# Patient Record
Sex: MALE | Race: WHITE | Employment: OTHER | URBAN - METROPOLITAN AREA
[De-identification: names, ages, dates, MRNs, and addresses within clinical notes are randomized per-mention and may not be internally consistent; named-entity substitution may affect disease eponyms.]

---

## 2018-06-05 ENCOUNTER — HOSPITAL ENCOUNTER (OUTPATIENT)
Dept: NON INVASIVE DIAGNOSTICS | Age: 83
Discharge: HOME | End: 2018-06-05
Payer: MEDICARE

## 2018-06-05 LAB
LV EF: 55 %
LVEF MODALITY: NORMAL

## 2018-06-05 PROCEDURE — 93306 TTE W/DOPPLER COMPLETE: CPT

## 2018-12-14 ENCOUNTER — HOSPITAL ENCOUNTER (OUTPATIENT)
Age: 83
Discharge: HOME | End: 2018-12-14
Payer: MEDICARE

## 2018-12-14 LAB — PROSTATE SPECIFIC ANTIGEN: 0.7 NG/ML (ref 0–4)

## 2018-12-14 PROCEDURE — 36415 COLL VENOUS BLD VENIPUNCTURE: CPT

## 2018-12-14 PROCEDURE — 84153 ASSAY OF PSA TOTAL: CPT

## 2019-01-14 ENCOUNTER — HOSPITAL ENCOUNTER (OUTPATIENT)
Age: 84
Discharge: HOME | End: 2019-01-14
Payer: MEDICARE

## 2019-01-14 LAB — PROSTATE SPECIFIC ANTIGEN: 0.82 NG/ML (ref 0–4)

## 2019-01-14 PROCEDURE — 36415 COLL VENOUS BLD VENIPUNCTURE: CPT

## 2019-01-14 PROCEDURE — 84153 ASSAY OF PSA TOTAL: CPT

## 2019-02-04 ENCOUNTER — HOSPITAL ENCOUNTER (OUTPATIENT)
Age: 84
Discharge: HOME | End: 2019-02-04
Payer: MEDICARE

## 2019-02-04 LAB
PROSTATE SPECIFIC ANTIGEN: 1.26 NG/ML (ref 0–4)
TESTOSTERONE TOTAL: <2.5 NG/DL

## 2019-02-04 PROCEDURE — 84403 ASSAY OF TOTAL TESTOSTERONE: CPT

## 2019-02-04 PROCEDURE — 84153 ASSAY OF PSA TOTAL: CPT

## 2019-02-04 PROCEDURE — 36415 COLL VENOUS BLD VENIPUNCTURE: CPT

## 2019-02-21 ENCOUNTER — HOSPITAL ENCOUNTER (OUTPATIENT)
Age: 84
Discharge: HOME | End: 2019-02-23
Payer: MEDICARE

## 2019-02-21 LAB
ALBUMIN SERPL-MCNC: 4.3 G/DL (ref 3.5–5.2)
ALP BLD-CCNC: 50 U/L (ref 40–129)
ALT SERPL-CCNC: 20 U/L (ref 0–40)
ANION GAP SERPL CALCULATED.3IONS-SCNC: 13 MMOL/L (ref 7–16)
AST SERPL-CCNC: 19 U/L (ref 0–39)
BILIRUB SERPL-MCNC: 0.6 MG/DL (ref 0–1.2)
BUN BLDV-MCNC: 29 MG/DL (ref 8–23)
CALCIUM SERPL-MCNC: 9.3 MG/DL (ref 8.6–10.2)
CHLORIDE BLD-SCNC: 107 MMOL/L (ref 98–107)
CO2: 22 MMOL/L (ref 22–29)
CREAT SERPL-MCNC: 1.2 MG/DL (ref 0.7–1.2)
GFR AFRICAN AMERICAN: >60
GFR NON-AFRICAN AMERICAN: 58 ML/MIN/1.73
GLUCOSE BLD-MCNC: 99 MG/DL (ref 74–99)
HCT VFR BLD CALC: 37.6 % (ref 37–54)
HEMOGLOBIN: 12.7 G/DL (ref 12.5–16.5)
MCH RBC QN AUTO: 30.2 PG (ref 26–35)
MCHC RBC AUTO-ENTMCNC: 33.8 % (ref 32–34.5)
MCV RBC AUTO: 89.5 FL (ref 80–99.9)
PDW BLD-RTO: 14.1 FL (ref 11.5–15)
PLATELET # BLD: 173 E9/L (ref 130–450)
PMV BLD AUTO: 11 FL (ref 7–12)
POTASSIUM SERPL-SCNC: 4.6 MMOL/L (ref 3.5–5)
RBC # BLD: 4.2 E12/L (ref 3.8–5.8)
SODIUM BLD-SCNC: 142 MMOL/L (ref 132–146)
TOTAL PROTEIN: 6.9 G/DL (ref 6.4–8.3)
WBC # BLD: 7.3 E9/L (ref 4.5–11.5)

## 2019-02-21 PROCEDURE — 85027 COMPLETE CBC AUTOMATED: CPT

## 2019-02-21 PROCEDURE — 80053 COMPREHEN METABOLIC PANEL: CPT

## 2019-02-25 ENCOUNTER — HOSPITAL ENCOUNTER (OUTPATIENT)
Age: 84
Discharge: HOME | End: 2019-02-27

## 2019-02-25 PROCEDURE — 88305 TISSUE EXAM BY PATHOLOGIST: CPT

## 2019-03-07 ENCOUNTER — HOSPITAL ENCOUNTER (OUTPATIENT)
Age: 84
Discharge: HOME | End: 2019-03-09
Payer: MEDICARE

## 2019-03-07 LAB — PROSTATE SPECIFIC ANTIGEN: 0.01 NG/ML (ref 0–4)

## 2019-03-07 PROCEDURE — 84153 ASSAY OF PSA TOTAL: CPT

## 2019-03-08 ENCOUNTER — HOSPITAL ENCOUNTER (OUTPATIENT)
Dept: INTERVENTIONAL RADIOLOGY/VASCULAR | Age: 84
Discharge: HOME | End: 2019-03-10
Payer: MEDICARE

## 2019-03-08 DIAGNOSIS — R97.20 ELEVATED PSA: ICD-10-CM

## 2019-03-08 PROCEDURE — 99214 OFFICE O/P EST MOD 30 MIN: CPT

## 2019-03-08 PROCEDURE — 99213 OFFICE O/P EST LOW 20 MIN: CPT

## 2019-03-13 ENCOUNTER — HOSPITAL ENCOUNTER (OUTPATIENT)
Dept: NUCLEAR MEDICINE | Age: 84
Discharge: HOME | End: 2019-03-15
Payer: MEDICARE

## 2019-03-13 DIAGNOSIS — C61 PROSTATE CA (HCC): ICD-10-CM

## 2019-03-13 PROCEDURE — 3440000000 HC RX THERAPEUTIC RADIOPHARMACEUTICAL: Performed by: RADIOLOGY

## 2019-03-13 PROCEDURE — 79101 NUCLEAR RX IV ADMIN: CPT

## 2019-03-13 PROCEDURE — A9606 RADIUM RA223 DICHLORIDE THER: HCPCS | Performed by: RADIOLOGY

## 2019-03-13 RX ADMIN — RADIUM RA 223 DICHLORIDE 149 MICRO CURIE: 30 INJECTION INTRAVENOUS at 11:33

## 2019-04-05 ENCOUNTER — HOSPITAL ENCOUNTER (OUTPATIENT)
Age: 84
Discharge: HOME | End: 2019-04-07
Payer: MEDICARE

## 2019-04-05 LAB
ALBUMIN SERPL-MCNC: 4.4 G/DL (ref 3.5–5.2)
ALP BLD-CCNC: 35 U/L (ref 40–129)
ALT SERPL-CCNC: 14 U/L (ref 0–40)
ANION GAP SERPL CALCULATED.3IONS-SCNC: 10 MMOL/L (ref 7–16)
AST SERPL-CCNC: 22 U/L (ref 0–39)
BASOPHILS ABSOLUTE: 0.04 E9/L (ref 0–0.2)
BASOPHILS RELATIVE PERCENT: 0.9 % (ref 0–2)
BILIRUB SERPL-MCNC: 0.6 MG/DL (ref 0–1.2)
BUN BLDV-MCNC: 14 MG/DL (ref 8–23)
CALCIUM SERPL-MCNC: 9.4 MG/DL (ref 8.6–10.2)
CHLORIDE BLD-SCNC: 108 MMOL/L (ref 98–107)
CO2: 26 MMOL/L (ref 22–29)
CREAT SERPL-MCNC: 0.9 MG/DL (ref 0.7–1.2)
EOSINOPHILS ABSOLUTE: 0.07 E9/L (ref 0.05–0.5)
EOSINOPHILS RELATIVE PERCENT: 1.5 % (ref 0–6)
GFR AFRICAN AMERICAN: >60
GFR NON-AFRICAN AMERICAN: >60 ML/MIN/1.73
GLUCOSE BLD-MCNC: 100 MG/DL (ref 74–99)
HCT VFR BLD CALC: 35.2 % (ref 37–54)
HEMOGLOBIN: 11.7 G/DL (ref 12.5–16.5)
IMMATURE GRANULOCYTES #: 0.04 E9/L
IMMATURE GRANULOCYTES %: 0.9 % (ref 0–5)
LYMPHOCYTES ABSOLUTE: 1.41 E9/L (ref 1.5–4)
LYMPHOCYTES RELATIVE PERCENT: 30.1 % (ref 20–42)
MCH RBC QN AUTO: 30.5 PG (ref 26–35)
MCHC RBC AUTO-ENTMCNC: 33.2 % (ref 32–34.5)
MCV RBC AUTO: 91.7 FL (ref 80–99.9)
MONOCYTES ABSOLUTE: 0.39 E9/L (ref 0.1–0.95)
MONOCYTES RELATIVE PERCENT: 8.3 % (ref 2–12)
NEUTROPHILS ABSOLUTE: 2.73 E9/L (ref 1.8–7.3)
NEUTROPHILS RELATIVE PERCENT: 58.3 % (ref 43–80)
PDW BLD-RTO: 14.6 FL (ref 11.5–15)
PLATELET # BLD: 168 E9/L (ref 130–450)
PMV BLD AUTO: 10.9 FL (ref 7–12)
POTASSIUM SERPL-SCNC: 4.3 MMOL/L (ref 3.5–5)
RBC # BLD: 3.84 E12/L (ref 3.8–5.8)
SODIUM BLD-SCNC: 144 MMOL/L (ref 132–146)
TOTAL PROTEIN: 7 G/DL (ref 6.4–8.3)
WBC # BLD: 4.7 E9/L (ref 4.5–11.5)

## 2019-04-05 PROCEDURE — 80053 COMPREHEN METABOLIC PANEL: CPT

## 2019-04-05 PROCEDURE — 85025 COMPLETE CBC W/AUTO DIFF WBC: CPT

## 2019-04-11 ENCOUNTER — TELEPHONE (OUTPATIENT)
Dept: VASCULAR SURGERY | Age: 84
End: 2019-04-11

## 2019-04-11 NOTE — TELEPHONE ENCOUNTER
Referral received from Dr. Mery Anderson for the evaluation of possible PVD, appt with Dr. Sandy Menendez 4/25 at 1:15 pm left on pt's answering machine

## 2019-04-12 ENCOUNTER — HOSPITAL ENCOUNTER (OUTPATIENT)
Dept: NUCLEAR MEDICINE | Age: 84
Discharge: HOME | End: 2019-04-14
Payer: MEDICARE

## 2019-04-12 DIAGNOSIS — C79.51 METASTASIS TO BONE (HCC): ICD-10-CM

## 2019-04-12 DIAGNOSIS — C61 PROSTATE CANCER (HCC): ICD-10-CM

## 2019-04-12 PROCEDURE — A9606 RADIUM RA223 DICHLORIDE THER: HCPCS | Performed by: RADIOLOGY

## 2019-04-12 PROCEDURE — 3440000000 HC RX THERAPEUTIC RADIOPHARMACEUTICAL: Performed by: RADIOLOGY

## 2019-04-12 PROCEDURE — 79101 NUCLEAR RX IV ADMIN: CPT

## 2019-04-12 RX ADMIN — RADIUM RA 223 DICHLORIDE 150 MICRO CURIE: 30 INJECTION INTRAVENOUS at 11:03

## 2019-04-18 ENCOUNTER — HOSPITAL ENCOUNTER (OUTPATIENT)
Dept: INTERVENTIONAL RADIOLOGY/VASCULAR | Age: 84
Discharge: HOME | End: 2019-04-20
Payer: MEDICARE

## 2019-04-18 ENCOUNTER — HOSPITAL ENCOUNTER (OUTPATIENT)
Dept: ULTRASOUND IMAGING | Age: 84
Discharge: HOME | End: 2019-04-20
Payer: MEDICARE

## 2019-04-18 DIAGNOSIS — I73.9 PVD (PERIPHERAL VASCULAR DISEASE) (HCC): ICD-10-CM

## 2019-04-18 DIAGNOSIS — I73.9 INTERMITTENT CLAUDICATION (HCC): ICD-10-CM

## 2019-04-18 PROCEDURE — 93970 EXTREMITY STUDY: CPT

## 2019-04-18 PROCEDURE — 93922 UPR/L XTREMITY ART 2 LEVELS: CPT

## 2019-04-25 ENCOUNTER — OFFICE VISIT (OUTPATIENT)
Dept: VASCULAR SURGERY | Age: 84
End: 2019-04-25
Payer: MEDICARE

## 2019-04-25 VITALS — SYSTOLIC BLOOD PRESSURE: 138 MMHG | HEART RATE: 72 BPM | DIASTOLIC BLOOD PRESSURE: 68 MMHG

## 2019-04-25 DIAGNOSIS — I73.9 PVD (PERIPHERAL VASCULAR DISEASE) WITH CLAUDICATION (HCC): ICD-10-CM

## 2019-04-25 DIAGNOSIS — C61 CA PROSTATE, ADENOCA (HCC): ICD-10-CM

## 2019-04-25 PROCEDURE — G8417 CALC BMI ABV UP PARAM F/U: HCPCS | Performed by: SURGERY

## 2019-04-25 PROCEDURE — 1123F ACP DISCUSS/DSCN MKR DOCD: CPT | Performed by: SURGERY

## 2019-04-25 PROCEDURE — 4040F PNEUMOC VAC/ADMIN/RCVD: CPT | Performed by: SURGERY

## 2019-04-25 PROCEDURE — 1036F TOBACCO NON-USER: CPT | Performed by: SURGERY

## 2019-04-25 PROCEDURE — G8427 DOCREV CUR MEDS BY ELIG CLIN: HCPCS | Performed by: SURGERY

## 2019-04-25 PROCEDURE — 99204 OFFICE O/P NEW MOD 45 MIN: CPT | Performed by: SURGERY

## 2019-04-25 RX ORDER — M-VIT,TX,IRON,MINS/CALC/FOLIC 27MG-0.4MG
1 TABLET ORAL DAILY
COMMUNITY

## 2019-04-25 RX ORDER — CILOSTAZOL 100 MG/1
100 TABLET ORAL 2 TIMES DAILY
Qty: 60 TABLET | Refills: 11 | Status: SHIPPED | OUTPATIENT
Start: 2019-04-25 | End: 2019-08-13

## 2019-04-25 NOTE — PROGRESS NOTES
Chief Complaint:   Chief Complaint   Patient presents with    Surgical Consult     PVD         HPI:  Patient came to the office, with his wife, for evaluation of peripheral vascular disease, and abnormal ankle brachial index that was done because of difficulty walking, referred by his PCP for further evaluation    Patient stated that after walking about  yards, he gets cramps in the legs and pain in the calves, has to stop for a few minutes, then start walking again denies any symptoms of rest pain    Patient denies any history of back problems or any history of diabetes    Patient does have a 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, currently under hormonal therapy, did undergo orchiectomy also      Patient denies any focal lateralizing neurological symptoms like loss of speech, vision or loss of function of extremity        Allergies   Allergen Reactions    Iodine        Current Outpatient Medications   Medication Sig Dispense Refill    Multiple Vitamins-Minerals (THERAPEUTIC MULTIVITAMIN-MINERALS) tablet Take 1 tablet by mouth daily      cilostazol (PLETAL) 100 MG tablet Take 1 tablet by mouth 2 times daily 60 tablet 11    metoprolol tartrate (LOPRESSOR) 25 MG tablet Take 25 mg by mouth 2 times daily      radium Ra 223 dichloride (XOFIGO) 30 MCCI/ML SOLN Infuse 30 micro curie intravenously once      megestrol (MEGACE) 20 MG tablet Take 20 mg by mouth daily      omeprazole (PRILOSEC) 20 MG delayed release capsule Take 20 mg by mouth daily      B Complex Vitamins (B COMPLEX PO) Take by mouth      Coenzyme Q10 (COQ10) 50 MG CAPS Take 1 capsule by mouth daily.  aspirin 81 MG tablet Take 81 mg by mouth daily.  omega-3 acid ethyl esters (LOVAZA) 1 GM capsule Take 1 g by mouth 2 times daily.  LISINOPRIL PO Take 20 mg by mouth daily.  Rosuvastatin Calcium (CRESTOR PO) Take 10 mg by mouth daily.       Calcium-Vitamin D (CALTRATE 600 PLUS-VIT D PO) Take  by mouth.      Leuprolide Acetate (LUPRON DEPOT IM) Inject into the muscle Indications: every 3 months      docusate sodium (COLACE) 100 MG capsule Take 100 mg by mouth as needed.  Cyanocobalamin (VITAMIN B 12 PO) Take  by mouth daily. sublingual       alendronate (FOSAMAX) 70 MG tablet Take 70 mg by mouth every 7 days. No current facility-administered medications for this visit. Past Medical History:   Diagnosis Date    Ascending aortic aneurysm ([facility] 2/8/2011    CA prostate, adenoca ([facility] [address] 4/25/2019    CAD (coronary artery disease)     Cancer (HCC)     prostate    Hyperlipidemia     Hypertension     Osteoarthritis     Osteopenia     PVD (peripheral vascular disease) with claudication ([facility] [address] 4/25/2019    S/P CABG (coronary artery bypass graft) 1996    Thoracic aneurysm     ASCENDING AORTIC ANEURYSM (MILD0 4. O CM    Ventricular bigeminy        Past Surgical History:   Procedure Laterality Date    ACETABULUM FRACTURE SURGERY  3/24/11    CHOLECYSTECTOMY      CORONARY ARTERY BYPASS GRAFT  Dec. 1996    [facility]    left   [facility]    LYMPH NODE DISSECTION      PROSTATE SURGERY  10/31/07    [address] [place] LAPAROSCOPIC PROSTATECTOMY    TOTAL HIP ARTHROPLASTY  11/30/11    right    TOTAL KNEE ARTHROPLASTY  2010    bilateral       Family History   Problem Relation Age of Onset    Stroke Mother     Cancer Mother         pancreatic    Cancer Father         prostate    Stroke Father        Social History     Socioeconomic History    Marital status:      Spouse name: Not on file    Number of children: Not on file    Years of education: Not on file    Highest education level: Not on file   Occupational History    Not on file   Social Needs    Financial resource strain: Not on file    Food insecurity:     Worry: Not on file     Inability: Not on file    Transportation needs:     Medical: Not on file     Non-medical: Not on file   Tobacco Use    Smoking status: Former Smoker     Packs/day: 1.00     Years: 40.00     Pack years: 40.00     Types: Cigarettes     Last attempt to quit: 10/1/1994     Years since quittin.5    Smokeless tobacco: Former User     Types: Chew   Substance and Sexual Activity    Alcohol use: Yes     Alcohol/week: 0.0 oz     Types: 2 - 3 Cans of beer per week     Comment: socially    Drug use: No    Sexual activity: Not on file   Lifestyle    Physical activity:     Days per week: Not on file     Minutes per session: Not on file    Stress: Not on file   Relationships    Social connections:     Talks on phone: Not on file     Gets together: Not on file     Attends Bahai service: Not on file     Active member of club or organization: Not on file     Attends meetings of clubs or organizations: Not on file     Relationship status: Not on file    Intimate partner violence:     Fear of current or ex partner: Not on file     Emotionally abused: Not on file     Physically abused: Not on file     Forced sexual activity: Not on file   Other Topics Concern    Not on file   Social History Narrative    Not on file       Review of Systems:  Skin:  No abnormal pigmentation or rash  Eyes:  No blurring, diplopia or vision loss  Ears/Nose/Throat:  No hearing loss or vertigo  Respiratory:  No cough, pleuritic chest pain, dyspnea, or wheezing. Cardiovascular: No angina, palpitations . Gastrointestinal:  No nausea or vomiting; no abdominal pain or rectal bleeding  Musculoskeletal:  No arthritis or weakness. Neurologic:  No paralysis, paresis, paresthesia, seizures or headaches  Hematologic/Lymphatic/Immunologic:  No anemia, abnormal bleeding/bruising, fever, chills or night sweats. Endocrine:  No heat or cold intolerance. No polyphagia, polydipsia or polyuria. Physical Exam:  General appearance:  Alert, awake, oriented x 3. No distress. Skin:  Warm and dry  Head:  Normocephalic. No masses, lesions or tenderness  Eyes:  Conjunctivae appear normal; PERRL  Ears:  External ears normal  Nose/Sinuses:  Septum midline, mucosa normal; no drainage  Oropharynx:  Clear, no exudate noted  Neck:  No jugular venous distention, lymphadenopathy or thyromegaly. No evidence of carotid bruit  Lungs:  Clear to ausculation bilaterally. No rhonchi, crackles, wheezes  Heart:  Regular rate and rhythm. No rub or murmur  Abdomen:  Soft, non-tender. No masses, organomegaly. Musculoskeletal : No joint effusions, tenderness swelling    Neuro: Speech is intact. Moving all extremities. No focal motor or sensory deficits      Extremities:  Both feet are warm to touch. The color of both feet is normal.        Pulses Right  Left    Brachial 3 3    Radial    3=normal   Femoral 1-2 2  2=diminished   Popliteal    1=barely palpable   Dorsalis pedis    0=absent   Posterior tibial 1 0  4=aneurysmal             Other pertinent information:1. The past medical records were reviewed. 2.  Ankle-brachial index that was done was personally reviewed by me, 0.91 on the right, with triphasic ankle Doppler tracings, almost biphasic ankle Doppler tracings on the left, with an ankle-brachial index of 0.66, with adequate collaterals to the metatarsal, based upon the pulse volume recordings    Assessment:    1.  PVD (peripheral vascular disease) with claudication (HCC)    2. CA prostate, adenoca (HCC)              Plan:        Discussed initial the patient's wife, all options, risks benefits and alternatives were explained,  it is felt, his symptoms could be due to combination of peripheral vascular disease as well as musculoskeletal pathology and in view of multiple risk factors, felt it is best to follow him conservatively with a walking program and a trial of Pletal, and continue the low-dose aspirin and call me when necessary if he has any increasing symptoms          Patient was instructed to continue walking program and to call if any worsening of symptoms and to call if any focal lateralizing neurological symptoms like loss of speech, vision or loss of function of extremity. All the questions were answered. Orders Placed This Encounter   Medications    cilostazol (PLETAL) 100 MG tablet     Sig: Take 1 tablet by mouth 2 times daily     Dispense:  60 tablet     Refill:  11           Indicated follow-up: Return in about 3 months (around 7/25/2019), or if symptoms worsen or fail to improve.

## 2019-05-06 ENCOUNTER — HOSPITAL ENCOUNTER (OUTPATIENT)
Age: 84
Discharge: HOME | End: 2019-05-08
Payer: MEDICARE

## 2019-05-06 ENCOUNTER — TELEPHONE (OUTPATIENT)
Dept: PRIMARY CARE CLINIC | Age: 84
End: 2019-05-06

## 2019-05-06 LAB
HCT VFR BLD CALC: 37.6 % (ref 37–54)
HEMOGLOBIN: 12.6 G/DL (ref 12.5–16.5)
MCH RBC QN AUTO: 30.7 PG (ref 26–35)
MCHC RBC AUTO-ENTMCNC: 33.5 % (ref 32–34.5)
MCV RBC AUTO: 91.5 FL (ref 80–99.9)
PDW BLD-RTO: 13.7 FL (ref 11.5–15)
PLATELET # BLD: 157 E9/L (ref 130–450)
PMV BLD AUTO: 10.4 FL (ref 7–12)
PROSTATE SPECIFIC ANTIGEN: <0.01 NG/ML (ref 0–4)
RBC # BLD: 4.11 E12/L (ref 3.8–5.8)
WBC # BLD: 5.1 E9/L (ref 4.5–11.5)

## 2019-05-06 PROCEDURE — 84153 ASSAY OF PSA TOTAL: CPT

## 2019-05-06 PROCEDURE — 85027 COMPLETE CBC AUTOMATED: CPT

## 2019-05-13 ENCOUNTER — OFFICE VISIT (OUTPATIENT)
Dept: PRIMARY CARE CLINIC | Age: 84
End: 2019-05-13
Payer: MEDICARE

## 2019-05-13 VITALS
HEART RATE: 104 BPM | BODY MASS INDEX: 30.76 KG/M2 | DIASTOLIC BLOOD PRESSURE: 70 MMHG | OXYGEN SATURATION: 98 % | WEIGHT: 214.4 LBS | SYSTOLIC BLOOD PRESSURE: 128 MMHG

## 2019-05-13 DIAGNOSIS — E04.1 THYROID NODULE: ICD-10-CM

## 2019-05-13 DIAGNOSIS — E88.9 METABOLIC DISORDER: ICD-10-CM

## 2019-05-13 DIAGNOSIS — D36.9 TUBULAR ADENOMA: ICD-10-CM

## 2019-05-13 DIAGNOSIS — I25.10 CORONARY ARTERY DISEASE INVOLVING NATIVE HEART WITHOUT ANGINA PECTORIS, UNSPECIFIED VESSEL OR LESION TYPE: ICD-10-CM

## 2019-05-13 DIAGNOSIS — I73.9 PVD (PERIPHERAL VASCULAR DISEASE) WITH CLAUDICATION (HCC): ICD-10-CM

## 2019-05-13 DIAGNOSIS — C61 PROSTATE CA (HCC): ICD-10-CM

## 2019-05-13 DIAGNOSIS — M81.0 AGE-RELATED OSTEOPOROSIS WITHOUT CURRENT PATHOLOGICAL FRACTURE: ICD-10-CM

## 2019-05-13 DIAGNOSIS — I71.10 THORACIC ANEURYSM, RUPTURED: ICD-10-CM

## 2019-05-13 DIAGNOSIS — E78.2 MIXED HYPERLIPIDEMIA: Primary | ICD-10-CM

## 2019-05-13 PROCEDURE — 99214 OFFICE O/P EST MOD 30 MIN: CPT | Performed by: FAMILY MEDICINE

## 2019-05-13 RX ORDER — ATORVASTATIN CALCIUM 40 MG/1
40 TABLET, FILM COATED ORAL DAILY
Qty: 30 TABLET | Refills: 1 | Status: SHIPPED | OUTPATIENT
Start: 2019-05-13 | End: 2019-08-08 | Stop reason: SDUPTHER

## 2019-05-13 RX ORDER — LISINOPRIL 20 MG/1
TABLET ORAL
COMMUNITY
Start: 2019-03-11 | End: 2020-01-15 | Stop reason: SDUPTHER

## 2019-05-13 NOTE — ASSESSMENT & PLAN NOTE
Counseled, potential serious is reviewed, if any symptoms directly to ER. Continue per Dr. Fran Ramos. He saw him 3/15 and had CT at least then.  Dr. Fran Ramos discharged him at this point unless symptoms and he declines follow-up imaging

## 2019-05-13 NOTE — ASSESSMENT & PLAN NOTE
Patient hematocrit, continue per cardiology. Sees Dr. Aidan Montelongo in August, had stress test 3/15. Asymptomatic now.

## 2019-05-13 NOTE — ASSESSMENT & PLAN NOTE
Did very well on d.a.w. Crestor unfortunately severe myalgias on generic. He may try to preauthorize d.a.w. but he would like to try Lipitor 1st with precautions. If ADRs which were reviewed at length discontinue. Counseling CoQ10 and vitamin D. Blood work 1 month follow-up 5 weeks sooner when necessary.  We discussed appropriate dosing given his risk factors

## 2019-05-13 NOTE — ASSESSMENT & PLAN NOTE
Last colonoscopy Dr. Kelsey Cai, 2/15 complaining repeat 3 years from then. Overdue. Declines. Hemoglobin consistently low but stable.

## 2019-05-13 NOTE — ASSESSMENT & PLAN NOTE
Counseled extensively. He tolerated Fosamax but discontinued after he took from 7/09 through 7/16. Morbidity/mortality related to fracture reviewed. 11/13 bone density showed improvement, 12/15 -1.3 stable, 3/19 showed L2 -2.6, hip -1.0. Discussed the option of restarting alendronate with pros and cons reviewed, he has taken it twice. Versus option of prolia every 6 months with pros and cons reviewed. He prefers this and he is checking with insurance.

## 2019-05-13 NOTE — PROGRESS NOTES
19  Rohit Poole : 1935 Sex: male  Age: 82 y.o. Chief Complaint   Patient presents with    Medication Problem     d/c crestor due to leg pain        HPI  HPI:     Patient presents today for follow-up. He saw Dr. Elias Wade. His peripheral vascular studies were positive and he was started on a trial of Pletal as well as asked to continue his baby aspirin. Risk benefits reviewed. However his leg symptoms resolved when he stopped the Crestor. He did well on d.a.w. Crestor for years but had symptoms on generic. He has history of CAD. Denies headache dizziness chest pain shortness breath abdominal symptoms or otherwise. He is still look into the cost of prolia, he has labs pending next month including CMP lipid panel CK vitamin B12/folic acid CBC TSH urinalysis microhematuria when A1c magnesium and phosphorus his last PSA was less than 0.01 and he has an upcoming appointment with Dr. Kristen Rdz. He also had injections by all points earlier in the month and sees him tomorrow, Dr. Celsa Cevallos in August    Last bone density scan showed -2.6 back, carotid ultrasound negative at 7 mm right lobe nodule    Review of Systems  ROS:    Const: Denies chills, fever, malaise and sweats. Eyes: Denies discharge, pain, redness and visual disturbance. ENMT: Denies earaches, other ear symptoms. Denies nasal or sinus symptoms other than if stated  above. Denies mouth and tongue lesions and sore throat. CV: Denies chest discomfort, pain; diaphoresis, dizziness, edema, lightheadedness, orthopnea,  palpitations, syncope and near syncopal episode or any exertional symptoms  Resp: Denies cough, hemoptysis, pleuritic pain, SOB, sputum production and wheezing. GI: Denies abdominal pain, change in bowel habits, hematochezia, melena, nausea and vomiting. : Denies urinary problems including dysuria. Musculo: Denies musculoskeletal symptoms. Skin: Denies bruising and rash or changing skin lesions.   Neuro: Denies headache, numbness, stiff neck, tingling and focal weakness slurred speech or facial  Droop  Extremities: Denies calf pain, redness or swelling. Hema/Lymph: Denies bleeding/bruising tendency and enlarged lymph nodes. Problem List: History of polyp of colon, Disorder of bone density and structure, unspecified,  Osteochondropathy, Carcinoma in situ of prostate, Aneurysm of thoracic aorta, Anemia, Coronary  arteriosclerosis, Cobalamin deficiency, Multiple closed fractures of pelvis with disruption of pelvic [place],  Hyperlipidemia, Essential hypertension  Health Maintenance:  Bone Density Test Screening - (3/5/2019)  Bone Density Scan - (12/18/2015)  Influenza Vaccination - (10/7/2016)  Colonoscopy - (4/3/2012)  Couseled on Home Safety - (11/23/2015)  Colonoscopy Screening - (4/3/2012)  US Liver - 8/1/08  Medical Problems:  Coronary Artery Disease (CAD), Hypertension, Hyperlipidemia, Prostate Cancer  Surgical Hx:  Prostatectomy, Coronary Artery Bypass Graft (CABG)  Reviewed, no changes. FH:  Father:  . (Hx)  Mother:  . (Hx)  Reviewed, no changes. SH:  Marital:  - retired . Personal Habits: Cigarette Use: former cigarette smoker, Nonsmoker. Alcohol: Occasionally  consumes alcohol. Reviewed, no change      Current Outpatient Medications:     atorvastatin (LIPITOR) 40 MG tablet, Take 1 tablet by mouth daily, Disp: 30 tablet, Rfl: 1    Multiple Vitamins-Minerals (THERAPEUTIC MULTIVITAMIN-MINERALS) tablet, Take 1 tablet by mouth daily, Disp: , Rfl:     cilostazol (PLETAL) 100 MG tablet, Take 1 tablet by mouth 2 times daily, Disp: 60 tablet, Rfl: 11    metoprolol tartrate (LOPRESSOR) 25 MG tablet, Take 25 mg by mouth 2 times daily, Disp: , Rfl:     omeprazole (PRILOSEC) 20 MG delayed release capsule, Take 20 mg by mouth daily, Disp: , Rfl:     B Complex Vitamins (B COMPLEX PO), Take by mouth, Disp: , Rfl:     Coenzyme Q10 (COQ10) 50 MG CAPS, Take 1 capsule by mouth daily. , Disp: , Rfl:     docusate sodium (COLACE) 100 MG capsule, Take 100 mg by mouth as needed. , Disp: , Rfl:     aspirin 81 MG tablet, Take 81 mg by mouth daily. , Disp: , Rfl:     omega-3 acid ethyl esters (LOVAZA) 1 GM capsule, Take 1 g by mouth 2 times daily. , Disp: , Rfl:     LISINOPRIL PO, Take 20 mg by mouth daily. , Disp: , Rfl:     Cyanocobalamin (VITAMIN B 12 PO), Take  by mouth daily. sublingual , Disp: , Rfl:     Calcium-Vitamin D (CALTRATE 600 PLUS-VIT D PO), Take  by mouth., Disp: , Rfl:     lisinopril (PRINIVIL;ZESTRIL) 20 MG tablet, , Disp: , Rfl:     radium Ra 223 dichloride (Wyona Spice) 30 MCCI/ML SOLN, Infuse 30 micro curie intravenously once, Disp: , Rfl:   Allergies   Allergen Reactions    Iodine        Past Medical History:   Diagnosis Date    Ascending aortic aneurysm ([facility] [address] 2/8/2011    CA prostate, adenoca ([facility] [address] 4/25/2019    CAD (coronary artery disease)     Cancer ([facility] [address]     prostate    Hyperlipidemia     Hypertension     Osteoarthritis     Osteopenia     PVD (peripheral vascular disease) with claudication ([facility] [address] 4/25/2019    S/P CABG (coronary artery bypass graft) 1996    Thoracic aneurysm     ASCENDING AORTIC ANEURYSM (MILD0 4. O CM    Ventricular bigeminy      Past Surgical History:   Procedure Laterality Date    ACETABULUM FRACTURE SURGERY  3/24/11    CHOLECYSTECTOMY      CORONARY ARTERY BYPASS GRAFT  Dec. 1996    [facility]/ Dr. Beth Records    left   [address] [facility] SURGERY  10/31/07    AMIRAH Reinoso M.D./ [place] LAPAROSCOPIC PROSTATECTOMY    TOTAL HIP ARTHROPLASTY  11/30/11    right    TOTAL KNEE ARTHROPLASTY  2010    bilateral     Family History   Problem Relation Age of Onset    Stroke Mother     Cancer Mother         pancreatic    Cancer Father         prostate    Stroke Father      Social History     Socioeconomic History    Marital status:      Spouse name: Not on file    Number of children: Not on file    Years of education: Not on file    Highest education level: Not on file   Occupational History    Not on file   Social Needs    Financial resource strain: Not on file    Food insecurity:     Worry: Not on file     Inability: Not on file    Transportation needs:     Medical: Not on file     Non-medical: Not on file   Tobacco Use    Smoking status: Former Smoker     Packs/day: 1.00     Years: 40.00     Pack years: 40.00     Types: Cigarettes     Last attempt to quit: 10/1/1994     Years since quittin.6    Smokeless tobacco: Former User     Types: Chew   Substance and Sexual Activity    Alcohol use: Yes     Alcohol/week: 0.0 oz     Types: 2 - 3 Cans of beer per week     Comment: socially    Drug use: No    Sexual activity: Not on file   Lifestyle    Physical activity:     Days per week: Not on file     Minutes per session: Not on file    Stress: Not on file   Relationships    Social connections:     Talks on phone: Not on file     Gets together: Not on file     Attends Congregational service: Not on file     Active member of club or organization: Not on file     Attends meetings of clubs or organizations: Not on file     Relationship status: Not on file    Intimate partner violence:     Fear of current or ex partner: Not on file     Emotionally abused: Not on file     Physically abused: Not on file     Forced sexual activity: Not on file   Other Topics Concern    Not on file   Social History Narrative    Not on file       Vitals:    19 1455   BP: 128/70   Pulse: 104   SpO2: 98%   Weight: 214 lb 6.4 oz (97.3 kg)      Wt Readings from Last 3 Encounters:   19 214 lb 6.4 oz (97.3 kg)   19 220 lb (99.8 kg)   17 218 lb (98.9 kg)        Physical Exam  Exam:  Const: Appears comfortable. No signs of acute distress present. Head/Face: Atraumatic on inspection. Eyes: EOMI in both eyes. No discharge from the eyes. PERRL. Sclerae clear.   ENMT: Auditory canals normal. Tympanic

## 2019-05-13 NOTE — ASSESSMENT & PLAN NOTE
Counseled, incidental 7 mm right thyroid nodule on carotid ultrasound 3/19.  Declines workup or follow-up now

## 2019-05-13 NOTE — ASSESSMENT & PLAN NOTE
Counseled. Blood sugar has been recently borderline at 124, 125. Admitted to a lot of l[facility]; await labwork a months with A1c.  Micro-macrovascular complications reviewed

## 2019-05-13 NOTE — ASSESSMENT & PLAN NOTE
Continue per Dr. Becky Gray. Recently started on Pletal and low-dose aspirin. Patient right now.  A number of her symptoms actually resolved after stopping generic Crestor

## 2019-05-16 ENCOUNTER — HOSPITAL ENCOUNTER (OUTPATIENT)
Dept: NUCLEAR MEDICINE | Age: 84
Discharge: HOME | End: 2019-05-18
Payer: MEDICARE

## 2019-05-16 DIAGNOSIS — C61 MALIGNANT NEOPLASM OF PROSTATE (HCC): ICD-10-CM

## 2019-05-16 PROCEDURE — A9606 RADIUM RA223 DICHLORIDE THER: HCPCS | Performed by: RADIOLOGY

## 2019-05-16 PROCEDURE — 3440000000 HC RX THERAPEUTIC RADIOPHARMACEUTICAL: Performed by: RADIOLOGY

## 2019-05-16 PROCEDURE — 79101 NUCLEAR RX IV ADMIN: CPT

## 2019-05-16 RX ADMIN — RADIUM RA 223 DICHLORIDE 144 MICRO CURIE: 30 INJECTION INTRAVENOUS at 13:28

## 2019-05-30 ENCOUNTER — TELEPHONE (OUTPATIENT)
Dept: VASCULAR SURGERY | Age: 84
End: 2019-05-30

## 2019-05-30 NOTE — TELEPHONE ENCOUNTER
Patient's wife called, patient having dizziness and one episode of heart palpitations with Pletal 100 mg b.i.d. Instructed to take 50 mg b.i.d.  And to call if symptoms persist.

## 2019-06-24 ENCOUNTER — HOSPITAL ENCOUNTER (OUTPATIENT)
Age: 84
Discharge: HOME | End: 2019-06-24
Payer: MEDICARE

## 2019-06-24 LAB
ALBUMIN SERPL-MCNC: 4.4 G/DL (ref 3.5–5.2)
ALP BLD-CCNC: 40 U/L (ref 40–129)
ALT SERPL-CCNC: 17 U/L (ref 0–40)
ANION GAP SERPL CALCULATED.3IONS-SCNC: 14 MMOL/L (ref 7–16)
AST SERPL-CCNC: 26 U/L (ref 0–39)
BASOPHILS ABSOLUTE: 0.02 E9/L (ref 0–0.2)
BASOPHILS RELATIVE PERCENT: 0.6 % (ref 0–2)
BILIRUB SERPL-MCNC: 0.7 MG/DL (ref 0–1.2)
BILIRUBIN URINE: NEGATIVE
BLOOD, URINE: NEGATIVE
BUN BLDV-MCNC: 19 MG/DL (ref 8–23)
CALCIUM SERPL-MCNC: 9.5 MG/DL (ref 8.6–10.2)
CHLORIDE BLD-SCNC: 107 MMOL/L (ref 98–107)
CHOLESTEROL, TOTAL: 194 MG/DL (ref 0–199)
CLARITY: CLEAR
CO2: 25 MMOL/L (ref 22–29)
COLOR: YELLOW
CREAT SERPL-MCNC: 1.1 MG/DL (ref 0.7–1.2)
CREATININE URINE: 161 MG/DL (ref 40–278)
EOSINOPHILS ABSOLUTE: 0.09 E9/L (ref 0.05–0.5)
EOSINOPHILS RELATIVE PERCENT: 2.5 % (ref 0–6)
FOLATE: >20 NG/ML (ref 4.8–24.2)
GFR AFRICAN AMERICAN: >60
GFR NON-AFRICAN AMERICAN: >60 ML/MIN/1.73
GLUCOSE BLD-MCNC: 117 MG/DL (ref 74–99)
GLUCOSE URINE: NEGATIVE MG/DL
HBA1C MFR BLD: 4.9 % (ref 4–5.6)
HCT VFR BLD CALC: 33.3 % (ref 37–54)
HDLC SERPL-MCNC: 39 MG/DL
HEMOGLOBIN: 11.2 G/DL (ref 12.5–16.5)
IMMATURE GRANULOCYTES #: 0.05 E9/L
IMMATURE GRANULOCYTES %: 1.4 % (ref 0–5)
KETONES, URINE: NEGATIVE MG/DL
LDL CHOLESTEROL CALCULATED: 96 MG/DL (ref 0–99)
LEUKOCYTE ESTERASE, URINE: NEGATIVE
LYMPHOCYTES ABSOLUTE: 0.98 E9/L (ref 1.5–4)
LYMPHOCYTES RELATIVE PERCENT: 27.5 % (ref 20–42)
MAGNESIUM: 1.8 MG/DL (ref 1.6–2.6)
MCH RBC QN AUTO: 30.1 PG (ref 26–35)
MCHC RBC AUTO-ENTMCNC: 33.6 % (ref 32–34.5)
MCV RBC AUTO: 89.5 FL (ref 80–99.9)
MICROALBUMIN UR-MCNC: <12 MG/L
MICROALBUMIN/CREAT UR-RTO: ABNORMAL (ref 0–30)
MONOCYTES ABSOLUTE: 0.3 E9/L (ref 0.1–0.95)
MONOCYTES RELATIVE PERCENT: 8.4 % (ref 2–12)
NEUTROPHILS ABSOLUTE: 2.12 E9/L (ref 1.8–7.3)
NEUTROPHILS RELATIVE PERCENT: 59.6 % (ref 43–80)
NITRITE, URINE: NEGATIVE
PDW BLD-RTO: 14.4 FL (ref 11.5–15)
PH UA: 5 (ref 5–9)
PHOSPHORUS: 3.4 MG/DL (ref 2.5–4.5)
PLATELET # BLD: 128 E9/L (ref 130–450)
PMV BLD AUTO: 9.4 FL (ref 7–12)
POTASSIUM SERPL-SCNC: 4.4 MMOL/L (ref 3.5–5)
PROTEIN UA: NEGATIVE MG/DL
RBC # BLD: 3.72 E12/L (ref 3.8–5.8)
SODIUM BLD-SCNC: 146 MMOL/L (ref 132–146)
SPECIFIC GRAVITY UA: 1.02 (ref 1–1.03)
TOTAL CK: 166 U/L (ref 20–200)
TOTAL PROTEIN: 7 G/DL (ref 6.4–8.3)
TRIGL SERPL-MCNC: 295 MG/DL (ref 0–149)
TSH SERPL DL<=0.05 MIU/L-ACNC: 2.67 UIU/ML (ref 0.27–4.2)
UROBILINOGEN, URINE: 0.2 E.U./DL
VITAMIN B-12: 1075 PG/ML (ref 211–946)
VLDLC SERPL CALC-MCNC: 59 MG/DL
WBC # BLD: 3.6 E9/L (ref 4.5–11.5)

## 2019-06-24 PROCEDURE — 82044 UR ALBUMIN SEMIQUANTITATIVE: CPT

## 2019-06-24 PROCEDURE — 82550 ASSAY OF CK (CPK): CPT

## 2019-06-24 PROCEDURE — 82746 ASSAY OF FOLIC ACID SERUM: CPT

## 2019-06-24 PROCEDURE — 84100 ASSAY OF PHOSPHORUS: CPT

## 2019-06-24 PROCEDURE — 80053 COMPREHEN METABOLIC PANEL: CPT

## 2019-06-24 PROCEDURE — 83735 ASSAY OF MAGNESIUM: CPT

## 2019-06-24 PROCEDURE — 83036 HEMOGLOBIN GLYCOSYLATED A1C: CPT

## 2019-06-24 PROCEDURE — 85025 COMPLETE CBC W/AUTO DIFF WBC: CPT

## 2019-06-24 PROCEDURE — 82570 ASSAY OF URINE CREATININE: CPT

## 2019-06-24 PROCEDURE — 82607 VITAMIN B-12: CPT

## 2019-06-24 PROCEDURE — 80061 LIPID PANEL: CPT

## 2019-06-24 PROCEDURE — 81003 URINALYSIS AUTO W/O SCOPE: CPT

## 2019-06-24 PROCEDURE — 36415 COLL VENOUS BLD VENIPUNCTURE: CPT

## 2019-06-24 PROCEDURE — 84443 ASSAY THYROID STIM HORMONE: CPT

## 2019-06-25 NOTE — RESULT ENCOUNTER NOTE
Triglycerides elevated, watch diet. White count, hemoglobin and platelets slightly low. I believe these have fluctuated over the years. Monitor. Keep follow-up to review more detail, sooner PRN.

## 2019-06-28 ENCOUNTER — HOSPITAL ENCOUNTER (OUTPATIENT)
Dept: NUCLEAR MEDICINE | Age: 84
Discharge: HOME | End: 2019-06-30
Payer: MEDICARE

## 2019-06-28 DIAGNOSIS — C61 PROSTATE CA (HCC): ICD-10-CM

## 2019-06-28 PROCEDURE — 3440000000 HC RX THERAPEUTIC RADIOPHARMACEUTICAL: Performed by: RADIOLOGY

## 2019-06-28 PROCEDURE — A9606 RADIUM RA223 DICHLORIDE THER: HCPCS | Performed by: RADIOLOGY

## 2019-06-28 PROCEDURE — 79101 NUCLEAR RX IV ADMIN: CPT

## 2019-06-28 RX ADMIN — RADIUM RA 223 DICHLORIDE 144.3 MICRO CURIE: 30 INJECTION INTRAVENOUS at 11:13

## 2019-07-02 ENCOUNTER — OFFICE VISIT (OUTPATIENT)
Dept: PRIMARY CARE CLINIC | Age: 84
End: 2019-07-02
Payer: MEDICARE

## 2019-07-02 ENCOUNTER — TELEPHONE (OUTPATIENT)
Dept: PRIMARY CARE CLINIC | Age: 84
End: 2019-07-02

## 2019-07-02 VITALS
BODY MASS INDEX: 30.64 KG/M2 | OXYGEN SATURATION: 92 % | HEIGHT: 70 IN | TEMPERATURE: 97.3 F | SYSTOLIC BLOOD PRESSURE: 114 MMHG | HEART RATE: 92 BPM | WEIGHT: 214 LBS | DIASTOLIC BLOOD PRESSURE: 70 MMHG

## 2019-07-02 DIAGNOSIS — C61 PROSTATE CA (HCC): ICD-10-CM

## 2019-07-02 DIAGNOSIS — I71.20 THORACIC AORTIC ANEURYSM WITHOUT RUPTURE: ICD-10-CM

## 2019-07-02 DIAGNOSIS — M81.0 AGE-RELATED OSTEOPOROSIS WITHOUT CURRENT PATHOLOGICAL FRACTURE: ICD-10-CM

## 2019-07-02 DIAGNOSIS — R73.9 HYPERGLYCEMIA: ICD-10-CM

## 2019-07-02 DIAGNOSIS — I25.10 CORONARY ARTERY DISEASE INVOLVING NATIVE HEART WITHOUT ANGINA PECTORIS, UNSPECIFIED VESSEL OR LESION TYPE: ICD-10-CM

## 2019-07-02 DIAGNOSIS — E53.8 VITAMIN B12 DEFICIENCY: ICD-10-CM

## 2019-07-02 DIAGNOSIS — E04.1 THYROID NODULE: ICD-10-CM

## 2019-07-02 DIAGNOSIS — D36.9 TUBULAR ADENOMA: ICD-10-CM

## 2019-07-02 DIAGNOSIS — E78.2 MIXED HYPERLIPIDEMIA: ICD-10-CM

## 2019-07-02 DIAGNOSIS — R79.0 LOW MAGNESIUM LEVEL: ICD-10-CM

## 2019-07-02 DIAGNOSIS — E88.9 METABOLIC DISORDER: ICD-10-CM

## 2019-07-02 DIAGNOSIS — E55.9 VITAMIN D DEFICIENCY: ICD-10-CM

## 2019-07-02 DIAGNOSIS — D61.818 PANCYTOPENIA (HCC): ICD-10-CM

## 2019-07-02 DIAGNOSIS — I73.9 PVD (PERIPHERAL VASCULAR DISEASE) WITH CLAUDICATION (HCC): Primary | ICD-10-CM

## 2019-07-02 PROCEDURE — 99215 OFFICE O/P EST HI 40 MIN: CPT | Performed by: FAMILY MEDICINE

## 2019-07-02 RX ORDER — CILOSTAZOL 100 MG/1
100 TABLET ORAL 2 TIMES DAILY
COMMUNITY
End: 2020-02-12 | Stop reason: ALTCHOICE

## 2019-07-02 ASSESSMENT — PATIENT HEALTH QUESTIONNAIRE - PHQ9
SUM OF ALL RESPONSES TO PHQ QUESTIONS 1-9: 0
SUM OF ALL RESPONSES TO PHQ QUESTIONS 1-9: 0
1. LITTLE INTEREST OR PLEASURE IN DOING THINGS: 0
SUM OF ALL RESPONSES TO PHQ9 QUESTIONS 1 & 2: 0
2. FEELING DOWN, DEPRESSED OR HOPELESS: 0

## 2019-07-02 NOTE — PROGRESS NOTES
hematochezia, melena, nausea and vomiting. : Denies urinary problems including dysuria. Musculo: Denies musculoskeletal symptoms. Skin: Denies bruising and rash or changing skin lesions. Neuro: Denies headache, numbness, stiff neck, tingling and focal weakness slurred speech or facial  Droop  Extremities: Denies calf pain, redness or swelling. Hema/Lymph: Denies bleeding/bruising tendency and enlarged lymph nodes. Current Outpatient Medications:     cilostazol (PLETAL) 100 MG tablet, Take 100 mg by mouth 2 times daily, Disp: , Rfl:     denosumab (PROLIA) 60 MG/ML SOSY SC injection, Inject 1 mL into the skin once for 1 dose Every 6mos. , Disp: 1 mL, Rfl: 0    lisinopril (PRINIVIL;ZESTRIL) 20 MG tablet, , Disp: , Rfl:     Multiple Vitamins-Minerals (THERAPEUTIC MULTIVITAMIN-MINERALS) tablet, Take 1 tablet by mouth daily, Disp: , Rfl:     metoprolol tartrate (LOPRESSOR) 25 MG tablet, Take 25 mg by mouth 2 times daily, Disp: , Rfl:     radium Ra 223 dichloride (XOFIGO) 30 MCCI/ML SOLN, Infuse 30 micro curie intravenously once, Disp: , Rfl:     omeprazole (PRILOSEC) 20 MG delayed release capsule, Take 20 mg by mouth daily, Disp: , Rfl:     B Complex Vitamins (B COMPLEX PO), Take by mouth, Disp: , Rfl:     aspirin 81 MG tablet, Take 81 mg by mouth daily. , Disp: , Rfl:     omega-3 acid ethyl esters (LOVAZA) 1 GM capsule, Take 1 g by mouth 2 times daily. , Disp: , Rfl:     Cyanocobalamin (VITAMIN B 12 PO), Take  by mouth daily. sublingual , Disp: , Rfl:     Calcium Carbonate-Vit D-Min (CALTRATE 600+D PLUS PO), Take by mouth, Disp: , Rfl:     atorvastatin (LIPITOR) 40 MG tablet, Take 1 tablet by mouth daily, Disp: 30 tablet, Rfl: 1    cilostazol (PLETAL) 100 MG tablet, Take 1 tablet by mouth 2 times daily, Disp: 60 tablet, Rfl: 11    Coenzyme Q10 (COQ10) 50 MG CAPS, Take 1 capsule by mouth daily. , Disp: , Rfl:     docusate sodium (COLACE) 100 MG capsule, Take 100 mg by mouth as needed.   , Smokeless tobacco: Former User     Types: Chew   Substance and Sexual Activity    Alcohol use: Yes     Alcohol/week: 0.0 oz     Types: 2 - 3 Cans of beer per week     Comment: socially    Drug use: No    Sexual activity: Not on file   Lifestyle    Physical activity:     Days per week: Not on file     Minutes per session: Not on file    Stress: Not on file   Relationships    Social connections:     Talks on phone: Not on file     Gets together: Not on file     Attends Bahai service: Not on file     Active member of club or organization: Not on file     Attends meetings of clubs or organizations: Not on file     Relationship status: Not on file    Intimate partner violence:     Fear of current or ex partner: Not on file     Emotionally abused: Not on file     Physically abused: Not on file     Forced sexual activity: Not on file   Other Topics Concern    Not on file   Social History Narrative    Problem List: History of polyp of colon, Disorder of bone density and structure, unspecified,    Osteochondropathy, Carcinoma in situ of prostate, Aneurysm of thoracic aorta, Anemia, Coronary    arteriosclerosis, Cobalamin deficiency, Multiple closed fractures of pelvis with disruption of pelvic [place],    Hyperlipidemia, Essential hypertension    Health Maintenance:    Bone Density Test Screening - (3/5/2019)    Bone Density Scan - (12/18/2015)    Influenza Vaccination - (10/7/2016)    Colonoscopy - (4/3/2012)    Couseled on Home Safety - (11/23/2015)    Colonoscopy Screening - (4/3/2012)    US Liver - 8/1/08    Medical Problems:    Coronary Artery Disease (CAD), Hypertension, Hyperlipidemia, Prostate Cancer    Surgical Hx:    Prostatectomy, Coronary Artery Bypass Graft (CABG)            FH: Father:    . (Hx)    Mother:    . (Hx)        SH: Marital:  - retired . Personal Habits: Cigarette Use: former cigarette smoker, Nonsmoker. Alcohol: Occasionally    consumes alcohol.        Vitals: PHOSPHORUS   13. Hyperglycemia  Hemoglobin A1C    Magnesium    Urinalysis    Microalbumin / Creatinine Urine Ratio    PHOSPHORUS   14. Thoracic aortic aneurysm without rupture [facility])         Coronary artery disease involving native heart without angina pectoris  Continue per cardiology. Sees Dr. Johnie Caicedo in August, had stress test 3/15. Asymptomatic now    Pancytopenia ([facility] [address]  Counseled extensively. Differential reviewed, including serious etiologies. Possibly from medication. CC Dr. Rosanne Figueroa. Declines hematology. Monitor. Asymptomatic. Low magnesium level  Minimally low/not at goal.  Goals reviewed. Discussed magnesium oxide 400 mg daily. Monitor. Vitamin D deficiency  Appropriate supplementation reviewed, monitor. Vitamin B12 deficiency  Appropriate supplementation reviewed. Monitor. Risk of deficiency reviewed including anemia and neuropathy. No longer on injection    Hyperglycemia  Counseled as above. Monitor. Tubular adenoma   Last colonoscopy Dr. Sarah Ace, 2/15 complaining repeat 3 years from then. Overdue. Declines. Hemoglobin consistently low but stable.     Thyroid nodule  Counseled, incidental 7 mm right thyroid nodule on carotid ultrasound 3/19. Declines workup or follow-up now     Thoracic aneurysm, not ruptured  Counseled, potential serious is reviewed, if any symptoms directly to ER. Continue per Dr. Faheem Tovar. He saw him 3/15 and had CT at least then. Dr. Faheem Tovar discharged him at this point unless symptoms and he declines follow-up imaging     PVD (peripheral vascular disease) with claudication ([facility] [address]  Continue per Dr. Rommel Peoples. Encouraged to keep his follow-up. He would like a second opinion from [facility] and referral placed as well. On low-dose Pletal entheses cannot tolerate higher dose, minimal results) and low-dose aspirin. My concerns reviewed. Risks of serious complications reviewed. Prostate CA [facility])  Continue per Dr. Reyes Bryant.  PSA less than 0.01 which is

## 2019-07-02 NOTE — ASSESSMENT & PLAN NOTE
Counseled extensively. Differential reviewed, including serious etiologies. Possibly from medication. CC Dr. John Patino. Declines hematology. Monitor. Asymptomatic.

## 2019-07-22 ENCOUNTER — TELEPHONE (OUTPATIENT)
Dept: PRIMARY CARE CLINIC | Age: 84
End: 2019-07-22

## 2019-07-25 ENCOUNTER — TELEPHONE (OUTPATIENT)
Dept: VASCULAR SURGERY | Age: 84
End: 2019-07-25

## 2019-07-30 ENCOUNTER — HOSPITAL ENCOUNTER (OUTPATIENT)
Age: 84
Discharge: HOME | End: 2019-07-30
Payer: MEDICARE

## 2019-07-30 LAB
HCT VFR BLD CALC: 34.8 % (ref 37–54)
HEMOGLOBIN: 11.6 G/DL (ref 12.5–16.5)
MCH RBC QN AUTO: 30.2 PG (ref 26–35)
MCHC RBC AUTO-ENTMCNC: 33.3 % (ref 32–34.5)
MCV RBC AUTO: 90.6 FL (ref 80–99.9)
PDW BLD-RTO: 13.9 FL (ref 11.5–15)
PLATELET # BLD: 144 E9/L (ref 130–450)
PMV BLD AUTO: 9.8 FL (ref 7–12)
RBC # BLD: 3.84 E12/L (ref 3.8–5.8)
WBC # BLD: 4.9 E9/L (ref 4.5–11.5)

## 2019-07-30 PROCEDURE — 36415 COLL VENOUS BLD VENIPUNCTURE: CPT

## 2019-07-30 PROCEDURE — 85027 COMPLETE CBC AUTOMATED: CPT

## 2019-08-01 ENCOUNTER — HOSPITAL ENCOUNTER (OUTPATIENT)
Age: 84
Discharge: HOME | End: 2019-08-01
Payer: MEDICARE

## 2019-08-01 DIAGNOSIS — R73.9 HYPERGLYCEMIA: ICD-10-CM

## 2019-08-01 DIAGNOSIS — D36.9 TUBULAR ADENOMA: ICD-10-CM

## 2019-08-01 DIAGNOSIS — I25.10 CORONARY ARTERY DISEASE INVOLVING NATIVE HEART WITHOUT ANGINA PECTORIS, UNSPECIFIED VESSEL OR LESION TYPE: ICD-10-CM

## 2019-08-01 DIAGNOSIS — E78.2 MIXED HYPERLIPIDEMIA: ICD-10-CM

## 2019-08-01 DIAGNOSIS — E55.9 VITAMIN D DEFICIENCY: ICD-10-CM

## 2019-08-01 DIAGNOSIS — R79.0 LOW MAGNESIUM LEVEL: ICD-10-CM

## 2019-08-01 DIAGNOSIS — E04.1 THYROID NODULE: ICD-10-CM

## 2019-08-01 DIAGNOSIS — D61.818 PANCYTOPENIA (HCC): ICD-10-CM

## 2019-08-01 DIAGNOSIS — I73.9 PVD (PERIPHERAL VASCULAR DISEASE) WITH CLAUDICATION (HCC): ICD-10-CM

## 2019-08-01 DIAGNOSIS — E53.8 VITAMIN B12 DEFICIENCY: ICD-10-CM

## 2019-08-01 DIAGNOSIS — M81.0 AGE-RELATED OSTEOPOROSIS WITHOUT CURRENT PATHOLOGICAL FRACTURE: ICD-10-CM

## 2019-08-01 DIAGNOSIS — C61 PROSTATE CA (HCC): ICD-10-CM

## 2019-08-01 DIAGNOSIS — E88.9 METABOLIC DISORDER: ICD-10-CM

## 2019-08-01 LAB
ALBUMIN SERPL-MCNC: 4.4 G/DL (ref 3.5–5.2)
ALP BLD-CCNC: 41 U/L (ref 40–129)
ALT SERPL-CCNC: 18 U/L (ref 0–40)
ANION GAP SERPL CALCULATED.3IONS-SCNC: 8 MMOL/L (ref 7–16)
AST SERPL-CCNC: 21 U/L (ref 0–39)
BASOPHILS ABSOLUTE: 0.01 E9/L (ref 0–0.2)
BASOPHILS RELATIVE PERCENT: 0.3 % (ref 0–2)
BILIRUB SERPL-MCNC: 1.1 MG/DL (ref 0–1.2)
BILIRUBIN URINE: NEGATIVE
BLOOD, URINE: NEGATIVE
BUN BLDV-MCNC: 20 MG/DL (ref 8–23)
CALCIUM SERPL-MCNC: 9.6 MG/DL (ref 8.6–10.2)
CHLORIDE BLD-SCNC: 109 MMOL/L (ref 98–107)
CHOLESTEROL, TOTAL: 132 MG/DL (ref 0–199)
CLARITY: CLEAR
CO2: 28 MMOL/L (ref 22–29)
COLOR: YELLOW
CREAT SERPL-MCNC: 1.1 MG/DL (ref 0.7–1.2)
CREATININE URINE: 156 MG/DL (ref 40–278)
EOSINOPHILS ABSOLUTE: 0.07 E9/L (ref 0.05–0.5)
EOSINOPHILS RELATIVE PERCENT: 1.8 % (ref 0–6)
FOLATE: 19.5 NG/ML (ref 4.8–24.2)
GFR AFRICAN AMERICAN: >60
GFR NON-AFRICAN AMERICAN: >60 ML/MIN/1.73
GLUCOSE BLD-MCNC: 101 MG/DL (ref 74–99)
GLUCOSE URINE: NEGATIVE MG/DL
HBA1C MFR BLD: 4.6 % (ref 4–5.6)
HCT VFR BLD CALC: 33.5 % (ref 37–54)
HDLC SERPL-MCNC: 37 MG/DL
HEMOGLOBIN: 11.4 G/DL (ref 12.5–16.5)
IMMATURE GRANULOCYTES #: 0.04 E9/L
IMMATURE GRANULOCYTES %: 1 % (ref 0–5)
KETONES, URINE: NEGATIVE MG/DL
LDL CHOLESTEROL CALCULATED: 52 MG/DL (ref 0–99)
LEUKOCYTE ESTERASE, URINE: NEGATIVE
LYMPHOCYTES ABSOLUTE: 0.95 E9/L (ref 1.5–4)
LYMPHOCYTES RELATIVE PERCENT: 24.4 % (ref 20–42)
MAGNESIUM: 1.9 MG/DL (ref 1.6–2.6)
MCH RBC QN AUTO: 31 PG (ref 26–35)
MCHC RBC AUTO-ENTMCNC: 34 % (ref 32–34.5)
MCV RBC AUTO: 91 FL (ref 80–99.9)
MICROALBUMIN UR-MCNC: <12 MG/L
MICROALBUMIN/CREAT UR-RTO: ABNORMAL (ref 0–30)
MONOCYTES ABSOLUTE: 0.32 E9/L (ref 0.1–0.95)
MONOCYTES RELATIVE PERCENT: 8.2 % (ref 2–12)
NEUTROPHILS ABSOLUTE: 2.51 E9/L (ref 1.8–7.3)
NEUTROPHILS RELATIVE PERCENT: 64.3 % (ref 43–80)
NITRITE, URINE: NEGATIVE
PDW BLD-RTO: 13.6 FL (ref 11.5–15)
PH UA: 5 (ref 5–9)
PHOSPHORUS: 4 MG/DL (ref 2.5–4.5)
PLATELET # BLD: 126 E9/L (ref 130–450)
PMV BLD AUTO: 9.3 FL (ref 7–12)
POTASSIUM SERPL-SCNC: 4.5 MMOL/L (ref 3.5–5)
PROSTATE SPECIFIC ANTIGEN: 0.03 NG/ML (ref 0–4)
PROTEIN UA: NEGATIVE MG/DL
RBC # BLD: 3.68 E12/L (ref 3.8–5.8)
SODIUM BLD-SCNC: 145 MMOL/L (ref 132–146)
SPECIFIC GRAVITY UA: 1.02 (ref 1–1.03)
TOTAL CK: 75 U/L (ref 20–200)
TOTAL PROTEIN: 7.1 G/DL (ref 6.4–8.3)
TRIGL SERPL-MCNC: 213 MG/DL (ref 0–149)
TSH SERPL DL<=0.05 MIU/L-ACNC: 2.88 UIU/ML (ref 0.27–4.2)
UROBILINOGEN, URINE: 0.2 E.U./DL
VITAMIN B-12: 1223 PG/ML (ref 211–946)
VITAMIN D 25-HYDROXY: 47 NG/ML (ref 30–100)
VLDLC SERPL CALC-MCNC: 43 MG/DL
WBC # BLD: 3.9 E9/L (ref 4.5–11.5)

## 2019-08-01 PROCEDURE — 82044 UR ALBUMIN SEMIQUANTITATIVE: CPT

## 2019-08-01 PROCEDURE — 82550 ASSAY OF CK (CPK): CPT

## 2019-08-01 PROCEDURE — 84443 ASSAY THYROID STIM HORMONE: CPT

## 2019-08-01 PROCEDURE — 81003 URINALYSIS AUTO W/O SCOPE: CPT

## 2019-08-01 PROCEDURE — 36415 COLL VENOUS BLD VENIPUNCTURE: CPT

## 2019-08-01 PROCEDURE — 82607 VITAMIN B-12: CPT

## 2019-08-01 PROCEDURE — 82570 ASSAY OF URINE CREATININE: CPT

## 2019-08-01 PROCEDURE — 85025 COMPLETE CBC W/AUTO DIFF WBC: CPT

## 2019-08-01 PROCEDURE — 83036 HEMOGLOBIN GLYCOSYLATED A1C: CPT

## 2019-08-01 PROCEDURE — 80053 COMPREHEN METABOLIC PANEL: CPT

## 2019-08-01 PROCEDURE — 83735 ASSAY OF MAGNESIUM: CPT

## 2019-08-01 PROCEDURE — 82306 VITAMIN D 25 HYDROXY: CPT

## 2019-08-01 PROCEDURE — 84153 ASSAY OF PSA TOTAL: CPT

## 2019-08-01 PROCEDURE — 80061 LIPID PANEL: CPT

## 2019-08-01 PROCEDURE — 82746 ASSAY OF FOLIC ACID SERUM: CPT

## 2019-08-01 PROCEDURE — 84100 ASSAY OF PHOSPHORUS: CPT

## 2019-08-08 DIAGNOSIS — E78.2 MIXED HYPERLIPIDEMIA: ICD-10-CM

## 2019-08-08 RX ORDER — ATORVASTATIN CALCIUM 40 MG/1
40 TABLET, FILM COATED ORAL DAILY
Qty: 90 TABLET | Refills: 3 | Status: SHIPPED
Start: 2019-08-08 | End: 2020-02-12 | Stop reason: SDUPTHER

## 2019-08-09 ENCOUNTER — HOSPITAL ENCOUNTER (OUTPATIENT)
Dept: NUCLEAR MEDICINE | Age: 84
Discharge: HOME | End: 2019-08-11
Payer: MEDICARE

## 2019-08-09 DIAGNOSIS — C61 MALIGNANT NEOPLASM OF PROSTATE (HCC): ICD-10-CM

## 2019-08-09 PROCEDURE — A9606 RADIUM RA223 DICHLORIDE THER: HCPCS | Performed by: RADIOLOGY

## 2019-08-09 PROCEDURE — 79101 NUCLEAR RX IV ADMIN: CPT

## 2019-08-09 PROCEDURE — 3440000000 HC RX THERAPEUTIC RADIOPHARMACEUTICAL: Performed by: RADIOLOGY

## 2019-08-09 RX ADMIN — RADIUM RA 223 DICHLORIDE 141 MICRO CURIE: 30 INJECTION INTRAVENOUS at 11:33

## 2019-08-13 ENCOUNTER — OFFICE VISIT (OUTPATIENT)
Dept: PRIMARY CARE CLINIC | Age: 84
End: 2019-08-13
Payer: MEDICARE

## 2019-08-13 VITALS
DIASTOLIC BLOOD PRESSURE: 70 MMHG | BODY MASS INDEX: 30.28 KG/M2 | SYSTOLIC BLOOD PRESSURE: 138 MMHG | HEART RATE: 75 BPM | WEIGHT: 211 LBS | OXYGEN SATURATION: 97 %

## 2019-08-13 DIAGNOSIS — E78.2 MIXED HYPERLIPIDEMIA: Primary | ICD-10-CM

## 2019-08-13 DIAGNOSIS — E53.8 VITAMIN B12 DEFICIENCY: ICD-10-CM

## 2019-08-13 DIAGNOSIS — I73.9 PVD (PERIPHERAL VASCULAR DISEASE) WITH CLAUDICATION (HCC): ICD-10-CM

## 2019-08-13 DIAGNOSIS — D61.818 PANCYTOPENIA (HCC): ICD-10-CM

## 2019-08-13 DIAGNOSIS — I25.10 CORONARY ARTERY DISEASE INVOLVING NATIVE HEART WITHOUT ANGINA PECTORIS, UNSPECIFIED VESSEL OR LESION TYPE: ICD-10-CM

## 2019-08-13 DIAGNOSIS — S92.354A CLOSED NONDISPLACED FRACTURE OF FIFTH METATARSAL BONE OF RIGHT FOOT, INITIAL ENCOUNTER: ICD-10-CM

## 2019-08-13 DIAGNOSIS — M51.37 DISC DISEASE, DEGENERATIVE, LUMBAR OR LUMBOSACRAL: ICD-10-CM

## 2019-08-13 DIAGNOSIS — E88.9 METABOLIC DISORDER: ICD-10-CM

## 2019-08-13 DIAGNOSIS — E55.9 VITAMIN D DEFICIENCY: ICD-10-CM

## 2019-08-13 DIAGNOSIS — C61 PROSTATE CA (HCC): ICD-10-CM

## 2019-08-13 DIAGNOSIS — M81.0 AGE-RELATED OSTEOPOROSIS WITHOUT CURRENT PATHOLOGICAL FRACTURE: ICD-10-CM

## 2019-08-13 PROBLEM — M51.379 DISC DISEASE, DEGENERATIVE, LUMBAR OR LUMBOSACRAL: Status: ACTIVE | Noted: 2019-08-13

## 2019-08-13 PROCEDURE — 99214 OFFICE O/P EST MOD 30 MIN: CPT | Performed by: FAMILY MEDICINE

## 2019-08-13 NOTE — PROGRESS NOTES
syncopal episode or any exertional symptoms  Resp: Denies cough, hemoptysis, pleuritic pain, SOB, sputum production and wheezing. GI: Denies abdominal pain, change in bowel habits, hematochezia, melena, nausea and vomiting. : Denies urinary problems including dysuria. Musculo: Denies musculoskeletal symptoms other than above. .  Skin: Denies bruising and rash or changing skin lesions. Neuro: Denies headache, numbness, stiff neck, tingling and focal weakness slurred speech or facial  Droop  Extremities: Denies calf pain, redness or swelling. Hema/Lymph: Denies bleeding/bruising tendency and enlarged lymph nodes. Current Outpatient Medications:     atorvastatin (LIPITOR) 40 MG tablet, Take 1 tablet by mouth daily, Disp: 90 tablet, Rfl: 3    Calcium Carbonate-Vit D-Min (CALTRATE 600+D PLUS PO), Take by mouth, Disp: , Rfl:     cilostazol (PLETAL) 100 MG tablet, Take 100 mg by mouth 2 times daily, Disp: , Rfl:     lisinopril (PRINIVIL;ZESTRIL) 20 MG tablet, , Disp: , Rfl:     Multiple Vitamins-Minerals (THERAPEUTIC MULTIVITAMIN-MINERALS) tablet, Take 1 tablet by mouth daily, Disp: , Rfl:     metoprolol tartrate (LOPRESSOR) 25 MG tablet, Take 25 mg by mouth 2 times daily, Disp: , Rfl:     radium Ra 223 dichloride (XOFIGO) 30 MCCI/ML SOLN, Infuse 30 micro curie intravenously once, Disp: , Rfl:     omeprazole (PRILOSEC) 20 MG delayed release capsule, Take 20 mg by mouth daily, Disp: , Rfl:     B Complex Vitamins (B COMPLEX PO), Take by mouth, Disp: , Rfl:     Coenzyme Q10 (COQ10) 50 MG CAPS, Take 1 capsule by mouth daily. , Disp: , Rfl:     aspirin 81 MG tablet, Take 81 mg by mouth daily. , Disp: , Rfl:     omega-3 acid ethyl esters (LOVAZA) 1 GM capsule, Take 1 g by mouth 2 times daily. , Disp: , Rfl:     Cyanocobalamin (VITAMIN B 12 PO), Take  by mouth daily.  sublingual , Disp: , Rfl:   Allergies   Allergen Reactions    Latex     Iodine     Other Hives       Past Medical History: pathological fracture     6. Metabolic disorder     7. Vitamin D deficiency     8. Vitamin B12 deficiency     9. Pancytopenia ([facility] [address]     10. Closed nondisplaced fracture of fifth metatarsal bone of right foot, initial encounter     11. Disc disease, degenerative, lumbar or lumbosacral         Prostate CA ([facility] [address]  Continue per Dr. Herbert Bill. PSA  8/19 0.03,  had metastasis, since orchiectomy, did not tolerate xtandi so he stopped it doing very well now    Vitamin D deficiency  Stable on current therapy. 8/19 level 47. Monitor    Vitamin B12 deficiency  Appropriate supplementation reviewed. Vitamin B12 elevated, discussed backing down. Monitor. Risk of deficiency reviewed including anemia and neuropathy. No longer on injection    Closed nondisplaced fracture of fifth right metatarsal bone  In a boot. Continue per Dr. Judith Judge. Precautions reviewed    Disc disease, degenerative, lumbar or lumbosacral  Counseled extensively. Differential reviewed, including serious etiologies. Sees dr Sherren Minister soon.             PVD (peripheral vascular disease) with claudication [facility])  He saw Dr. Deanna Morel, declines follow-up, saw [facility] vascular who did tell him that he has a \"blockage behind the left knee\". He did not tolerate Pletal and so stopped it. He is on low-dose aspirin. My concerns reviewed. Risks of serious complications reviewed. Eldred told him to address the back issue first and so he is seeing Dr. Raymund Holstein August 19.       Mixed hyperlipidemia  Did very well on d.a.w. Crestor unfortunately severe myalgias on generic. He could try to preauthorize d.a.w. but he preferred Lipitor which she was started on about 5 weeks ago and doing well. If ADRs which were reviewed at length discontinue. Counseling CoQ10 and vitamin D. We discussed appropriate dosing given his risk factors     Metabolic disorder  Counseled. Blood sugar has been recently borderline at 124, 125, now 117.  Admitted to a lot of pitzels, cookies; however hemoglobin A1c only 4.6 we will stop checking this. Micro-macrovascular complications reviewed     Coronary artery disease involving native heart without angina pectoris  Continue per cardiology. He saw Dr. Maliha Rosa yesterday, had stress test 3/15. Asymptomatic now.     Age-related osteoporosis without current pathological fracture  Counseled extensively. He tolerated Fosamax but discontinued after he took from 7/09 through 7/16. Morbidity/mortality related to fracture reviewed. 11/13 bone density showed improvement, 12/15 -1.3 stable, 3/19 showed L2 -2.6, hip -1.0. Discussed the option of restarting alendronate with pros and cons reviewed, he has taken it twice. Versus option of prolia every 6 months with pros and cons reviewed. Agreed to prolia now refusing. \"no more injections\". Has been of fosamax x > 5yrs as of 7/19. Decided to take fosamax 7/19 then agreed to prolia again. It appears it is approved through Medicare part B but not D. They will look into it. They will defer until next visit. Needs bw within 2wks prior. Counseled extensively and differential diagnoses relevant to above were reviewed, including serious etiologies. Side effects and interactions of medications were reviewed. Overall feeling well. Unfortunately the lab deedee the wrong dates so we will look into it. At this point simply wants to get the labs that are currently ordered for next time and will keep follow-up, actually wants to reschedule his 2-month follow-up for 3 months, sooner as needed. Continue per multiple specialist.  Precautions reviewed. Return in about 3 months (around 11/13/2019) for Multiple. Signs and symptoms to watch for discussed, serious signs and symptoms reviewed. ER if any. Jesus Godoy MD    Patients are advised to check with insurance company to ensure coverage and to fully understand benefits and cost prior to any testing.   This note was created with the assistance of

## 2019-09-03 ENCOUNTER — HOSPITAL ENCOUNTER (OUTPATIENT)
Age: 84
Discharge: HOME | End: 2019-09-03
Payer: MEDICARE

## 2019-09-03 LAB
HCT VFR BLD CALC: 32.4 % (ref 37–54)
HEMOGLOBIN: 10.7 G/DL (ref 12.5–16.5)
MCH RBC QN AUTO: 30.1 PG (ref 26–35)
MCHC RBC AUTO-ENTMCNC: 33 % (ref 32–34.5)
MCV RBC AUTO: 91.3 FL (ref 80–99.9)
PDW BLD-RTO: 14 FL (ref 11.5–15)
PLATELET # BLD: 134 E9/L (ref 130–450)
PMV BLD AUTO: 9.9 FL (ref 7–12)
RBC # BLD: 3.55 E12/L (ref 3.8–5.8)
WBC # BLD: 3.7 E9/L (ref 4.5–11.5)

## 2019-09-03 PROCEDURE — 36415 COLL VENOUS BLD VENIPUNCTURE: CPT

## 2019-09-03 PROCEDURE — 85027 COMPLETE CBC AUTOMATED: CPT

## 2019-09-07 ENCOUNTER — APPOINTMENT (OUTPATIENT)
Dept: GENERAL RADIOLOGY | Age: 84
End: 2019-09-07
Payer: MEDICARE

## 2019-09-07 ENCOUNTER — HOSPITAL ENCOUNTER (EMERGENCY)
Age: 84
Discharge: HOME | End: 2019-09-07
Attending: EMERGENCY MEDICINE
Payer: MEDICARE

## 2019-09-07 VITALS
OXYGEN SATURATION: 98 % | SYSTOLIC BLOOD PRESSURE: 142 MMHG | TEMPERATURE: 97.5 F | DIASTOLIC BLOOD PRESSURE: 68 MMHG | RESPIRATION RATE: 16 BRPM | HEART RATE: 76 BPM | BODY MASS INDEX: 30.06 KG/M2 | HEIGHT: 70 IN | WEIGHT: 210 LBS

## 2019-09-07 DIAGNOSIS — S76.011A STRAIN OF RIGHT HIP, INITIAL ENCOUNTER: ICD-10-CM

## 2019-09-07 DIAGNOSIS — M25.551 RIGHT HIP PAIN: Primary | ICD-10-CM

## 2019-09-07 PROCEDURE — 99283 EMERGENCY DEPT VISIT LOW MDM: CPT

## 2019-09-07 PROCEDURE — 72170 X-RAY EXAM OF PELVIS: CPT

## 2019-09-07 PROCEDURE — 73552 X-RAY EXAM OF FEMUR 2/>: CPT

## 2019-09-07 RX ORDER — NAPROXEN 500 MG/1
500 TABLET ORAL 2 TIMES DAILY
Qty: 10 TABLET | Refills: 0 | Status: SHIPPED | OUTPATIENT
Start: 2019-09-07 | End: 2020-02-12

## 2019-09-07 ASSESSMENT — ENCOUNTER SYMPTOMS
GASTROINTESTINAL NEGATIVE: 1
RESPIRATORY NEGATIVE: 1
EYES NEGATIVE: 1

## 2019-09-07 NOTE — ED PROVIDER NOTES
REVIEWED ---------------------------  Date / Time Roomed:  9/7/2019  2:04 PM  ED Bed Assignment:  DYLLAN/DYLLAN    The nursing notes within the ED encounter and vital signs as below have been reviewed. Patient Vitals for the past 24 hrs:   BP Temp Temp src Pulse Resp SpO2 Height Weight   09/07/19 1403 (!) 142/68 97.5 °F (36.4 °C) Oral 76 16 98 % 5' 10\" (1.778 m) 210 lb (95.3 kg)       ------------------------------------------ PROGRESS NOTES ------------------------------------------  Re-evaluation(s):  Patient re evaluated in the room. Patient states he still is not in pain. Patient informed of imaging results. Feels well and wants to go home.       --------------------------------------- ED Clinical Course/MDM --------------------------------------    Patient had a near mechanical fall and is worried that he fractured, dislocated, his right hip. Patient is being evaluated for right hip fracture and dislocations. Imaging results are negative for fracture and dislocation. Patient feels well and wants to go home  11:48 AM  I have spoken with the patient and discussed todays results, in addition to providing specific details for the plan of care and counseling regarding the diagnosis and prognosis. Their questions are answered at this time and they are agreeable with the plan. I discussed at length with them reasons for immediate return here for re evaluation. They will followup with their primary care physician by calling their office at their earliest convinience. Kalen Gtz --------------------------------- ADDITIONAL PROVIDER NOTES ---------------------------------  At this time the patient is without objective evidence of an acute process requiring hospitalization or inpatient management. They have remained hemodynamically stable throughout their entire ED visit and are stable for discharge with outpatient follow-up.      The plan has been discussed in detail and they are aware of the specific conditions for

## 2019-09-12 ENCOUNTER — HOSPITAL ENCOUNTER (OUTPATIENT)
Dept: NUCLEAR MEDICINE | Age: 84
Discharge: HOME | End: 2019-09-14
Payer: MEDICARE

## 2019-09-12 DIAGNOSIS — C61 MALIGNANT NEOPLASM OF PROSTATE (HCC): ICD-10-CM

## 2019-09-12 PROCEDURE — 3440000000 HC RX THERAPEUTIC RADIOPHARMACEUTICAL: Performed by: RADIOLOGY

## 2019-09-12 PROCEDURE — 79101 NUCLEAR RX IV ADMIN: CPT

## 2019-09-12 PROCEDURE — A9606 RADIUM RA223 DICHLORIDE THER: HCPCS | Performed by: RADIOLOGY

## 2019-09-12 RX ADMIN — RADIUM RA 223 DICHLORIDE 143 MICRO CURIE: 30 INJECTION INTRAVENOUS at 10:46

## 2019-10-02 ENCOUNTER — TELEPHONE (OUTPATIENT)
Dept: PRIMARY CARE CLINIC | Age: 84
End: 2019-10-02

## 2019-10-02 DIAGNOSIS — M81.0 AGE-RELATED OSTEOPOROSIS WITHOUT CURRENT PATHOLOGICAL FRACTURE: Primary | ICD-10-CM

## 2019-10-03 ENCOUNTER — HOSPITAL ENCOUNTER (OUTPATIENT)
Age: 84
Discharge: HOME | End: 2019-10-03
Payer: MEDICARE

## 2019-10-03 DIAGNOSIS — M81.0 AGE-RELATED OSTEOPOROSIS WITHOUT CURRENT PATHOLOGICAL FRACTURE: ICD-10-CM

## 2019-10-03 LAB
ANION GAP SERPL CALCULATED.3IONS-SCNC: 11 MMOL/L (ref 7–16)
BUN BLDV-MCNC: 19 MG/DL (ref 8–23)
CALCIUM SERPL-MCNC: 9.8 MG/DL (ref 8.6–10.2)
CHLORIDE BLD-SCNC: 106 MMOL/L (ref 98–107)
CO2: 28 MMOL/L (ref 22–29)
CREAT SERPL-MCNC: 1.2 MG/DL (ref 0.7–1.2)
GFR AFRICAN AMERICAN: >60
GFR NON-AFRICAN AMERICAN: 58 ML/MIN/1.73
GLUCOSE BLD-MCNC: 111 MG/DL (ref 74–99)
MAGNESIUM: 1.7 MG/DL (ref 1.6–2.6)
PHOSPHORUS: 4.3 MG/DL (ref 2.5–4.5)
POTASSIUM SERPL-SCNC: 4.5 MMOL/L (ref 3.5–5)
SODIUM BLD-SCNC: 145 MMOL/L (ref 132–146)

## 2019-10-03 PROCEDURE — 36415 COLL VENOUS BLD VENIPUNCTURE: CPT

## 2019-10-03 PROCEDURE — 80048 BASIC METABOLIC PNL TOTAL CA: CPT

## 2019-10-03 PROCEDURE — 84100 ASSAY OF PHOSPHORUS: CPT

## 2019-10-03 PROCEDURE — 83735 ASSAY OF MAGNESIUM: CPT

## 2019-10-16 RX ORDER — MECLIZINE HYDROCHLORIDE 25 MG/1
TABLET ORAL
COMMUNITY

## 2019-10-16 RX ORDER — ROSUVASTATIN CALCIUM 10 MG/1
10 TABLET, COATED ORAL DAILY
COMMUNITY
End: 2020-02-12

## 2019-10-16 RX ORDER — B-COMPLEX WITH VITAMIN C
TABLET ORAL
COMMUNITY
End: 2020-05-18

## 2019-10-16 RX ORDER — MEGESTROL ACETATE 20 MG/1
20 TABLET ORAL DAILY
COMMUNITY
End: 2020-02-12

## 2019-10-17 ENCOUNTER — OFFICE VISIT (OUTPATIENT)
Dept: PRIMARY CARE CLINIC | Age: 84
End: 2019-10-17
Payer: MEDICARE

## 2019-10-17 VITALS
OXYGEN SATURATION: 98 % | BODY MASS INDEX: 30.42 KG/M2 | WEIGHT: 212 LBS | HEART RATE: 74 BPM | DIASTOLIC BLOOD PRESSURE: 64 MMHG | SYSTOLIC BLOOD PRESSURE: 126 MMHG

## 2019-10-17 DIAGNOSIS — M25.511 ACUTE PAIN OF RIGHT SHOULDER: ICD-10-CM

## 2019-10-17 DIAGNOSIS — Z00.00 HEALTH MAINTENANCE EXAMINATION: ICD-10-CM

## 2019-10-17 DIAGNOSIS — M81.0 AGE-RELATED OSTEOPOROSIS WITHOUT CURRENT PATHOLOGICAL FRACTURE: Primary | ICD-10-CM

## 2019-10-17 PROCEDURE — 1123F ACP DISCUSS/DSCN MKR DOCD: CPT | Performed by: FAMILY MEDICINE

## 2019-10-17 PROCEDURE — G8598 ASA/ANTIPLAT THER USED: HCPCS | Performed by: FAMILY MEDICINE

## 2019-10-17 PROCEDURE — G8417 CALC BMI ABV UP PARAM F/U: HCPCS | Performed by: FAMILY MEDICINE

## 2019-10-17 PROCEDURE — 1036F TOBACCO NON-USER: CPT | Performed by: FAMILY MEDICINE

## 2019-10-17 PROCEDURE — G8484 FLU IMMUNIZE NO ADMIN: HCPCS | Performed by: FAMILY MEDICINE

## 2019-10-17 PROCEDURE — G8427 DOCREV CUR MEDS BY ELIG CLIN: HCPCS | Performed by: FAMILY MEDICINE

## 2019-10-17 PROCEDURE — 4040F PNEUMOC VAC/ADMIN/RCVD: CPT | Performed by: FAMILY MEDICINE

## 2019-10-17 PROCEDURE — 99214 OFFICE O/P EST MOD 30 MIN: CPT | Performed by: FAMILY MEDICINE

## 2019-10-17 PROCEDURE — 96372 THER/PROPH/DIAG INJ SC/IM: CPT | Performed by: FAMILY MEDICINE

## 2019-11-05 ENCOUNTER — HOSPITAL ENCOUNTER (OUTPATIENT)
Age: 84
Discharge: HOME | End: 2019-11-05
Payer: MEDICARE

## 2019-11-05 LAB — PROSTATE SPECIFIC ANTIGEN: 0.07 NG/ML (ref 0–4)

## 2019-11-05 PROCEDURE — 84153 ASSAY OF PSA TOTAL: CPT

## 2019-11-05 PROCEDURE — 36415 COLL VENOUS BLD VENIPUNCTURE: CPT

## 2019-11-12 ENCOUNTER — OFFICE VISIT (OUTPATIENT)
Dept: PRIMARY CARE CLINIC | Age: 84
End: 2019-11-12
Payer: MEDICARE

## 2019-11-12 VITALS
SYSTOLIC BLOOD PRESSURE: 128 MMHG | HEART RATE: 68 BPM | WEIGHT: 212.6 LBS | DIASTOLIC BLOOD PRESSURE: 70 MMHG | BODY MASS INDEX: 30.5 KG/M2 | OXYGEN SATURATION: 95 %

## 2019-11-12 DIAGNOSIS — G56.03 BILATERAL CARPAL TUNNEL SYNDROME: Primary | ICD-10-CM

## 2019-11-12 DIAGNOSIS — I73.9 PVD (PERIPHERAL VASCULAR DISEASE) WITH CLAUDICATION (HCC): ICD-10-CM

## 2019-11-12 DIAGNOSIS — D61.818 PANCYTOPENIA (HCC): ICD-10-CM

## 2019-11-12 DIAGNOSIS — M25.511 ACUTE PAIN OF RIGHT SHOULDER: ICD-10-CM

## 2019-11-12 DIAGNOSIS — I10 ESSENTIAL HYPERTENSION: ICD-10-CM

## 2019-11-12 DIAGNOSIS — E78.2 MIXED HYPERLIPIDEMIA: ICD-10-CM

## 2019-11-12 DIAGNOSIS — D36.9 TUBULAR ADENOMA: ICD-10-CM

## 2019-11-12 DIAGNOSIS — E04.1 THYROID NODULE: ICD-10-CM

## 2019-11-12 DIAGNOSIS — C61 PROSTATE CA (HCC): ICD-10-CM

## 2019-11-12 DIAGNOSIS — R73.9 HYPERGLYCEMIA: ICD-10-CM

## 2019-11-12 DIAGNOSIS — I71.20 THORACIC AORTIC ANEURYSM WITHOUT RUPTURE: ICD-10-CM

## 2019-11-12 DIAGNOSIS — E88.9 METABOLIC DISORDER: ICD-10-CM

## 2019-11-12 DIAGNOSIS — M81.0 AGE-RELATED OSTEOPOROSIS WITHOUT CURRENT PATHOLOGICAL FRACTURE: ICD-10-CM

## 2019-11-12 DIAGNOSIS — M51.37 DISC DISEASE, DEGENERATIVE, LUMBAR OR LUMBOSACRAL: ICD-10-CM

## 2019-11-12 DIAGNOSIS — I25.10 CORONARY ARTERY DISEASE INVOLVING NATIVE HEART WITHOUT ANGINA PECTORIS, UNSPECIFIED VESSEL OR LESION TYPE: ICD-10-CM

## 2019-11-12 DIAGNOSIS — R79.0 LOW MAGNESIUM LEVEL: ICD-10-CM

## 2019-11-12 DIAGNOSIS — Z00.00 HEALTH MAINTENANCE EXAMINATION: ICD-10-CM

## 2019-11-12 DIAGNOSIS — E53.8 VITAMIN B12 DEFICIENCY: ICD-10-CM

## 2019-11-12 DIAGNOSIS — E55.9 VITAMIN D DEFICIENCY: ICD-10-CM

## 2019-11-12 PROBLEM — I71.10 THORACIC ANEURYSM, RUPTURED (HCC): Status: ACTIVE | Noted: 2019-11-12

## 2019-11-12 PROCEDURE — 4040F PNEUMOC VAC/ADMIN/RCVD: CPT | Performed by: FAMILY MEDICINE

## 2019-11-12 PROCEDURE — G8598 ASA/ANTIPLAT THER USED: HCPCS | Performed by: FAMILY MEDICINE

## 2019-11-12 PROCEDURE — 1036F TOBACCO NON-USER: CPT | Performed by: FAMILY MEDICINE

## 2019-11-12 PROCEDURE — G8482 FLU IMMUNIZE ORDER/ADMIN: HCPCS | Performed by: FAMILY MEDICINE

## 2019-11-12 PROCEDURE — G8417 CALC BMI ABV UP PARAM F/U: HCPCS | Performed by: FAMILY MEDICINE

## 2019-11-12 PROCEDURE — 1123F ACP DISCUSS/DSCN MKR DOCD: CPT | Performed by: FAMILY MEDICINE

## 2019-11-12 PROCEDURE — 99215 OFFICE O/P EST HI 40 MIN: CPT | Performed by: FAMILY MEDICINE

## 2019-11-12 PROCEDURE — G8427 DOCREV CUR MEDS BY ELIG CLIN: HCPCS | Performed by: FAMILY MEDICINE

## 2019-11-16 PROBLEM — Z00.00 HEALTH MAINTENANCE EXAMINATION: Status: RESOLVED | Noted: 2019-10-17 | Resolved: 2019-11-16

## 2019-11-19 ENCOUNTER — HOSPITAL ENCOUNTER (OUTPATIENT)
Age: 84
Discharge: HOME | End: 2019-11-19
Payer: MEDICARE

## 2019-11-19 DIAGNOSIS — R79.0 LOW MAGNESIUM LEVEL: ICD-10-CM

## 2019-11-19 DIAGNOSIS — I10 ESSENTIAL HYPERTENSION: ICD-10-CM

## 2019-11-19 DIAGNOSIS — E53.8 VITAMIN B12 DEFICIENCY: ICD-10-CM

## 2019-11-19 DIAGNOSIS — E55.9 VITAMIN D DEFICIENCY: ICD-10-CM

## 2019-11-19 DIAGNOSIS — E04.1 THYROID NODULE: ICD-10-CM

## 2019-11-19 DIAGNOSIS — E88.9 METABOLIC DISORDER: ICD-10-CM

## 2019-11-19 DIAGNOSIS — M81.0 AGE-RELATED OSTEOPOROSIS WITHOUT CURRENT PATHOLOGICAL FRACTURE: ICD-10-CM

## 2019-11-19 LAB
BILIRUBIN URINE: NEGATIVE
BLOOD, URINE: NEGATIVE
CLARITY: CLEAR
COLOR: YELLOW
CREATININE URINE: 202 MG/DL (ref 40–278)
FOLATE: 19 NG/ML (ref 4.8–24.2)
GLUCOSE URINE: NEGATIVE MG/DL
KETONES, URINE: NEGATIVE MG/DL
LEUKOCYTE ESTERASE, URINE: NEGATIVE
MAGNESIUM: 1.8 MG/DL (ref 1.6–2.6)
MICROALBUMIN UR-MCNC: 13.6 MG/L
MICROALBUMIN/CREAT UR-RTO: 6.7 (ref 0–30)
NITRITE, URINE: NEGATIVE
PH UA: 5.5 (ref 5–9)
PROTEIN UA: NEGATIVE MG/DL
SPECIFIC GRAVITY UA: >=1.03 (ref 1–1.03)
TSH SERPL DL<=0.05 MIU/L-ACNC: 1.15 UIU/ML (ref 0.27–4.2)
UROBILINOGEN, URINE: 0.2 E.U./DL
VITAMIN B-12: 1009 PG/ML (ref 211–946)
VITAMIN D 25-HYDROXY: 47 NG/ML (ref 30–100)

## 2019-11-19 PROCEDURE — 36415 COLL VENOUS BLD VENIPUNCTURE: CPT

## 2019-11-19 PROCEDURE — 83735 ASSAY OF MAGNESIUM: CPT

## 2019-11-19 PROCEDURE — 82306 VITAMIN D 25 HYDROXY: CPT

## 2019-11-19 PROCEDURE — 82570 ASSAY OF URINE CREATININE: CPT

## 2019-11-19 PROCEDURE — 82746 ASSAY OF FOLIC ACID SERUM: CPT

## 2019-11-19 PROCEDURE — 82607 VITAMIN B-12: CPT

## 2019-11-19 PROCEDURE — 84443 ASSAY THYROID STIM HORMONE: CPT

## 2019-11-19 PROCEDURE — 81003 URINALYSIS AUTO W/O SCOPE: CPT

## 2019-11-19 PROCEDURE — 82044 UR ALBUMIN SEMIQUANTITATIVE: CPT

## 2020-01-16 RX ORDER — LISINOPRIL 20 MG/1
20 TABLET ORAL DAILY
Qty: 90 TABLET | Refills: 3 | Status: SHIPPED
Start: 2020-01-16 | End: 2020-09-16 | Stop reason: SDUPTHER

## 2020-02-03 ENCOUNTER — HOSPITAL ENCOUNTER (OUTPATIENT)
Age: 85
Discharge: HOME | End: 2020-02-03
Payer: MEDICARE

## 2020-02-03 LAB — PROSTATE SPECIFIC ANTIGEN: 0.3 NG/ML (ref 0–4)

## 2020-02-03 PROCEDURE — 36415 COLL VENOUS BLD VENIPUNCTURE: CPT

## 2020-02-03 PROCEDURE — 84153 ASSAY OF PSA TOTAL: CPT

## 2020-02-06 ENCOUNTER — HOSPITAL ENCOUNTER (OUTPATIENT)
Age: 85
Discharge: HOME | End: 2020-02-06
Payer: MEDICARE

## 2020-02-06 LAB
ALBUMIN SERPL-MCNC: 4.4 G/DL (ref 3.5–5.2)
ALP BLD-CCNC: 47 U/L (ref 40–129)
ALT SERPL-CCNC: 18 U/L (ref 0–40)
ANION GAP SERPL CALCULATED.3IONS-SCNC: 12 MMOL/L (ref 7–16)
AST SERPL-CCNC: 23 U/L (ref 0–39)
BASOPHILS ABSOLUTE: 0.02 E9/L (ref 0–0.2)
BASOPHILS RELATIVE PERCENT: 0.5 % (ref 0–2)
BILIRUB SERPL-MCNC: 1.1 MG/DL (ref 0–1.2)
BUN BLDV-MCNC: 22 MG/DL (ref 8–23)
CALCIUM SERPL-MCNC: 9.5 MG/DL (ref 8.6–10.2)
CHLORIDE BLD-SCNC: 104 MMOL/L (ref 98–107)
CHOLESTEROL, TOTAL: 125 MG/DL (ref 0–199)
CO2: 26 MMOL/L (ref 22–29)
CREAT SERPL-MCNC: 1.2 MG/DL (ref 0.7–1.2)
EOSINOPHILS ABSOLUTE: 0.1 E9/L (ref 0.05–0.5)
EOSINOPHILS RELATIVE PERCENT: 2.5 % (ref 0–6)
GFR AFRICAN AMERICAN: >60
GFR NON-AFRICAN AMERICAN: 58 ML/MIN/1.73
GLUCOSE BLD-MCNC: 110 MG/DL (ref 74–99)
HCT VFR BLD CALC: 34.9 % (ref 37–54)
HDLC SERPL-MCNC: 44 MG/DL
HEMOGLOBIN: 11.7 G/DL (ref 12.5–16.5)
IMMATURE GRANULOCYTES #: 0.02 E9/L
IMMATURE GRANULOCYTES %: 0.5 % (ref 0–5)
LDL CHOLESTEROL CALCULATED: 43 MG/DL (ref 0–99)
LYMPHOCYTES ABSOLUTE: 1.23 E9/L (ref 1.5–4)
LYMPHOCYTES RELATIVE PERCENT: 31.1 % (ref 20–42)
MCH RBC QN AUTO: 29.8 PG (ref 26–35)
MCHC RBC AUTO-ENTMCNC: 33.5 % (ref 32–34.5)
MCV RBC AUTO: 89 FL (ref 80–99.9)
MONOCYTES ABSOLUTE: 0.31 E9/L (ref 0.1–0.95)
MONOCYTES RELATIVE PERCENT: 7.8 % (ref 2–12)
NEUTROPHILS ABSOLUTE: 2.27 E9/L (ref 1.8–7.3)
NEUTROPHILS RELATIVE PERCENT: 57.6 % (ref 43–80)
PDW BLD-RTO: 13.9 FL (ref 11.5–15)
PHOSPHORUS: 3.8 MG/DL (ref 2.5–4.5)
PLATELET # BLD: 133 E9/L (ref 130–450)
PMV BLD AUTO: 10 FL (ref 7–12)
POTASSIUM SERPL-SCNC: 4.4 MMOL/L (ref 3.5–5)
RBC # BLD: 3.92 E12/L (ref 3.8–5.8)
SODIUM BLD-SCNC: 142 MMOL/L (ref 132–146)
TOTAL CK: 60 U/L (ref 20–200)
TOTAL PROTEIN: 6.8 G/DL (ref 6.4–8.3)
TRIGL SERPL-MCNC: 190 MG/DL (ref 0–149)
VLDLC SERPL CALC-MCNC: 38 MG/DL
WBC # BLD: 4 E9/L (ref 4.5–11.5)

## 2020-02-06 PROCEDURE — 84100 ASSAY OF PHOSPHORUS: CPT

## 2020-02-06 PROCEDURE — 80061 LIPID PANEL: CPT

## 2020-02-06 PROCEDURE — 36415 COLL VENOUS BLD VENIPUNCTURE: CPT

## 2020-02-06 PROCEDURE — 82550 ASSAY OF CK (CPK): CPT

## 2020-02-06 PROCEDURE — 85025 COMPLETE CBC W/AUTO DIFF WBC: CPT

## 2020-02-06 PROCEDURE — 80053 COMPREHEN METABOLIC PANEL: CPT

## 2020-02-12 ENCOUNTER — OFFICE VISIT (OUTPATIENT)
Dept: PRIMARY CARE CLINIC | Age: 85
End: 2020-02-12
Payer: MEDICARE

## 2020-02-12 VITALS
DIASTOLIC BLOOD PRESSURE: 64 MMHG | WEIGHT: 212 LBS | RESPIRATION RATE: 14 BRPM | BODY MASS INDEX: 30.42 KG/M2 | TEMPERATURE: 98.3 F | SYSTOLIC BLOOD PRESSURE: 128 MMHG | OXYGEN SATURATION: 97 % | HEART RATE: 78 BPM

## 2020-02-12 PROBLEM — I71.10 THORACIC ANEURYSM, RUPTURED (HCC): Status: ACTIVE | Noted: 2020-02-12

## 2020-02-12 PROBLEM — I71.10 THORACIC ANEURYSM, RUPTURED: Status: RESOLVED | Noted: 2020-02-12 | Resolved: 2020-02-12

## 2020-02-12 PROCEDURE — G8427 DOCREV CUR MEDS BY ELIG CLIN: HCPCS | Performed by: FAMILY MEDICINE

## 2020-02-12 PROCEDURE — 99215 OFFICE O/P EST HI 40 MIN: CPT | Performed by: FAMILY MEDICINE

## 2020-02-12 PROCEDURE — G8417 CALC BMI ABV UP PARAM F/U: HCPCS | Performed by: FAMILY MEDICINE

## 2020-02-12 PROCEDURE — 4040F PNEUMOC VAC/ADMIN/RCVD: CPT | Performed by: FAMILY MEDICINE

## 2020-02-12 PROCEDURE — G8482 FLU IMMUNIZE ORDER/ADMIN: HCPCS | Performed by: FAMILY MEDICINE

## 2020-02-12 PROCEDURE — 1123F ACP DISCUSS/DSCN MKR DOCD: CPT | Performed by: FAMILY MEDICINE

## 2020-02-12 PROCEDURE — 1036F TOBACCO NON-USER: CPT | Performed by: FAMILY MEDICINE

## 2020-02-12 RX ORDER — OMEGA-3-ACID ETHYL ESTERS 1 G/1
1 CAPSULE, LIQUID FILLED ORAL 2 TIMES DAILY
Qty: 180 CAPSULE | Refills: 3 | Status: SHIPPED
Start: 2020-02-12 | End: 2020-12-09 | Stop reason: SDUPTHER

## 2020-02-12 RX ORDER — ATORVASTATIN CALCIUM 40 MG/1
40 TABLET, FILM COATED ORAL DAILY
Qty: 90 TABLET | Refills: 3 | Status: SHIPPED
Start: 2020-02-12 | End: 2020-09-16 | Stop reason: SDUPTHER

## 2020-02-12 ASSESSMENT — PATIENT HEALTH QUESTIONNAIRE - PHQ9
SUM OF ALL RESPONSES TO PHQ QUESTIONS 1-9: 0
2. FEELING DOWN, DEPRESSED OR HOPELESS: 0
SUM OF ALL RESPONSES TO PHQ9 QUESTIONS 1 & 2: 0
1. LITTLE INTEREST OR PLEASURE IN DOING THINGS: 0
SUM OF ALL RESPONSES TO PHQ QUESTIONS 1-9: 0

## 2020-02-12 NOTE — PROGRESS NOTES
19  Oscar Perry : 1935 Sex: male  Age: 82 y.o. Chief Complaint   Patient presents with    Discuss Labs       HPI:       Patient presents today for follow-up. Here with his wife. Overall doing well. Chronic back pain. Switching from Dr. Mari Cortes because of insurance purposes, saw Dr. Kyara Ashby, going to see [facility] pain management. Known metastatic prostate cancer. Well aware of the lesions. We reviewed the MRI of the cervical spine and lumbar spine. Dr. Aquiles Maya recently started [facility] and he follows closely with him.     TSH  1.150, vitamin D 40 711/, PSA went from 0.07-0.3, glucose 110, LDL 43, triglyceride 190, HDL 44, white blood cell count 3.74.0, hemoglobin 10.7 11.7.. Triglycerides 190 HDL 44 LDL 43, platelet 376, CMP normal    Most Recent Labs  CBC  Lab Results   Component Value Date    WBC 4.0 2020    WBC 3.7 2019    WBC 3.9 2019    RBC 3.92 2020    RBC 3.55 2019    RBC 3.68 2019    HGB 11.7 2020    HGB 10.7 2019    HGB 11.4 2019    HCT 34.9 2020    HCT 32.4 2019    HCT 33.5 2019    MCV 89.0 2020    MCV 91.3 2019    MCV 91.0 2019     2020     2019     2019      CMP  Lab Results   Component Value Date     2020     10/03/2019     2019    K 4.4 2020    K 4.5 10/03/2019    K 4.5 2019     2020     10/03/2019     2019    CO2 26 2020    CO2 28 10/03/2019    CO2 28 2019    ANIONGAP 12 2020    ANIONGAP 11 10/03/2019    ANIONGAP 8 2019    GLUCOSE 110 2020    GLUCOSE 111 10/03/2019    GLUCOSE 101 2019    GLUCOSE 113 2011    GLUCOSE 93 2011    GLUCOSE 101 2011    BUN 22 2020    BUN 19 10/03/2019    BUN 20 2019    CREATININE 1.2 2020    CREATININE 1.2 10/03/2019    CREATININE 1.1 2019    LABGLOM 58 2020 LABGLOM 58 10/03/2019    LABGLOM >60 08/01/2019    GFRAA >60 02/06/2020    GFRAA >60 10/03/2019    GFRAA >60 08/01/2019    CALCIUM 9.5 02/06/2020    CALCIUM 9.8 10/03/2019    CALCIUM 9.6 08/01/2019    PROT 6.8 02/06/2020    PROT 7.1 08/01/2019    PROT 7.0 06/24/2019    LABALBU 4.4 02/06/2020    LABALBU 4.4 08/01/2019    LABALBU 4.4 06/24/2019    LABALBU 2.9 03/26/2011    LABALBU 3.0 03/25/2011    LABALBU 3.4 03/24/2011    BILITOT 1.1 02/06/2020    BILITOT 1.1 08/01/2019    BILITOT 0.7 06/24/2019    ALKPHOS 47 02/06/2020    ALKPHOS 41 08/01/2019    ALKPHOS 40 06/24/2019    AST 23 02/06/2020    AST 21 08/01/2019    AST 26 06/24/2019    ALT 18 02/06/2020    ALT 18 08/01/2019    ALT 17 06/24/2019     A1C  Lab Results   Component Value Date    LABA1C 4.6 08/01/2019    LABA1C 4.9 06/24/2019     TSH  Lab Results   Component Value Date    TSH 1.150 11/19/2019    TSH 2.880 08/01/2019    TSH 2.670 06/24/2019     FREET4  Lab Results   Component Value Date    K5MCMAG 9.5 01/31/2011     LIPID  Lab Results   Component Value Date    CHOL 125 02/06/2020    CHOL 132 08/01/2019    CHOL 194 06/24/2019    HDL 44 02/06/2020    HDL 37 08/01/2019    HDL 39 06/24/2019    LDLCALC 43 02/06/2020    LDLCALC 52 08/01/2019    LDLCALC 96 06/24/2019    TRIG 190 02/06/2020    TRIG 213 08/01/2019    TRIG 295 06/24/2019     VITAMIN D  Lab Results   Component Value Date    VITD25 47 11/19/2019    VITD25 47 08/01/2019     MAGNESIUM  Lab Results   Component Value Date    MG 1.8 11/19/2019    MG 1.7 10/03/2019    MG 1.9 08/01/2019      PHOS  Lab Results   Component Value Date    PHOS 3.8 02/06/2020    PHOS 4.3 10/03/2019    PHOS 4.0 08/01/2019      PAWAN   No results found for: PAWAN  RHEUMATOID FACTOR  No results found for: RF  PSA  Lab Results   Component Value Date    PSA 0.30 02/03/2020    PSA 0.07 11/05/2019    PSA 0.03 08/01/2019      HEPATITIS C  No results found for: HCVABI  HIV  No results found for: DEK1RDD, HIV1QT  UA  Lab Results   Component Value Date    COLORU Yellow 11/19/2019    COLORU Yellow 08/01/2019    COLORU Yellow 06/24/2019    CLARITYU Clear 11/19/2019    CLARITYU Clear 08/01/2019    CLARITYU Clear 06/24/2019    GLUCOSEU Negative 11/19/2019    GLUCOSEU Negative 08/01/2019    GLUCOSEU Negative 06/24/2019    GLUCOSEU NEGATIVE 12/21/2010    BILIRUBINUR Negative 11/19/2019    BILIRUBINUR Negative 08/01/2019    BILIRUBINUR Negative 06/24/2019    BILIRUBINUR NEGATIVE 12/21/2010    KETUA Negative 11/19/2019    KETUA Negative 08/01/2019    KETUA Negative 06/24/2019    SPECGRAV >=1.030 11/19/2019    SPECGRAV 1.025 08/01/2019    SPECGRAV 1.025 06/24/2019    BLOODU Negative 11/19/2019    BLOODU Negative 08/01/2019    BLOODU Negative 06/24/2019    PHUR 5.5 11/19/2019    PHUR 5.0 08/01/2019    PHUR 5.0 06/24/2019    PROTEINU Negative 11/19/2019    PROTEINU Negative 08/01/2019    PROTEINU Negative 06/24/2019    UROBILINOGEN 0.2 11/19/2019    UROBILINOGEN 0.2 08/01/2019    UROBILINOGEN 0.2 06/24/2019    NITRU Negative 11/19/2019    NITRU Negative 08/01/2019    NITRU Negative 06/24/2019    LEUKOCYTESUR Negative 11/19/2019    LEUKOCYTESUR Negative 08/01/2019    LEUKOCYTESUR Negative 06/24/2019     Urine Micro/Albumin Ratio  Lab Results   Component Value Date    MALBCR 6.7 11/19/2019    MALBCR - 08/01/2019    MALBCR - 06/24/2019           He also follow-up with Dr. [facility] who did identify blockage proximal femoral area that would require bypass as well as more distal blockages that would require stents but they felt management with observation best.  Did not feel medication was necessary. Dr. Gosia Pozo agreed with this plan. Had recent EMG nerve conduction study showing bilateral carpal tunnel syndrome. He was referred to Dr. Cris Davalos but didn't go because no sxs. Can't see Laisha Stone any more because of insurance, now [facility]      Health Maintenance: Well balanced/proper diet reviewed.  Counseled on MVI, bruising and rash or changing skin lesions. Neuro: Denies headache, numbness, stiff neck, tingling and focal weakness slurred speech or facial  Droop  Extremities: Denies calf pain, redness or swelling. Hema/Lymph: Denies bleeding/bruising tendency and enlarged lymph nodes. Current Outpatient Medications:     lisinopril (PRINIVIL;ZESTRIL) 20 MG tablet, Take 1 tablet by mouth daily, Disp: 90 tablet, Rfl: 3    denosumab (PROLIA) 60 MG/ML SOSY SC injection, Inject 60 mg into the skin every 6 months, Disp: , Rfl:     meclizine (ANTIVERT) 25 MG tablet, 1/2 - 1 by mouth every 6 hours as needed, Disp: , Rfl:     Leuprolide Acetate (LUPRON IJ), Inject as directed, Disp: , Rfl:     calcium carbonate-vitamin D (CALCIUM 600+D) 600-200 MG-UNIT TABS, Take by mouth, Disp: , Rfl:     Multiple Vitamin (MULTI-VITAMIN DAILY PO), Take by mouth, Disp: , Rfl:     atorvastatin (LIPITOR) 40 MG tablet, Take 1 tablet by mouth daily, Disp: 90 tablet, Rfl: 3    Multiple Vitamins-Minerals (THERAPEUTIC MULTIVITAMIN-MINERALS) tablet, Take 1 tablet by mouth daily, Disp: , Rfl:     metoprolol tartrate (LOPRESSOR) 25 MG tablet, Take 25 mg by mouth 2 times daily, Disp: , Rfl:     omeprazole (PRILOSEC) 20 MG delayed release capsule, Take 20 mg by mouth daily, Disp: , Rfl:     B Complex Vitamins (B COMPLEX PO), Take by mouth, Disp: , Rfl:     Coenzyme Q10 (COQ10) 50 MG CAPS, Take 1 capsule by mouth daily. , Disp: , Rfl:     aspirin 81 MG tablet, Take 81 mg by mouth daily. , Disp: , Rfl:     omega-3 acid ethyl esters (LOVAZA) 1 GM capsule, Take 1 g by mouth 2 times daily. , Disp: , Rfl:     Cyanocobalamin (VITAMIN B 12 PO), Take  by mouth daily.  sublingual , Disp: , Rfl:   Allergies   Allergen Reactions    Latex     Iodine     Other Hives       Past Medical History:   Diagnosis Date    Anemia     Aneurysm [facility] thoracic aorta ([facility]     Ascending aortic aneurysm ([facility] [address] 2/8/2011    Bilateral carpal tunnel syndrome 11/12/2019 unspecified vessel or lesion type     2. Mixed hyperlipidemia  atorvastatin (LIPITOR) 40 MG tablet   3. PVD (peripheral vascular disease) with claudication ([address]     4. Metabolic disorder     5. Prostate CA ([address]     6. Health maintenance examination     7. Disc disease, degenerative, lumbar or lumbosacral     8. Age-related osteoporosis without current pathological fracture     9. Vitamin B12 deficiency     10. Vitamin D deficiency     11. Pancytopenia ([address]     12. Tubular adenoma     13. Thyroid nodule     14. Hyperglycemia     15. Thoracic aortic aneurysm without rupture ([address]     16. Essential hypertension     17. Thoracic aneurysm, ruptured (HCC)         Disc disease, degenerative, lumbar or lumbosacral  Counseled extensively. Differential reviewed, including serious etiologies. Was Following with [facility], had the changed because of insurance, now going to see [facility] pain management, saw Dr. Benigno Griffin. Saw [place] [facility]. Counseled on injections, radiofrequency ablation etc.  Failed physical therapy. .    Bilateral carpal tunnel syndrome  Had emg/ncs Nov 2019 [facility]. Referred to [facility] but did not go because no symptoms  Health maintenance examination  Health maintenance issues discussed at length as above (2/2020) encouraged yearly. had flu  shot . Counseled on shingrix. Pancytopenia ([address]  Counseled extensively. Differential reviewed, including serious etiologies. Possibly from medication. .  Declines hematology. Monitor. Asymptomatic. Platelets since normalized    Low magnesium level  Minimally low/not at goal last time. Goals reviewed. Now on magnesium oxide 400 mg daily. Monitor. Vitamin D deficiency  Continue current therapy. Monitor. Vitamin B12 deficiency  Appropriate supplementation reviewed.    Vitamin B12 elevated last time, discussed backing down.   Monitor.  Risk of deficiency reviewed including anemia and neuropathy.  No longer on injection, await repeat    Hyperglycemia  Counseled as above. Monitor. Essential hypertension  Counseled. The risks of hypertension and hypotension reviewed. Watch closely ambulatory. Hyper and hypotensive precautions and parameters reviewed and written as well as parameters on pulse, call if out of range, ER dangers numbers. Lifestyle modification reviewed. Tolerating therapy. Tubular adenoma   Last colonoscopy Dr. Michael Morgan, 2/15 complaining repeat 3 years from then. Overdue. Declines now, fit cards or Cologuard. . Hemoglobin consistently low but stable.     Thyroid nodule  Counseled, incidental 7 mm right thyroid nodule on carotid ultrasound 3/19. Declines workup or follow-up now     Thoracic aneurysm, not ruptured  Counseled, potential serious is reviewed, if any symptoms directly to ER. Continue per Dr. Tomer Dorantes. He saw him 3/15 and had CT at least then. Dr. Tomer Dorantes discharged him at this point unless symptoms and he declines follow-up imaging     PVD (peripheral vascular disease) with claudication ([address]  Sees  Dr. Analia Keys, who had him on pletal, minimal results. Also saw Dr Meta Baumgarten vascular in [place] who states he did have a femoral blockage proximal that would require bypass as well as some distal blockages that would require stenting but they felt observation most appropriate. Will follow with them regularly. He did not feel medication was necessary or helpful at this time. They state Dr. Analia Keys agrees with this plan. He is comfortable with this plan at this point. Serious signs and symptoms watch were reviewed. Prostate CA ([address]  Metastatic. Continue per Dr. Andre Penaloza. PSA less than 0.01-0.07-0.3 , he had been on Lupron, had metastasis, since orchiectomy, did not tolerate xtandi so he stopped it , overall feeling well with rising PSA going to start Erleada    Mixed hyperlipidemia  Did very well on d.a.w. Crestor unfortunately severe myalgias on generic.  He could try to preauthorize d.a.w. but he would like to try Lipitor 1st with precautions. If ADRs which were reviewed at length discontinue. Counseling CoQ10 and vitamin D. .  We discussed appropriate dosing given his risk factors       Metabolic disorder  Counseled. Blood sugar had been recently borderline at 124, 125,117, [id number]. Admitted to a lot of [facility]; await labwork a months with A1c. Micro-macrovascular complications      Coronary artery disease involving native heart without angina pectoris  Continue per cardiology. Sees Dr. Rudd Necessary in August, had stress test 3/15. Asymptomatic now.     Age-related osteoporosis without current pathological fracture  Counseled extensively. He tolerated Fosamax but discontinued after he took from 7/09 through 7/16. Morbidity/mortality related to fracture reviewed. 11/13 bone density showed improvement, 12/15 -1.3 stable, 3/19 showed L2 -2.6, hip -1.0. Discussed the option of restarting alendronate with pros and cons reviewed, he has taken it twice. Now on prolia, had 10/17, q 6mos. Needs bw within 2wks          PLAN AS ABOVE:      Counseled extensively and differential diagnoses relevant to above were reviewed, including serious etiologies. Side effects and interactions of medications were reviewed. At this point simply wants to continue with specialists continue current therapy. Refills given. Declines blood work or follow-up for 4 months sooner as needed. Precautions reviewed. No follow-ups on file. Signs and symptoms to watch for discussed, serious signs and symptoms reviewed. ER if any. Over 40 minutes  spent with the patient in reviewing records, reviewing with patient/family, counseling, ordering,  prescribing, completing h&p, etc., with over 50% of the time spent face to face counseling. Robe Malone MD    Patients are advised to check with insurance company to ensure coverage and to fully understand benefits and cost prior to any testing.   This note was created with

## 2020-02-19 RX ORDER — OMEPRAZOLE 20 MG/1
20 CAPSULE, DELAYED RELEASE ORAL DAILY
Qty: 90 CAPSULE | Refills: 3 | Status: SHIPPED
Start: 2020-02-19 | End: 2020-09-16 | Stop reason: SDUPTHER

## 2020-03-05 NOTE — PROGRESS NOTES
[address]  Dept: [phone]        Patient:  Shari Lindquist DOB 1935    Date of Service:  20     Requesting Physician:  Zan Novoa DO    Reason for Consult:      Patient presents with complaints of bilateral back pain that started 4 years ago    HISTORY OF PRESENT ILLNESS:      Pain is constant and is described as aching, stabbing, throbbing and shooting. Pain does not radiate to both lower extremities. He  does not have weakness in the posterior calves (has been diagnosed with a vascular issue) of the both lower extremities and does not have bladder or bowel dysfunction. Alleviating factors include: sitting. Aggravating factors include:  Standing, walking, activity. He has been on anticoagulation medications to include ASA and has not been on herbal supplements. He is not diabetic. Imaging:   Lumbar MRI 2019 -   IMPRESSION:    Bone lesions at S1, S2 and in the right iliac wing worrisome for a marrow   infiltrative process.  Recommend clinical correlation and correlation   with nuclear medicine bone scanning. Dextroconvex scoliosis and multilevel malalignment associated with   multilevel spondylosis as detailed. L2-3 level, disc bulging with mild spinal canal stenosis and mild to   moderate right foraminal stenosis. L3-4 level, disc bulging with mild spinal canal stenosis and mild to   moderate left foraminal stenosis. L4-5 level, disc degeneration and disc osteophyte complex with mild to   moderate foraminal stenosis. L5-S1 level, grade 1 anterolisthesis from bilateral L5 pars defects and   degenerative disc disease with moderate to severe foraminal stenosis and   impingement of the L5 nerve roots. Anatomic Thoracic/Lumbar Variant: None.  L4-5 is considered the level of   the iliac crest and assume there are 5 lumbar-type vertebrae.     There are hepatic and renal cysts.  Recommend correlation with ultrasound. Cervical MRI 7/2019 -   IMPRESSION:    10 mm low signal intensity lesion at the right side of the C6 vertebral   body and 7 mm signal intensity lesion at the base of C2 are worrisome for   a marrow infiltrative process such as osteoblastic metastasis.  Clinical   correlation and correlation with nuclear medicine bone scanning   recommended for further evaluation. Levoconvex scoliosis and multilevel malalignment associated multilevel   spondylosis most notably moderate to advanced degenerative disc disease   at C6-7 and mild to moderate degenerative disc disease at C5-6 and C4-5. There is no substantial spinal canal stenosis. There is multilevel acquired foraminal stenosis most notably moderate to   severe left and moderate right foraminal stenosis at C5-6. Anatomic Variant:  None.  Assume 7 cervical vertebrae with counting from   the craniocervical junction. Previous treatments: Epidural Steroid Injection and lumbar MNBB, OTC medications. Past Medical History:   Diagnosis Date    Anemia     Aneurysm of thoracic aorta (HCC)     Ascending aortic aneurysm (HCC) 2/8/2011    Bilateral carpal tunnel syndrome 11/12/2019    CA prostate, adenoca ([facility] [address] 4/25/2019    CAD (coronary artery disease)     Cancer (HCC)     prostate    Chronic bilateral low back pain without sciatica 3/6/2020    Cobalamin deficiency     Hyperlipidemia     Hypertension     Multiple closed fractures of pelvis with stable disruption of pelvic [place] (HCC)     Osteoarthritis     Osteochondropathy     Osteopenia     Pain in lower limb     Peripheral venous insufficiency     PVD (peripheral vascular disease) with claudication ([address] 4/25/2019    S/P CABG (coronary artery bypass graft) 1996    SOB (shortness of breath)     Thoracic aneurysm     ASCENDING AORTIC ANEURYSM (MILD0 4. O CM    Ventricular bigeminy        Past Surgical History:   Procedure Laterality Date    ACETABULUM FRACTURE SURGERY 3/24/11    CHOLECYSTECTOMY      CORONARY ARTERY BYPASS GRAFT  Dec. 1996    [facility]/ Dr. Merary Raya    left   [address] [facility] SURGERY  10/31/07    AMIRAH STREETER M.D./ DA [facility] LAPAROSCOPIC PROSTATECTOMY    PROSTATECTOMY      TOTAL HIP ARTHROPLASTY  11/30/11    right    TOTAL KNEE ARTHROPLASTY  2010    bilateral       Prior to Admission medications    Medication Sig Start Date End Date Taking? Authorizing Provider   ERLEADA 60 MG TABS  3/2/20  Yes Historical Provider, MD   diphenhydrAMINE (BENADRYL) 25 MG tablet Take 2 tablets by mouth once for 1 dose Dose should be given 1 hour prior to contrast media administration. 3/6/20 3/6/20 Yes Chrissy Meals, DO   predniSONE (DELTASONE) 50 MG tablet Take 1 tablet by mouth every 6 hours for 3 doses First dose should be given 13 hours prior to contrast media administration.  3/6/20 3/7/20 Yes Chrissy Meals, DO   omeprazole (PRILOSEC) 20 MG delayed release capsule Take 1 capsule by mouth daily 2/19/20  Yes Ashu Drake MD   omega-3 acid ethyl esters (LOVAZA) 1 g capsule Take 1 capsule by mouth 2 times daily 2/12/20  Yes Ashu Drake MD   atorvastatin (LIPITOR) 40 MG tablet Take 1 tablet by mouth daily 2/12/20  Yes Ashu Drake MD   lisinopril (PRINIVIL;ZESTRIL) 20 MG tablet Take 1 tablet by mouth daily 1/16/20  Yes Ashu Drake MD   denosumab (PROLIA) 60 MG/ML SOSY SC injection Inject 60 mg into the skin every 6 months   Yes Historical Provider, MD   meclizine (ANTIVERT) 25 MG tablet 1/2 - 1 by mouth every 6 hours as needed   Yes Historical Provider, MD   calcium carbonate-vitamin D (CALCIUM 600+D) 600-200 MG-UNIT TABS Take by mouth   Yes Historical Provider, MD   Multiple Vitamin (MULTI-VITAMIN DAILY PO) Take by mouth   Yes Historical Provider, MD   Multiple Vitamins-Minerals (THERAPEUTIC MULTIVITAMIN-MINERALS) tablet Take 1 tablet by mouth daily   Yes Historical Provider, MD metoprolol tartrate (LOPRESSOR) 25 MG tablet Take 25 mg by mouth 2 times daily   Yes Historical Provider, MD   B Complex Vitamins (B COMPLEX PO) Take by mouth   Yes Historical Provider, MD   Coenzyme Q10 (COQ10) 50 MG CAPS Take 1 capsule by mouth daily. Yes Historical Provider, MD   aspirin 81 MG tablet Take 81 mg by mouth daily. Yes Historical Provider, MD   Cyanocobalamin (VITAMIN B 12 PO) Take  by mouth daily. sublingual  11  Yes Historical Provider, MD       Allergies   Allergen Reactions    Latex     Iodine     Other Hives       Social History     Socioeconomic History    Marital status:      Spouse name: Not on file    Number of children: Not on file    Years of education: Not on file    Highest education level: Not on file   Occupational History    Not on file   Social Needs    Financial resource strain: Not on file    Food insecurity:     Worry: Not on file     Inability: Not on file    Transportation needs:     Medical: Not on file     Non-medical: Not on file   Tobacco Use    Smoking status: Former Smoker     Packs/day: 1.00     Years: 40.00     Pack years: 40.00     Types: Cigarettes     Last attempt to quit: 10/1/1994     Years since quittin.4    Smokeless tobacco: Former User     Types: Chew   Substance and Sexual Activity    Alcohol use:  Yes     Alcohol/week: 0.0 standard drinks     Types: 2 - 3 Cans of beer per week     Comment: socially    Drug use: No    Sexual activity: Not on file   Lifestyle    Physical activity:     Days per week: Not on file     Minutes per session: Not on file    Stress: Not on file   Relationships    Social connections:     Talks on phone: Not on file     Gets together: Not on file     Attends Yazidism service: Not on file     Active member of club or organization: Not on file     Attends meetings of clubs or organizations: Not on file     Relationship status: Not on file    Intimate partner violence:     Fear of current or ex atraumatic  Pupils:regular, round, equal  Sclera: icterus absent    Lungs:    Breathing:normal breathing pattern    Abdomen:    Shape:non-distended and normal  Tenderness:none  Guarding:none    Lumbar spine:    Spine inspection:normal   CVA tenderness:No   Palpation:tenderness paravertebral muscles, left, right and positive. Range of motion:abnormal mildly in lateral bending, flexion, extension rotation bilateral and is not painful. Musculoskeletal:    Trigger points in Paraveteral:absent bilaterally  SI joint tenderness:negative right, negative left              JELENA test:not done right, not done left  Piriformis tenderness:negative right, negative left  Trochanteric bursa tenderness:negative right, negative left  SLR:negative right, negative left, sitting     Extremities:    Tremors:None bilaterally upper and lower  Range of motion:pain with internal rotation of hips negative.   Intact:Yes  Varicose veins:absent   Pulses:present Lt radial  Cyanosis:none  Edema:none x all 4 extremities    Neurological:    Sensory:normal to light touch BLE  Motor:                Right Quadriceps5/5          Left Quadriceps5/5           Right Gastrocnemius5/5    Left Gastrocnemius5/5  Right Ant Tibialis5/5  Left Ant Tibialis5/5    Reflexes:    Right Quadriceps reflex2+  Left Quadriceps reflex2+  Right Achilles reflex2+  Left Achilles reflex2+  Gait:normal    Dermatology:    Skin:no rashes or lesions noted    Assessment/Plan:  Axial LBP in region of L-S junction particularly when walking   7/2019 Lumbar MRI shows metastatic (prostate CA) S1-S2 lesion and L5-S1 grade 1-2 anterolisthesis with b/l pars defect  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 Ayo Ferrer  Referred by Dr. Harsha [place] for consideration for further injections as Dr. Ayo Ferrer no longer takes his insurance    Reviewed referral documents and imaging  [facility] report

## 2020-03-06 ENCOUNTER — OFFICE VISIT (OUTPATIENT)
Dept: PAIN MANAGEMENT | Age: 85
End: 2020-03-06
Payer: MEDICARE

## 2020-03-06 VITALS
WEIGHT: 212 LBS | HEART RATE: 86 BPM | RESPIRATION RATE: 18 BRPM | HEIGHT: 70 IN | OXYGEN SATURATION: 95 % | BODY MASS INDEX: 30.35 KG/M2 | SYSTOLIC BLOOD PRESSURE: 118 MMHG | DIASTOLIC BLOOD PRESSURE: 70 MMHG | TEMPERATURE: 97.6 F

## 2020-03-06 PROBLEM — G89.4 CHRONIC PAIN SYNDROME: Status: ACTIVE | Noted: 2020-03-06

## 2020-03-06 PROBLEM — M43.16 SPONDYLOLISTHESIS OF LUMBAR REGION: Status: ACTIVE | Noted: 2020-03-06

## 2020-03-06 PROBLEM — G89.29 CHRONIC BILATERAL LOW BACK PAIN WITHOUT SCIATICA: Status: ACTIVE | Noted: 2020-03-06

## 2020-03-06 PROBLEM — C79.51 PROSTATE CANCER METASTATIC TO BONE (HCC): Status: ACTIVE | Noted: 2020-03-06

## 2020-03-06 PROBLEM — M54.50 CHRONIC BILATERAL LOW BACK PAIN WITHOUT SCIATICA: Status: ACTIVE | Noted: 2020-03-06

## 2020-03-06 PROBLEM — C61 PROSTATE CANCER METASTATIC TO BONE (HCC): Status: ACTIVE | Noted: 2020-03-06

## 2020-03-06 PROCEDURE — G8417 CALC BMI ABV UP PARAM F/U: HCPCS | Performed by: PAIN MEDICINE

## 2020-03-06 PROCEDURE — 1123F ACP DISCUSS/DSCN MKR DOCD: CPT | Performed by: PAIN MEDICINE

## 2020-03-06 PROCEDURE — 4040F PNEUMOC VAC/ADMIN/RCVD: CPT | Performed by: PAIN MEDICINE

## 2020-03-06 PROCEDURE — G8482 FLU IMMUNIZE ORDER/ADMIN: HCPCS | Performed by: PAIN MEDICINE

## 2020-03-06 PROCEDURE — 1036F TOBACCO NON-USER: CPT | Performed by: PAIN MEDICINE

## 2020-03-06 PROCEDURE — 99204 OFFICE O/P NEW MOD 45 MIN: CPT | Performed by: PAIN MEDICINE

## 2020-03-06 PROCEDURE — G8427 DOCREV CUR MEDS BY ELIG CLIN: HCPCS | Performed by: PAIN MEDICINE

## 2020-03-06 RX ORDER — PREDNISONE 50 MG/1
50 TABLET ORAL EVERY 6 HOURS
Qty: 3 TABLET | Refills: 0 | Status: SHIPPED | OUTPATIENT
Start: 2020-03-06 | End: 2020-03-07

## 2020-03-06 RX ORDER — DIPHENHYDRAMINE HCL 25 MG
50 TABLET ORAL ONCE
Qty: 2 TABLET | Refills: 0 | Status: SHIPPED | OUTPATIENT
Start: 2020-03-06 | End: 2020-03-06

## 2020-03-06 RX ORDER — APALUTAMIDE 60 MG/1
60 TABLET, FILM COATED ORAL
COMMUNITY
Start: 2020-03-02

## 2020-03-07 ENCOUNTER — HOSPITAL ENCOUNTER (OUTPATIENT)
Age: 85
Discharge: HOME | End: 2020-03-07
Payer: MEDICARE

## 2020-03-07 LAB
BUN BLDV-MCNC: 27 MG/DL (ref 8–23)
CREAT SERPL-MCNC: 1.3 MG/DL (ref 0.7–1.2)
GFR AFRICAN AMERICAN: >60
GFR NON-AFRICAN AMERICAN: 53 ML/MIN/1.73

## 2020-03-07 PROCEDURE — 82565 ASSAY OF CREATININE: CPT

## 2020-03-07 PROCEDURE — 84520 ASSAY OF UREA NITROGEN: CPT

## 2020-03-07 PROCEDURE — 36415 COLL VENOUS BLD VENIPUNCTURE: CPT

## 2020-03-13 ENCOUNTER — HOSPITAL ENCOUNTER (OUTPATIENT)
Age: 85
Discharge: HOME | End: 2020-03-15
Payer: MEDICARE

## 2020-03-13 LAB — PROSTATE SPECIFIC ANTIGEN: <0.01 NG/ML (ref 0–4)

## 2020-03-13 PROCEDURE — 84153 ASSAY OF PSA TOTAL: CPT

## 2020-03-23 RX ORDER — DENOSUMAB 60 MG/ML
60 INJECTION SUBCUTANEOUS
Qty: 1 SYRINGE | Refills: 0 | Status: SHIPPED
Start: 2020-03-23 | End: 2020-10-29 | Stop reason: SDUPTHER

## 2020-03-24 ENCOUNTER — TELEPHONE (OUTPATIENT)
Dept: PAIN MANAGEMENT | Age: 85
End: 2020-03-24

## 2020-03-24 NOTE — TELEPHONE ENCOUNTER
Spoke to wife regarding apt with Dr Romayne Basset at 12:30 on 4-1 due to the Covid 19 the [facility] office is closed, Dr Romayne Basset will be doing telephone visit.

## 2020-04-01 ENCOUNTER — VIRTUAL VISIT (OUTPATIENT)
Dept: PAIN MANAGEMENT | Age: 85
End: 2020-04-01
Payer: MEDICARE

## 2020-04-01 PROCEDURE — 99442 PR PHYS/QHP TELEPHONE EVALUATION 11-20 MIN: CPT | Performed by: PAIN MEDICINE

## 2020-04-01 NOTE — PROGRESS NOTES
infected or exposed.   ; Call 911 if you have a medical emergency: If you have a medical emergency and need to call 911, notify the dispatch personnel that you have, or are being evaluated for COVID-19. If possible, put on a facemask before emergency medical services arrive. I affirm this is a Patient Initiated Episode with an Established Patient who has not had a related appointment within my department in the past 7 days or scheduled within the next 24 hours.     Total Time: minutes: 11-20 minutes    Note: not billable if this call serves to triage the patient into an appointment for the relevant concern

## 2020-04-18 ENCOUNTER — HOSPITAL ENCOUNTER (OUTPATIENT)
Dept: MRI IMAGING | Age: 85
Discharge: HOME | End: 2020-04-20
Payer: MEDICARE

## 2020-04-18 PROCEDURE — A9577 INJ MULTIHANCE: HCPCS | Performed by: RADIOLOGY

## 2020-04-18 PROCEDURE — 72158 MRI LUMBAR SPINE W/O & W/DYE: CPT

## 2020-04-18 PROCEDURE — 6360000004 HC RX CONTRAST MEDICATION: Performed by: RADIOLOGY

## 2020-04-18 RX ADMIN — GADOBENATE DIMEGLUMINE 19 ML: 529 INJECTION, SOLUTION INTRAVENOUS at 12:06

## 2020-04-27 ENCOUNTER — TELEPHONE (OUTPATIENT)
Dept: PAIN MANAGEMENT | Age: 85
End: 2020-04-27

## 2020-04-27 NOTE — TELEPHONE ENCOUNTER
Nestor Toni called, Robert Cabrera had the MRI on 4-13-20 and she did speak to Dr. Vandana Espana, but she called to see if there was any new information since the first look at them.   Please call her at [phone] or [phone]

## 2020-05-01 ENCOUNTER — PREP FOR PROCEDURE (OUTPATIENT)
Dept: PAIN MANAGEMENT | Age: 85
End: 2020-05-01

## 2020-05-01 ENCOUNTER — HOSPITAL ENCOUNTER (OUTPATIENT)
Age: 85
Discharge: HOME | End: 2020-05-01
Payer: MEDICARE

## 2020-05-01 PROBLEM — M54.16 LUMBAR RADICULOPATHY: Status: ACTIVE | Noted: 2020-05-01

## 2020-05-01 LAB — PROSTATE SPECIFIC ANTIGEN: <0.01 NG/ML (ref 0–4)

## 2020-05-01 PROCEDURE — 84153 ASSAY OF PSA TOTAL: CPT

## 2020-05-01 PROCEDURE — 36415 COLL VENOUS BLD VENIPUNCTURE: CPT

## 2020-05-06 ENCOUNTER — TELEPHONE (OUTPATIENT)
Dept: PRIMARY CARE CLINIC | Age: 85
End: 2020-05-06

## 2020-05-06 ENCOUNTER — TELEPHONE (OUTPATIENT)
Dept: PAIN MANAGEMENT | Age: 85
End: 2020-05-06

## 2020-05-06 NOTE — TELEPHONE ENCOUNTER
Call to  [facility] that procedure was approved for 5/19/2020 and that the surgery center should call him a few days before for the pre op call and after 3:00 PM the business day before with the arrival time. Instructed for Loni Rosales to hold ibuprofen for 24 hours, naprosyn for 4 days, any aspirin containing products and fish oil for 7 days. Instructed to call office back if any questions. Instructed of need for COVID-19 testing and self quarantining. Maria Guadalupe verbalized understanding.     Loni Rosales 's caregivers response to the following screening questions:    Fever: No  Headache:  No  Cough: No  Shortness of breath: No  Exposed to anyone with these symptoms: No

## 2020-05-11 ENCOUNTER — NURSE ONLY (OUTPATIENT)
Dept: PRIMARY CARE CLINIC | Age: 85
End: 2020-05-11
Payer: MEDICARE

## 2020-05-11 PROCEDURE — 96372 THER/PROPH/DIAG INJ SC/IM: CPT | Performed by: FAMILY MEDICINE

## 2020-05-15 ENCOUNTER — HOSPITAL ENCOUNTER (OUTPATIENT)
Age: 85
Discharge: HOME | End: 2020-05-17
Payer: MEDICARE

## 2020-05-15 PROCEDURE — U0003 INFECTIOUS AGENT DETECTION BY NUCLEIC ACID (DNA OR RNA); SEVERE ACUTE RESPIRATORY SYNDROME CORONAVIRUS 2 (SARS-COV-2) (CORONAVIRUS DISEASE [COVID-19]), AMPLIFIED PROBE TECHNIQUE, MAKING USE OF HIGH THROUGHPUT TECHNOLOGIES AS DESCRIBED BY CMS-2020-01-R: HCPCS

## 2020-05-16 LAB
SARS-COV-2: NOT DETECTED
SOURCE: NORMAL

## 2020-05-19 ENCOUNTER — HOSPITAL ENCOUNTER (OUTPATIENT)
Dept: GENERAL RADIOLOGY | Age: 85
Discharge: HOME | End: 2020-05-21
Attending: PAIN MEDICINE
Payer: MEDICARE

## 2020-05-19 ENCOUNTER — HOSPITAL ENCOUNTER (OUTPATIENT)
Age: 85
Setting detail: OUTPATIENT SURGERY
Discharge: HOME | End: 2020-05-19
Attending: PAIN MEDICINE | Admitting: PAIN MEDICINE
Payer: MEDICARE

## 2020-05-19 VITALS
HEIGHT: 70 IN | TEMPERATURE: 96.8 F | OXYGEN SATURATION: 96 % | RESPIRATION RATE: 16 BRPM | WEIGHT: 205 LBS | HEART RATE: 72 BPM | BODY MASS INDEX: 29.35 KG/M2 | SYSTOLIC BLOOD PRESSURE: 116 MMHG | DIASTOLIC BLOOD PRESSURE: 56 MMHG

## 2020-05-19 PROCEDURE — 2709999900 HC NON-CHARGEABLE SUPPLY: Performed by: PAIN MEDICINE

## 2020-05-19 PROCEDURE — 6360000002 HC RX W HCPCS: Performed by: PAIN MEDICINE

## 2020-05-19 PROCEDURE — 7100000011 HC PHASE II RECOVERY - ADDTL 15 MIN: Performed by: PAIN MEDICINE

## 2020-05-19 PROCEDURE — 3600000002 HC SURGERY LEVEL 2 BASE: Performed by: PAIN MEDICINE

## 2020-05-19 PROCEDURE — 6360000004 HC RX CONTRAST MEDICATION: Performed by: PAIN MEDICINE

## 2020-05-19 PROCEDURE — 2580000003 HC RX 258: Performed by: PAIN MEDICINE

## 2020-05-19 PROCEDURE — A9579 GAD-BASE MR CONTRAST NOS,1ML: HCPCS | Performed by: PAIN MEDICINE

## 2020-05-19 PROCEDURE — 7100000010 HC PHASE II RECOVERY - FIRST 15 MIN: Performed by: PAIN MEDICINE

## 2020-05-19 PROCEDURE — 2500000003 HC RX 250 WO HCPCS: Performed by: PAIN MEDICINE

## 2020-05-19 PROCEDURE — 3209999900 FLUORO FOR SURGICAL PROCEDURES

## 2020-05-19 PROCEDURE — 62323 NJX INTERLAMINAR LMBR/SAC: CPT | Performed by: PAIN MEDICINE

## 2020-05-19 RX ORDER — LIDOCAINE HYDROCHLORIDE 5 MG/ML
INJECTION, SOLUTION INFILTRATION; INTRAVENOUS PRN
Status: DISCONTINUED | OUTPATIENT
Start: 2020-05-19 | End: 2020-05-19 | Stop reason: ALTCHOICE

## 2020-05-19 RX ORDER — METHYLPREDNISOLONE ACETATE 40 MG/ML
INJECTION, SUSPENSION INTRA-ARTICULAR; INTRALESIONAL; INTRAMUSCULAR; SOFT TISSUE PRN
Status: DISCONTINUED | OUTPATIENT
Start: 2020-05-19 | End: 2020-05-19 | Stop reason: ALTCHOICE

## 2020-05-19 RX ORDER — SODIUM CHLORIDE 9 MG/ML
INJECTION INTRAVENOUS PRN
Status: DISCONTINUED | OUTPATIENT
Start: 2020-05-19 | End: 2020-05-19 | Stop reason: ALTCHOICE

## 2020-05-19 ASSESSMENT — PAIN SCALES - GENERAL
PAINLEVEL_OUTOF10: 0
PAINLEVEL_OUTOF10: 0

## 2020-05-19 ASSESSMENT — PAIN - FUNCTIONAL ASSESSMENT: PAIN_FUNCTIONAL_ASSESSMENT: 0-10

## 2020-05-19 NOTE — H&P
release capsule Take 1 capsule by mouth daily 20  Yes Morales Braden MD   omega-3 acid ethyl esters (LOVAZA) 1 g capsule Take 1 capsule by mouth 2 times daily 20  Yes Morales Braden MD   atorvastatin (LIPITOR) 40 MG tablet Take 1 tablet by mouth daily 20  Yes Morales Braden MD   lisinopril (PRINIVIL;ZESTRIL) 20 MG tablet Take 1 tablet by mouth daily 20  Yes Morales Braden MD   meclizine (ANTIVERT) 25 MG tablet 1/2 - 1 by mouth every 6 hours as needed   Yes Historical Provider, MD   Multiple Vitamins-Minerals (THERAPEUTIC MULTIVITAMIN-MINERALS) tablet Take 1 tablet by mouth daily   Yes Historical Provider, MD   metoprolol tartrate (LOPRESSOR) 25 MG tablet Take 25 mg by mouth 2 times daily   Yes Historical Provider, MD   Coenzyme Q10 (COQ10) 50 MG CAPS Take 1 capsule by mouth daily. Yes Historical Provider, MD   aspirin 81 MG tablet Take 81 mg by mouth daily. Yes Historical Provider, MD   Cyanocobalamin (VITAMIN B 12 PO) Take  by mouth daily. sublingual  11  Yes Historical Provider, MD       Allergies   Allergen Reactions    Latex     Iodine     Other Hives       Social History     Socioeconomic History    Marital status:      Spouse name: Not on file    Number of children: Not on file    Years of education: Not on file    Highest education level: Not on file   Occupational History    Not on file   Social Needs    Financial resource strain: Not on file    Food insecurity     Worry: Not on file     Inability: Not on file    Transportation needs     Medical: Not on file     Non-medical: Not on file   Tobacco Use    Smoking status: Former Smoker     Packs/day: 1.00     Years: 40.00     Pack years: 40.00     Types: Cigarettes     Last attempt to quit: 10/1/1994     Years since quittin.6    Smokeless tobacco: Former User     Types: Chew   Substance and Sexual Activity    Alcohol use:  Yes     Alcohol/week: 1.0 standard drinks     Types: 1 Cans of beer per week Comment: rare    Drug use: No    Sexual activity: Not on file   Lifestyle    Physical activity     Days per week: Not on file     Minutes per session: Not on file    Stress: Not on file   Relationships    Social connections     Talks on phone: Not on file     Gets together: Not on file     Attends Religion service: Not on file     Active member of club or organization: Not on file     Attends meetings of clubs or organizations: Not on file     Relationship status: Not on file    Intimate partner violence     Fear of current or ex partner: Not on file     Emotionally abused: Not on file     Physically abused: Not on file     Forced sexual activity: Not on file   Other Topics Concern    Not on file   Social History Narrative    Problem List: History of polyp of colon, Disorder of bone density and structure, unspecified,    Osteochondropathy, Carcinoma in situ of prostate, Aneurysm of thoracic aorta, Anemia, Coronary    arteriosclerosis, Cobalamin deficiency, Multiple closed fractures of pelvis with disruption of pelvic [place],    Hyperlipidemia, Essential hypertension    Health Maintenance:    Bone Density Test Screening - (3/5/2019)    Bone Density Scan - (12/18/2015)    Influenza Vaccination - (10/7/2016)    Colonoscopy - (4/3/2012)    Couseled on Home Safety - (11/23/2015)    Colonoscopy Screening - (4/3/2012)    US Liver - 8/1/08    Medical Problems:    Coronary Artery Disease (CAD), Hypertension, Hyperlipidemia, Prostate Cancer    Surgical Hx:    Prostatectomy, Coronary Artery Bypass Graft (CABG)            FH: Father:    . (Hx)    Mother:    . (Hx)        SH: Marital:  - retired . Personal Habits: Cigarette Use: former cigarette smoker, Nonsmoker. Alcohol: Occasionally    consumes alcohol.        Family History   Problem Relation Age of Onset    Stroke Mother     Cancer Mother         pancreatic    Cancer Father         prostate    Stroke Father          REVIEW OF SYSTEMS: CONSTITUTIONAL:  negative for  fevers, chills, sweats and fatigue    RESPIRATORY:  negative for  dry cough, cough with sputum, dyspnea, wheezing and chest pain    CARDIOVASCULAR:  negative for chest pain, dyspnea, palpitations, syncope    GASTROINTESTINAL:  negative for nausea, vomiting, change in bowel habits, diarrhea, constipation and abdominal pain    MUSCULOSKELETAL: negative for muscle weakness    SKIN: negative for itching or rashes. BEHAVIOR/PSYCH:  negative for poor appetite, increased appetite, decreased sleep and poor concentration    All other systems negative      PHYSICAL EXAM:    VITALS:  Ht 5' 10\" (1.778 m)   Wt 205 lb (93 kg)   BMI 29.41 kg/m²     CONSTITUTIONAL:  awake, alert, cooperative, no apparent distress, and appears stated age    EYES: PERRLA, EOMI    LUNGS:  No increased work of breathing, no audible wheezing    CARDIOVASCULAR:  regular rate and rhythm    ABDOMEN:  Soft non tender non distended     EXTREMITIES: no signs of clubbing or cyanosis. MUSCULOSKELETAL: negative for flaccid muscle tone or spastic movements. SKIN: gross examination reveals no signs of rashes, or diaphoresis. NEURO: Cranial nerves II-XII grossly intact. No signs of agitated mood.        Assessment/Plan:    LBP BLE pain for caudal EVGENY

## 2020-05-19 NOTE — PROGRESS NOTES
Patient ambulating in room and no complaints of weakness. Patient and wife given discharge instructions and verbally understands.

## 2020-05-19 NOTE — OP NOTE
Lidocaine and 40 mg DepoMedrol, total of 15 cc, was easily injected . There was a clear outline of the epidural space and visualization of the sacral roots. The needle was then removed and a sterile Band-Aid was applied to the puncture site. Disposition the patient tolerated the procedure well and there were no complications . Vital signs remained stable throughout the procedure. The patient was escorted to the recovery area where they remained until discharge and written discharge instructions for the procedure were given. Plan: Brea Harmon will return to our pain management center as scheduled.      Florin Blank DO

## 2020-05-26 NOTE — PROGRESS NOTES
on file     Forced sexual activity: Not on file   Other Topics Concern    Not on file   Social History Narrative    Problem List: History of polyp of colon, Disorder of bone density and structure, unspecified,    Osteochondropathy, Carcinoma in situ of prostate, Aneurysm of thoracic aorta, Anemia, Coronary    arteriosclerosis, Cobalamin deficiency, Multiple closed fractures of pelvis with disruption of pelvic [place],    Hyperlipidemia, Essential hypertension    Health Maintenance:    Bone Density Test Screening - (3/5/2019)    Bone Density Scan - (12/18/2015)    Influenza Vaccination - (10/7/2016)    Colonoscopy - (4/3/2012)    Couseled on Home Safety - (11/23/2015)    Colonoscopy Screening - (4/3/2012)    US Liver - 8/1/08    Medical Problems:    Coronary Artery Disease (CAD), Hypertension, Hyperlipidemia, Prostate Cancer    Surgical Hx:    Prostatectomy, Coronary Artery Bypass Graft (CABG)            FH: Father:    . (Hx)    Mother:    . (Hx)        SH: Marital:  - retired . Personal Habits: Cigarette Use: former cigarette smoker, Nonsmoker. Alcohol: Occasionally    consumes alcohol. Family History   Problem Relation Age of Onset    Stroke Mother     Cancer Mother         pancreatic    Cancer Father         prostate    Stroke Father        REVIEW OF SYSTEMS:     Robert Cabrera denies fever/chills, chest pain, shortness of breath, new bowel or bladder complaints. All other review of systems was negative.     PHYSICAL EXAMINATION:      /63   Pulse 65   Temp 97.9 °F (36.6 °C) (Oral)   Resp 16   Ht 5' 9\" (1.753 m)   Wt 210 lb (95.3 kg)   SpO2 97%   BMI 31.01 kg/m²     General:       General appearance:pleasant and well-hydrated, in no distress and A & O x3  Build:Normal Weight  Function:Rises from a seated position with mild difficulty     HEENT:     Head:normocephalic, atraumatic  Pupils:regular, round, equal  Sclera: icterus absent     Lungs:     Breathing:normal breathing pattern     Abdomen:     Shape:non-distended and normal  Tenderness:none  Guarding:none     Lumbar spine:     Spine inspection:normal   CVA tenderness:No   Palpation:tenderness paravertebral muscles, left, right and positive, + pain with facet loading. Range of motion:abnormal mildly in lateral bending, flexion, extension rotation bilateral and is not painful.     Musculoskeletal:     Trigger points in Paraveteral:absent bilaterally  SI joint tenderness:negative right, negative left              JELENA test:not done right, not done left  Piriformis tenderness:negative right, negative left  Trochanteric bursa tenderness:negative right, negative left  SLR:negative right, negative left, sitting      Extremities:     Tremors:None bilaterally upper and lower  Range of motion:pain with internal rotation of hips negative.   Intact:Yes  Varicose veins:absent   Pulses:present Lt radial  Cyanosis:none  Edema:none x all 4 extremities     Neurological:     Sensory:normal to light touch BLE  Motor:                Right Quadriceps5/5          Left Quadriceps5/5           Right Gastrocnemius5/5    Left Gastrocnemius5/5  Right Ant Tibialis5/5  Left Ant Tibialis5/5     Reflexes:    Right Quadriceps reflex2+  Left Quadriceps reflex2+  Right Achilles reflex2+  Left Achilles reflex2+  Gait:normal     Dermatology:     Skin:no rashes or lesions noted    Assessment/Plan:    Axial LBP in region of L-S junction particularly when walking   2020 Lumbar MRI shows metastatic (prostate CA) S1-S2 lesion and L5-S1 grade 1-2 anterolisthesis with b/l pars defect  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 Chery Ramos  Referred by Dr. David Dwyer for consideration for further injections as Dr. Chery Ramos no longer takes his insurance     Pt's wife states she has discussed his LE symptoms and they have determined they feel his LE weakness is a vascular

## 2020-05-27 ENCOUNTER — HOSPITAL ENCOUNTER (OUTPATIENT)
Dept: PET IMAGING | Age: 85
Discharge: HOME | End: 2020-05-29
Payer: MEDICARE

## 2020-05-27 PROCEDURE — 78815 PET IMAGE W/CT SKULL-THIGH: CPT

## 2020-05-27 PROCEDURE — 3430000000 HC RX DIAGNOSTIC RADIOPHARMACEUTICAL: Performed by: RADIOLOGY

## 2020-05-27 PROCEDURE — A9588 FLUCICLOVINE F-18: HCPCS | Performed by: RADIOLOGY

## 2020-05-27 RX ADMIN — FLUCICLOVINE F-18 10 MILLICURIE: 221 INJECTION, SOLUTION INTRAVENOUS at 11:29

## 2020-06-03 ENCOUNTER — OFFICE VISIT (OUTPATIENT)
Dept: PAIN MANAGEMENT | Age: 85
End: 2020-06-03
Payer: MEDICARE

## 2020-06-03 ENCOUNTER — PREP FOR PROCEDURE (OUTPATIENT)
Dept: PAIN MANAGEMENT | Age: 85
End: 2020-06-03

## 2020-06-03 VITALS
WEIGHT: 210 LBS | HEIGHT: 69 IN | SYSTOLIC BLOOD PRESSURE: 100 MMHG | OXYGEN SATURATION: 97 % | BODY MASS INDEX: 31.1 KG/M2 | DIASTOLIC BLOOD PRESSURE: 63 MMHG | TEMPERATURE: 97.9 F | HEART RATE: 65 BPM | RESPIRATION RATE: 16 BRPM

## 2020-06-03 PROBLEM — M47.817 LUMBOSACRAL SPONDYLOSIS WITHOUT MYELOPATHY: Status: ACTIVE | Noted: 2020-06-03

## 2020-06-03 PROCEDURE — 99213 OFFICE O/P EST LOW 20 MIN: CPT | Performed by: PAIN MEDICINE

## 2020-06-03 PROCEDURE — 4040F PNEUMOC VAC/ADMIN/RCVD: CPT | Performed by: PAIN MEDICINE

## 2020-06-03 PROCEDURE — G8417 CALC BMI ABV UP PARAM F/U: HCPCS | Performed by: PAIN MEDICINE

## 2020-06-03 PROCEDURE — G8428 CUR MEDS NOT DOCUMENT: HCPCS | Performed by: PAIN MEDICINE

## 2020-06-03 PROCEDURE — 1123F ACP DISCUSS/DSCN MKR DOCD: CPT | Performed by: PAIN MEDICINE

## 2020-06-03 PROCEDURE — 1036F TOBACCO NON-USER: CPT | Performed by: PAIN MEDICINE

## 2020-06-03 NOTE — PROGRESS NOTES
Do you currently have any of the following:    Fever: No  Headache:  No  Cough: No  Shortness of breath: No  Exposed to anyone with these symptoms: No                                                                                                                Ruthy Bradshaw presents to the [address] on 6/3/2020. Brea Harmon is complaining of pain lower back and down legs. The pain is constant. The pain is described as aching, dull and tender. Pain is rated on his best day at a 2, on his worst day at a 8, and on average at a 4 on the VAS scale. He took his last dose of Motrin prn. Brea Harmon does not have issues with constipation. Any procedures since your last visit: Yes, with 50 % relief. He is not on NSAIDS and  is  on anticoagulation medications to include ASA and is managed by Eileen Mejía MD  .     Pacemaker or defibrilator: No Physician managing device is . /63   Pulse 65   Temp 97.9 °F (36.6 °C) (Oral)   Resp 16   Ht 5' 9\" (1.753 m)   Wt 210 lb (95.3 kg)   SpO2 97%   BMI 31.01 kg/m²      No LMP for male patient.

## 2020-06-05 ENCOUNTER — TELEPHONE (OUTPATIENT)
Dept: PAIN MANAGEMENT | Age: 85
End: 2020-06-05

## 2020-06-05 NOTE — TELEPHONE ENCOUNTER
Call to [facility] that procedure was approved for 6/16/2020 and that the surgery center should call him a few days before for the pre op call and after 3:00 PM the business day before with the arrival time. Instructed Alejandro to hold ibuprofen for 24 hours, naprosyn for 4 days and any aspirin containing products for 7 days. Pt. Verbalized understanding last dose of Aspirin 6/10/2020 Instructed to call office back if any questions. Instructed of need for COVID-19 testing and self quarantining. Lucas Morse verbalized understanding.     Lucas Morse 's response to the following screening questions:    Fever: No  Headache:  No  Cough: No  Shortness of breath: No  Exposed to anyone with these symptoms: No

## 2020-06-08 ENCOUNTER — HOSPITAL ENCOUNTER (OUTPATIENT)
Age: 85
Discharge: HOME | End: 2020-06-08
Payer: MEDICARE

## 2020-06-08 LAB
ALBUMIN SERPL-MCNC: 4.5 G/DL (ref 3.5–5.2)
ALP BLD-CCNC: 45 U/L (ref 40–129)
ALT SERPL-CCNC: 20 U/L (ref 0–40)
ANION GAP SERPL CALCULATED.3IONS-SCNC: 14 MMOL/L (ref 7–16)
AST SERPL-CCNC: 23 U/L (ref 0–39)
BACTERIA: ABNORMAL /HPF
BASOPHILS ABSOLUTE: 0.02 E9/L (ref 0–0.2)
BASOPHILS RELATIVE PERCENT: 0.4 % (ref 0–2)
BILIRUB SERPL-MCNC: 0.4 MG/DL (ref 0–1.2)
BILIRUBIN URINE: NEGATIVE
BLOOD, URINE: NEGATIVE
BUN BLDV-MCNC: 40 MG/DL (ref 8–23)
CALCIUM SERPL-MCNC: 9.8 MG/DL (ref 8.6–10.2)
CHLORIDE BLD-SCNC: 104 MMOL/L (ref 98–107)
CHOLESTEROL, TOTAL: 157 MG/DL (ref 0–199)
CLARITY: CLEAR
CO2: 25 MMOL/L (ref 22–29)
COLOR: YELLOW
CREAT SERPL-MCNC: 1.7 MG/DL (ref 0.7–1.2)
CREATININE URINE: 388 MG/DL (ref 40–278)
EOSINOPHILS ABSOLUTE: 0.12 E9/L (ref 0.05–0.5)
EOSINOPHILS RELATIVE PERCENT: 2.6 % (ref 0–6)
FOLATE: >20 NG/ML (ref 4.8–24.2)
GFR AFRICAN AMERICAN: 47
GFR NON-AFRICAN AMERICAN: 39 ML/MIN/1.73
GLUCOSE BLD-MCNC: 121 MG/DL (ref 74–99)
GLUCOSE URINE: NEGATIVE MG/DL
HCT VFR BLD CALC: 34.8 % (ref 37–54)
HDLC SERPL-MCNC: 55 MG/DL
HEMOGLOBIN: 11.9 G/DL (ref 12.5–16.5)
IMMATURE GRANULOCYTES #: 0.03 E9/L
IMMATURE GRANULOCYTES %: 0.6 % (ref 0–5)
KETONES, URINE: NEGATIVE MG/DL
LDL CHOLESTEROL CALCULATED: 77 MG/DL (ref 0–99)
LEUKOCYTE ESTERASE, URINE: NEGATIVE
LYMPHOCYTES ABSOLUTE: 1.07 E9/L (ref 1.5–4)
LYMPHOCYTES RELATIVE PERCENT: 23.1 % (ref 20–42)
MAGNESIUM: 2.4 MG/DL (ref 1.6–2.6)
MCH RBC QN AUTO: 31 PG (ref 26–35)
MCHC RBC AUTO-ENTMCNC: 34.2 % (ref 32–34.5)
MCV RBC AUTO: 90.6 FL (ref 80–99.9)
MICROALBUMIN UR-MCNC: 45.5 MG/L
MICROALBUMIN/CREAT UR-RTO: 11.7 (ref 0–30)
MONOCYTES ABSOLUTE: 0.33 E9/L (ref 0.1–0.95)
MONOCYTES RELATIVE PERCENT: 7.1 % (ref 2–12)
NEUTROPHILS ABSOLUTE: 3.07 E9/L (ref 1.8–7.3)
NEUTROPHILS RELATIVE PERCENT: 66.2 % (ref 43–80)
NITRITE, URINE: NEGATIVE
PDW BLD-RTO: 13.5 FL (ref 11.5–15)
PH UA: 5 (ref 5–9)
PHOSPHORUS: 4.1 MG/DL (ref 2.5–4.5)
PLATELET # BLD: 134 E9/L (ref 130–450)
PMV BLD AUTO: 10 FL (ref 7–12)
POTASSIUM SERPL-SCNC: 4.5 MMOL/L (ref 3.5–5)
PROTEIN UA: NORMAL MG/DL
RBC # BLD: 3.84 E12/L (ref 3.8–5.8)
RBC UA: ABNORMAL /HPF (ref 0–2)
SODIUM BLD-SCNC: 143 MMOL/L (ref 132–146)
SPECIFIC GRAVITY UA: >=1.03 (ref 1–1.03)
T4 FREE: 1.15 NG/DL (ref 0.93–1.7)
TOTAL CK: 65 U/L (ref 20–200)
TOTAL PROTEIN: 7.6 G/DL (ref 6.4–8.3)
TRIGL SERPL-MCNC: 126 MG/DL (ref 0–149)
TSH SERPL DL<=0.05 MIU/L-ACNC: 5.12 UIU/ML (ref 0.27–4.2)
UROBILINOGEN, URINE: 0.2 E.U./DL
VITAMIN B-12: 946 PG/ML (ref 211–946)
VITAMIN D 25-HYDROXY: 42 NG/ML (ref 30–100)
VLDLC SERPL CALC-MCNC: 25 MG/DL
WBC # BLD: 4.6 E9/L (ref 4.5–11.5)
WBC UA: ABNORMAL /HPF (ref 0–5)

## 2020-06-08 PROCEDURE — 84439 ASSAY OF FREE THYROXINE: CPT

## 2020-06-08 PROCEDURE — 81001 URINALYSIS AUTO W/SCOPE: CPT

## 2020-06-08 PROCEDURE — 84443 ASSAY THYROID STIM HORMONE: CPT

## 2020-06-08 PROCEDURE — 82746 ASSAY OF FOLIC ACID SERUM: CPT

## 2020-06-08 PROCEDURE — 82550 ASSAY OF CK (CPK): CPT

## 2020-06-08 PROCEDURE — 85025 COMPLETE CBC W/AUTO DIFF WBC: CPT

## 2020-06-08 PROCEDURE — 82306 VITAMIN D 25 HYDROXY: CPT

## 2020-06-08 PROCEDURE — 36415 COLL VENOUS BLD VENIPUNCTURE: CPT

## 2020-06-08 PROCEDURE — 83735 ASSAY OF MAGNESIUM: CPT

## 2020-06-08 PROCEDURE — 82607 VITAMIN B-12: CPT

## 2020-06-08 PROCEDURE — 80061 LIPID PANEL: CPT

## 2020-06-08 PROCEDURE — 84100 ASSAY OF PHOSPHORUS: CPT

## 2020-06-08 PROCEDURE — 82570 ASSAY OF URINE CREATININE: CPT

## 2020-06-08 PROCEDURE — 80053 COMPREHEN METABOLIC PANEL: CPT

## 2020-06-08 PROCEDURE — 82044 UR ALBUMIN SEMIQUANTITATIVE: CPT

## 2020-06-08 NOTE — RESULT ENCOUNTER NOTE
Creatinine slightly higher. Make sure not taking NSAIDs. Proper hydration.   Keep follow-up to review more detail sooner as needed

## 2020-06-11 ENCOUNTER — HOSPITAL ENCOUNTER (OUTPATIENT)
Age: 85
Discharge: HOME | End: 2020-06-13
Payer: MEDICARE

## 2020-06-11 PROCEDURE — U0003 INFECTIOUS AGENT DETECTION BY NUCLEIC ACID (DNA OR RNA); SEVERE ACUTE RESPIRATORY SYNDROME CORONAVIRUS 2 (SARS-COV-2) (CORONAVIRUS DISEASE [COVID-19]), AMPLIFIED PROBE TECHNIQUE, MAKING USE OF HIGH THROUGHPUT TECHNOLOGIES AS DESCRIBED BY CMS-2020-01-R: HCPCS

## 2020-06-12 ENCOUNTER — OFFICE VISIT (OUTPATIENT)
Dept: PRIMARY CARE CLINIC | Age: 85
End: 2020-06-12
Payer: MEDICARE

## 2020-06-12 VITALS
TEMPERATURE: 97.8 F | WEIGHT: 204.8 LBS | HEART RATE: 86 BPM | SYSTOLIC BLOOD PRESSURE: 106 MMHG | BODY MASS INDEX: 30.24 KG/M2 | DIASTOLIC BLOOD PRESSURE: 74 MMHG

## 2020-06-12 PROBLEM — E03.2 HYPOTHYROIDISM DUE TO MEDICATION: Status: ACTIVE | Noted: 2020-06-12

## 2020-06-12 PROBLEM — N28.9 RENAL INSUFFICIENCY: Status: ACTIVE | Noted: 2020-06-12

## 2020-06-12 PROBLEM — D64.9 ANEMIA: Status: ACTIVE | Noted: 2020-06-12

## 2020-06-12 PROCEDURE — G8417 CALC BMI ABV UP PARAM F/U: HCPCS | Performed by: FAMILY MEDICINE

## 2020-06-12 PROCEDURE — 4040F PNEUMOC VAC/ADMIN/RCVD: CPT | Performed by: FAMILY MEDICINE

## 2020-06-12 PROCEDURE — 1123F ACP DISCUSS/DSCN MKR DOCD: CPT | Performed by: FAMILY MEDICINE

## 2020-06-12 PROCEDURE — G8427 DOCREV CUR MEDS BY ELIG CLIN: HCPCS | Performed by: FAMILY MEDICINE

## 2020-06-12 PROCEDURE — 99215 OFFICE O/P EST HI 40 MIN: CPT | Performed by: FAMILY MEDICINE

## 2020-06-12 PROCEDURE — 1036F TOBACCO NON-USER: CPT | Performed by: FAMILY MEDICINE

## 2020-06-12 NOTE — ASSESSMENT & PLAN NOTE
Counseled. There is a small risk of Erleada causing thyroid issues, 25% of people can experience increased TSH, about 7% true hypothyroid. This can be treated depending on clinical presentation/labs etc.  If TSH continues to rise and/or free T4 drops we will consider supplementation, asymptomatic now.

## 2020-06-12 NOTE — PROGRESS NOTES
19  Rohit Garcia : 1935 Sex: male  Age: 82 y.o. Chief Complaint   Patient presents with    Results     review recent labs       HPI:       Patient presents today for follow-up. Here with his wife. Overall doing well. Chronic back pain. Stable. Swithed  from Dr. Milvia Jones because of insurance purposes, saw Dr. Sumi Keith, then Dr Osmany EBNNETT's pain management. Known metastatic prostate cancer. Well aware of the lesions. We reviewed the MRI of the cervical spine and lumbar spine. Dr. Jonh Penaloza , on [facility] and he follows closely with him.     got temporary relief with epidural, moved 3 yards of dirt, planning nerve block then possible ablation. Overall doing well    Tolerating the Erleada. There is a 25% risk of increased TSH with a low risk of hypothyroidism, TSH is elevated free T4 normal, monitor. Creatinine is elevated somewhat as well, they blame hydration. They refuse imaging referral or anything beyond BMP in 1 month. There is some irregularity in his heart rhythm on auscultation as below. They refused anything other than seeing Dr. Rebecca Sears again in August.  They refuse EKG today, \"knowing that it will likely lead me to send him to the ER\"-they do not want to do anything further right now. They want to stay conservative. Denies symptoms of depression. Labs reviewed, BUN 40 creatinine did climb to 1.7, glucose 121, HDL 55 LDL 77 triglycerides 126 liver enzymes normal, TSH elevated at 5.120 Leukos 121 vitamin D normal at 42 hemoglobin low but stable 11.9 white count normal 4.6 platelet count normal 750 folic acid greater than 20 B12 946 urinalysis overall negative, microalbumin to creatinine ratio normal 11.7    His appetite is been somewhat decreased but overall he is content with his weight. He understands some weight loss.     Most Recent Labs  CBC  Lab Results   Component Value Date    WBC 4.6 2020    WBC 4.0 2020    WBC 3.7 2019    RBC 3.84 standard drinks     Types: 1 Cans of beer per week     Comment: rare    Drug use: No    Sexual activity: Not on file   Lifestyle    Physical activity     Days per week: Not on file     Minutes per session: Not on file    Stress: Not on file   Relationships    Social connections     Talks on phone: Not on file     Gets together: Not on file     Attends Anglican service: Not on file     Active member of club or organization: Not on file     Attends meetings of clubs or organizations: Not on file     Relationship status: Not on file    Intimate partner violence     Fear of current or ex partner: Not on file     Emotionally abused: Not on file     Physically abused: Not on file     Forced sexual activity: Not on file   Other Topics Concern    Not on file   Social History Narrative    Problem List: History of polyp of colon, Disorder of bone density and structure, unspecified,    Osteochondropathy, Carcinoma in situ of prostate, Aneurysm of thoracic aorta, Anemia, Coronary    arteriosclerosis, Cobalamin deficiency, Multiple closed fractures of pelvis with disruption of pelvic [place],    Hyperlipidemia, Essential hypertension    Health Maintenance:    Bone Density Test Screening - (3/5/2019)    Bone Density Scan - (12/18/2015)    Influenza Vaccination - (10/7/2016)    Colonoscopy - (4/3/2012)    Couseled on Home Safety - (11/23/2015)    Colonoscopy Screening - (4/3/2012)    US Liver - 8/1/08    Medical Problems:    Coronary Artery Disease (CAD), Hypertension, Hyperlipidemia, Prostate Cancer    Surgical Hx:    Prostatectomy, Coronary Artery Bypass Graft (CABG)            FH: Father:    . (Hx)    Mother:    . (Hx)        SH: Marital:  - retired . Personal Habits: Cigarette Use: former cigarette smoker, Nonsmoker. Alcohol: Occasionally    consumes alcohol.        Vitals:    06/12/20 1059   BP: 106/74   Site: Left Upper Arm   Position: Sitting   Pulse: 86   Temp: 97.8 °F (36.6 °C)   TempSrc: on shingrix. They are very opposed to vaccines at this time    Pancytopenia ([address]  Counseled extensively. Differential reviewed, including serious etiologies. Possibly from medication. .  Declines hematology. Monitor. Asymptomatic. Platelets, since normalized, white count currently normal, hemoglobin stable. Low magnesium level  Minimally low/not at goal last time. Goals reviewed. Now on magnesium oxide 400 mg daily. Monitor. Vitamin D deficiency  Continue current therapy. Monitor. Vitamin B12 deficiency  Appropriate supplementation reviewed.    Vitamin B12 elevated last time, discussed backing down. Monitor.  Risk of deficiency reviewed including anemia and neuropathy.  No longer on injection, await repeat    Hyperglycemia  Counseled as above. Monitor. Essential hypertension  Counseled. The risks of hypertension and hypotension reviewed. Watch closely ambulatory. Hyper and hypotensive precautions and parameters reviewed and written as well as parameters on pulse, call if out of range, ER dangers numbers. Lifestyle modification reviewed. Tolerating therapy. Tubular adenoma   Last colonoscopy Dr. Elaina Gabriel, 2/15 complaining repeat 3 years from then. Overdue. Declines now, fit cards or Cologuard. . Hemoglobin consistently low but stable. Declines any intervention now, declines any tests or procedures now.     Thyroid nodule  Counseled, incidental 7 mm right thyroid nodule on carotid ultrasound 3/19. Declines workup or follow-up now     Thoracic aneurysm, not ruptured  Counseled, potential serious is reviewed, if any symptoms directly to ER. Continue per Dr. Lluvia Hobbs. He saw him 3/15 and had CT at least then. Dr. Lluvia Hobbs discharged him at this point unless symptoms and he declines follow-up imaging     PVD (peripheral vascular disease) with claudication [facility])  He saw Dr. Tc Hoover, who had him on pletal, had minimal results.    Now follows with Dr Pam Carpio vascular in [place] who states he did have a femoral blockage proximal that would require bypass as well as some distal blockages that would require stenting but they felt observation most appropriate. He did not feel medication was necessary or helpful at this time. They state Dr. Tc Hoover agrees with this plan. He is comfortable with this plan at this point. Serious signs and symptoms watch were reviewed. States he missed his March appointment because of Covid-19 pandemic, they were planning surgery but after discussion, and the risks of complications and the complexity of the surgery he is declining intervention now. States he watches the distance he walks but otherwise doing well. No sores etc.    Prostate CA (HCC)  Metastatic. Continue per Dr. Carol Fam. PSA less than 0.01-0.07-0.3 , he had been on Lupron, had metastasis, since orchiectomy, did not tolerate xtandi so he stopped it , overall feeling well, on Erleada, sees Dr. Jhoan Lowry soon    Mixed hyperlipidemia  Did very well on d.a.w. Crestor unfortunately severe myalgias on generic. He could try to [address] d.a.w., but he chose to go on Lipitor instead, tolerating, declines change in dose, goals reviewed, risk benefits ADRs reviewed. Counseling CoQ10 and vitamin D. .  We discussed appropriate dosing given his risk factors       Metabolic disorder  Counseled. Blood sugar had been recently borderline at 124, 125,117, 110, 121. Admitted to a lot of pitzels, cookies;  Micro-macrovascular complications monitor. Check hemoglobin A1c with next blood work.     Coronary artery disease involving native heart without angina pectoris  Continue per cardiology. Sees Dr. Aleyda Bond in August, had stress test 3/15. Asymptomatic now. He did have ectopy on exam but refuses EKG or other evaluation/treatment. There is a risk that the Erleada could cause cardiac arrhythmia     Age-related osteoporosis without current pathological fracture  Counseled extensively.  He tolerated Fosamax

## 2020-06-12 NOTE — ASSESSMENT & PLAN NOTE
Counseled extensively. Differential reviewed, including serious etiologies. Long discussion. Other than BMP in 1 month refuses other evaluation treatment, refuses imaging or referral.  He notes poor hydration and proper hydration reviewed.

## 2020-06-13 LAB
SARS-COV-2: NOT DETECTED
SOURCE: NORMAL

## 2020-06-15 ENCOUNTER — HOSPITAL ENCOUNTER (OUTPATIENT)
Age: 85
Discharge: HOME | End: 2020-06-15
Payer: MEDICARE

## 2020-06-15 LAB — PROSTATE SPECIFIC ANTIGEN: <0.03 NG/ML (ref 0–4)

## 2020-06-15 PROCEDURE — 36415 COLL VENOUS BLD VENIPUNCTURE: CPT

## 2020-06-15 PROCEDURE — 84153 ASSAY OF PSA TOTAL: CPT

## 2020-06-16 ENCOUNTER — HOSPITAL ENCOUNTER (OUTPATIENT)
Age: 85
Setting detail: OUTPATIENT SURGERY
Discharge: HOME | End: 2020-06-16
Attending: PAIN MEDICINE | Admitting: PAIN MEDICINE
Payer: MEDICARE

## 2020-06-16 ENCOUNTER — HOSPITAL ENCOUNTER (OUTPATIENT)
Dept: GENERAL RADIOLOGY | Age: 85
Discharge: HOME | End: 2020-06-18
Attending: PAIN MEDICINE
Payer: MEDICARE

## 2020-06-16 VITALS
TEMPERATURE: 96.6 F | BODY MASS INDEX: 30.36 KG/M2 | HEART RATE: 54 BPM | WEIGHT: 205 LBS | RESPIRATION RATE: 19 BRPM | HEIGHT: 69 IN | OXYGEN SATURATION: 96 % | SYSTOLIC BLOOD PRESSURE: 129 MMHG | DIASTOLIC BLOOD PRESSURE: 56 MMHG

## 2020-06-16 PROCEDURE — 64493 INJ PARAVERT F JNT L/S 1 LEV: CPT | Performed by: PAIN MEDICINE

## 2020-06-16 PROCEDURE — 2500000003 HC RX 250 WO HCPCS: Performed by: PAIN MEDICINE

## 2020-06-16 PROCEDURE — 7100000011 HC PHASE II RECOVERY - ADDTL 15 MIN: Performed by: PAIN MEDICINE

## 2020-06-16 PROCEDURE — 64494 INJ PARAVERT F JNT L/S 2 LEV: CPT | Performed by: PAIN MEDICINE

## 2020-06-16 PROCEDURE — 7100000010 HC PHASE II RECOVERY - FIRST 15 MIN: Performed by: PAIN MEDICINE

## 2020-06-16 PROCEDURE — 3600000002 HC SURGERY LEVEL 2 BASE: Performed by: PAIN MEDICINE

## 2020-06-16 PROCEDURE — 2709999900 HC NON-CHARGEABLE SUPPLY: Performed by: PAIN MEDICINE

## 2020-06-16 PROCEDURE — 3209999900 FLUORO FOR SURGICAL PROCEDURES

## 2020-06-16 RX ORDER — LIDOCAINE HYDROCHLORIDE 5 MG/ML
INJECTION, SOLUTION INFILTRATION; INTRAVENOUS PRN
Status: DISCONTINUED | OUTPATIENT
Start: 2020-06-16 | End: 2020-06-16 | Stop reason: ALTCHOICE

## 2020-06-16 RX ORDER — BUPIVACAINE HYDROCHLORIDE 2.5 MG/ML
INJECTION, SOLUTION EPIDURAL; INFILTRATION; INTRACAUDAL PRN
Status: DISCONTINUED | OUTPATIENT
Start: 2020-06-16 | End: 2020-06-16 | Stop reason: ALTCHOICE

## 2020-06-16 ASSESSMENT — PAIN DESCRIPTION - DESCRIPTORS: DESCRIPTORS: ACHING

## 2020-06-16 ASSESSMENT — PAIN SCALES - GENERAL
PAINLEVEL_OUTOF10: 0
PAINLEVEL_OUTOF10: 0

## 2020-06-16 ASSESSMENT — PAIN - FUNCTIONAL ASSESSMENT: PAIN_FUNCTIONAL_ASSESSMENT: 0-10

## 2020-06-16 NOTE — OP NOTE
2020    Patient: Keaton Sesay  :  1935  Age:  82 y.o. Sex:  male     PRE-OPERATIVE DIAGNOSIS: Bilateral Lumbar spondylosis, lumbar facet syndrome. POST-OPERATIVE DIAGNOSIS: Same. PROCEDURE:  # 1 Fluoroscopic guided lumbar medial branch blocks Bilateral at Levels: L3,4,5 (anesthetizing the L4-5 and L5-S1 facet joints    SURGEON: NAVARRO Cortés. ANESTHESIA: local    ESTIMATED BLOOD LOSS: None.  __________________________________________________________  BRIEF HISTORY:  Keaton Sesay comes in today for 3 fluoroscopic guided lumbar medial branch blocks  Bilateral  at Levels: L3,4,5. The potential complications of this procedure were discussed with him again today. He has elected to undergo the aforementioned procedure. Antoni complete History & Physical examination were reviewed in depth, a copy of which is in the chart. DESCRIPTION OF PROCEDURE:   After confirming written and informed consent, a time-out was performed and Antoni name and date of birth, the procedure to be performed as well as the plan for the location of the needle insertion were confirmed. The patient was brought into the procedure room and placed in the prone position on the fluoroscopy table. Standard monitors were placed and vital signs were observed throughout the procedure. The area of the lumbar spine was prepped with chloraprep and draped in a sterile manner. AP fluoroscopy was used to identify and christian bartons point at the targeted levels. The skin and subcutaneous tissues in these identified areas were anesthetized with 0.5%Lidocaine. A #22 gauge spinal needle was advanced toward the junction of the superior articular process and the transverse process, along the course of the medial branch. Satisfactory needle placement was confirmed by AP and oblique projections.   At the sacral alar level, the sacral alar region was visualized and the needle tip was positioned on the sacral alar at the base of the

## 2020-06-16 NOTE — H&P
quit: 10/1/1994     Years since quittin.7    Smokeless tobacco: Former User     Types: Chew   Substance and Sexual Activity    Alcohol use: Yes     Alcohol/week: 1.0 standard drinks     Types: 1 Cans of beer per week     Comment: rare    Drug use: No    Sexual activity: Not on file   Lifestyle    Physical activity     Days per week: Not on file     Minutes per session: Not on file    Stress: Not on file   Relationships    Social connections     Talks on phone: Not on file     Gets together: Not on file     Attends Advent service: Not on file     Active member of club or organization: Not on file     Attends meetings of clubs or organizations: Not on file     Relationship status: Not on file    Intimate partner violence     Fear of current or ex partner: Not on file     Emotionally abused: Not on file     Physically abused: Not on file     Forced sexual activity: Not on file   Other Topics Concern    Not on file   Social History Narrative    Problem List: History of polyp of colon, Disorder of bone density and structure, unspecified,    Osteochondropathy, Carcinoma in situ of prostate, Aneurysm of thoracic aorta, Anemia, Coronary    arteriosclerosis, Cobalamin deficiency, Multiple closed fractures of pelvis with disruption of pelvic [place],    Hyperlipidemia, Essential hypertension    Health Maintenance:    Bone Density Test Screening - (3/5/2019)    Bone Density Scan - (2015)    Influenza Vaccination - (10/7/2016)    Colonoscopy - (4/3/2012)    Couseled on Home Safety - (2015)    Colonoscopy Screening - (4/3/2012)    US Liver - 08    Medical Problems:    Coronary Artery Disease (CAD), Hypertension, Hyperlipidemia, Prostate Cancer    Surgical Hx:    Prostatectomy, Coronary Artery Bypass Graft (CABG)            FH: Father:    . (Hx)    Mother:    . (Hx)        SH: Marital:  - retired . Personal Habits: Cigarette Use: former cigarette smoker, Nonsmoker. Alcohol:

## 2020-07-01 NOTE — PROGRESS NOTES
[facility]        [address], [address] [facility]  Dept: [phone]        Follow up Note      [employer]     Date of Visit:  07/02/20     CC:  Patient presents for follow up   Chief Complaint   Patient presents with    Follow-up     no pain     HPI:    Pain is improved/resolved  Change in quality of symptoms:no. Medication side effects:not applicable . Recent diagnostic testing:none. Recent interventional procedures:b/l L3,4,5 MNBB #1 with 100% relief    He has been on anticoagulation medications to include ASA and has not been on herbal supplements. He is not diabetic.     Imaging:   Lumbar MRI 4/2020 -  1. Metastatic lesions are noted in the right iliac bone and first   sacral body, but are not associated with neural encroachment. Other   metastases previously documented in the pelvis are not included on   this exam. Metastatic foci do enhance. 2. There is grade 1 anterolisthesis of L5 on S1 and loss of the L4-5   disc space. 3. Lateral portions of the circumferential disc bulge at the L5-S1   level could encroach on the L5 nerve roots lateral to the foramina on   either side. 4. Telescoping of facets and disc bulging results in severe central   stenosis of the right-sided L4 and L5 nerve roots and moderately   severe stenosis of the left-sided L2-L5 root levels, more severe   inferiorly. Lumbar MRI 7/2019 -   IMPRESSION:    Bone lesions at S1, S2 and in the right iliac wing worrisome for a marrow   infiltrative process.  Recommend clinical correlation and correlation   with nuclear medicine bone scanning. Dextroconvex scoliosis and multilevel malalignment associated with   multilevel spondylosis as detailed. L2-3 level, disc bulging with mild spinal canal stenosis and mild to   moderate right foraminal stenosis. L3-4 level, disc bulging with mild spinal canal stenosis and mild to   moderate left foraminal stenosis.     L4-5 level, disc degeneration and disc osteophyte complex with mild to   moderate foraminal stenosis. L5-S1 level, grade 1 anterolisthesis from bilateral L5 pars defects and   degenerative disc disease with moderate to severe foraminal stenosis and   impingement of the L5 nerve roots. Anatomic Thoracic/Lumbar Variant: None.  L4-5 is considered the level of   the iliac crest and assume there are 5 lumbar-type vertebrae. There are hepatic and renal cysts.  Recommend correlation with ultrasound.     Cervical MRI 7/2019 -   IMPRESSION:    10 mm low signal intensity lesion at the right side of the C6 vertebral   body and 7 mm signal intensity lesion at the base of C2 are worrisome for   a marrow infiltrative process such as osteoblastic metastasis.  Clinical   correlation and correlation with nuclear medicine bone scanning   recommended for further evaluation. Levoconvex scoliosis and multilevel malalignment associated multilevel   spondylosis most notably moderate to advanced degenerative disc disease   at C6-7 and mild to moderate degenerative disc disease at C5-6 and C4-5. There is no substantial spinal canal stenosis. There is multilevel acquired foraminal stenosis most notably moderate to   severe left and moderate right foraminal stenosis at C5-6. Anatomic Variant:  None.  Assume 7 cervical vertebrae with counting from   the craniocervical junction.        Potential Aberrant Drug-Related Behavior:  no    Urine Drug Screening:    OARRS report:    Past Medical History:   Diagnosis Date    CA prostate, adenoca ([address] 4/25/2019    CAD (coronary artery disease)     Dr. Amilcar [facility]zo [facility])     prostate- PO chemotherapy     Chronic bilateral low back pain without sciatica 3/6/2020    Cobalamin deficiency     Hyperlipidemia     Hypertension     Osteoarthritis     Osteochondropathy     Osteopenia     Pain in lower limb     Peripheral venous insufficiency     PONV (postoperative nausea and vomiting)     Prolonged emergence from general anesthesia     PVD (peripheral vascular disease) with claudication ([address] 4/25/2019       Past Surgical History:   Procedure Laterality Date    ACETABULUM FRACTURE SURGERY  3/24/11    ANESTHESIA NERVE BLOCK Bilateral 6/16/2020    BILATERAL L3 L4 L5 MEDIAL NERVE BRANCH BLOCK performed by Coretta Livingston DO at [facility] COLONOSCOPY  2016    CORONARY ARTERY BYPASS GRAFT  Dec. 1996    [facility]/ Dr. Aiyana Garcia, DIAGNOSTIC  2016    EYE SURGERY Bilateral 01/2018    cataract    FEMUR FRACTURE SURGERY  1981    left    HERNIA REPAIR  1993    JOINT REPLACEMENT      bilat knee 2010, padmini dunne 2011     LYMPH NODE DISSECTION  10/2007    PAIN MANAGEMENT PROCEDURE N/A 5/19/2020    CAUDAL EPIDURAL STEROID INJECTION performed by Coretta Livingston DO at [facility].  10/31/07    [address] M[address] [place] LAPAROSCOPIC PROSTATECTOMY    TESTICLE REMOVAL Bilateral 02/2019    hx prostate ca-    TONSILLECTOMY      TOTAL HIP ARTHROPLASTY  11/30/11    right    TOTAL KNEE ARTHROPLASTY  2010    bilateral       Prior to Admission medications    Medication Sig Start Date End Date Taking?  Authorizing Provider   denosumab (PROLIA) 60 MG/ML SOSY SC injection Inject 1 mL into the skin every 6 months 3/23/20   Mc Forman MD   ERLEADA 60 MG TABS daily  3/2/20   Historical Provider, MD   omeprazole (PRILOSEC) 20 MG delayed release capsule Take 1 capsule by mouth daily 2/19/20   Mc Forman MD   omega-3 acid ethyl esters (LOVAZA) 1 g capsule Take 1 capsule by mouth 2 times daily  Patient taking differently: Take 1 g by mouth 2 times daily LD 6-11-20 2/12/20   Mc Forman MD   atorvastatin (LIPITOR) 40 MG tablet Take 1 tablet by mouth daily 2/12/20   Mc Forman MD   lisinopril (PRINIVIL;ZESTRIL) 20 MG tablet Take 1 tablet by mouth daily 1/16/20   Mc Forman MD   meclizine (ANTIVERT) 25 MG tablet 1/2 - 1 by mouth every 6 Relationship status: Not on file    Intimate partner violence     Fear of current or ex partner: Not on file     Emotionally abused: Not on file     Physically abused: Not on file     Forced sexual activity: Not on file   Other Topics Concern    Not on file   Social History Narrative    Problem List: History of polyp of colon, Disorder of bone density and structure, unspecified,    Osteochondropathy, Carcinoma in situ of prostate, Aneurysm of thoracic aorta, Anemia, Coronary    arteriosclerosis, Cobalamin deficiency, Multiple closed fractures of pelvis with disruption of pelvic [place],    Hyperlipidemia, Essential hypertension    Health Maintenance:    Bone Density Test Screening - (3/5/2019)    Bone Density Scan - (12/18/2015)    Influenza Vaccination - (10/7/2016)    Colonoscopy - (4/3/2012)    Couseled on Home Safety - (11/23/2015)    Colonoscopy Screening - (4/3/2012)    US Liver - 8/1/08    Medical Problems:    Coronary Artery Disease (CAD), Hypertension, Hyperlipidemia, Prostate Cancer    Surgical Hx:    Prostatectomy, Coronary Artery Bypass Graft (CABG)            FH: Father:    . (Hx)    Mother:    . (Hx)        SH: Marital:  - retired . Personal Habits: Cigarette Use: former cigarette smoker, Nonsmoker. Alcohol: Occasionally    consumes alcohol. Family History   Problem Relation Age of Onset    Stroke Mother     Cancer Mother         pancreatic    Cancer Father         prostate    Stroke Father        REVIEW OF SYSTEMS:     Sanjana Rivera denies fever/chills, chest pain, shortness of breath, new bowel or bladder complaints. All other review of systems was negative.     PHYSICAL EXAMINATION:      /68   Pulse 78   Temp 98.4 °F (36.9 °C) (Oral)   Resp 18   Ht 5' 9\" (1.753 m)   Wt 205 lb (93 kg)   SpO2 95%   BMI 30.27 kg/m²     General:       General appearance:pleasant and well-hydrated, in no distress and A & O x3  Build:Normal Weight  Function:Rises from a seated position with mild difficulty     HEENT:     Head:normocephalic, atraumatic  Pupils:regular, round, equal  Sclera: icterus absent     Lungs:     Breathing:normal breathing pattern     Abdomen:     Shape:non-distended and normal  Tenderness:none  Guarding:none     Lumbar spine:     Spine inspection:normal   CVA tenderness:No   Palpation:tenderness paravertebral muscles, left, right and negative  Range of motion:abnormal mildly in lateral bending, flexion, extension rotation bilateral and is not painful.     Musculoskeletal:     Trigger points in Paraveteral:absent bilaterally  SI joint tenderness:negative right, negative left              JELENA test:not done right, not done left  Piriformis tenderness:negative right, negative left  Trochanteric bursa tenderness:negative right, negative left  SLR:negative right, negative left, sitting      Extremities:     Tremors:None bilaterally upper and lower  Range of motion:pain with internal rotation of hips negative.   Intact:Yes  Varicose veins:absent   Pulses:present Lt radial  Cyanosis:none  Edema:none x all 4 extremities     Neurological:     Sensory:normal to light touch BLE  Motor:                Right Quadriceps5/5          Left Quadriceps5/5           Right Gastrocnemius5/5    Left Gastrocnemius5/5  Right Ant Tibialis5/5  Left Ant Tibialis5/5     Reflexes:    Right Quadriceps reflex2+  Left Quadriceps reflex2+  Right Achilles reflex2+  Left Achilles reflex2+  Gait:normal     Dermatology:     Skin:no rashes or lesions noted    Assessment/Plan:    Axial LBP in region of L-S junction particularly when walking   2020 Lumbar MRI shows metastatic (prostate CA) S1-S2 lesion and L5-S1 grade 1-2 anterolisthesis with b/l pars defect  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 Breanna Lopez  Referred by Dr. Alphonse Friedman for consideration for further injections as Dr. Breanna Lopez no longer takes his insurance  Pt's wife states she has discussed his LE symptoms and they have determined they feel his LE weakness is a vascular issue, pt states he can stand still       Plan:    Pt does not feel his condition warrants prescription medication at this time  Caudal EVGENY done with 60% relief of lumbar symptoms x 2-3 days until he did yardwork  B/l L3,4,5 MNBB #1 with 100% relief, wife states when he had this done previously it was helpful for 3-6 months  Will order RFA once pain returns   Patient encouraged to stay active   Treatment plan discussed with the patient including medication and procedure side effects     Cc:  Referring physician    NAVARRO Rivas.

## 2020-07-02 ENCOUNTER — OFFICE VISIT (OUTPATIENT)
Dept: PAIN MANAGEMENT | Age: 85
End: 2020-07-02
Payer: MEDICARE

## 2020-07-02 VITALS
RESPIRATION RATE: 18 BRPM | TEMPERATURE: 98.4 F | OXYGEN SATURATION: 95 % | HEIGHT: 69 IN | BODY MASS INDEX: 30.36 KG/M2 | WEIGHT: 205 LBS | HEART RATE: 78 BPM | DIASTOLIC BLOOD PRESSURE: 68 MMHG | SYSTOLIC BLOOD PRESSURE: 118 MMHG

## 2020-07-02 PROCEDURE — G8417 CALC BMI ABV UP PARAM F/U: HCPCS | Performed by: PAIN MEDICINE

## 2020-07-02 PROCEDURE — 99213 OFFICE O/P EST LOW 20 MIN: CPT | Performed by: PAIN MEDICINE

## 2020-07-02 PROCEDURE — G8427 DOCREV CUR MEDS BY ELIG CLIN: HCPCS | Performed by: PAIN MEDICINE

## 2020-07-02 PROCEDURE — 4040F PNEUMOC VAC/ADMIN/RCVD: CPT | Performed by: PAIN MEDICINE

## 2020-07-02 PROCEDURE — 1123F ACP DISCUSS/DSCN MKR DOCD: CPT | Performed by: PAIN MEDICINE

## 2020-07-02 PROCEDURE — 1036F TOBACCO NON-USER: CPT | Performed by: PAIN MEDICINE

## 2020-07-12 PROBLEM — Z00.00 HEALTH MAINTENANCE EXAMINATION: Status: RESOLVED | Noted: 2019-10-17 | Resolved: 2020-07-12

## 2020-07-17 NOTE — PROGRESS NOTES
EUGENE WALDROP [facility] - BE[facility]AVIOR[facility] Pain Management        [address] [facility], [facility] [address] [facility]  Dept: [phone]        Follow up Note      Diann Fritz     Date of Visit:  07/21/20     CC:  Patient presents for follow up   Chief Complaint   Patient presents with    Follow-up     post procedure 100% relief until july 10 75%     HPI:    Pain is worse  Change in quality of symptoms:no. Medication side effects:not applicable. Recent diagnostic testing:none. Recent interventional procedures:none. He has been on anticoagulation medications to include ASA and has not been on herbal supplements. He is not diabetic.     Imaging:   Lumbar MRI 4/2020 -  1. Metastatic lesions are noted in the right iliac bone and first   sacral body, but are not associated with neural encroachment. Other   metastases previously documented in the pelvis are not included on   this exam. Metastatic foci do enhance. 2. There is grade 1 anterolisthesis of L5 on S1 and loss of the L4-5   disc space. 3. Lateral portions of the circumferential disc bulge at the L5-S1   level could encroach on the L5 nerve roots lateral to the foramina on   either side. 4. Telescoping of facets and disc bulging results in severe central   stenosis of the right-sided L4 and L5 nerve roots and moderately   severe stenosis of the left-sided L2-L5 root levels, more severe   inferiorly. Lumbar MRI 7/2019 -   IMPRESSION:    Bone lesions at S1, S2 and in the right iliac wing worrisome for a marrow   infiltrative process.  Recommend clinical correlation and correlation   with nuclear medicine bone scanning. Dextroconvex scoliosis and multilevel malalignment associated with   multilevel spondylosis as detailed. L2-3 level, disc bulging with mild spinal canal stenosis and mild to   moderate right foraminal stenosis. L3-4 level, disc bulging with mild spinal canal stenosis and mild to   moderate left foraminal stenosis.     L4-5 level, disc degeneration and disc osteophyte complex with mild to   moderate foraminal stenosis. L5-S1 level, grade 1 anterolisthesis from bilateral L5 pars defects and   degenerative disc disease with moderate to severe foraminal stenosis and   impingement of the L5 nerve roots. Anatomic Thoracic/Lumbar Variant: None.  L4-5 is considered the level of   the iliac crest and assume there are 5 lumbar-type vertebrae. There are hepatic and renal cysts.  Recommend correlation with ultrasound.     Cervical MRI 7/2019 -   IMPRESSION:    10 mm low signal intensity lesion at the right side of the C6 vertebral   body and 7 mm signal intensity lesion at the base of C2 are worrisome for   a marrow infiltrative process such as osteoblastic metastasis.  Clinical   correlation and correlation with nuclear medicine bone scanning   recommended for further evaluation. Levoconvex scoliosis and multilevel malalignment associated multilevel   spondylosis most notably moderate to advanced degenerative disc disease   at C6-7 and mild to moderate degenerative disc disease at C5-6 and C4-5. There is no substantial spinal canal stenosis. There is multilevel acquired foraminal stenosis most notably moderate to   severe left and moderate right foraminal stenosis at C5-6. Anatomic Variant:  None.  Assume 7 cervical vertebrae with counting from   the craniocervical junction.        Potential Aberrant Drug-Related Behavior:  no    Urine Drug Screening:    OARRS report:  7/2020 consistent    Past Medical History:   Diagnosis Date    CA prostate, adenoca ([address] 4/25/2019    CAD (coronary artery disease)     Dr. Jeremiah Parmar - [facility])     prostate- PO chemotherapy     Chronic bilateral low back pain without sciatica 3/6/2020    Cobalamin deficiency     Hyperlipidemia     Hypertension     Osteoarthritis     Osteochondropathy     Osteopenia     Pain in lower limb     Peripheral venous insufficiency     PONV (postoperative nausea and vomiting)     Prolonged emergence from general anesthesia     PVD (peripheral vascular disease) with claudication ([address] 4/25/2019       Past Surgical History:   Procedure Laterality Date    ACETABULUM FRACTURE SURGERY  3/24/11    ANESTHESIA NERVE BLOCK Bilateral 6/16/2020    BILATERAL L3 L4 L5 MEDIAL NERVE BRANCH BLOCK performed by Glenys Hamman, DO at [address] [facility] COLONOSCOPY  2016    CORONARY ARTERY BYPASS GRAFT  Dec. 1996    [facility]/ Dr. Ja Rodriguez, DIAGNOSTIC  2016    EYE SURGERY Bilateral 01/2018    cataract    FEMUR FRACTURE SURGERY  1981    left    HERNIA REPAIR  1993    JOINT REPLACEMENT      bilat knee 2010, righ thip 2011     LYMPH NODE DISSECTION  10/2007    PAIN MANAGEMENT PROCEDURE N/A 5/19/2020    CAUDAL EPIDURAL STEROID INJECTION performed by Glenys Hamman, DO at [address] [facility].  10/31/07    [address] M.D[address] [place] LAPAROSCOPIC PROSTATECTOMY    TESTICLE REMOVAL Bilateral 02/2019    hx prostate ca-    TONSILLECTOMY      TOTAL HIP ARTHROPLASTY  11/30/11    right    TOTAL KNEE ARTHROPLASTY  2010    bilateral       Prior to Admission medications    Medication Sig Start Date End Date Taking?  Authorizing Provider   denosumab (PROLIA) 60 MG/ML SOSY SC injection Inject 1 mL into the skin every 6 months 3/23/20  Yes Peter Lopes MD   ERLEADA 60 MG TABS daily  3/2/20  Yes Historical Provider, MD   omeprazole (PRILOSEC) 20 MG delayed release capsule Take 1 capsule by mouth daily 2/19/20  Yes Peter Lopes MD   omega-3 acid ethyl esters (LOVAZA) 1 g capsule Take 1 capsule by mouth 2 times daily  Patient taking differently: Take 1 g by mouth 2 times daily LD 6-11-20 2/12/20  Yes Peter oLpes MD   atorvastatin (LIPITOR) 40 MG tablet Take 1 tablet by mouth daily 2/12/20  Yes Peter Lopes MD   lisinopril (PRINIVIL;ZESTRIL) 20 MG tablet Take 1 tablet by mouth daily 1/16/20  Yes Peter Lopes MD   meclizine (ANTIVERT) 25 MG tablet 1/2 - 1 by mouth every 6 hours as needed   Yes Historical Provider, MD   Multiple Vitamins-Minerals (THERAPEUTIC MULTIVITAMIN-MINERALS) tablet Take 1 tablet by mouth daily LD 20   Yes Historical Provider, MD   metoprolol tartrate (LOPRESSOR) 25 MG tablet Take 25 mg by mouth 2 times daily   Yes Historical Provider, MD   Coenzyme Q10 (COQ10) 50 MG CAPS Take 1 capsule by mouth daily LD 20   Yes Historical Provider, MD   aspirin 81 MG tablet Take 81 mg by mouth daily LD 20   Yes Historical Provider, MD   Cyanocobalamin (VITAMIN B 12 PO) Take  by mouth daily. sublingual  11  Yes Historical Provider, MD       Allergies   Allergen Reactions    Iodine Hives    Other Hives       Social History     Socioeconomic History    Marital status:      Spouse name: Not on file    Number of children: Not on file    Years of education: Not on file    Highest education level: Not on file   Occupational History    Not on file   Social Needs    Financial resource strain: Not on file    Food insecurity     Worry: Not on file     Inability: Not on file    Transportation needs     Medical: Not on file     Non-medical: Not on file   Tobacco Use    Smoking status: Former Smoker     Packs/day: 1.00     Years: 40.00     Pack years: 40.00     Types: Cigarettes     Last attempt to quit: 10/1/1994     Years since quittin.8    Smokeless tobacco: Former User     Types: Chew   Substance and Sexual Activity    Alcohol use:  Yes     Alcohol/week: 1.0 standard drinks     Types: 1 Cans of beer per week     Comment: rare    Drug use: No    Sexual activity: Not on file   Lifestyle    Physical activity     Days per week: Not on file     Minutes per session: Not on file    Stress: Not on file   Relationships    Social connections     Talks on phone: Not on file     Gets together: Not on file     Attends [employer] service: Not on file     Active member of club or organization: Not on file     Attends meetings of clubs or organizations: Not on file     Relationship status: Not on file    Intimate partner violence     Fear of current or ex partner: Not on file     Emotionally abused: Not on file     Physically abused: Not on file     Forced sexual activity: Not on file   Other Topics Concern    Not on file   Social History Narrative    Problem List: History of polyp of colon, Disorder of bone density and structure, unspecified,    Osteochondropathy, Carcinoma in situ of prostate, Aneurysm of thoracic aorta, Anemia, Coronary    arteriosclerosis, Cobalamin deficiency, Multiple closed fractures of pelvis with disruption of pelvic [place],    Hyperlipidemia, Essential hypertension    Health Maintenance:    Bone Density Test Screening - (3/5/2019)    Bone Density Scan - (12/18/2015)    Influenza Vaccination - (10/7/2016)    Colonoscopy - (4/3/2012)    Couseled on Home Safety - (11/23/2015)    Colonoscopy Screening - (4/3/2012)    US Liver - 8/1/08    Medical Problems:    Coronary Artery Disease (CAD), Hypertension, Hyperlipidemia, Prostate Cancer    Surgical Hx:    Prostatectomy, Coronary Artery Bypass Graft (CABG)            FH: Father:    . (Hx)    Mother:    . (Hx)        SH: Marital:  - retired . Personal Habits: Cigarette Use: former cigarette smoker, Nonsmoker. Alcohol: Occasionally    consumes alcohol. Family History   Problem Relation Age of Onset    Stroke Mother     Cancer Mother         pancreatic    Cancer Father         prostate    Stroke Father        REVIEW OF SYSTEMS:     Aisha Niño denies fever/chills, chest pain, shortness of breath, new bowel or bladder complaints. All other review of systems was negative.     PHYSICAL EXAMINATION:      /62   Pulse 70   Temp 97.7 °F (36.5 °C) (Oral)   Resp 16   Ht 5' 9\" (1.753 m)   Wt 210 lb (95.3 kg)   SpO2 99%   BMI 31.01 kg/m²     General:       General appearance:pleasant and well-hydrated, in no distress and A & O x3  Build:Normal Weight  Function:Rises from a seated position with mild difficulty     HEENT:     Head:normocephalic, atraumatic  Pupils:regular, round, equal  Sclera: icterus absent     Lungs:     Breathing:normal breathing pattern     Abdomen:     Shape:non-distended and normal  Tenderness:none  Guarding:none     Lumbar spine:     Spine inspection:normal   CVA tenderness:No   Palpation:tenderness paravertebral muscles, left, right positive  Range of motion:abnormal mildly in lateral bending, flexion, extension rotation bilateral and is painful.     Musculoskeletal:     Trigger points in Paraveteral:absent bilaterally  SI joint tenderness:negative right, negative left              JELENA test:not done right, not done left  Piriformis tenderness:negative right, negative left  Trochanteric bursa tenderness:negative right, negative left  SLR:negative right, negative left, sitting      Extremities:     Tremors:None bilaterally upper and lower  Range of motion:pain with internal rotation of hips negative.   Intact:Yes  Varicose veins:absent   Pulses:present Lt radial  Cyanosis:none  Edema:none x all 4 extremities     Neurological:     Sensory:normal to light touch BLE  Motor:                Right Quadriceps5/5          Left Quadriceps5/5           Right Gastrocnemius5/5    Left Gastrocnemius5/5  Right Ant Tibialis5/5  Left Ant Tibialis5/5     Reflexes:    Right Quadriceps reflex2+  Left Quadriceps reflex2+  Right Achilles reflex2+  Left Achilles reflex2+  Gait:normal     Dermatology:     Skin:no rashes or lesions noted    Assessment/Plan:    Axial LBP in region of L-S junction particularly when walking   2020 Lumbar MRI shows metastatic (prostate CA) S1-S2 lesion and L5-S1 grade 1-2 anterolisthesis with b/l pars defect  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 Bradley Gibson  Referred by Dr. Mya Jackson for consideration

## 2020-07-21 ENCOUNTER — PREP FOR PROCEDURE (OUTPATIENT)
Dept: PAIN MANAGEMENT | Age: 85
End: 2020-07-21

## 2020-07-21 ENCOUNTER — OFFICE VISIT (OUTPATIENT)
Dept: PAIN MANAGEMENT | Age: 85
End: 2020-07-21
Payer: MEDICARE

## 2020-07-21 VITALS
HEIGHT: 69 IN | SYSTOLIC BLOOD PRESSURE: 132 MMHG | RESPIRATION RATE: 16 BRPM | HEART RATE: 70 BPM | OXYGEN SATURATION: 99 % | WEIGHT: 210 LBS | DIASTOLIC BLOOD PRESSURE: 62 MMHG | BODY MASS INDEX: 31.1 KG/M2 | TEMPERATURE: 97.7 F

## 2020-07-21 PROCEDURE — G8417 CALC BMI ABV UP PARAM F/U: HCPCS | Performed by: PAIN MEDICINE

## 2020-07-21 PROCEDURE — G8427 DOCREV CUR MEDS BY ELIG CLIN: HCPCS | Performed by: PAIN MEDICINE

## 2020-07-21 PROCEDURE — 1123F ACP DISCUSS/DSCN MKR DOCD: CPT | Performed by: PAIN MEDICINE

## 2020-07-21 PROCEDURE — 1036F TOBACCO NON-USER: CPT | Performed by: PAIN MEDICINE

## 2020-07-21 PROCEDURE — 4040F PNEUMOC VAC/ADMIN/RCVD: CPT | Performed by: PAIN MEDICINE

## 2020-07-21 PROCEDURE — 99213 OFFICE O/P EST LOW 20 MIN: CPT | Performed by: PAIN MEDICINE

## 2020-07-21 RX ORDER — TRAMADOL HYDROCHLORIDE 50 MG/1
50 TABLET ORAL DAILY PRN
Qty: 30 TABLET | Refills: 0 | Status: SHIPPED | OUTPATIENT
Start: 2020-07-21 | End: 2020-08-20

## 2020-07-21 NOTE — PROGRESS NOTES
Do you currently have any of the following:    Fever: No  Headache:  No  Cough: No  Shortness of breath: No  Exposed to anyone with these symptoms: No         Ashli Davis presents to the [facility] on 7/21/2020. Bola Maria is complaining of pain lower back  The pain is intermittent. The pain is described as aching. Pain is rated on his best day at a 0, on his worst day at a 8, and on average at a 5 on the VAS scale. He took his last dose of      Any procedures since your last visit: Yes, with 100 % relief until July 10,2020 75%. Pacemaker or defibrilator: No managed by     He is not on NSAIDS and is  on anticoagulation medications to include ASA and is managed by pcp    /62   Pulse 70   Temp 97.7 °F (36.5 °C) (Oral)   Resp 16   Ht 5' 9\" (1.753 m)   Wt 210 lb (95.3 kg)   SpO2 99%   BMI 31.01 kg/m²      No LMP for male patient.

## 2020-07-23 ENCOUNTER — TELEPHONE (OUTPATIENT)
Dept: PAIN MANAGEMENT | Age: 85
End: 2020-07-23

## 2020-07-23 NOTE — TELEPHONE ENCOUNTER
Call to Ashli Davis that procedure was approved for 08/04/2020 and that the surgery center should call him a few days before for the pre op call and after 3:00 PM the business day before with the arrival time. Instructed Alejandro to hold ibuprofen for 24 hours, naprosyn for 4 days and any aspirin containing products for 7 days. Hold aspirin and fish oil x 7 days last dose 7/28/2020. Instructed to call office back if any questions. Instructed of need for COVID-19 testing and self quarantining. Bola Maria verbalized understanding.     Bola Maria 's response to the following screening questions:    Fever: No  Headache:  No  Cough: No  Shortness of breath: No  Exposed to anyone with these symptoms: No

## 2020-07-27 ENCOUNTER — HOSPITAL ENCOUNTER (OUTPATIENT)
Age: 85
Discharge: HOME | End: 2020-07-27
Payer: MEDICARE

## 2020-07-27 LAB — PROSTATE SPECIFIC ANTIGEN: <0.03 NG/ML (ref 0–4)

## 2020-07-27 PROCEDURE — 36415 COLL VENOUS BLD VENIPUNCTURE: CPT

## 2020-07-27 PROCEDURE — 84153 ASSAY OF PSA TOTAL: CPT

## 2020-07-30 ENCOUNTER — HOSPITAL ENCOUNTER (OUTPATIENT)
Age: 85
Discharge: HOME | End: 2020-08-01
Payer: MEDICARE

## 2020-07-30 PROCEDURE — U0003 INFECTIOUS AGENT DETECTION BY NUCLEIC ACID (DNA OR RNA); SEVERE ACUTE RESPIRATORY SYNDROME CORONAVIRUS 2 (SARS-COV-2) (CORONAVIRUS DISEASE [COVID-19]), AMPLIFIED PROBE TECHNIQUE, MAKING USE OF HIGH THROUGHPUT TECHNOLOGIES AS DESCRIBED BY CMS-2020-01-R: HCPCS

## 2020-07-31 NOTE — PROGRESS NOTES
Have you been tested for COVID    Have you been told you were positive for COVID No  Have you had any known exposure to someone that is positive for COVID No  Do you have a cough                   No              Do you have shortness of breath No                 Do you have a sore throat            No                Are you having chills                    No                Are you having muscle aches. No                    Please come to the hospital wearing a mask and have your significant other wear a mask as well. Both of you should check your temperature before leaving to come here,  if it is 100 or higher please call [phone] for instruction.

## 2020-08-01 LAB
SARS-COV-2: NOT DETECTED
SOURCE: NORMAL

## 2020-08-04 ENCOUNTER — HOSPITAL ENCOUNTER (OUTPATIENT)
Age: 85
Setting detail: OUTPATIENT SURGERY
Discharge: HOME | End: 2020-08-04
Attending: PAIN MEDICINE | Admitting: PAIN MEDICINE
Payer: MEDICARE

## 2020-08-04 ENCOUNTER — HOSPITAL ENCOUNTER (OUTPATIENT)
Dept: GENERAL RADIOLOGY | Age: 85
Setting detail: OUTPATIENT SURGERY
Discharge: HOME | End: 2020-08-06
Attending: PAIN MEDICINE
Payer: MEDICARE

## 2020-08-04 VITALS
SYSTOLIC BLOOD PRESSURE: 142 MMHG | HEART RATE: 62 BPM | WEIGHT: 210 LBS | RESPIRATION RATE: 20 BRPM | TEMPERATURE: 97.3 F | HEIGHT: 69 IN | BODY MASS INDEX: 31.1 KG/M2 | DIASTOLIC BLOOD PRESSURE: 76 MMHG | OXYGEN SATURATION: 96 %

## 2020-08-04 PROCEDURE — 7100000011 HC PHASE II RECOVERY - ADDTL 15 MIN: Performed by: PAIN MEDICINE

## 2020-08-04 PROCEDURE — 2500000003 HC RX 250 WO HCPCS: Performed by: PAIN MEDICINE

## 2020-08-04 PROCEDURE — 2709999900 HC NON-CHARGEABLE SUPPLY: Performed by: PAIN MEDICINE

## 2020-08-04 PROCEDURE — 6360000002 HC RX W HCPCS: Performed by: PAIN MEDICINE

## 2020-08-04 PROCEDURE — 64636 DESTROY L/S FACET JNT ADDL: CPT | Performed by: PAIN MEDICINE

## 2020-08-04 PROCEDURE — 7100000010 HC PHASE II RECOVERY - FIRST 15 MIN: Performed by: PAIN MEDICINE

## 2020-08-04 PROCEDURE — 3600000002 HC SURGERY LEVEL 2 BASE: Performed by: PAIN MEDICINE

## 2020-08-04 PROCEDURE — 3600000012 HC SURGERY LEVEL 2 ADDTL 15MIN: Performed by: PAIN MEDICINE

## 2020-08-04 PROCEDURE — 3209999900 FLUORO FOR SURGICAL PROCEDURES

## 2020-08-04 PROCEDURE — 64635 DESTROY LUMB/SAC FACET JNT: CPT | Performed by: PAIN MEDICINE

## 2020-08-04 RX ORDER — LIDOCAINE HYDROCHLORIDE 20 MG/ML
INJECTION, SOLUTION EPIDURAL; INFILTRATION; INTRACAUDAL; PERINEURAL PRN
Status: DISCONTINUED | OUTPATIENT
Start: 2020-08-04 | End: 2020-08-04 | Stop reason: ALTCHOICE

## 2020-08-04 RX ORDER — BUPIVACAINE HYDROCHLORIDE 2.5 MG/ML
INJECTION, SOLUTION EPIDURAL; INFILTRATION; INTRACAUDAL PRN
Status: DISCONTINUED | OUTPATIENT
Start: 2020-08-04 | End: 2020-08-04 | Stop reason: ALTCHOICE

## 2020-08-04 RX ORDER — METHYLPREDNISOLONE ACETATE 40 MG/ML
INJECTION, SUSPENSION INTRA-ARTICULAR; INTRALESIONAL; INTRAMUSCULAR; SOFT TISSUE PRN
Status: DISCONTINUED | OUTPATIENT
Start: 2020-08-04 | End: 2020-08-04 | Stop reason: ALTCHOICE

## 2020-08-04 ASSESSMENT — PAIN SCALES - GENERAL
PAINLEVEL_OUTOF10: 5
PAINLEVEL_OUTOF10: 0

## 2020-08-04 ASSESSMENT — PAIN DESCRIPTION - ONSET: ONSET: ON-GOING

## 2020-08-04 ASSESSMENT — PAIN DESCRIPTION - PAIN TYPE: TYPE: CHRONIC PAIN

## 2020-08-04 ASSESSMENT — PAIN DESCRIPTION - DIRECTION: RADIATING_TOWARDS: BILTERAL LEGS

## 2020-08-04 ASSESSMENT — PAIN DESCRIPTION - DESCRIPTORS: DESCRIPTORS: ACHING;SHOOTING

## 2020-08-04 ASSESSMENT — PAIN DESCRIPTION - PROGRESSION: CLINICAL_PROGRESSION: NOT CHANGED

## 2020-08-04 ASSESSMENT — PAIN DESCRIPTION - ORIENTATION: ORIENTATION: MID

## 2020-08-04 ASSESSMENT — PAIN DESCRIPTION - LOCATION: LOCATION: BACK

## 2020-08-04 NOTE — H&P
[facility]        [address] [facility], [address]  Dept: [phone]    Procedure History & Physical      [facility]     HPI:    Patient  is here for right LBP for right L3,4,5 RFA  Labs/imaging studies reviewed   All question and concerns addressed including R/B/A associated with the procedure    Past Medical History:   Diagnosis Date    CA prostate, adenoca ([address] 4/25/2019    CAD [facility]onary artery [facility])     [facility]     pro[facility]- PO chemotherapy     Chronic bilateral low back pain without sciatica 3/6/2020    Cobalamin deficiency     Hyperlipidemia     Hypertension     Osteoarthritis     Osteochondropathy     Osteopenia     Pain in lower limb     Peripheral venous insufficiency     PONV (postoperative nausea and vomiting)     Prolonged emergence from general anesthesia     PVD (peripheral vascular disease) with claudication ([address] 4/25/2019       Past Surgical History:   Procedure Laterality Date    ACETABULUM FRACTURE SURGERY  3/24/11    ANESTHESIA NERVE BLOCK Bilateral 6/16/2020    BILATERAL L3 L4 L5 MEDIAL NERVE BRANCH BLOCK performed by Gudelia Mejia DO at [address] [place] [address]  2016   [address] GRAFT  Dec. 1996    [facility]/ Dr. Nathaly Beckford, DIAGNOSTIC  2016    EYE SURGERY Bilateral 01/2018    cataract   [address]    left   [address]    JOINT REPLACEMENT      bilat knee 2010, padmini dunne 2011     LYMPH NODE DISSECTION  10/2007    PAIN MANAGEMENT PROCEDURE N/A 5/19/2020    CAUDAL EPIDURAL STEROID INJECTION performed by Gudelia Mejia DO at [facility] [address]  8/4/20    PROSTATE SURGERY  10/31/07    [address] M.D./ [facility] [place] LAPAROSCOPIC PROSTATECTOMY    TESTICLE REMOVAL Bilateral 02/2019    hx prostate ca-    TONSILLECTOMY      TOTAL HIP ARTHROPLASTY  11/30/11    right    TOTAL KNEE ARTHROPLASTY  2010 bilateral       Prior to Admission medications    Medication Sig Start Date End Date Taking? Authorizing Provider   traMADol (ULTRAM) 50 MG tablet Take 1 tablet by mouth daily as needed for Pain for up to 30 days. Intended supply: 30 days. Take lowest dose possible to manage pain 7/21/20 8/20/20 Yes Josiah Krueger,    denosumab (PROLIA) 60 MG/ML SOSY SC injection Inject 1 mL into the skin every 6 months 3/23/20  Yes Jarrett Hernandez MD   ERLEADA 60 MG TABS daily  3/2/20  Yes Historical Provider, MD   omeprazole (PRILOSEC) 20 MG delayed release capsule Take 1 capsule by mouth daily 2/19/20  Yes Jarrett Hernandez MD   omega-3 acid ethyl esters (LOVAZA) 1 g capsule Take 1 capsule by mouth 2 times daily  Patient taking differently: Take 1 g by mouth 2 times daily LD 6-11-20 2/12/20  Yes Jarrett Hernandez MD   atorvastatin (LIPITOR) 40 MG tablet Take 1 tablet by mouth daily 2/12/20  Yes Jarrett Hernandez MD   lisinopril (PRINIVIL;ZESTRIL) 20 MG tablet Take 1 tablet by mouth daily 1/16/20  Yes Jarrett Hernandez MD   meclizine (ANTIVERT) 25 MG tablet 1/2 - 1 by mouth every 6 hours as needed   Yes Historical Provider, MD   Multiple Vitamins-Minerals (THERAPEUTIC MULTIVITAMIN-MINERALS) tablet Take 1 tablet by mouth daily LD 6-11-20   Yes Historical Provider, MD   metoprolol tartrate (LOPRESSOR) 25 MG tablet Take 25 mg by mouth 2 times daily   Yes Historical Provider, MD   Coenzyme Q10 (COQ10) 50 MG CAPS Take 1 capsule by mouth daily LD 6-11-20   Yes Historical Provider, MD   aspirin 81 MG tablet Take 81 mg by mouth daily    Yes Historical Provider, MD   Cyanocobalamin (VITAMIN B 12 PO) Take  by mouth daily.  sublingual  2/8/11  Yes Historical Provider, MD       Allergies   Allergen Reactions    Iodine Hives       Social History     Socioeconomic History    Marital status:      Spouse name: Not on file    Number of children: Not on file    Years of education: Not on file    Highest education level: Not on file Occupational History    Not on file   Social Needs    Financial resource strain: Not on file    Food insecurity     Worry: Not on file     Inability: Not on file    Transportation needs     Medical: Not on file     Non-medical: Not on file   Tobacco Use    Smoking status: Former Smoker     Packs/day: 1.00     Years: 40.00     Pack years: 40.00     Types: Cigarettes     Last attempt to quit: 10/1/1994     Years since quittin.8    Smokeless tobacco: Former User     Types: Chew   Substance and Sexual Activity    Alcohol use:  Yes     Alcohol/week: 1.0 standard drinks     Types: 1 Cans of beer per week     Comment: rare    Drug use: No    Sexual activity: Not on file   Lifestyle    Physical activity     Days per week: Not on file     Minutes per session: Not on file    Stress: Not on file   Relationships    Social connections     Talks on phone: Not on file     Gets together: Not on file     Attends Anabaptism service: Not on file     Active member of club or organization: Not on file     Attends meetings of clubs or organizations: Not on file     Relationship status: Not on file    Intimate partner violence     Fear of current or ex partner: Not on file     Emotionally abused: Not on file     Physically abused: Not on file     Forced sexual activity: Not on file   Other Topics Concern    Not on file   Social History Narrative    Problem List: History of polyp of colon, Disorder of bone density and structure, unspecified,    Osteochondropathy, Carcinoma in situ of prostate, Aneurysm of thoracic aorta, Anemia, Coronary    arteriosclerosis, Cobalamin deficiency, Multiple closed fractures of pelvis with disruption of pelvic [place],    Hyperlipidemia, Essential hypertension    Health Maintenance:    Bone Density Test Screening - (3/5/2019)    Bone Density Scan - (2015)    Influenza Vaccination - (10/7/2016)    Colonoscopy - (4/3/2012)    Couseled on Home Safety - (2015)    Colonoscopy Screening - (4/3/2012)    US Liver - 8/1/08    Medical Problems:    Coronary Artery Disease (CAD), Hypertension, Hyperlipidemia, Prostate Cancer    Surgical Hx:    Prostatectomy, Coronary Artery Bypass Graft (CABG)            FH: Father:    . (Hx)    Mother:    . (Hx)        SH: Marital:  - retired . Personal Habits: Cigarette Use: former cigarette smoker, Nonsmoker. Alcohol: Occasionally    consumes alcohol. Family History   Problem Relation Age of Onset    Stroke Mother     Cancer Mother         pancreatic    Cancer Father         prostate    Stroke Father          REVIEW OF SYSTEMS:    CONSTITUTIONAL:  negative for  fevers, chills, sweats and fatigue    RESPIRATORY:  negative for  dry cough, cough with sputum, dyspnea, wheezing and chest pain    CARDIOVASCULAR:  negative for chest pain, dyspnea, palpitations, syncope    GASTROINTESTINAL:  negative for nausea, vomiting, change in bowel habits, diarrhea, constipation and abdominal pain    MUSCULOSKELETAL: negative for muscle weakness    SKIN: negative for itching or rashes. BEHAVIOR/PSYCH:  negative for poor appetite, increased appetite, decreased sleep and poor concentration    All other systems negative      PHYSICAL EXAM:    VITALS:  BP (!) 129/58   Pulse 68   Temp 96.4 °F (35.8 °C) (Temporal)   Resp 18   Ht 5' 9\" (1.753 m)   Wt 210 lb (95.3 kg)   SpO2 95%   BMI 31.01 kg/m²     CONSTITUTIONAL:  awake, alert, cooperative, no apparent distress, and appears stated age    EYES: PERRLA, EOMI    LUNGS:  No increased work of breathing, no audible wheezing    CARDIOVASCULAR:  regular rate and rhythm    ABDOMEN:  Soft non tender non distended     EXTREMITIES: no signs of clubbing or cyanosis. MUSCULOSKELETAL: negative for flaccid muscle tone or spastic movements. SKIN: gross examination reveals no signs of rashes, or diaphoresis. NEURO: Cranial nerves II-XII grossly intact. No signs of agitated mood.

## 2020-08-04 NOTE — OP NOTE
2020    Patient: Gilma Pryor  :  1935  Age:  82 y.o. Sex:  male     PRE-OPERATIVE DIAGNOSIS: Right Lumbar spondylosis, lumbar facet arthropathy. POST-OPERATIVE DIAGNOSIS: Same. PROCEDURE:  Fluoroscopic-guided Right  Lumbar facet medial branch radiofrequency ablation at levels L3,4,5 (anesthetizing the L4-5 and L5-S1 facet joints). SURGEON:  M. Leopoldo Conners. ANESTHESIA: local    ESTIMATED BLOOD LOSS: None.  ______________________________________________________________________  HISTORY & PHYSICAL: Gilma Pryor presents today for fluoroscopic-guided Right lumbar facet medial branch radiofrequency ablation at levels L3,4,5. The potential complications of the procedure were explained to Asif Weston again today and he has elected to undergo the aforementioned procedure. Antoni complete History & Physical examination were reviewed in depth, a copy of which is in the chart. DESCRIPTION OF PROCEDURE:    After confirming written and informed consent, a time-out was performed and Antoni name and date of birth, the procedure to be performed as well as the plan for the location of the needle insertion were confirmed. The patient was brought into the procedure room and placed in the prone position on the fluoroscopy table. Standard monitors were placed and vital signs were observed throughout the procedure. The area of the lumbar spine and upper buttocks was sterilely prepped with chloraprep and draped in a sterile manner. AP fluoroscopy was used to identify and christian bartons point at the targeted area. A 22 gauge 10 mm radiofrequency probe was advanced toward each of these points. Once bone was contacted, negative aspiration for blood and CSF was confirmed, sensory stimulation was performed at 50 Hz and at 0.4 volts generating a pressure sensation. Motor stimulation < 2.0 volts elicited multifidus twitching without any radicular symptoms.  1 ml of 2% lidocaine was injected prior to lesioning, which was performed for 90 seconds at 90 degrees centigrade. Once the lesions were complete, a solution of 0.25% marcaine 3 cc and 40 mg DepoMedrol was injected and distributed equally through each probe. The probes were removed . The patient's back was cleaned and bandages were placed over the needle insertion sites. Disposition the patient tolerated the procedure well and there were no complications . Vital signs remained stable throughout the procedure. The patient was escorted to the recovery area where they remained until discharge and written discharge instructions for the procedure were given. Plan: Bola Maria will return to our pain management center as scheduled.      Cammie Cole DO

## 2020-08-14 NOTE — PROGRESS NOTES
[facility] Pain Management        [address], [place]  Dept: [phone]        Follow up Note      Isaura Door     Date of Visit:  08/19/20     CC:  Patient presents for follow up   Chief Complaint   Patient presents with    Follow-up    Lower Back Pain     HPI:    Pain is better  Change in quality of symptoms:no. Medication side effects:not applicable. Recent diagnostic testing:none. Recent interventional procedures:right L3,4,5 RFA with 100% relief. He has been on anticoagulation medications to include ASA and has not been on herbal supplements. He is not diabetic.     Imaging:   Lumbar MRI 4/2020 -  1. Metastatic lesions are noted in the right iliac bone and first   sacral body, but are not associated with neural encroachment. Other   metastases previously documented in the pelvis are not included on   this exam. Metastatic foci do enhance. 2. There is grade 1 anterolisthesis of L5 on S1 and loss of the L4-5   disc space. 3. Lateral portions of the circumferential disc bulge at the L5-S1   level could encroach on the L5 nerve roots lateral to the foramina on   either side. 4. Telescoping of facets and disc bulging results in severe central   stenosis of the right-sided L4 and L5 nerve roots and moderately   severe stenosis of the left-sided L2-L5 root levels, more severe   inferiorly. Lumbar MRI 7/2019 -   IMPRESSION:    Bone lesions at S1, S2 and in the right iliac wing worrisome for a marrow   infiltrative process.  Recommend clinical correlation and correlation   with nuclear medicine bone scanning. Dextroconvex scoliosis and multilevel malalignment associated with   multilevel spondylosis as detailed. L2-3 level, disc bulging with mild spinal canal stenosis and mild to   moderate right foraminal stenosis. L3-4 level, disc bulging with mild spinal canal stenosis and mild to   moderate left foraminal stenosis.     L4-5 level, disc degeneration and disc osteophyte complex with mild to   moderate foraminal stenosis. L5-S1 level, grade 1 anterolisthesis from bilateral L5 pars defects and   degenerative disc disease with moderate to severe foraminal stenosis and   impingement of the L5 nerve roots. Anatomic Thoracic/Lumbar Variant: None.  L4-5 is considered the level of   the iliac crest and assume there are 5 lumbar-type vertebrae. There are hepatic and renal cysts.  Recommend correlation with ultrasound.     Cervical MRI 2019 -   IMPRESSION:    10 mm low signal intensity lesion at the right side of the C6 vertebral   body and 7 mm signal intensity lesion at the base of C2 are worrisome for   a marrow infiltrative process such as osteoblastic metastasis.  Clinical   correlation and correlation with nuclear medicine bone scanning   recommended for further evaluation. Levoconvex scoliosis and multilevel malalignment associated multilevel   spondylosis most notably moderate to advanced degenerative disc disease   at C6-7 and mild to moderate degenerative disc disease at C5-6 and C4-5. There is no substantial spinal canal stenosis. There is multilevel acquired foraminal stenosis most notably moderate to   severe left and moderate right foraminal stenosis at C5-6. Anatomic Variant:  None.  Assume 7 cervical vertebrae with counting from   the craniocervical junction.        Potential Aberrant Drug-Related Behavior:  no    Urine Drug Screenin2020 consistent    OARRS report:  2020 consistent  2020 consistent    Past Medical History:   Diagnosis Date    CA prostate, adenoca ([address] 2019    CAD (coronary artery disease)     Dr. Catalino Sainz [facility])     prostate- PO chemotherapy     Chronic bilateral low back pain without sciatica 3/6/2020    Cobalamin deficiency     Hyperlipidemia     Hypertension     Osteoarthritis     Osteochondropathy     Osteopenia     Pain in lower limb     Peripheral venous insufficiency  PONV (postoperative nausea and vomiting)     Prolonged emergence from general anesthesia     PVD (peripheral vascular disease) with claudication ([facility] [address] 4/25/2019       Past Surgical History:   Procedure Laterality Date    ACETABULUM FRACTURE SURGERY  3/24/11    ANESTHESIA NERVE BLOCK Bilateral 6/16/2020    BILATERAL L3 L4 L5 MEDIAL NERVE BRANCH BLOCK performed by Tasha Valverde DO at [address] [facility]  2016    CORONARY ARTERY BYPASS GRAFT  Dec. 1996    [facility]/ Dr. Jackson Body, DIAGNOSTIC  2016    EYE SURGERY Bilateral 01/2018    cataract    FEMUR FRACTURE SURGERY  1981    left   [address]    JOINT REPLACEMENT      bilat knee 2010, righ thip 2011     LYMPH NODE DISSECTION  10/2007    PAIN MANAGEMENT PROCEDURE N/A 5/19/2020    CAUDAL EPIDURAL STEROID INJECTION performed by Tasha Valverde DO at [facility]  8/4/20    PAIN MANAGEMENT PROCEDURE Right 8/4/2020    RIGHT L3 4 5 MEDIAL BRANCH RADIOFREQUENCY ABLATION performed by Tasha Valverde DO at [address] [place] [address]  10/31/07    [address] M[address]D./ [facility] [place] LAPAROSCOPIC PROSTATECTOMY    TESTICLE REMOVAL Bilateral 02/2019    hx prostate ca-    TONSILLECTOMY      TOTAL HIP ARTHROPLASTY  11/30/11    right    TOTAL KNEE ARTHROPLASTY  2010    bilateral       Prior to Admission medications    Medication Sig Start Date End Date Taking? Authorizing Provider   traMADol (ULTRAM) 50 MG tablet Take 1 tablet by mouth daily as needed for Pain for up to 30 days. Intended supply: 30 days.  Take lowest dose possible to manage pain 7/21/20 8/20/20 Yes Tasha Valverde DO   denosumab (PROLIA) 60 MG/ML SOSY SC injection Inject 1 mL into the skin every 6 months 3/23/20  Yes Federico Locke MD   ERLEADA 60 MG TABS daily  3/2/20  Yes Historical Provider, MD   omeprazole (PRILOSEC) 20 MG delayed release capsule Take 1 capsule by mouth daily 2/19/20  Yes Federico Locke MD rare    Drug use: No    Sexual activity: Not on file   Lifestyle    Physical activity     Days per week: Not on file     Minutes per session: Not on file    Stress: Not on file   Relationships    Social connections     Talks on phone: Not on file     Gets together: Not on file     Attends [employer] service: Not on file     Active member of club or organization: Not on file     Attends meetings of clubs or organizations: Not on file     Relationship status: Not on file    Intimate partner violence     Fear of current or ex partner: Not on file     Emotionally abused: Not on file     Physically abused: Not on file     Forced sexual activity: Not on file   Other Topics Concern    Not on file   Social History Narrative    Problem List: History of polyp of colon, Disorder of bone density and structure, unspecified,    Osteochondropathy, Carcinoma in situ of prostate, Aneurysm of thoracic aorta, Anemia, Coronary    arteriosclerosis, Cobalamin deficiency, Multiple closed fractures of pelvis with disruption of pelvic [place],    Hyperlipidemia, Essential hypertension    Health Maintenance:    Bone Density Test Screening - (3/5/2019)    Bone Density Scan - (12/18/2015)    Influenza Vaccination - (10/7/2016)    Colonoscopy - (4/3/2012)    Couseled on Home Safety - (11/23/2015)    Colonoscopy Screening - (4/3/2012)    US Liver - 8/1/08    Medical Problems:    Coronary Artery Disease (CAD), Hypertension, Hyperlipidemia, Prostate Cancer    Surgical Hx:    Prostatectomy, Coronary Artery Bypass Graft (CABG)            FH: Father:    . (Hx)    Mother:    . (Hx)        SH: Marital:  - retired . Personal Habits: Cigarette Use: former cigarette smoker, Nonsmoker. Alcohol: Occasionally    consumes alcohol.        Family History   Problem Relation Age of Onset    Stroke Mother     Cancer Mother         pancreatic    Cancer Father         prostate    Stroke Father        REVIEW OF SYSTEMS:     Sanjana Rivera denies fever/chills, chest pain, shortness of breath, new bowel or bladder complaints. All other review of systems was negative. PHYSICAL EXAMINATION:      /64   Pulse 66   Temp 97.5 °F (36.4 °C)   Resp 16   Ht 5' 9\" (1.753 m)   Wt 207 lb (93.9 kg)   SpO2 98%   BMI 30.57 kg/m²     General:       General appearance:pleasant and well-hydrated, in no distress and A & O x3  Build:Normal Weight  Function:Rises from a seated position with mild difficulty     HEENT:     Head:normocephalic, atraumatic  Pupils:regular, round, equal  Sclera: icterus absent     Lungs:     Breathing:normal breathing pattern     Abdomen:     Shape:non-distended and normal  Tenderness:none  Guarding:none     Lumbar spine:     Spine inspection:normal   CVA tenderness:No   Palpation:tenderness paravertebral muscles, left, right negative  Range of motion:abnormal mildly in lateral bending, flexion, extension rotation bilateral and is not painful.     Musculoskeletal:     Trigger points in Paraveteral:absent bilaterally  SI joint tenderness:negative right, negative left              JELENA test:not done right, not done left  Piriformis tenderness:negative right, negative left  Trochanteric bursa tenderness:negative right, negative left  SLR:negative right, negative left, sitting      Extremities:     Tremors:None bilaterally upper and lower  Range of motion:pain with internal rotation of hips negative.   Intact:Yes  Varicose veins:absent   Pulses:present Lt radial  Cyanosis:none  Edema:none x all 4 extremities     Neurological:     Sensory:normal to light touch BLE  Motor:                Right Quadriceps5/5          Left Quadriceps5/5           Right Gastrocnemius5/5    Left Gastrocnemius5/5  Right Ant Tibialis5/5  Left Ant Tibialis5/5     Reflexes:    Right Quadriceps reflex2+  Left Quadriceps reflex2+  Right Achilles reflex2+  Left Achilles reflex2+  Gait:normal     Dermatology:     Skin:no rashes or lesions noted    Assessment/Plan:    Axial LBP in region of L-S junction particularly when walking   2020 Lumbar MRI shows metastatic (prostate CA) S1-S2 lesion and L5-S1 grade 1-2 anterolisthesis with b/l pars defect  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 Joyce Liu  Referred by Dr. Jeanette Williamson for consideration for further injections as Dr. Joyce Liu no longer takes his insurance  Pt's wife states she has discussed his LE symptoms and they have determined they feel his LE weakness is a vascular issue, pt states he can stand still       Plan:    Buccal drug screen and OARRS reviewed  stop tramadol 50 mg #30 - no longer needed  Caudal EVGENY done with 60% relief of lumbar symptoms x 2-3 days until he did yardwork  B/l L3,4,5 MNBB #1 with 100% relief, now wearing   R L3,4,5 RFA with 100% relief, no need to proceed w/ the left side at this time   Patient encouraged to stay active   Treatment plan discussed with the patient including medication and procedure side effects     Cc:  Referring physician    NAVARRO Flor.

## 2020-08-18 ENCOUNTER — TELEPHONE (OUTPATIENT)
Dept: VASCULAR SURGERY | Age: 85
End: 2020-08-18

## 2020-08-19 ENCOUNTER — OFFICE VISIT (OUTPATIENT)
Dept: PAIN MANAGEMENT | Age: 85
End: 2020-08-19
Payer: MEDICARE

## 2020-08-19 VITALS
SYSTOLIC BLOOD PRESSURE: 102 MMHG | DIASTOLIC BLOOD PRESSURE: 64 MMHG | HEART RATE: 66 BPM | HEIGHT: 69 IN | WEIGHT: 207 LBS | RESPIRATION RATE: 16 BRPM | BODY MASS INDEX: 30.66 KG/M2 | TEMPERATURE: 97.5 F | OXYGEN SATURATION: 98 %

## 2020-08-19 PROCEDURE — G8427 DOCREV CUR MEDS BY ELIG CLIN: HCPCS | Performed by: PAIN MEDICINE

## 2020-08-19 PROCEDURE — 1036F TOBACCO NON-USER: CPT | Performed by: PAIN MEDICINE

## 2020-08-19 PROCEDURE — 1123F ACP DISCUSS/DSCN MKR DOCD: CPT | Performed by: PAIN MEDICINE

## 2020-08-19 PROCEDURE — 99213 OFFICE O/P EST LOW 20 MIN: CPT | Performed by: PAIN MEDICINE

## 2020-08-19 PROCEDURE — G8417 CALC BMI ABV UP PARAM F/U: HCPCS | Performed by: PAIN MEDICINE

## 2020-08-19 PROCEDURE — 4040F PNEUMOC VAC/ADMIN/RCVD: CPT | Performed by: PAIN MEDICINE

## 2020-08-19 NOTE — PROGRESS NOTES
Do you currently have any of the following:    Fever: No  Headache:  No  Cough: No  Shortness of breath: No  Exposed to anyone with these symptoms: No         Gilma Pryor presents to the [facility] on 8/19/2020. Asif Weston is complaining of pain in his lower back. The pain is intermittent. The pain is described as stiffness. Pain is rated on his best day at a 0, on his worst day at a 0, and on average at a 0 on the VAS scale. He took his last dose of Tramadol 7/31/2020. Any procedures since your last visit: Yes, Right  Lumbar facet medial branch radiofrequency ablation at levels L3,4,5 with 100 % relief. Pacemaker or defibrilator: No managed by N/A. He is not on NSAIDS and is  on anticoagulation medications to include ASA and is managed by Pradip Littlejohn MD.     /64   Pulse 66   Temp 97.5 °F (36.4 °C)   Resp 16   Ht 5' 9\" (1.753 m)   Wt 207 lb (93.9 kg)   SpO2 98%   BMI 30.57 kg/m²      No LMP for male patient.

## 2020-09-01 ENCOUNTER — TELEPHONE (OUTPATIENT)
Dept: PAIN MANAGEMENT | Age: 85
End: 2020-09-01

## 2020-09-01 NOTE — TELEPHONE ENCOUNTER
Kezia Ortiz called in, when they saw Dr. Shoshana Roberts he was doing well. He would like to have his left side done now, but can they schedule that and avoid the office visit before hand.  Kezia Ortiz stated it is starting to bother him.   Best number to reach them is [phone]

## 2020-09-02 ENCOUNTER — TELEPHONE (OUTPATIENT)
Dept: PAIN MANAGEMENT | Age: 85
End: 2020-09-02

## 2020-09-02 NOTE — TELEPHONE ENCOUNTER
Yes, prep for case is already in his chart it is for left L3,4,5 RFA. Sherri please schedule. Thank you.

## 2020-09-04 ENCOUNTER — HOSPITAL ENCOUNTER (OUTPATIENT)
Age: 85
Discharge: HOME | End: 2020-09-06
Payer: MEDICARE

## 2020-09-04 PROCEDURE — U0003 INFECTIOUS AGENT DETECTION BY NUCLEIC ACID (DNA OR RNA); SEVERE ACUTE RESPIRATORY SYNDROME CORONAVIRUS 2 (SARS-COV-2) (CORONAVIRUS DISEASE [COVID-19]), AMPLIFIED PROBE TECHNIQUE, MAKING USE OF HIGH THROUGHPUT TECHNOLOGIES AS DESCRIBED BY CMS-2020-01-R: HCPCS

## 2020-09-08 NOTE — PROGRESS NOTES
Have you been tested for COVID  Yes           Have you been told you were positive for COVID No  Have you had any known exposure to someone that is positive for COVID No  Do you have a cough                   No              Do you have shortness of breath No                 Do you have a sore throat            No                Are you having chills                    No                Are you having muscle aches. No                    Please come to the hospital wearing a mask and have your significant other wear a mask as well. Both of you should check your temperature before leaving to come here,  if it is 100 or higher please call [phone] for instruction.

## 2020-09-08 NOTE — PROGRESS NOTES
[facility]  INSTRUCTIONS    . ..........................................................................................................................................       ? Parking the day of Surgery is located in the [facility]. Upon entering the door, make an immediate right to the surgery reception desk    ? Bring photo ID and insurance card     ? You may have a light breakfast day of procedure    ? Wear loose comfortable clothing    ? Please follow instructions for medications as given per Dr's office     ? Stop blood thinners as per Dr's office instructions    ? You can expect a call the business day prior to procedure to notify you if your arrival time changes    ?  Please arrange for

## 2020-09-09 ENCOUNTER — HOSPITAL ENCOUNTER (OUTPATIENT)
Age: 85
Discharge: HOME | End: 2020-09-09
Payer: MEDICARE

## 2020-09-09 LAB
ALBUMIN SERPL-MCNC: 4.4 G/DL (ref 3.5–5.2)
ALP BLD-CCNC: 40 U/L (ref 40–129)
ALT SERPL-CCNC: 18 U/L (ref 0–40)
ANION GAP SERPL CALCULATED.3IONS-SCNC: 10 MMOL/L (ref 7–16)
AST SERPL-CCNC: 25 U/L (ref 0–39)
BASOPHILS ABSOLUTE: 0.03 E9/L (ref 0–0.2)
BASOPHILS RELATIVE PERCENT: 0.8 % (ref 0–2)
BILIRUB SERPL-MCNC: 0.7 MG/DL (ref 0–1.2)
BILIRUBIN URINE: NEGATIVE
BLOOD, URINE: NEGATIVE
BUN BLDV-MCNC: 20 MG/DL (ref 8–23)
CALCIUM SERPL-MCNC: 9.6 MG/DL (ref 8.6–10.2)
CHLORIDE BLD-SCNC: 109 MMOL/L (ref 98–107)
CHOLESTEROL, TOTAL: 123 MG/DL (ref 0–199)
CLARITY: CLEAR
CO2: 26 MMOL/L (ref 22–29)
COLOR: YELLOW
CREAT SERPL-MCNC: 1.3 MG/DL (ref 0.7–1.2)
CREATININE URINE: 245 MG/DL (ref 40–278)
EOSINOPHILS ABSOLUTE: 0.09 E9/L (ref 0.05–0.5)
EOSINOPHILS RELATIVE PERCENT: 2.4 % (ref 0–6)
FOLATE: >20 NG/ML (ref 4.8–24.2)
GFR AFRICAN AMERICAN: >60
GFR NON-AFRICAN AMERICAN: 53 ML/MIN/1.73
GLUCOSE BLD-MCNC: 114 MG/DL (ref 74–99)
GLUCOSE URINE: NEGATIVE MG/DL
HBA1C MFR BLD: 4.8 % (ref 4–5.6)
HCT VFR BLD CALC: 33.3 % (ref 37–54)
HDLC SERPL-MCNC: 45 MG/DL
HEMOGLOBIN: 11.5 G/DL (ref 12.5–16.5)
IMMATURE GRANULOCYTES #: 0.03 E9/L
IMMATURE GRANULOCYTES %: 0.8 % (ref 0–5)
KETONES, URINE: NEGATIVE MG/DL
LDL CHOLESTEROL CALCULATED: 37 MG/DL (ref 0–99)
LEUKOCYTE ESTERASE, URINE: NEGATIVE
LYMPHOCYTES ABSOLUTE: 1.03 E9/L (ref 1.5–4)
LYMPHOCYTES RELATIVE PERCENT: 27.4 % (ref 20–42)
MAGNESIUM: 1.9 MG/DL (ref 1.6–2.6)
MCH RBC QN AUTO: 30.7 PG (ref 26–35)
MCHC RBC AUTO-ENTMCNC: 34.5 % (ref 32–34.5)
MCV RBC AUTO: 89 FL (ref 80–99.9)
MICROALBUMIN UR-MCNC: 27.5 MG/L
MICROALBUMIN/CREAT UR-RTO: 11.2 (ref 0–30)
MONOCYTES ABSOLUTE: 0.28 E9/L (ref 0.1–0.95)
MONOCYTES RELATIVE PERCENT: 7.4 % (ref 2–12)
NEUTROPHILS ABSOLUTE: 2.3 E9/L (ref 1.8–7.3)
NEUTROPHILS RELATIVE PERCENT: 61.2 % (ref 43–80)
NITRITE, URINE: NEGATIVE
PDW BLD-RTO: 13.8 FL (ref 11.5–15)
PH UA: 5.5 (ref 5–9)
PLATELET # BLD: 135 E9/L (ref 130–450)
PMV BLD AUTO: 9.7 FL (ref 7–12)
POTASSIUM SERPL-SCNC: 4.6 MMOL/L (ref 3.5–5)
PROTEIN UA: NEGATIVE MG/DL
RBC # BLD: 3.74 E12/L (ref 3.8–5.8)
SARS-COV-2: NOT DETECTED
SODIUM BLD-SCNC: 145 MMOL/L (ref 132–146)
SOURCE: NORMAL
SPECIFIC GRAVITY UA: >=1.03 (ref 1–1.03)
T4 FREE: 1.28 NG/DL (ref 0.93–1.7)
TOTAL CK: 83 U/L (ref 20–200)
TOTAL PROTEIN: 7.1 G/DL (ref 6.4–8.3)
TRIGL SERPL-MCNC: 205 MG/DL (ref 0–149)
TSH SERPL DL<=0.05 MIU/L-ACNC: 5.01 UIU/ML (ref 0.27–4.2)
UROBILINOGEN, URINE: 0.2 E.U./DL
VITAMIN B-12: 1198 PG/ML (ref 211–946)
VITAMIN D 25-HYDROXY: 55 NG/ML (ref 30–100)
VLDLC SERPL CALC-MCNC: 41 MG/DL
WBC # BLD: 3.8 E9/L (ref 4.5–11.5)

## 2020-09-09 PROCEDURE — 82570 ASSAY OF URINE CREATININE: CPT

## 2020-09-09 PROCEDURE — 83735 ASSAY OF MAGNESIUM: CPT

## 2020-09-09 PROCEDURE — 36415 COLL VENOUS BLD VENIPUNCTURE: CPT

## 2020-09-09 PROCEDURE — 82607 VITAMIN B-12: CPT

## 2020-09-09 PROCEDURE — 82044 UR ALBUMIN SEMIQUANTITATIVE: CPT

## 2020-09-09 PROCEDURE — 82746 ASSAY OF FOLIC ACID SERUM: CPT

## 2020-09-09 PROCEDURE — 81003 URINALYSIS AUTO W/O SCOPE: CPT

## 2020-09-09 PROCEDURE — 84443 ASSAY THYROID STIM HORMONE: CPT

## 2020-09-09 PROCEDURE — 83036 HEMOGLOBIN GLYCOSYLATED A1C: CPT

## 2020-09-09 PROCEDURE — 84439 ASSAY OF FREE THYROXINE: CPT

## 2020-09-09 PROCEDURE — 85025 COMPLETE CBC W/AUTO DIFF WBC: CPT

## 2020-09-09 PROCEDURE — 80061 LIPID PANEL: CPT

## 2020-09-09 PROCEDURE — 80053 COMPREHEN METABOLIC PANEL: CPT

## 2020-09-09 PROCEDURE — 82306 VITAMIN D 25 HYDROXY: CPT

## 2020-09-09 PROCEDURE — 82550 ASSAY OF CK (CPK): CPT

## 2020-09-10 ENCOUNTER — HOSPITAL ENCOUNTER (OUTPATIENT)
Age: 85
Setting detail: OUTPATIENT SURGERY
Discharge: HOME | End: 2020-09-10
Attending: PAIN MEDICINE | Admitting: PAIN MEDICINE
Payer: MEDICARE

## 2020-09-10 ENCOUNTER — HOSPITAL ENCOUNTER (OUTPATIENT)
Dept: GENERAL RADIOLOGY | Age: 85
Setting detail: OUTPATIENT SURGERY
Discharge: HOME | End: 2020-09-12
Attending: PAIN MEDICINE
Payer: MEDICARE

## 2020-09-10 VITALS
DIASTOLIC BLOOD PRESSURE: 60 MMHG | WEIGHT: 210 LBS | HEART RATE: 58 BPM | HEIGHT: 70 IN | OXYGEN SATURATION: 96 % | RESPIRATION RATE: 16 BRPM | SYSTOLIC BLOOD PRESSURE: 129 MMHG | TEMPERATURE: 97.8 F | BODY MASS INDEX: 30.06 KG/M2

## 2020-09-10 PROCEDURE — 64636 DESTROY L/S FACET JNT ADDL: CPT | Performed by: PAIN MEDICINE

## 2020-09-10 PROCEDURE — 7100000010 HC PHASE II RECOVERY - FIRST 15 MIN: Performed by: PAIN MEDICINE

## 2020-09-10 PROCEDURE — 2500000003 HC RX 250 WO HCPCS: Performed by: PAIN MEDICINE

## 2020-09-10 PROCEDURE — 3600000002 HC SURGERY LEVEL 2 BASE: Performed by: PAIN MEDICINE

## 2020-09-10 PROCEDURE — 7100000011 HC PHASE II RECOVERY - ADDTL 15 MIN: Performed by: PAIN MEDICINE

## 2020-09-10 PROCEDURE — 64635 DESTROY LUMB/SAC FACET JNT: CPT | Performed by: PAIN MEDICINE

## 2020-09-10 PROCEDURE — 3600000012 HC SURGERY LEVEL 2 ADDTL 15MIN: Performed by: PAIN MEDICINE

## 2020-09-10 PROCEDURE — 3209999900 FLUORO FOR SURGICAL PROCEDURES

## 2020-09-10 PROCEDURE — 2709999900 HC NON-CHARGEABLE SUPPLY: Performed by: PAIN MEDICINE

## 2020-09-10 PROCEDURE — 6360000002 HC RX W HCPCS: Performed by: PAIN MEDICINE

## 2020-09-10 RX ORDER — LIDOCAINE HYDROCHLORIDE 20 MG/ML
INJECTION, SOLUTION INFILTRATION; PERINEURAL PRN
Status: DISCONTINUED | OUTPATIENT
Start: 2020-09-10 | End: 2020-09-10 | Stop reason: ALTCHOICE

## 2020-09-10 RX ORDER — BUPIVACAINE HYDROCHLORIDE 2.5 MG/ML
INJECTION, SOLUTION EPIDURAL; INFILTRATION; INTRACAUDAL PRN
Status: DISCONTINUED | OUTPATIENT
Start: 2020-09-10 | End: 2020-09-10 | Stop reason: ALTCHOICE

## 2020-09-10 RX ORDER — METHYLPREDNISOLONE ACETATE 40 MG/ML
INJECTION, SUSPENSION INTRA-ARTICULAR; INTRALESIONAL; INTRAMUSCULAR; SOFT TISSUE PRN
Status: DISCONTINUED | OUTPATIENT
Start: 2020-09-10 | End: 2020-09-10 | Stop reason: ALTCHOICE

## 2020-09-10 ASSESSMENT — PAIN SCALES - GENERAL
PAINLEVEL_OUTOF10: 0

## 2020-09-10 ASSESSMENT — PAIN - FUNCTIONAL ASSESSMENT: PAIN_FUNCTIONAL_ASSESSMENT: 0-10

## 2020-09-10 NOTE — OP NOTE
which was performed for 90 seconds at 90 degrees centigrade. Once the lesions were complete, a solution of 0.25% marcaine 3 cc and 40 mg DepoMedrol was injected and distributed equally through each probe. The probes were removed . The patient's back was cleaned and bandages were placed over the needle insertion sites. Disposition the patient tolerated the procedure well and there were no complications . Vital signs remained stable throughout the procedure. The patient was escorted to the recovery area where they remained until discharge and written discharge instructions for the procedure were given. Plan: Shara Penaloza will return to our pain management center as scheduled.      Oswaldo Boykin DO

## 2020-09-10 NOTE — H&P
[facility]        [address] [facility], [facility] [address]  Dept: [phone]    Procedure History & Physical      Mary Gaitan     HPI:    Patient  is here for left LBP for left L3,4,5 RFA  Labs/imaging studies reviewed   All question and concerns addressed including R/B/A associated with the procedure    Past Medical History:   Diagnosis Date    CA prostate, adenoca ([facility] [address] 4/25/2019    CAD (coronary artery disease)     Dr. Chandrakant Joshua - [facility])     prostate- PO chemotherapy     Chronic bilateral low back pain without sciatica 3/6/2020    Cobalamin deficiency     Hyperlipidemia     Hypertension     Osteoarthritis     Osteochondropathy     Osteopenia     Pain in lower limb     Peripheral venous insufficiency     PONV (postoperative nausea and vomiting)     Prolonged emergence from general anesthesia     PVD (peripheral vascular disease) with claudication ([facility] [address] 4/25/2019       Past Surgical History:   Procedure Laterality Date    ACETABULUM FRACTURE SURGERY  3/24/11    ANESTHESIA NERVE BLOCK Bilateral 6/16/2020    BILATERAL L3 L4 L5 MEDIAL NERVE BRANCH BLOCK performed by Felecia Stratton DO at [address] [facility]  2016   [facility] 18 GRAFT  Dec. 1996    [facility]/ Dr. Rin Real, DIAGNOSTIC  2016    EYE SURGERY Bilateral 01/2018    cataract   [address]    left   [address]    JOINT REPLACEMENT      bilat knee 2010, padmini dunne 2011     LYMPH NODE DISSECTION  10/2007    PAIN MANAGEMENT PROCEDURE N/A 5/19/2020    CAUDAL EPIDURAL STEROID INJECTION performed by Felecia Stratton DO at [facility] [address]  8/4/20    PAIN MANAGEMENT PROCEDURE Right 8/4/2020    RIGHT L3 4 5 MEDIAL BRANCH RADIOFREQUENCY ABLATION performed by Felecia Stratton DO at [address] [facility].  10/31/07    AMIRAH STREETER M.D./ [facility] LAPAROSCOPIC PROSTATECTOMY    TESTICLE REMOVAL Bilateral 02/2019    hx prostate ca-    TONSILLECTOMY      TOTAL HIP ARTHROPLASTY  11/30/11    right    TOTAL KNEE ARTHROPLASTY  2010    bilateral       Prior to Admission medications    Medication Sig Start Date End Date Taking? Authorizing Provider   denosumab (PROLIA) 60 MG/ML SOSY SC injection Inject 1 mL into the skin every 6 months 3/23/20  Yes Belén Herrera MD   ERLEADA 60 MG TABS daily  3/2/20  Yes Historical Provider, MD   omeprazole (PRILOSEC) 20 MG delayed release capsule Take 1 capsule by mouth daily 2/19/20  Yes Belén Herrera MD   omega-3 acid ethyl esters (LOVAZA) 1 g capsule Take 1 capsule by mouth 2 times daily  Patient taking differently: Take 1 g by mouth 2 times daily \ 2/12/20  Yes Belén Herrera MD   atorvastatin (LIPITOR) 40 MG tablet Take 1 tablet by mouth daily 2/12/20  Yes Belén Herrera MD   lisinopril (PRINIVIL;ZESTRIL) 20 MG tablet Take 1 tablet by mouth daily 1/16/20  Yes Belén Herrera MD   meclizine (ANTIVERT) 25 MG tablet 1/2 - 1 by mouth every 6 hours as needed   Yes Historical Provider, MD   Multiple Vitamins-Minerals (THERAPEUTIC MULTIVITAMIN-MINERALS) tablet Take 1 tablet by mouth daily LD 6-11-20   Yes Historical Provider, MD   metoprolol tartrate (LOPRESSOR) 25 MG tablet Take 25 mg by mouth 2 times daily   Yes Historical Provider, MD   Coenzyme Q10 (COQ10) 50 MG CAPS Take 1 capsule by mouth daily LD 6-11-20   Yes Historical Provider, MD   aspirin 81 MG tablet Take 81 mg by mouth daily    Yes Historical Provider, MD   Cyanocobalamin (VITAMIN B 12 PO) Take  by mouth daily.  sublingual  2/8/11  Yes Historical Provider, MD       Allergies   Allergen Reactions    Iodine Hives       Social History     Socioeconomic History    Marital status:      Spouse name: Not on file    Number of children: Not on file    Years of education: Not on file    Highest education level: Not on file   Occupational History    Not on file   Social Needs    Financial resource strain: Not on file    Food insecurity     Worry: Not on file     Inability: Not on file    Transportation needs     Medical: Not on file     Non-medical: Not on file   Tobacco Use    Smoking status: Former Smoker     Packs/day: 1.00     Years: 40.00     Pack years: 40.00     Types: Cigarettes     Last attempt to quit: 10/1/1994     Years since quittin.9    Smokeless tobacco: Former User     Types: Chew   Substance and Sexual Activity    Alcohol use:  Yes     Alcohol/week: 1.0 standard drinks     Types: 1 Cans of beer per week     Comment: rare    Drug use: No    Sexual activity: Not on file   Lifestyle    Physical activity     Days per week: Not on file     Minutes per session: Not on file    Stress: Not on file   Relationships    Social connections     Talks on phone: Not on file     Gets together: Not on file     Attends Anabaptist service: Not on file     Active member of club or organization: Not on file     Attends meetings of clubs or organizations: Not on file     Relationship status: Not on file    Intimate partner violence     Fear of current or ex partner: Not on file     Emotionally abused: Not on file     Physically abused: Not on file     Forced sexual activity: Not on file   Other Topics Concern    Not on file   Social History Narrative    Problem List: History of polyp of colon, Disorder of bone density and structure, unspecified,    Osteochondropathy, Carcinoma in situ of prostate, Aneurysm of thoracic aorta, Anemia, Coronary    arteriosclerosis, Cobalamin deficiency, Multiple closed fractures of pelvis with disruption of pelvic [place],    Hyperlipidemia, Essential hypertension    Health Maintenance:    Bone Density Test Screening - (3/5/2019)    Bone Density Scan - (2015)    Influenza Vaccination - (10/7/2016)    Colonoscopy - (4/3/2012)    Couseled on Home Safety - (2015)    Colonoscopy Screening - (4/3/2012)    US Liver - 08    Medical Problems:    Coronary Artery Disease (CAD), Hypertension, Hyperlipidemia, Prostate Cancer    Surgical Hx:    Prostatectomy, Coronary Artery Bypass Graft (CABG)            FH: Father:    . (Hx)    Mother:    . (Hx)        SH: Marital:  - retired . Personal Habits: Cigarette Use: former cigarette smoker, Nonsmoker. Alcohol: Occasionally    consumes alcohol. Family History   Problem Relation Age of Onset    Stroke Mother     Cancer Mother         pancreatic    Cancer Father         prostate    Stroke Father          REVIEW OF SYSTEMS:    CONSTITUTIONAL:  negative for  fevers, chills, sweats and fatigue    RESPIRATORY:  negative for  dry cough, cough with sputum, dyspnea, wheezing and chest pain    CARDIOVASCULAR:  negative for chest pain, dyspnea, palpitations, syncope    GASTROINTESTINAL:  negative for nausea, vomiting, change in bowel habits, diarrhea, constipation and abdominal pain    MUSCULOSKELETAL: negative for muscle weakness    SKIN: negative for itching or rashes. BEHAVIOR/PSYCH:  negative for poor appetite, increased appetite, decreased sleep and poor concentration    All other systems negative      PHYSICAL EXAM:    VITALS:  Ht 5' 10\" (1.778 m)   Wt 210 lb (95.3 kg)   BMI 30.13 kg/m²     CONSTITUTIONAL:  awake, alert, cooperative, no apparent distress, and appears stated age    EYES: PERRLA, EOMI    LUNGS:  No increased work of breathing, no audible wheezing    CARDIOVASCULAR:  regular rate and rhythm    ABDOMEN:  Soft non tender non distended     EXTREMITIES: no signs of clubbing or cyanosis. MUSCULOSKELETAL: negative for flaccid muscle tone or spastic movements. SKIN: gross examination reveals no signs of rashes, or diaphoresis. NEURO: Cranial nerves II-XII grossly intact. No signs of agitated mood.        Assessment/Plan:    Left LB pain for left L3,4,5 RFA

## 2020-09-16 ENCOUNTER — OFFICE VISIT (OUTPATIENT)
Dept: PRIMARY CARE CLINIC | Age: 85
End: 2020-09-16
Payer: MEDICARE

## 2020-09-16 VITALS
WEIGHT: 212 LBS | BODY MASS INDEX: 30.42 KG/M2 | TEMPERATURE: 97.5 F | HEART RATE: 65 BPM | SYSTOLIC BLOOD PRESSURE: 128 MMHG | DIASTOLIC BLOOD PRESSURE: 72 MMHG | OXYGEN SATURATION: 95 %

## 2020-09-16 PROBLEM — M54.50 CHRONIC BILATERAL LOW BACK PAIN WITHOUT SCIATICA: Status: RESOLVED | Noted: 2020-03-06 | Resolved: 2020-09-16

## 2020-09-16 PROBLEM — K21.9 GASTROESOPHAGEAL REFLUX DISEASE WITHOUT ESOPHAGITIS: Status: ACTIVE | Noted: 2020-09-16

## 2020-09-16 PROBLEM — G89.29 CHRONIC BILATERAL LOW BACK PAIN WITHOUT SCIATICA: Status: RESOLVED | Noted: 2020-03-06 | Resolved: 2020-09-16

## 2020-09-16 PROBLEM — H40.9 GLAUCOMA OF LEFT EYE: Status: ACTIVE | Noted: 2020-09-16

## 2020-09-16 PROCEDURE — 1123F ACP DISCUSS/DSCN MKR DOCD: CPT | Performed by: FAMILY MEDICINE

## 2020-09-16 PROCEDURE — 4040F PNEUMOC VAC/ADMIN/RCVD: CPT | Performed by: FAMILY MEDICINE

## 2020-09-16 PROCEDURE — 99215 OFFICE O/P EST HI 40 MIN: CPT | Performed by: FAMILY MEDICINE

## 2020-09-16 PROCEDURE — G8417 CALC BMI ABV UP PARAM F/U: HCPCS | Performed by: FAMILY MEDICINE

## 2020-09-16 PROCEDURE — 1036F TOBACCO NON-USER: CPT | Performed by: FAMILY MEDICINE

## 2020-09-16 PROCEDURE — G8427 DOCREV CUR MEDS BY ELIG CLIN: HCPCS | Performed by: FAMILY MEDICINE

## 2020-09-16 RX ORDER — LISINOPRIL 20 MG/1
20 TABLET ORAL DAILY
Qty: 90 TABLET | Refills: 3 | Status: SHIPPED
Start: 2020-09-16 | End: 2021-02-03 | Stop reason: SDUPTHER

## 2020-09-16 RX ORDER — OMEPRAZOLE 20 MG/1
20 CAPSULE, DELAYED RELEASE ORAL DAILY
Qty: 90 CAPSULE | Refills: 3 | Status: SHIPPED
Start: 2020-09-16 | End: 2021-07-16 | Stop reason: SDUPTHER

## 2020-09-16 RX ORDER — ATORVASTATIN CALCIUM 40 MG/1
40 TABLET, FILM COATED ORAL DAILY
Qty: 90 TABLET | Refills: 3 | Status: SHIPPED
Start: 2020-09-16 | End: 2021-07-16 | Stop reason: SDUPTHER

## 2020-09-16 NOTE — ASSESSMENT & PLAN NOTE
Recent. Follows with eye care. Pressures came down with drops. 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

## 2020-09-16 NOTE — PROGRESS NOTES
GLUCOSE 101 03/26/2011    BUN 20 09/09/2020    BUN 40 06/08/2020    BUN 27 03/07/2020    CREATININE 1.3 09/09/2020    CREATININE 1.7 06/08/2020    CREATININE 1.3 03/07/2020    LABGLOM 53 09/09/2020    LABGLOM 39 06/08/2020    LABGLOM 53 03/07/2020    GFRAA >60 09/09/2020    GFRAA 47 06/08/2020    GFRAA >60 03/07/2020    CALCIUM 9.6 09/09/2020    CALCIUM 9.8 06/08/2020    CALCIUM 9.5 02/06/2020    PROT 7.1 09/09/2020    PROT 7.6 06/08/2020    PROT 6.8 02/06/2020    LABALBU 4.4 09/09/2020    LABALBU 4.5 06/08/2020    LABALBU 4.4 02/06/2020    LABALBU 2.9 03/26/2011    LABALBU 3.0 03/25/2011    LABALBU 3.4 03/24/2011    BILITOT 0.7 09/09/2020    BILITOT 0.4 06/08/2020    BILITOT 1.1 02/06/2020    ALKPHOS 40 09/09/2020    ALKPHOS 45 06/08/2020    ALKPHOS 47 02/06/2020    AST 25 09/09/2020    AST 23 06/08/2020    AST 23 02/06/2020    ALT 18 09/09/2020    ALT 20 06/08/2020    ALT 18 02/06/2020     A1C  Lab Results   Component Value Date    LABA1C 4.8 09/09/2020    LABA1C 4.6 08/01/2019    LABA1C 4.9 06/24/2019     TSH  Lab Results   Component Value Date    TSH 5.010 09/09/2020    TSH 5.120 06/08/2020    TSH 1.150 11/19/2019     FREET4  Lab Results   Component Value Date    U1JYMWQ 9.5 01/31/2011     LIPID  Lab Results   Component Value Date    CHOL 123 09/09/2020    CHOL 157 06/08/2020    CHOL 125 02/06/2020    HDL 45 09/09/2020    HDL 55 06/08/2020    HDL 44 02/06/2020    LDLCALC 37 09/09/2020    LDLCALC 77 06/08/2020    LDLCALC 43 02/06/2020    TRIG 205 09/09/2020    TRIG 126 06/08/2020    TRIG 190 02/06/2020     VITAMIN D  Lab Results   Component Value Date    VITD25 55 09/09/2020    VITD25 42 06/08/2020    VITD25 47 11/19/2019     MAGNESIUM  Lab Results   Component Value Date    MG 1.9 09/09/2020    MG 2.4 06/08/2020    MG 1.8 11/19/2019      PHOS  Lab Results   Component Value Date    PHOS 4.1 06/08/2020    PHOS 3.8 02/06/2020    PHOS 4.3 10/03/2019      PAWAN   No results found for: PAWAN  RHEUMATOID FACTOR  No results 83    Review of Systems  ROS:    Const: Denies chills, fever, malaise and sweats. Eyes: Denies discharge, pain, redness and visual disturbance. ENMT: Denies earaches, other ear symptoms. Denies nasal or sinus symptoms other than if stated  above. Denies mouth and tongue lesions and sore throat. CV: Denies chest discomfort, pain; diaphoresis, dizziness, edema, lightheadedness, orthopnea,  palpitations, syncope and near syncopal episode or any exertional symptoms  Resp: Denies cough, hemoptysis, pleuritic pain, SOB, sputum production and wheezing. GI: Denies abdominal pain, change in bowel habits, hematochezia, melena, nausea and vomiting. : Denies urinary problems including dysuria. Musculo: Denies musculoskeletal symptoms other than above. Skin: Denies bruising and rash or changing skin lesions. Neuro: Denies headache, numbness, stiff neck, tingling and focal weakness slurred speech or facial  Droop  Extremities: Denies calf pain, redness or swelling. Hema/Lymph: Denies bleeding/bruising tendency and enlarged lymph nodes.         Current Outpatient Medications:     lisinopril (PRINIVIL;ZESTRIL) 20 MG tablet, Take 1 tablet by mouth daily, Disp: 90 tablet, Rfl: 3    atorvastatin (LIPITOR) 40 MG tablet, Take 1 tablet by mouth daily, Disp: 90 tablet, Rfl: 3    omeprazole (PRILOSEC) 20 MG delayed release capsule, Take 1 capsule by mouth daily, Disp: 90 capsule, Rfl: 3    denosumab (PROLIA) 60 MG/ML SOSY SC injection, Inject 1 mL into the skin every 6 months, Disp: 1 Syringe, Rfl: 0    ERLEADA 60 MG TABS, daily , Disp: , Rfl:     omega-3 acid ethyl esters (LOVAZA) 1 g capsule, Take 1 capsule by mouth 2 times daily (Patient taking differently: Take 1 g by mouth 2 times daily \), Disp: 180 capsule, Rfl: 3    meclizine (ANTIVERT) 25 MG tablet, 1/2 - 1 by mouth every 6 hours as needed, Disp: , Rfl:     Multiple Vitamins-Minerals (THERAPEUTIC MULTIVITAMIN-MINERALS) tablet, Take 1 tablet by mouth daily LD 6-11-20, Disp: , Rfl:     metoprolol tartrate (LOPRESSOR) 25 MG tablet, Take 25 mg by mouth 2 times daily, Disp: , Rfl:     Coenzyme Q10 (COQ10) 50 MG CAPS, Take 1 capsule by mouth daily LD 6-11-20, Disp: , Rfl:     aspirin 81 MG tablet, Take 81 mg by mouth daily , Disp: , Rfl:     Cyanocobalamin (VITAMIN B 12 PO), Take  by mouth daily.  sublingual , Disp: , Rfl:   Allergies   Allergen Reactions    Iodine Hives       Past Medical History:   Diagnosis Date    CA prostate, adenoca ([facility] 4/25/2019    CAD (coronary artery disease)     Dr. Naida Javed [facility])     prostate- PO chemotherapy     Chronic bilateral low back pain without sciatica 3/6/2020    Cobalamin deficiency     Hyperlipidemia     Hypertension     Osteoarthritis     Osteochondropathy     Osteopenia     Pain in lower limb     Peripheral venous insufficiency     PONV (postoperative nausea and vomiting)     Prolonged emergence from general anesthesia     PVD (peripheral vascular disease) with claudication ([facility] 4/25/2019     Past Surgical History:   Procedure Laterality Date    ACETABULUM FRACTURE SURGERY  3/24/11    ANESTHESIA NERVE BLOCK Bilateral 6/16/2020    BILATERAL L3 L4 L5 MEDIAL NERVE BRANCH BLOCK performed by Manjit Batista DO at [address] [place] [address]  2016   [facility] 18 GRAFT  Dec. 1996    [facility]/ Dr. Vanesa Leavitt, DIAGNOSTIC  2016    EYE SURGERY Bilateral 01/2018    cataract   [address]    left   [facility]    JOINT REPLACEMENT      bilat knee 2010, rig thi 2011     LYMPH NODE DISSECTION  10/2007    PAIN MANAGEMENT PROCEDURE N/A 5/19/2020    CAUDAL EPIDURAL STEROID INJECTION performed by Manjit Batista DO at [facility]  8/4/20    PAIN MANAGEMENT PROCEDURE Right 8/4/2020    RIGHT L3 4 5 MEDIAL BRANCH RADIOFREQUENCY ABLATION performed by Manjit Batista DO at [address] [place] [address] PROCEDURE Left 9/10/2020    LEFT L3 4 5 MEDIAL BRANCH RADIOFREQUENCY ABLATION     +++IODINE ALLERGY+++   CPT: 65097 [id number] performed by Wesley Moseley DO at [facility].  10/31/07    AMIRAH STREETER M.D./ [place] LAPAROSCOPIC PROSTATECTOMY    TESTICLE REMOVAL Bilateral 2019    hx prostate ca-    TONSILLECTOMY      TOTAL HIP ARTHROPLASTY  11    right    TOTAL KNEE ARTHROPLASTY  2010    bilateral     Family History   Problem Relation Age of Onset    Stroke Mother     Cancer Mother         pancreatic    Cancer Father         prostate    Stroke Father      Social History     Socioeconomic History    Marital status:      Spouse name: Not on file    Number of children: Not on file    Years of education: Not on file    Highest education level: Not on file   Occupational History    Not on file   Social Needs    Financial resource strain: Not on file    Food insecurity     Worry: Not on file     Inability: Not on file    Transportation needs     Medical: Not on file     Non-medical: Not on file   Tobacco Use    Smoking status: Former Smoker     Packs/day: 1.00     Years: 40.00     Pack years: 40.00     Types: Cigarettes     Last attempt to quit: 10/1/1994     Years since quittin.9    Smokeless tobacco: Former User     Types: Chew   Substance and Sexual Activity    Alcohol use:  Yes     Alcohol/week: 1.0 standard drinks     Types: 1 Cans of beer per week     Comment: rare    Drug use: No    Sexual activity: Not on file   Lifestyle    Physical activity     Days per week: Not on file     Minutes per session: Not on file    Stress: Not on file   Relationships    Social connections     Talks on phone: Not on file     Gets together: Not on file     Attends Mandaeism service: Not on file     Active member of club or organization: Not on file     Attends meetings of clubs or organizations: Not on file     Relationship status: Not on file    Intimate partner violence Carotids: no  bruits. Resp: Respirations are unlabored. Clear to auscultation. No rales, rhonchi or wheezes appreciated  over the lungs bilaterally. CV: Rate is regular. Rhythm regular today. No heart murmur appreciated. Abdomen: Bowel sounds are normoactive. Abdomen is soft, nontender, and nondistended. No  abdominal masses. No palpable hepatosplenomegaly. Lymph: No palpable or visible regional lymphadenopathy. Skin: Dry and warm with no rash or concerning lesions. Extremities: No clubbing cyanosis or edema. No calf inflammation or tenderness. Normal pulses. Neuro: Alert and oriented. Affect: appropriate. Upper Extremities: 5/5 bilaterally. Lower Extremities:  5/5 bilaterally. Sensation intact to light touch. Reflexes: DTR's are symmetric and 2+ bilaterally. .  Cranial Nerves: Cranial nerves grossly intact. Muscular skeletal- limited range of motion low back. Lab Results   Component Value Date    PSA <0.03 07/27/2020    PSA <0.03 06/15/2020    PSA <0.01 05/01/2020    PSADIA 0.02 12/09/2014    PSADIA <0.01 09/05/2014    PSADIA 0.32 06/03/2014          Assessment and Plan:   Diagnosis Orders   1. Mixed hyperlipidemia  atorvastatin (LIPITOR) 40 MG tablet    Comprehensive Metabolic Panel    CK    Lipid Panel   2. PVD (peripheral vascular disease) with claudication ([address]     3. Coronary artery disease involving native heart without angina pectoris, unspecified vessel or lesion type     4. Prostate CA ([address]     5. Age-related osteoporosis without current pathological fracture  Magnesium    Phosphorus   6. Thoracic aortic aneurysm without rupture ([address]     7. Metabolic disorder  Comprehensive Metabolic Panel    Hemoglobin A1C    Urinalysis    Microalbumin / Creatinine Urine Ratio   8. Tubular adenoma     9. Thyroid nodule     10. Pancytopenia (HCC)  CBC Auto Differential   11. Low magnesium level  Magnesium   12. Vitamin D deficiency  Vitamin D 25 Hydroxy   13.  Vitamin B12 deficiency  Vitamin B12 & Folate 14. Disc disease, degenerative, lumbar or lumbosacral     15. Essential hypertension  lisinopril (PRINIVIL;ZESTRIL) 20 MG tablet    Comprehensive Metabolic Panel    Urinalysis    Microalbumin / Creatinine Urine Ratio   16. Prostate cancer metastatic to bone ([facility]     17. Chronic bilateral low back pain without sciatica     18. Chronic pain syndrome     19. Renal insufficiency  Comprehensive Metabolic Panel   20. Hypothyroidism due to medication  TSH without Reflex    T4, Free   21. Glaucoma of left eye, unspecified glaucoma type     22. Bilateral carpal tunnel syndrome     23. Gastroesophageal reflux disease without esophagitis  omeprazole (PRILOSEC) 20 MG delayed release capsule       Disc disease, degenerative, lumbar or lumbosacral  Counseled extensively. Differential reviewed, including serious etiologies. Was Following with [facility], had to change because of insurance, now seeing Dr. Jeannie Tillman pain management, saw Dr. Kayla Brown. Previously saw [facility]. Counseled on injections, radiofrequency ablation etc.  Failed physical therapy. . Had recent radiofrequency ablation and doing well. Bilateral carpal tunnel syndrome  Had emg/ncs Nov 2019 [facility]. Referred to [facility] but did not go because no symptoms    Health maintenance examination  Health maintenance issues discussed at length as above (2/2020) encouraged yearly. Counseled on shingrix. Declines but willing to get flu vaccine this year. He will schedule annual wellness      Pancytopenia ([facility] [address]  Counseled extensively. Differential reviewed, including serious etiologies. Possibly from medication. .  Declines hematology. Monitor. Asymptomatic. Platelets, since normalized, white count fluctuates, hemoglobin stable. Low magnesium level  Counseled. Goals reviewed. Now on magnesium oxide 400 mg daily. Monitor. Vitamin D deficiency  Continue current therapy. Monitor.     Vitamin B12 deficiency  Appropriate supplementation reviewed.    Vitamin J48 and folic acid elevated last time, currently just on multivitamin, Monitor.  Risk of deficiency reviewed including anemia and neuropathy.  No longer on injection. Hyperglycemia  Counseled as below. Monitor. Essential hypertension  Counseled. The risks of hypertension and hypotension reviewed. Watch closely ambulatory. Hyper and hypotensive precautions and parameters reviewed and written as well as parameters on pulse, call if out of range, ER dangers numbers. Lifestyle modification reviewed. Tolerating therapy. Tubular adenoma   Last colonoscopy Dr. Dread Houseo, 2/15 complaining repeat 3 years from then. Overdue. Declines now, fit cards or Cologuard. . Hemoglobin consistently low but stable. Declines any intervention now, declines any tests or procedures now.     Thyroid nodule  Counseled, incidental 7 mm right thyroid nodule on carotid ultrasound 3/19. Declines workup or follow-up now     Thoracic aneurysm, not ruptured  Counseled, potential serious is reviewed, if any symptoms directly to ER. Continue per Dr. Chantal Mendoza. He saw him 3/15 and had CT at least then. Dr. Chantal Mendoza discharged him at this point unless symptoms and he declines follow-up imaging     PVD (peripheral vascular disease) with claudication [facility])  He saw Dr. Frosty Canavan, who had him on pletal, had minimal results. Now follows with Dr Abbie Hickey vascular in [facility] [place] who states he did have a femoral blockage proximal that would require bypass as well as some distal blockages that would require stenting but they felt observation most appropriate. He did not feel medication was necessary or helpful at this time. They state Dr. Frosty Canavan agrees with this plan. He is comfortable with this plan at this point. Serious signs and symptoms watch were reviewed.   States he missed his March appointment because of Covid-19 pandemic, they were planning surgery but after discussion, and the risks of complications and the complexity of the surgery he is declining intervention now. States he watches the distance he walks but otherwise doing well. No sores etc. sees next month. Prostate CA ([address]  Metastatic. Continue per Dr. Alix Castro. PSA less than 0.01-0.07-0.3 , he had been on Lupron, had metastasis, since orchiectomy, did not tolerate xtandi so he stopped it , overall feeling well, on Erleada, recently saw Dr. Chula Mcclendon      Mixed hyperlipidemia  Did very well on d.a.w. Crestor unfortunately severe myalgias on generic. He could try to [address] d.a.w., but he chose to go on Lipitor instead, tolerating, declines change in dose, goals reviewed, risk benefits ADRs reviewed. Counseling CoQ10 and vitamin D. .  We discussed appropriate dosing given his risk factors       Metabolic disorder  Counseled. Blood sugar had been recently borderline, midst a lot of sweets, monitor hemoglobin A1c. Not interested in insulin sensitizer. Lifestyle modification reviewed. Micro-macrovascular complications monitor.       Coronary artery disease involving native heart without angina pectoris  Continue per cardiology. Sees Dr. Shan Davis in August, had stress test 3/15. Asymptomatic now. He did have ectopy on exam but refuses EKG or other evaluation/treatment. There is a risk that the Erleada could cause cardiac arrhythmia     Age-related osteoporosis without current pathological fracture  Counseled extensively. He tolerated Fosamax but discontinued after he took from 7/09 through 7/16. Morbidity/mortality related to fracture reviewed. 11/13 bone density showed improvement, 12/15 -1.3 stable, 3/19 showed L2 -2.6, hip -1.0. Discussed the option of restarting alendronate with pros and cons reviewed, he has taken it twice. Now on prolia, had June 2020 q 6mos. Needs bw within 2wks. He will go to [facility] center [facility] this now.       Gastroesophageal reflux disease without esophagitis  Asymptomatic as long as takes medication, had EGD in the past.  Risk of meds reviewed including electrolyte imbalance, renal etc.  Wants to continue. Glaucoma of left eye  Recent. Follows with eye care. Pressures came down with drops. 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 PLAN AS ABOVE:      Counseled extensively and differential diagnoses relevant to above were reviewed, including serious etiologies. Side effects and interactions of medications were reviewed. Extensive counseling. Continue per multiple specialist.  Refills given. Blood work and follow-up in 3-month sooner as needed. Also schedule annual wellness visit but he would like via telephone in the next few weeks sooner as needed     Return in about 3 months (around 12/16/2020), or if symptoms worsen or fail to improve, for telephone awv. Signs and symptoms to watch for discussed, serious signs and symptoms reviewed. ER if any. Over 40 minutes  spent with the patient in reviewing records, reviewing with patient/family, counseling, ordering,  prescribing, completing h&p, etc., with over 50% of the time spent face to face counseling. Leodan Silverio MD    Patients are advised to check with insurance company to ensure coverage and to fully understand benefits and cost prior to any testing. This note was created with the assistance of voice recognition software. Document was reviewed however may contain grammatical errors.

## 2020-09-17 ASSESSMENT — LIFESTYLE VARIABLES
AUDIT TOTAL SCORE: 1
HOW OFTEN DURING THE LAST YEAR HAVE YOU HAD A FEELING OF GUILT OR REMORSE AFTER DRINKING: 0
HAS A RELATIVE, FRIEND, DOCTOR, OR ANOTHER HEALTH PROFESSIONAL EXPRESSED CONCERN ABOUT YOUR DRINKING OR SUGGESTED YOU CUT DOWN: 0
HOW OFTEN DURING THE LAST YEAR HAVE YOU NEEDED AN ALCOHOLIC DRINK FIRST THING IN THE MORNING TO GET YOURSELF GOING AFTER A NIGHT OF HEAVY DRINKING: 0
HOW MANY STANDARD DRINKS CONTAINING ALCOHOL DO YOU HAVE ON A TYPICAL DAY: 0
HOW OFTEN DURING THE LAST YEAR HAVE YOU BEEN UNABLE TO REMEMBER WHAT HAPPENED THE NIGHT BEFORE BECAUSE YOU HAD BEEN DRINKING: 0
HOW OFTEN DO YOU HAVE SIX OR MORE DRINKS ON ONE OCCASION: 0
HOW OFTEN DURING THE LAST YEAR HAVE YOU FAILED TO DO WHAT WAS NORMALLY EXPECTED FROM YOU BECAUSE OF DRINKING: 0
HOW OFTEN DO YOU HAVE A DRINK CONTAINING ALCOHOL: 1
HAVE YOU OR SOMEONE ELSE BEEN INJURED AS A RESULT OF YOUR DRINKING: 0
AUDIT-C TOTAL SCORE: 1
HOW OFTEN DURING THE LAST YEAR HAVE YOU FOUND THAT YOU WERE NOT ABLE TO STOP DRINKING ONCE YOU HAD STARTED: 0

## 2020-09-17 ASSESSMENT — PATIENT HEALTH QUESTIONNAIRE - PHQ9
SUM OF ALL RESPONSES TO PHQ9 QUESTIONS 1 & 2: 0
1. LITTLE INTEREST OR PLEASURE IN DOING THINGS: 0
2. FEELING DOWN, DEPRESSED OR HOPELESS: 0
SUM OF ALL RESPONSES TO PHQ QUESTIONS 1-9: 0
SUM OF ALL RESPONSES TO PHQ QUESTIONS 1-9: 0

## 2020-09-22 ASSESSMENT — LIFESTYLE VARIABLES
AUDIT-C TOTAL SCORE: 0
HAS A RELATIVE, FRIEND, DOCTOR, OR ANOTHER HEALTH PROFESSIONAL EXPRESSED CONCERN ABOUT YOUR DRINKING OR SUGGESTED YOU CUT DOWN: 0
HOW OFTEN DURING THE LAST YEAR HAVE YOU FAILED TO DO WHAT WAS NORMALLY EXPECTED FROM YOU BECAUSE OF DRINKING: NEVER
HOW MANY STANDARD DRINKS CONTAINING ALCOHOL DO YOU HAVE ON A TYPICAL DAY: 0
HOW OFTEN DURING THE LAST YEAR HAVE YOU FOUND THAT YOU WERE NOT ABLE TO STOP DRINKING ONCE YOU HAD STARTED: 0
HOW OFTEN DURING THE LAST YEAR HAVE YOU BEEN UNABLE TO REMEMBER WHAT HAPPENED THE NIGHT BEFORE BECAUSE YOU HAD BEEN DRINKING: 0
HOW OFTEN DURING THE LAST YEAR HAVE YOU NEEDED AN ALCOHOLIC DRINK FIRST THING IN THE MORNING TO GET YOURSELF GOING AFTER A NIGHT OF HEAVY DRINKING: 0
AUDIT TOTAL SCORE: 0
HOW OFTEN DURING THE LAST YEAR HAVE YOU FOUND THAT YOU WERE NOT ABLE TO STOP DRINKING ONCE YOU HAD STARTED: NEVER
HOW OFTEN DURING THE LAST YEAR HAVE YOU HAD A FEELING OF GUILT OR REMORSE AFTER DRINKING: NEVER
HAVE YOU OR SOMEONE ELSE BEEN INJURED AS A RESULT OF YOUR DRINKING: 0
HOW OFTEN DO YOU HAVE A DRINK CONTAINING ALCOHOL: MONTHLY OR LESS
HAS A RELATIVE, FRIEND, DOCTOR, OR ANOTHER HEALTH PROFESSIONAL EXPRESSED CONCERN ABOUT YOUR DRINKING OR SUGGESTED YOU CUT DOWN: NO
HOW OFTEN DURING THE LAST YEAR HAVE YOU BEEN UNABLE TO REMEMBER WHAT HAPPENED THE NIGHT BEFORE BECAUSE YOU HAD BEEN DRINKING: NEVER
HOW OFTEN DURING THE LAST YEAR HAVE YOU HAD A FEELING OF GUILT OR REMORSE AFTER DRINKING: 0
AUDIT-C TOTAL SCORE: 1
AUDIT TOTAL SCORE: 1
HAVE YOU OR SOMEONE ELSE BEEN INJURED AS A RESULT OF YOUR DRINKING: NO
HOW OFTEN DURING THE LAST YEAR HAVE YOU NEEDED AN ALCOHOLIC DRINK FIRST THING IN THE MORNING TO GET YOURSELF GOING AFTER A NIGHT OF HEAVY DRINKING: NEVER
HOW OFTEN DO YOU HAVE SIX OR MORE DRINKS ON ONE OCCASION: 0
HOW OFTEN DURING THE LAST YEAR HAVE YOU FAILED TO DO WHAT WAS NORMALLY EXPECTED FROM YOU BECAUSE OF DRINKING: 0
HOW MANY STANDARD DRINKS CONTAINING ALCOHOL DO YOU HAVE ON A TYPICAL DAY: ONE OR TWO
HOW OFTEN DO YOU HAVE A DRINK CONTAINING ALCOHOL: 1
HOW OFTEN DO YOU HAVE SIX OR MORE DRINKS ON ONE OCCASION: NEVER

## 2020-09-22 ASSESSMENT — PATIENT HEALTH QUESTIONNAIRE - PHQ9
SUM OF ALL RESPONSES TO PHQ9 QUESTIONS 1 & 2: 0
SUM OF ALL RESPONSES TO PHQ QUESTIONS 1-9: 0
2. FEELING DOWN, DEPRESSED OR HOPELESS: 0
1. LITTLE INTEREST OR PLEASURE IN DOING THINGS: 0
SUM OF ALL RESPONSES TO PHQ QUESTIONS 1-9: 0

## 2020-09-24 ENCOUNTER — VIRTUAL VISIT (OUTPATIENT)
Dept: PRIMARY CARE CLINIC | Age: 85
End: 2020-09-24
Payer: MEDICARE

## 2020-09-24 PROCEDURE — 4040F PNEUMOC VAC/ADMIN/RCVD: CPT | Performed by: FAMILY MEDICINE

## 2020-09-24 PROCEDURE — G0438 PPPS, INITIAL VISIT: HCPCS | Performed by: FAMILY MEDICINE

## 2020-09-24 PROCEDURE — 1123F ACP DISCUSS/DSCN MKR DOCD: CPT | Performed by: FAMILY MEDICINE

## 2020-09-24 PROCEDURE — 99497 ADVNCD CARE PLAN 30 MIN: CPT | Performed by: FAMILY MEDICINE

## 2020-09-24 NOTE — PATIENT INSTRUCTIONS
Personalized Preventive Plan for Mary Gaitan - 9/24/2020  Medicare offers a range of preventive health benefits. Some of the tests and screenings are paid in full while other may be subject to a deductible, co-insurance, and/or copay. Some of these benefits include a comprehensive review of your medical history including lifestyle, illnesses that may run in your family, and various assessments and screenings as appropriate. After reviewing your medical record and screening and assessments performed today your provider may have ordered immunizations, labs, imaging, and/or referrals for you. A list of these orders (if applicable) as well as your Preventive Care list are included within your After Visit Summary for your review. Other Preventive Recommendations:    · A preventive eye exam performed by an eye specialist is recommended every 1-2 years to screen for glaucoma; cataracts, macular degeneration, and other eye disorders. · A preventive dental visit is recommended every 6 months. · Try to get at least 150 minutes of exercise per week or 10,000 steps per day on a pedometer . · Order or download the FREE \"Exercise & Physical Activity: Your Everyday Guide\" from [employer]. Call 8-473.159.9975 or search [employer] online. · You need 6191-9022 mg of calcium and 8063-3283 IU of vitamin D per day. It is possible to meet your calcium requirement with diet alone, but a vitamin D supplement is usually necessary to meet this goal.  · When exposed to the sun, use a sunscreen that protects against both UVA and UVB radiation with an SPF of 30 or greater. Reapply every 2 to 3 hours or after sweating, drying off with a towel, or swimming. · Always wear a seat belt when traveling in a car. Always wear a helmet when riding a bicycle or motorcycle.

## 2020-09-24 NOTE — PROGRESS NOTES
Provider   lisinopril (PRINIVIL;ZESTRIL) 20 MG tablet Take 1 tablet by mouth daily Yes Carole Dailey MD   atorvastatin (LIPITOR) 40 MG tablet Take 1 tablet by mouth daily Yes Carole Dailey MD   omeprazole (PRILOSEC) 20 MG delayed release capsule Take 1 capsule by mouth daily Yes Carole Dailey MD   denosumab (PROLIA) 60 MG/ML SOSY SC injection Inject 1 mL into the skin every 6 months Yes Carole Dailey MD   ERLEADA 60 MG TABS daily  Yes Historical Provider, MD   omega-3 acid ethyl esters (LOVAZA) 1 g capsule Take 1 capsule by mouth 2 times daily  Patient taking differently: Take 1 g by mouth 2 times daily \ Yes Carole Dailey MD   meclizine (ANTIVERT) 25 MG tablet 1/2 - 1 by mouth every 6 hours as needed Yes Historical Provider, MD   Multiple Vitamins-Minerals (THERAPEUTIC MULTIVITAMIN-MINERALS) tablet Take 1 tablet by mouth daily LD 6-11-20 Yes Historical Provider, MD   metoprolol tartrate (LOPRESSOR) 25 MG tablet Take 25 mg by mouth 2 times daily Yes Historical Provider, MD   Coenzyme Q10 (COQ10) 50 MG CAPS Take 1 capsule by mouth daily LD 6-11-20 Yes Historical Provider, MD   aspirin 81 MG tablet Take 81 mg by mouth daily  Yes Historical Provider, MD   Cyanocobalamin (VITAMIN B 12 PO) Take  by mouth daily.  sublingual  Yes Historical Provider, MD         Past Medical History:   Diagnosis Date    CA prostate, adenoca ([facility] [address] 4/25/2019    CAD (coronary artery disease)     Dr. Naida Javed [facility])     prostate- PO chemotherapy     Chronic bilateral low back pain without sciatica 3/6/2020    Cobalamin deficiency     Hyperlipidemia     Hypertension     Osteoarthritis     Osteochondropathy     Osteopenia     Pain in lower limb     Peripheral venous insufficiency     PONV (postoperative nausea and vomiting)     Prolonged emergence from general anesthesia     PVD (peripheral vascular disease) with claudication ([facility] [address] 4/25/2019       Past Surgical History:   Procedure Laterality Date    ACETABULUM FRACTURE SURGERY  3/24/11    ANESTHESIA NERVE BLOCK Bilateral 6/16/2020    BILATERAL L3 L4 L5 MEDIAL NERVE BRANCH BLOCK performed by Pedrito Phelan DO at [facility] COLONOSCOPY  2016    CORONARY ARTERY BYPASS GRAFT  Dec. 1996    [facility]/ Dr. Osiel Scruggs, DIAGNOSTIC  2016    EYE SURGERY Bilateral 01/2018    cataract    FEMUR FRACTURE SURGERY  1981    left    HERNIA REPAIR  1993    JOINT REPLACEMENT      bilat knee 2010, righ thip 2011     LYMPH NODE DISSECTION  10/2007    PAIN MANAGEMENT PROCEDURE N/A 5/19/2020    CAUDAL EPIDURAL STEROID INJECTION performed by Pedrito Phelan DO at [facility]  8/4/20    PAIN MANAGEMENT PROCEDURE Right 8/4/2020    RIGHT L3 4 5 MEDIAL BRANCH RADIOFREQUENCY ABLATION performed by Pedrito Phelan DO at [address] [place] [address] Left 9/10/2020    LEFT L3 4 5 MEDIAL BRANCH RADIOFREQUENCY ABLATION     +++IODINE ALLERGY+++   CPT: 17816 [id number] performed by Pedrito Phelan DO at [address].  10/31/07    AMIRAH STREETER M.D./ DA LETICIA LAPAROSCOPIC PROSTATECTOMY    TESTICLE REMOVAL Bilateral 02/2019    hx prostate ca-    TONSILLECTOMY      TOTAL HIP ARTHROPLASTY  11/30/11    right    TOTAL KNEE ARTHROPLASTY  2010    bilateral         Family History   Problem Relation Age of Onset    Stroke Mother     Cancer Mother         pancreatic    Cancer Father         prostate    Stroke Father        CareTeam (Including outside providers/suppliers regularly involved in providing care):   Patient Care Team:  MD Daniel as PCP - Mark Anthony Wynne MD as PCP - [facility]  Omaira Neumann MD (Cardiology)  Carroll Sahu MD (Urology)  Zari Anguiano MD (Radiation Oncology)  Michael Graves DO as Surgeon (Cardiothoracic Surgery)  Wade Roth MD as Consulting Physician (Cardiology)  Trey Weaver MD as Consulting Physician (Pulmonary and older) 10/28/2017, 10/09/2018    Influenza, Triv, inactivated, subunit, adjuvanted, IM (Fluad 65 yrs and older) 10/09/2018, 10/10/2019    Pneumococcal Conjugate 13-valent ([id number]) 10/09/2018    Pneumococcal Polysaccharide ([id number]) 10/14/2013    Td vaccine (adult) 01/15/2014    Td, unspecified formulation 01/14/2014        Health Maintenance   Topic Date Due    DTaP/Tdap/Td vaccine (1 - Tdap) 05/17/1956    Shingles Vaccine (1 of 2) 05/17/1987    Annual Wellness Visit (AWV)  05/29/2019    Flu vaccine (1) 09/01/2020    PSA counseling  07/27/2021    Lipid screen  09/09/2021    TSH testing  09/09/2021    Potassium monitoring  09/09/2021    Creatinine monitoring  09/09/2021    Pneumococcal 65+ yrs at Risk Vaccine  Completed    Hepatitis A vaccine  Aged Out    Hepatitis B vaccine  Aged Out    Hib vaccine  Aged Out    Meningococcal (ACWY) vaccine  Aged Out     Recommendations for [employer] Due: see orders and patient instructions/AVS.  . Recommended screening schedule for the next 5-10 years is provided to the patient in written form: see Patient Instructions/AVS.    Bryan Joaquin was seen today for medicare awv. Diagnoses and all orders for this visit:    ACP (advance care planning)  -     [address], [address] [[id number]]  -     Full code    Routine general medical examination at a health care facility          Assessment and Plan:   Diagnosis Orders   1. ACP (advance care planning)  [place] [facility]ED CARE PLAN FACE TO [address], [address] [id number]    Full code   2. Routine general medical examination at a health care facility            No problem-specific Assessment & Plan notes found for this encounter. Plan as above. Counseled extensively and differential diagnoses relevant to above were reviewed, including serious etiologies. Side effects and interactions of medications were reviewed.               As long as symptoms steadily improve/resolve, and medical and treatment in case of a health event that adversely affects decision-making abilities. Also discussed the patients long-term treatment options. Reviewed the process of designating a competent adult as an Agent (or -in-fact) that could take make health care decisions for the patient if incompetent. Patient was asked to complete the declaration forms, either acknowledge the forms by a public notary or an eligible witness and provide a signed copy to the practice office.   Time spent (minutes): 10

## 2020-10-07 ENCOUNTER — TELEPHONE (OUTPATIENT)
Dept: VASCULAR SURGERY | Age: 85
End: 2020-10-07

## 2020-10-29 RX ORDER — DENOSUMAB 60 MG/ML
60 INJECTION SUBCUTANEOUS
Qty: 1 SYRINGE | Refills: 1 | Status: SHIPPED | OUTPATIENT
Start: 2020-10-29 | End: 2021-04-22

## 2020-10-29 NOTE — TELEPHONE ENCOUNTER
Thank you. Geno, will be set up to print. Also make sure BMP, magnesium and phosphorus is ordered-diagnosis osteoporosis.   He will need this within 2 weeks of infusion unless infusion center has there own criteria

## 2020-10-29 NOTE — TELEPHONE ENCOUNTER
Had 5/11/20 in office.  Script will need to be printed and signed and then faxed to the infusion center for his next shot

## 2020-10-29 NOTE — TELEPHONE ENCOUNTER
Please double ck with patient/wife. I believe he goes to the infusion center now and I believe he had one administered June 2020 so wouldn't be due quite yet. If patient requesting, let me know and i'll send. Thank you.

## 2020-12-08 ENCOUNTER — HOSPITAL ENCOUNTER (OUTPATIENT)
Age: 85
Discharge: HOME | End: 2020-12-08
Payer: MEDICARE

## 2020-12-08 PROCEDURE — 36415 COLL VENOUS BLD VENIPUNCTURE: CPT

## 2020-12-08 PROCEDURE — 84153 ASSAY OF PSA TOTAL: CPT

## 2020-12-09 LAB — PROSTATE SPECIFIC ANTIGEN: <0.03 NG/ML (ref 0–4)

## 2020-12-09 NOTE — TELEPHONE ENCOUNTER
Sorry to ask, but this is usually 2 p.o. twice a day. Can you double check with him. If he is only taking 1 twice a day,, is he willing to go up to 2 twice a day which is a therapeutic dose.

## 2020-12-11 RX ORDER — OMEGA-3-ACID ETHYL ESTERS 1 G/1
1 CAPSULE, LIQUID FILLED ORAL 2 TIMES DAILY
Qty: 180 CAPSULE | Refills: 3 | Status: SHIPPED
Start: 2020-12-11 | End: 2021-11-30 | Stop reason: SDUPTHER

## 2020-12-11 NOTE — TELEPHONE ENCOUNTER
Pt's wife called back and said that the pt is only taking the 1 capsule twice daily. I advised her that Dr. Yakov Helm wanted to see if the pt would be willing to go up 2 capsules bid because it was the therapeutic dose. She said she would go and talk to the pt and discuss it with him and call back.  She called back and said that the pt would like to stay at the 1 capsule bid and discuss it further with you when he comes for his next appointment 1/13

## 2020-12-18 ENCOUNTER — OFFICE VISIT (OUTPATIENT)
Dept: PAIN MANAGEMENT | Age: 85
End: 2020-12-18
Payer: MEDICARE

## 2020-12-18 ENCOUNTER — PREP FOR PROCEDURE (OUTPATIENT)
Dept: PAIN MANAGEMENT | Age: 85
End: 2020-12-18

## 2020-12-18 VITALS
WEIGHT: 215 LBS | OXYGEN SATURATION: 98 % | HEIGHT: 70 IN | HEART RATE: 76 BPM | TEMPERATURE: 96.6 F | DIASTOLIC BLOOD PRESSURE: 86 MMHG | RESPIRATION RATE: 18 BRPM | SYSTOLIC BLOOD PRESSURE: 115 MMHG | BODY MASS INDEX: 30.78 KG/M2

## 2020-12-18 PROCEDURE — 4040F PNEUMOC VAC/ADMIN/RCVD: CPT | Performed by: PHYSICIAN ASSISTANT

## 2020-12-18 PROCEDURE — G8484 FLU IMMUNIZE NO ADMIN: HCPCS | Performed by: PHYSICIAN ASSISTANT

## 2020-12-18 PROCEDURE — 99213 OFFICE O/P EST LOW 20 MIN: CPT | Performed by: PHYSICIAN ASSISTANT

## 2020-12-18 PROCEDURE — G8417 CALC BMI ABV UP PARAM F/U: HCPCS | Performed by: PHYSICIAN ASSISTANT

## 2020-12-18 PROCEDURE — 99214 OFFICE O/P EST MOD 30 MIN: CPT | Performed by: PHYSICIAN ASSISTANT

## 2020-12-18 PROCEDURE — 1036F TOBACCO NON-USER: CPT | Performed by: PHYSICIAN ASSISTANT

## 2020-12-18 PROCEDURE — 1123F ACP DISCUSS/DSCN MKR DOCD: CPT | Performed by: PHYSICIAN ASSISTANT

## 2020-12-18 PROCEDURE — G8427 DOCREV CUR MEDS BY ELIG CLIN: HCPCS | Performed by: PHYSICIAN ASSISTANT

## 2020-12-18 NOTE — PROGRESS NOTES
Do you currently have any of the following:    Fever: No  Headache:  No  Cough: No  Shortness of breath: No  Exposed to anyone with these symptoms: No                                                                                                                Kati Khan presents to the [facility] on [facility]/18/2020. Phillip Newberry is complaining of pain lower  back. The pain is intermittent. The pain is described as aching, stabbing and sharp. Pain is rated on his best day at a 2, on his worst day at a 10, and on average at a 5 on the VAS scale. He took his last dose of nothing . Phillip Newberry does not have issues with constipation. Any procedures since your last visit: No, with  % relief. He is not on NSAIDS and  is  on anticoagulation medications to include asa and is managed by Ruta Dance, MD  .     Pacemaker or defibrilator: No Physician managing device is . Medication Contract and Consent for Opioid Use Documents Filed     Patient Documents       Type of Document Status Date Received Received By Description     Medication Contract Received 3/6/2020 10:13 AM Jeremias TINAJERO KIMMY med contract                   /86   Pulse 76   Temp 96.6 °F (35.9 °C) (Infrared)   Resp 18   Ht 5' 10\" (1.778 m)   Wt 215 lb (97.5 kg)   SpO2 98%   BMI 30.85 kg/m²      No LMP for male patient.

## 2020-12-18 NOTE — PROGRESS NOTES
History:   Diagnosis Date    CA prostate, adenoca ([facility] [address] 4/25/2019    CAD (coronary artery disease)     Dr. Sherif Rizzo [facility])     prostate- PO chemotherapy     Chronic bilateral low back pain without sciatica 3/6/2020    Cobalamin deficiency     Hyperlipidemia     Hypertension     Osteoarthritis     Osteochondropathy     Osteopenia     Pain in lower limb     Peripheral venous insufficiency     PONV (postoperative nausea and vomiting)     Prolonged emergence from general anesthesia     PVD (peripheral vascular disease) with claudication ([facility] [address] 4/25/2019       Past Surgical History:   Procedure Laterality Date    ACETABULUM FRACTURE SURGERY  3/24/11    ANESTHESIA NERVE BLOCK Bilateral 6/16/2020    BILATERAL L3 L4 L5 MEDIAL NERVE BRANCH BLOCK performed by Bonifacio Mckeon DO at [place] [address]  2016   [facility]    [facility]/ Dr. Terri Church, DIAGNOSTIC  2016    EYE SURGERY Bilateral 01/2018    cataract   [address]    left   [facility]    JOINT REPLACEMENT      bilat knee 2010, faisal delgado 2011     LYMPH NODE DISSECTION  10/2007    PAIN MANAGEMENT PROCEDURE N/A 5/19/2020    CAUDAL EPIDURAL STEROID INJECTION performed by Bonifacio Mckeon DO at [address] [place] [address]  8/4/20    PAIN MANAGEMENT PROCEDURE Right 8/4/2020    RIGHT L3 4 5 MEDIAL BRANCH RADIOFREQUENCY ABLATION performed by Bonifacio Mckeon DO at [address] [place] [address] Left 9/10/2020    LEFT L3 4 5 MEDIAL BRANCH RADIOFREQUENCY ABLATION     +++IODINE ALLERGY+++   CPT: 83899 [id number] performed by Bonifacio Mckeon DO at [address].  10/31/07    AMIRAH STREETER M.D./ DA LETICIA LAPAROSCOPIC PROSTATECTOMY    TESTICLE REMOVAL Bilateral 02/2019    hx prostate ca-    TONSILLECTOMY      TOTAL HIP ARTHROPLASTY  11/30/11    right    TOTAL KNEE ARTHROPLASTY  2010    bilateral Prior to Admission medications    Medication Sig Start Date End Date Taking? Authorizing Provider   omega-3 acid ethyl esters (LOVAZA) 1 g capsule Take 1 capsule by mouth 2 times daily 12/11/20  Yes Palmira Gimenez, MD   denosumab (PROLIA) 60 MG/ML SOSY SC injection Inject 1 mL into the skin every 6 months 10/29/20  Yes Lorren Daily, MD   lisinopril (PRINIVIL;ZESTRIL) 20 MG tablet Take 1 tablet by mouth daily 9/16/20  Yes Lorren Daily, MD   atorvastatin (LIPITOR) 40 MG tablet Take 1 tablet by mouth daily 9/16/20  Yes Lorren Daily, MD   omeprazole (PRILOSEC) 20 MG delayed release capsule Take 1 capsule by mouth daily 9/16/20  Yes Lorren Daily, MD   ERLEADA 60 MG TABS daily  3/2/20  Yes Historical Provider, MD   meclizine (ANTIVERT) 25 MG tablet 1/2 - 1 by mouth every 6 hours as needed   Yes Historical Provider, MD   Multiple Vitamins-Minerals (THERAPEUTIC MULTIVITAMIN-MINERALS) tablet Take 1 tablet by mouth daily LD 6-11-20   Yes Historical Provider, MD   metoprolol tartrate (LOPRESSOR) 25 MG tablet Take 25 mg by mouth 2 times daily   Yes Historical Provider, MD   Coenzyme Q10 (COQ10) 50 MG CAPS Take 1 capsule by mouth daily LD 6-11-20   Yes Historical Provider, MD   aspirin 81 MG tablet Take 81 mg by mouth daily    Yes Historical Provider, MD   Cyanocobalamin (VITAMIN B 12 PO) Take  by mouth daily.  sublingual  2/8/11  Yes Historical Provider, MD       Allergies   Allergen Reactions    Iodine Hives       Social History     Socioeconomic History    Marital status:      Spouse name: Not on file    Number of children: Not on file    Years of education: Not on file    Highest education level: Not on file   Occupational History    Not on file   Social Needs    Financial resource strain: Not on file    Food insecurity     Worry: Not on file     Inability: Not on file    Transportation needs     Medical: Not on file     Non-medical: Not on file   Tobacco Use    Smoking status: Former Smoker pam. Personal Habits: Cigarette Use: former cigarette smoker, Nonsmoker. Alcohol: Occasionally    consumes alcohol. Family History   Problem Relation Age of Onset    Stroke Mother     Cancer Mother         pancreatic    Cancer Father         prostate    Stroke Father        REVIEW OF SYSTEMS:     Shaina Campos denies fever/chills, chest pain, shortness of breath, new bowel or bladder complaints. All other review of systems was negative. PHYSICAL EXAMINATION:      /86   Pulse 76   Temp 96.6 °F (35.9 °C) (Infrared)   Resp 18   Ht 5' 10\" (1.778 m)   Wt 215 lb (97.5 kg)   SpO2 98%   BMI 30.85 kg/m²     General:      General appearance:   pleasant and well-hydrated. , in mild discomfort and A & O x3  Build:Overweight    HEENT:    Head:normocephalic and atraumatic  Sclera: icterus absent,    Lungs:    Breathing:Normal expansion. No rales, rhonchi, or wheezing. Abdomen:    Shape:non-distended and normal    Lumbar spine:    Range of motion:abnormal mildly Lateral bending, flexion, extension rotation bilateral and is painful. Extremities:    Tremors:None bilaterally upper and lower  Range of motion:Generally normal shoulders, pain with internal rotation of hips not done.   Intact:Yes  Edema:Normal    Neurological:           [address]    Dermatology:    Skin:no unusual rashes, no skin lesions, no palpable subcutaneous nodules and good skin turgor    Impression:    Axial LBP in region of L-S junction particularly when walking   2020 Lumbar MRI shows metastatic (prostate CA) S1-S2 lesion and L5-S1 grade 1-2 anterolisthesis with b/l pars defect  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 Jacqueline Arroyo  Referred by Dr. Melendez Said for consideration for further injections as Dr. Jacqueline Arroyo no longer takes his insurance  Pt's wife states she has discussed his LE symptoms and they have determined they feel his LE weakness is a vascular issue, pt states he can stand still                  Plan:     [place] reviewed  Tramadol 50 mg #30 was stopped. No longer needed. He would like to stay away from medications. Caudal EVGENY done with 60% relief of lumbar symptoms x 2-3 days until he did yardwork              R and left L3,4,5 RFA with 100% relief x 1.5 months and now back to             baseline. Will submit for repeat. Treatment plan discussed with the               patient including procedure side effects                 Controlled Substance Monitoring:    Acute and Chronic Pain Monitoring:   RX Monitoring 12/18/2020   Periodic Controlled Substance Monitoring No signs of potential drug abuse or diversion identified. ;Assessed functional status. This patient was seen and evaluated with Dr. Luzmaria Barcenas DO. We discussed with the patient that combining opioids, benzodiazepines, alcohol, illicit drugs or sleep aids increases the risk of respiratory depression including death. We discussed that these medications may cause drowsiness, sedation or dizziness and have counseled the patient not to drive or operate machinery. We have discussed that these medications will be prescribed only by one provider. We have discussed with the patient about age related risk factors and have thoroughly discussed the importance of taking these medications as prescribed. The patient verbalizes understanding.     ccreferring physic

## 2020-12-29 ENCOUNTER — OFFICE VISIT (OUTPATIENT)
Dept: PRIMARY CARE CLINIC | Age: 85
End: 2020-12-29
Payer: MEDICARE

## 2020-12-29 VITALS
WEIGHT: 215 LBS | DIASTOLIC BLOOD PRESSURE: 75 MMHG | OXYGEN SATURATION: 97 % | SYSTOLIC BLOOD PRESSURE: 118 MMHG | TEMPERATURE: 97.3 F | HEART RATE: 79 BPM | HEIGHT: 70 IN | BODY MASS INDEX: 30.78 KG/M2

## 2020-12-29 DIAGNOSIS — Z20.822 CLOSE EXPOSURE TO COVID-19 VIRUS: ICD-10-CM

## 2020-12-29 DIAGNOSIS — Z20.822 ENCOUNTER FOR LABORATORY TESTING FOR COVID-19 VIRUS: ICD-10-CM

## 2020-12-29 PROCEDURE — 99213 OFFICE O/P EST LOW 20 MIN: CPT | Performed by: PHYSICIAN ASSISTANT

## 2020-12-29 PROCEDURE — G8427 DOCREV CUR MEDS BY ELIG CLIN: HCPCS | Performed by: PHYSICIAN ASSISTANT

## 2020-12-29 PROCEDURE — 1036F TOBACCO NON-USER: CPT | Performed by: PHYSICIAN ASSISTANT

## 2020-12-29 PROCEDURE — G8484 FLU IMMUNIZE NO ADMIN: HCPCS | Performed by: PHYSICIAN ASSISTANT

## 2020-12-29 PROCEDURE — 4040F PNEUMOC VAC/ADMIN/RCVD: CPT | Performed by: PHYSICIAN ASSISTANT

## 2020-12-29 PROCEDURE — G8417 CALC BMI ABV UP PARAM F/U: HCPCS | Performed by: PHYSICIAN ASSISTANT

## 2020-12-29 PROCEDURE — 1123F ACP DISCUSS/DSCN MKR DOCD: CPT | Performed by: PHYSICIAN ASSISTANT

## 2020-12-29 NOTE — PROGRESS NOTES
Chief Complaint   Covid Testing (grandson, asymptomatic)      History of Present Illness   Source of history provided by: patient. Chavo Mera is a 82 y.o. old male who has a past medical history of:   Past Medical History:   Diagnosis Date    CA prostate, adenoca ([facility] [address] 4/25/2019    CAD (coronary artery disease)     Dr. Yuly Johnson - \[facility] [place] [facility]\""[facility])     prostate- PO chemotherapy     Chronic bilateral low back pain without sciatica 3/6/2020    Cobalamin deficiency     Hyperlipidemia     Hypertension     Osteoarthritis     Osteochondropathy     Osteopenia     Pain in lower limb     Peripheral venous insufficiency     PONV (postoperative nausea and vomiting)     Prolonged emergence from general anesthesia     PVD (peripheral vascular disease) with claudication ([facility] 4/25/2019   Presents to the flu clinic for COVID-19 testing after recent exposure to his grandson. Patient is currently asymptomatic. Denies any, chills, loss of taste or smell, CP, dyspnea, LE edema, abdominal pain, vomiting, rash, or lethargy. Denies any hx of asthma or COPD. Patient is a former smoker. ROS   Pertinent positives and negatives are stated within HPI, all other systems reviewed and are negative. Surgical History:  has a past surgical history that includes Cholecystectomy; hernia repair (1993); Femur fracture surgery (1981); lymph node dissection (10/2007); Prostate surgery (10/31/07); Total knee arthroplasty (2010); Coronary artery bypass graft (Dec. 1996); Acetabulum fracture surgery (3/24/11); Total hip arthroplasty (11/30/11); Colonoscopy (2016); Endoscopy, colon, diagnostic (2016); eye surgery (Bilateral, 01/2018); Testicle removal (Bilateral, 02/2019); Tonsillectomy; Pain management procedure (N/A, 5/19/2020); joint replacement; Anesthesia Nerve Block (Bilateral, 6/16/2020); Pain management procedure (8/4/20);  Pain management procedure (Right, 8/4/2020); and Pain management procedure (Left, 9/10/2020). Social History:  reports that he quit smoking about 26 years ago. His smoking use included cigarettes. He has a 40.00 pack-year smoking history. He has quit using smokeless tobacco.  His smokeless tobacco use included chew. He reports current alcohol use of about 1.0 standard drinks of alcohol per week. He reports that he does not use drugs. Family History: family history includes Cancer in his father and mother; Stroke in his father and mother. Allergies: Iodine    Physical Exam      VS:  /75   Pulse 79   Temp 97.3 °F (36.3 °C)   Ht 5' 10\" (1.778 m)   Wt 215 lb (97.5 kg)   SpO2 97%   BMI 30.85 kg/m²    Oxygen Saturation Interpretation: Normal.    Constitutional:  Alert, development consistent with age. NAD. Head:  NC/NT. Airway patent. Neck:  Normal ROM. Supple. No anterior cervical adenopathy noted. Lungs: CTAB without wheezes, rales, or rhonchi. CV:  Regular rate and rhythm, normal heart sounds, without pathological murmurs, ectopy, gallops, or rubs. Skin:  Normal turgor. Warm, dry, without visible rash. Lymphatic: No lymphangitis or adenopathy noted. Neurological:  Oriented. Motor functions intact. Lab / Imaging Results   (All laboratory and radiology results have been personally reviewed by myself)  Labs:  No results found for this visit on 12/29/20. Imaging: All Radiology results interpreted by Radiologist unless otherwise noted. No results found. Medical Decision Making   Pt non-toxic, in no apparent distress and stable at time of discharge. Assessment/Plan   [place] was seen today for covid testing. Diagnoses and all orders for this visit:    Close exposure to COVID-19 virus  -     Covid-19 Ambulatory; Future    Encounter for laboratory testing for COVID-19 virus  -     Covid-19 Ambulatory; Future      COVID-19 swab obtained and pending, will call with results once available. Advised cautionary self-quarantine at home in the interim.  ED immediately with high or refractory fever, progressive SOB, dyspnea, CP, calf pain/swelling, shaking chills, vomiting, abdominal pain, lethargy, flank pain, or decreased urinary output. Pt verbalizes understanding and is in agreement with plan of care. All questions answered. Aidee Dias PA-C    This visit was provided as a focused evaluation during the COVID -19 pandemic/national emergency. A comprehensive review of all previous patient history and testing was not conducted. Pertinent findings were elicited during the visit.

## 2020-12-31 LAB
SARS-COV-2: NOT DETECTED
SOURCE: NORMAL

## 2021-01-04 ENCOUNTER — HOSPITAL ENCOUNTER (EMERGENCY)
Age: 86
Discharge: HOME | End: 2021-01-04
Attending: FAMILY MEDICINE
Payer: MEDICARE

## 2021-01-04 ENCOUNTER — APPOINTMENT (OUTPATIENT)
Dept: GENERAL RADIOLOGY | Age: 86
End: 2021-01-04
Payer: MEDICARE

## 2021-01-04 VITALS
RESPIRATION RATE: 18 BRPM | HEART RATE: 68 BPM | BODY MASS INDEX: 32.21 KG/M2 | DIASTOLIC BLOOD PRESSURE: 90 MMHG | WEIGHT: 225 LBS | TEMPERATURE: 97 F | OXYGEN SATURATION: 95 % | HEIGHT: 70 IN | SYSTOLIC BLOOD PRESSURE: 158 MMHG

## 2021-01-04 DIAGNOSIS — B34.9 VIRAL ILLNESS: Primary | ICD-10-CM

## 2021-01-04 PROCEDURE — U0003 INFECTIOUS AGENT DETECTION BY NUCLEIC ACID (DNA OR RNA); SEVERE ACUTE RESPIRATORY SYNDROME CORONAVIRUS 2 (SARS-COV-2) (CORONAVIRUS DISEASE [COVID-19]), AMPLIFIED PROBE TECHNIQUE, MAKING USE OF HIGH THROUGHPUT TECHNOLOGIES AS DESCRIBED BY CMS-2020-01-R: HCPCS

## 2021-01-04 PROCEDURE — 99283 EMERGENCY DEPT VISIT LOW MDM: CPT

## 2021-01-04 PROCEDURE — 71045 X-RAY EXAM CHEST 1 VIEW: CPT

## 2021-01-04 ASSESSMENT — PAIN DESCRIPTION - PROGRESSION: CLINICAL_PROGRESSION: GRADUALLY WORSENING

## 2021-01-04 ASSESSMENT — PAIN DESCRIPTION - PAIN TYPE: TYPE: ACUTE PAIN;CHRONIC PAIN

## 2021-01-04 ASSESSMENT — PAIN DESCRIPTION - LOCATION: LOCATION: BACK

## 2021-01-04 ASSESSMENT — PAIN DESCRIPTION - ORIENTATION: ORIENTATION: LOWER

## 2021-01-04 ASSESSMENT — PAIN DESCRIPTION - FREQUENCY: FREQUENCY: CONTINUOUS

## 2021-01-04 NOTE — ED NOTES
Nasal swab for Covid 19 collected and sent to lab at this time.      Manas Hampton RN  01/04/21 [id number]

## 2021-01-04 NOTE — ED NOTES
Pt very alert, oriented x4, skin warm and dry, color normal, respirations easy and unlabored, in no noted distress.      Mae JUANY Mcqueen  01/04/21 [id number]

## 2021-01-04 NOTE — ED PROVIDER NOTES
HPI:  1/4/21,   Time: 12:24 PM DEO Abel is a 82 y.o. male presenting to the ED for a 3-day history of cough, some mild shortness of breath with activity, 1 day ago he lost his taste and smell\" with a 3 days he feels very tired. He also has some complaint of muscle aches and pains over the past 3 days. His wife tested positive for Covid about 1 week ago. He was tested at the same time and was negative. Since then, he has developed the symptoms. Denies diarrhea. ROS:   Pertinent positives and negatives are stated within HPI, all other systems reviewed and are negative.  --------------------------------------------- PAST HISTORY ---------------------------------------------  Past Medical History:  has a past medical history of CA prostate, adenoca ([facility] CAD (coronary artery disease), Cancer ([facility] Chronic bilateral low back pain without sciatica, Cobalamin deficiency, Hyperlipidemia, Hypertension, Osteoarthritis, Osteochondropathy, Osteopenia, Pain in lower limb, Peripheral venous insufficiency, PONV (postoperative nausea and vomiting), Prolonged emergence from general anesthesia, and PVD (peripheral vascular disease) with claudication ([facility] Past Surgical History:  has a past surgical history that includes Cholecystectomy; hernia repair (1993); Femur fracture surgery (1981); lymph node dissection (10/2007); Prostate surgery (10/31/07); Total knee arthroplasty (2010); Coronary artery bypass graft (Dec. 1996); Acetabulum fracture surgery (3/24/11); Total hip arthroplasty (11/30/11); Colonoscopy (2016); Endoscopy, colon, diagnostic (2016); Testicle removal (Bilateral, 02/2019); Tonsillectomy; Pain management procedure (N/A, 5/19/2020); joint replacement; Anesthesia Nerve Block (Bilateral, 6/16/2020); Pain management procedure (8/4/20); Pain management procedure (Right, 8/4/2020); Pain management procedure (Left, 9/10/2020); and eye surgery (Bilateral, 01/2018).     Social History: reports that he quit smoking about 26 years ago. His smoking use included cigarettes. He has a 40.00 pack-year smoking history. He has quit using smokeless tobacco.  His smokeless tobacco use included chew. He reports current alcohol use of about 1.0 standard drinks of alcohol per week. He reports that he does not use drugs. Family History: family history includes Cancer in his father and mother; Stroke in his father and mother. The patients home medications have been reviewed. Allergies: Iodine    -------------------------------------------------- RESULTS -------------------------------------------------  All laboratory and radiology results have been personally reviewed by myself   LABS:  No results found for this visit on 01/04/21. RADIOLOGY:  Interpreted by Radiologist.  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 No acute cardiopulmonary process. ------------------------- NURSING NOTES AND VITALS REVIEWED ---------------------------   The nursing notes within the ED encounter and vital signs as below have been reviewed. BP (!) 158/90   Pulse 68   Temp 97 °F (36.1 °C) (Infrared)   Resp 18   Ht 5' 10\" (1.778 m)   Wt 225 lb (102.1 kg)   SpO2 95%   BMI 32.28 kg/m²   Oxygen Saturation Interpretation: Normal      ---------------------------------------------------PHYSICAL EXAM--------------------------------------    Constitutional/General: Alert and oriented x3, well appearing, non toxic in NAD  Head: NC/AT  Eyes: PERRL, EOMI  Mouth: Oropharynx clear, handling secretions, no trismus  Neck: Supple, full ROM, no meningeal signs  Pulmonary: Lungs clear to auscultation bilaterally, no wheezes, rales, or rhonchi. Not in respiratory distress  Cardiovascular:  Regular rate and rhythm, no murmurs, gallops, or rubs.  2+ distal pulses  Abdomen: Soft, non tender, non distended,   Extremities: Moves all extremities x 4. Warm and well perfused  Skin: warm and dry without rash  Neurologic: GCS 15,  Psych: Normal Affect      ------------------------------ ED COURSE/MEDICAL DECISION MAKING----------------------  Medications - No data to display      Medical Decision Making:    I discussed results of the x-ray of the chest with the patient. 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  Recommended that he be vigilant for any changing symptoms such as shortness of breath, fever chills, that might indicate worsening of his illness and could be associated with pneumonia due to Covid. He states that the metastasis that he has in his ribs and spine does not get much pain unless he coughs real hard. Counseling: The emergency provider has spoken with the patient and discussed todays results, in addition to providing specific details for the plan of care and counseling regarding the diagnosis and prognosis. Questions are answered at this time and they are agreeable with the plan.      --------------------------------- IMPRESSION AND DISPOSITION ---------------------------------    IMPRESSION  1.  Viral illness        DISPOSITION  Disposition: Discharge to home  Patient condition is stable                 Mahamed Pedraza MD  01/04/21 [id number]

## 2021-01-05 ENCOUNTER — CARE COORDINATION (OUTPATIENT)
Dept: CASE MANAGEMENT | Age: 86
End: 2021-01-05

## 2021-01-05 NOTE — CARE COORDINATION
Spoke with pt who states he went to ED for SOB. Ask if had gotten worse since he was negative 1 week prior (wife was tested positive at that time), he states SOB isn't worse, however, he does complain of aches and pains. Loss of taste and smell. Patient advised to rest, drink plenty of fluids and self isolate which he states he has been doing. Waiting for test results. Patient advised to contact his physician if test results are positive.     Fabio Niño, [address] [facility] Coordination Transition

## 2021-01-06 LAB
SARS-COV-2: DETECTED
SOURCE: ABNORMAL

## 2021-01-07 ENCOUNTER — CARE COORDINATION (OUTPATIENT)
Dept: CASE MANAGEMENT | Age: 86
End: 2021-01-07

## 2021-01-07 NOTE — CARE COORDINATION
Patient contacted regarding recent visit for viral symptoms. This Justino Brumfield contacted the patient by telephone to perform post discharge call. Verified name and  with patient as identifiers. Provided introduction to self, and reason for call due to viral symptoms of infection and/or exposure to COVID-19. Call within 2 business days of discharge: Yes       Patient presented to emergency department/flu clinic with complaints of viral symptoms/exposure to COVID. Patient reports symptoms are the same. Due to no new or worsening symptoms the RN CTN/ACMARLEY was not notified for escalation. Discussed exposure protocols and quarantine with CDC Guidelines What To Do If You Are Sick    Patient was given an opportunity for questions and concerns. Stay home except to get medical care    Separate yourself from other people and animals in your home    Call ahead before visiting your doctor    Wear a facemask    Cover your coughs and sneezes    Clean your hands often    Avoid sharing personal household items    Clean all high-touch surfaces everyday    Monitor your symptoms  Seek prompt medical attention if your illness is worsening (e.g., difficulty breathing). Before seeking care, call your healthcare provider and tell them that you have, or are being evaluated for, COVID-19. Put on a facemask before you enter the facility. These steps will help the healthcare provider's office to keep other people in the office or waiting room from getting infected or exposed. Ask your healthcare provider to call the local or [facility] health department. Persons who are placed under If you have a medical emergency and need to call 911, notify the dispatch personnel that you have, or are being evaluated for COVID-19. If possible, put on a facemask before emergency medical services arrive.     The patient agrees to contact the [facility] exposure line [phone], local health department [place] Department of Health: ([phone]) and PCP office for questions related to their healthcare. Author provided contact information for future reference. Patient/family/caregiver given information for [employer] and agrees to enroll {Blank Single Select Template:20061::\"yes  Patient's preferred e-mail: [email or web address]  Patient's preferred phone [phone]  Based on Loop alert triggers, patient will be contacted by nurse care manager for worsening symptoms.     Mariluz Jacobson, [address] [facility]  Care Coordination Transition

## 2021-01-08 ENCOUNTER — CARE COORDINATION (OUTPATIENT)
Dept: CASE MANAGEMENT | Age: 86
End: 2021-01-08

## 2021-01-08 ENCOUNTER — TELEPHONE (OUTPATIENT)
Dept: PRIMARY CARE CLINIC | Age: 86
End: 2021-01-08

## 2021-01-08 NOTE — CARE COORDINATION
[facility] Transitions Follow Up Call    2021    Patient: Cheryle Mooring  Patient : 1935   MRN: <[id number]>  Reason for Admission: Covid 19  Discharge Date: 21 RARS: No data recorded       Spoke with: spouse    Spoke with patient yesterday, called back today to see how they are doing. Spouse states they both are not feeling well, both tested positive for covid and have not been on medication. Spouse states she has tried to contact PCP but has no response other than going to the flu clinic which she states they prefer not to go out as he has very weak. Suggested ER if patient is worse, but spouse believe if they could get medication this would help. Sent loop enrollment yesterday, spouse will use this tool.     Cristian Enrique

## 2021-01-08 NOTE — TELEPHONE ENCOUNTER
Patients wife is calling in [facility] that patient tested positive on 01/06/21. He is having loss of taste and smell, fatigue, diarrhea and a cough. His O2 is running at 93%. Pts wife is asking if you could call a zpak into the pharmacy. Also looking for any advice you can give.

## 2021-01-08 NOTE — TELEPHONE ENCOUNTER
Please see last note. With symptoms, especially with pulse ox less than 94%, he needs seen in person. Flu clinic at a minimum, ER if any serious signs or symptoms. Needs seen today.

## 2021-01-13 ENCOUNTER — HOSPITAL ENCOUNTER (INPATIENT)
Age: 86
LOS: 3 days | Discharge: HOME | DRG: 871 | End: 2021-01-16
Attending: EMERGENCY MEDICINE | Admitting: INTERNAL MEDICINE
Payer: MEDICARE

## 2021-01-13 ENCOUNTER — APPOINTMENT (OUTPATIENT)
Dept: GENERAL RADIOLOGY | Age: 86
DRG: 871 | End: 2021-01-13
Payer: MEDICARE

## 2021-01-13 ENCOUNTER — APPOINTMENT (OUTPATIENT)
Dept: CT IMAGING | Age: 86
DRG: 871 | End: 2021-01-13
Payer: MEDICARE

## 2021-01-13 DIAGNOSIS — R09.02 HYPOXIA: ICD-10-CM

## 2021-01-13 DIAGNOSIS — U07.1 COVID-19: Primary | ICD-10-CM

## 2021-01-13 PROBLEM — J12.82 PNEUMONIA DUE TO COVID-19 VIRUS: Status: ACTIVE | Noted: 2021-01-13

## 2021-01-13 LAB
ALBUMIN SERPL-MCNC: 4 G/DL (ref 3.5–5.2)
ALP BLD-CCNC: 105 U/L (ref 40–129)
ALT SERPL-CCNC: 66 U/L (ref 0–40)
ANION GAP SERPL CALCULATED.3IONS-SCNC: 12 MMOL/L (ref 7–16)
AST SERPL-CCNC: 70 U/L (ref 0–39)
BACTERIA: ABNORMAL /HPF
BASOPHILS ABSOLUTE: 0.01 E9/L (ref 0–0.2)
BASOPHILS RELATIVE PERCENT: 0.2 % (ref 0–2)
BILIRUB SERPL-MCNC: 0.6 MG/DL (ref 0–1.2)
BILIRUBIN URINE: NEGATIVE
BLOOD, URINE: NEGATIVE
BUN BLDV-MCNC: 22 MG/DL (ref 8–23)
CALCIUM SERPL-MCNC: 9.3 MG/DL (ref 8.6–10.2)
CHLORIDE BLD-SCNC: 100 MMOL/L (ref 98–107)
CLARITY: CLEAR
CO2: 26 MMOL/L (ref 22–29)
COLOR: YELLOW
CREAT SERPL-MCNC: 1.4 MG/DL (ref 0.7–1.2)
D DIMER: 557 NG/ML DDU
EOSINOPHILS ABSOLUTE: 0.02 E9/L (ref 0.05–0.5)
EOSINOPHILS RELATIVE PERCENT: 0.4 % (ref 0–6)
GFR AFRICAN AMERICAN: 58
GFR NON-AFRICAN AMERICAN: 48 ML/MIN/1.73
GLUCOSE BLD-MCNC: 113 MG/DL (ref 74–99)
GLUCOSE URINE: NEGATIVE MG/DL
HCT VFR BLD CALC: 33.2 % (ref 37–54)
HEMOGLOBIN: 10.6 G/DL (ref 12.5–16.5)
HYALINE CASTS: ABNORMAL /LPF (ref 0–2)
IMMATURE GRANULOCYTES #: 0.02 E9/L
IMMATURE GRANULOCYTES %: 0.4 % (ref 0–5)
KETONES, URINE: 15 MG/DL
LACTIC ACID, SEPSIS: 1.3 MMOL/L (ref 0.5–1.9)
LACTIC ACID, SEPSIS: 1.4 MMOL/L (ref 0.5–1.9)
LEUKOCYTE ESTERASE, URINE: NEGATIVE
LYMPHOCYTES ABSOLUTE: 0.41 E9/L (ref 1.5–4)
LYMPHOCYTES RELATIVE PERCENT: 7.6 % (ref 20–42)
MAGNESIUM: 1.7 MG/DL (ref 1.6–2.6)
MCH RBC QN AUTO: 28.9 PG (ref 26–35)
MCHC RBC AUTO-ENTMCNC: 31.9 % (ref 32–34.5)
MCV RBC AUTO: 90.5 FL (ref 80–99.9)
MONOCYTES ABSOLUTE: 0.16 E9/L (ref 0.1–0.95)
MONOCYTES RELATIVE PERCENT: 3 % (ref 2–12)
MUCUS: PRESENT /LPF
NEUTROPHILS ABSOLUTE: 4.74 E9/L (ref 1.8–7.3)
NEUTROPHILS RELATIVE PERCENT: 88.4 % (ref 43–80)
NITRITE, URINE: NEGATIVE
PDW BLD-RTO: 13.3 FL (ref 11.5–15)
PH UA: 5.5 (ref 5–9)
PLATELET # BLD: 148 E9/L (ref 130–450)
PMV BLD AUTO: 10.7 FL (ref 7–12)
POTASSIUM SERPL-SCNC: 4.2 MMOL/L (ref 3.5–5)
PRO-BNP: 796 PG/ML (ref 0–450)
PROTEIN UA: 30 MG/DL
RBC # BLD: 3.67 E12/L (ref 3.8–5.8)
RBC # BLD: NORMAL 10*6/UL
RBC UA: ABNORMAL /HPF (ref 0–2)
SODIUM BLD-SCNC: 138 MMOL/L (ref 132–146)
SPECIFIC GRAVITY UA: 1.02 (ref 1–1.03)
TOTAL PROTEIN: 7.4 G/DL (ref 6.4–8.3)
TROPONIN: <0.01 NG/ML (ref 0–0.03)
UROBILINOGEN, URINE: 0.2 E.U./DL
WBC # BLD: 5.4 E9/L (ref 4.5–11.5)
WBC UA: ABNORMAL /HPF (ref 0–5)

## 2021-01-13 PROCEDURE — 83735 ASSAY OF MAGNESIUM: CPT

## 2021-01-13 PROCEDURE — 83605 ASSAY OF LACTIC ACID: CPT

## 2021-01-13 PROCEDURE — 84484 ASSAY OF TROPONIN QUANT: CPT

## 2021-01-13 PROCEDURE — 87088 URINE BACTERIA CULTURE: CPT

## 2021-01-13 PROCEDURE — 71045 X-RAY EXAM CHEST 1 VIEW: CPT

## 2021-01-13 PROCEDURE — 85378 FIBRIN DEGRADE SEMIQUANT: CPT

## 2021-01-13 PROCEDURE — 6360000004 HC RX CONTRAST MEDICATION: Performed by: RADIOLOGY

## 2021-01-13 PROCEDURE — 93005 ELECTROCARDIOGRAM TRACING: CPT | Performed by: EMERGENCY MEDICINE

## 2021-01-13 PROCEDURE — 83880 ASSAY OF NATRIURETIC PEPTIDE: CPT

## 2021-01-13 PROCEDURE — 81001 URINALYSIS AUTO W/SCOPE: CPT

## 2021-01-13 PROCEDURE — 2580000003 HC RX 258: Performed by: INTERNAL MEDICINE

## 2021-01-13 PROCEDURE — 36415 COLL VENOUS BLD VENIPUNCTURE: CPT

## 2021-01-13 PROCEDURE — 80053 COMPREHEN METABOLIC PANEL: CPT

## 2021-01-13 PROCEDURE — 85025 COMPLETE CBC W/AUTO DIFF WBC: CPT

## 2021-01-13 PROCEDURE — 99223 1ST HOSP IP/OBS HIGH 75: CPT | Performed by: INTERNAL MEDICINE

## 2021-01-13 PROCEDURE — XW033E5 INTRODUCTION OF REMDESIVIR ANTI-INFECTIVE INTO PERIPHERAL VEIN, PERCUTANEOUS APPROACH, NEW TECHNOLOGY GROUP 5: ICD-10-PCS | Performed by: INTERNAL MEDICINE

## 2021-01-13 PROCEDURE — 6370000000 HC RX 637 (ALT 250 FOR IP): Performed by: STUDENT IN AN ORGANIZED HEALTH CARE EDUCATION/TRAINING PROGRAM

## 2021-01-13 PROCEDURE — 2500000003 HC RX 250 WO HCPCS: Performed by: INTERNAL MEDICINE

## 2021-01-13 PROCEDURE — 71275 CT ANGIOGRAPHY CHEST: CPT

## 2021-01-13 PROCEDURE — 99285 EMERGENCY DEPT VISIT HI MDM: CPT

## 2021-01-13 PROCEDURE — 2060000000 HC ICU INTERMEDIATE R&B

## 2021-01-13 PROCEDURE — 87040 BLOOD CULTURE FOR BACTERIA: CPT

## 2021-01-13 RX ORDER — PREDNISONE 20 MG/1
50 TABLET ORAL EVERY 6 HOURS
Status: DISCONTINUED | OUTPATIENT
Start: 2021-01-13 | End: 2021-01-13

## 2021-01-13 RX ORDER — DEXAMETHASONE SODIUM PHOSPHATE 10 MG/ML
6 INJECTION, SOLUTION INTRAMUSCULAR; INTRAVENOUS EVERY 24 HOURS
Status: DISCONTINUED | OUTPATIENT
Start: 2021-01-13 | End: 2021-01-14 | Stop reason: SDUPTHER

## 2021-01-13 RX ORDER — ACETAMINOPHEN 500 MG
1000 TABLET ORAL ONCE
Status: COMPLETED | OUTPATIENT
Start: 2021-01-13 | End: 2021-01-13

## 2021-01-13 RX ORDER — DIPHENHYDRAMINE HCL 25 MG
50 TABLET ORAL ONCE
Status: COMPLETED | OUTPATIENT
Start: 2021-01-13 | End: 2021-01-13

## 2021-01-13 RX ORDER — 0.9 % SODIUM CHLORIDE 0.9 %
30 INTRAVENOUS SOLUTION INTRAVENOUS PRN
Status: DISCONTINUED | OUTPATIENT
Start: 2021-01-13 | End: 2021-01-16 | Stop reason: HOSPADM

## 2021-01-13 RX ADMIN — IOPAMIDOL 75 ML: 755 INJECTION, SOLUTION INTRAVENOUS at 18:11

## 2021-01-13 RX ADMIN — PREDNISONE 50 MG: 20 TABLET ORAL at 18:04

## 2021-01-13 RX ADMIN — ACETAMINOPHEN 1000 MG: 500 TABLET ORAL at 18:04

## 2021-01-13 RX ADMIN — DIPHENHYDRAMINE HCL 50 MG: 25 TABLET ORAL at 18:05

## 2021-01-13 RX ADMIN — REMDESIVIR 200 MG: 100 INJECTION, POWDER, LYOPHILIZED, FOR SOLUTION INTRAVENOUS at 21:55

## 2021-01-13 NOTE — Clinical Note
Patient Class: Inpatient [101]   REQUIRED: Diagnosis: CJAVA-09 [[id number]]   Estimated Length of Stay: Estimated stay of more than 2 midnights   Admitting Provider: Sole Osullivan [[id number]]   Telemetry Bed Required?: Yes

## 2021-01-14 LAB
ANION GAP SERPL CALCULATED.3IONS-SCNC: 9 MMOL/L (ref 7–16)
BUN BLDV-MCNC: 20 MG/DL (ref 8–23)
CALCIUM SERPL-MCNC: 9 MG/DL (ref 8.6–10.2)
CHLORIDE BLD-SCNC: 103 MMOL/L (ref 98–107)
CO2: 24 MMOL/L (ref 22–29)
CREAT SERPL-MCNC: 1.2 MG/DL (ref 0.7–1.2)
EKG ATRIAL RATE: 107 BPM
EKG P AXIS: 42 DEGREES
EKG P-R INTERVAL: 168 MS
EKG Q-T INTERVAL: 380 MS
EKG QRS DURATION: 140 MS
EKG QTC CALCULATION (BAZETT): 507 MS
EKG R AXIS: -60 DEGREES
EKG T AXIS: 15 DEGREES
EKG VENTRICULAR RATE: 107 BPM
GFR AFRICAN AMERICAN: >60
GFR NON-AFRICAN AMERICAN: 58 ML/MIN/1.73
GLUCOSE BLD-MCNC: 153 MG/DL (ref 74–99)
HCT VFR BLD CALC: 27.3 % (ref 37–54)
HEMOGLOBIN: 9.4 G/DL (ref 12.5–16.5)
MCH RBC QN AUTO: 30 PG (ref 26–35)
MCHC RBC AUTO-ENTMCNC: 34.4 % (ref 32–34.5)
MCV RBC AUTO: 87.2 FL (ref 80–99.9)
PDW BLD-RTO: 13.3 FL (ref 11.5–15)
PLATELET # BLD: 123 E9/L (ref 130–450)
PMV BLD AUTO: 10.7 FL (ref 7–12)
POTASSIUM REFLEX MAGNESIUM: 4.4 MMOL/L (ref 3.5–5)
RBC # BLD: 3.13 E12/L (ref 3.8–5.8)
SODIUM BLD-SCNC: 136 MMOL/L (ref 132–146)
WBC # BLD: 2.7 E9/L (ref 4.5–11.5)

## 2021-01-14 PROCEDURE — 93010 ELECTROCARDIOGRAM REPORT: CPT | Performed by: INTERNAL MEDICINE

## 2021-01-14 PROCEDURE — 6370000000 HC RX 637 (ALT 250 FOR IP): Performed by: INTERNAL MEDICINE

## 2021-01-14 PROCEDURE — 2060000000 HC ICU INTERMEDIATE R&B

## 2021-01-14 PROCEDURE — 99232 SBSQ HOSP IP/OBS MODERATE 35: CPT | Performed by: INTERNAL MEDICINE

## 2021-01-14 PROCEDURE — 85027 COMPLETE CBC AUTOMATED: CPT

## 2021-01-14 PROCEDURE — 6360000002 HC RX W HCPCS: Performed by: INTERNAL MEDICINE

## 2021-01-14 PROCEDURE — 36415 COLL VENOUS BLD VENIPUNCTURE: CPT

## 2021-01-14 PROCEDURE — 80048 BASIC METABOLIC PNL TOTAL CA: CPT

## 2021-01-14 PROCEDURE — 2500000003 HC RX 250 WO HCPCS: Performed by: INTERNAL MEDICINE

## 2021-01-14 PROCEDURE — 2580000003 HC RX 258: Performed by: INTERNAL MEDICINE

## 2021-01-14 RX ORDER — SODIUM CHLORIDE 0.9 % (FLUSH) 0.9 %
10 SYRINGE (ML) INJECTION PRN
Status: DISCONTINUED | OUTPATIENT
Start: 2021-01-14 | End: 2021-01-16 | Stop reason: HOSPADM

## 2021-01-14 RX ORDER — PROMETHAZINE HYDROCHLORIDE 25 MG/1
12.5 TABLET ORAL EVERY 6 HOURS PRN
Status: DISCONTINUED | OUTPATIENT
Start: 2021-01-14 | End: 2021-01-14

## 2021-01-14 RX ORDER — SODIUM CHLORIDE 0.9 % (FLUSH) 0.9 %
10 SYRINGE (ML) INJECTION EVERY 12 HOURS SCHEDULED
Status: DISCONTINUED | OUTPATIENT
Start: 2021-01-14 | End: 2021-01-16 | Stop reason: HOSPADM

## 2021-01-14 RX ORDER — POTASSIUM CHLORIDE 7.45 MG/ML
10 INJECTION INTRAVENOUS PRN
Status: DISCONTINUED | OUTPATIENT
Start: 2021-01-14 | End: 2021-01-16 | Stop reason: HOSPADM

## 2021-01-14 RX ORDER — ACETAMINOPHEN 650 MG/1
650 SUPPOSITORY RECTAL EVERY 6 HOURS PRN
Status: DISCONTINUED | OUTPATIENT
Start: 2021-01-14 | End: 2021-01-16 | Stop reason: HOSPADM

## 2021-01-14 RX ORDER — POLYETHYLENE GLYCOL 3350 17 G/17G
17 POWDER, FOR SOLUTION ORAL DAILY PRN
Status: DISCONTINUED | OUTPATIENT
Start: 2021-01-14 | End: 2021-01-16 | Stop reason: HOSPADM

## 2021-01-14 RX ORDER — PANTOPRAZOLE SODIUM 40 MG/1
40 TABLET, DELAYED RELEASE ORAL
Status: DISCONTINUED | OUTPATIENT
Start: 2021-01-14 | End: 2021-01-16 | Stop reason: HOSPADM

## 2021-01-14 RX ORDER — POTASSIUM CHLORIDE 20 MEQ/1
40 TABLET, EXTENDED RELEASE ORAL PRN
Status: DISCONTINUED | OUTPATIENT
Start: 2021-01-14 | End: 2021-01-16 | Stop reason: HOSPADM

## 2021-01-14 RX ORDER — LISINOPRIL 20 MG/1
20 TABLET ORAL DAILY
Status: DISCONTINUED | OUTPATIENT
Start: 2021-01-14 | End: 2021-01-16 | Stop reason: HOSPADM

## 2021-01-14 RX ORDER — DEXAMETHASONE SODIUM PHOSPHATE 4 MG/ML
6 INJECTION, SOLUTION INTRA-ARTICULAR; INTRALESIONAL; INTRAMUSCULAR; INTRAVENOUS; SOFT TISSUE EVERY 24 HOURS
Status: DISCONTINUED | OUTPATIENT
Start: 2021-01-14 | End: 2021-01-16 | Stop reason: HOSPADM

## 2021-01-14 RX ORDER — ONDANSETRON 2 MG/ML
4 INJECTION INTRAMUSCULAR; INTRAVENOUS EVERY 6 HOURS PRN
Status: DISCONTINUED | OUTPATIENT
Start: 2021-01-14 | End: 2021-01-14

## 2021-01-14 RX ORDER — ATORVASTATIN CALCIUM 40 MG/1
40 TABLET, FILM COATED ORAL DAILY
Status: DISCONTINUED | OUTPATIENT
Start: 2021-01-14 | End: 2021-01-16 | Stop reason: HOSPADM

## 2021-01-14 RX ORDER — ASPIRIN 81 MG/1
81 TABLET, CHEWABLE ORAL DAILY
Status: DISCONTINUED | OUTPATIENT
Start: 2021-01-14 | End: 2021-01-16 | Stop reason: HOSPADM

## 2021-01-14 RX ORDER — ACETAMINOPHEN 325 MG/1
650 TABLET ORAL EVERY 6 HOURS PRN
Status: DISCONTINUED | OUTPATIENT
Start: 2021-01-14 | End: 2021-01-16 | Stop reason: HOSPADM

## 2021-01-14 RX ADMIN — Medication 10 ML: at 20:45

## 2021-01-14 RX ADMIN — ASPIRIN 81 MG: 81 TABLET, CHEWABLE ORAL at 10:35

## 2021-01-14 RX ADMIN — ACETAMINOPHEN 650 MG: 325 TABLET ORAL at 01:52

## 2021-01-14 RX ADMIN — Medication 10 ML: at 10:37

## 2021-01-14 RX ADMIN — METOPROLOL TARTRATE 25 MG: 25 TABLET, FILM COATED ORAL at 20:54

## 2021-01-14 RX ADMIN — REMDESIVIR 100 MG: 100 INJECTION, POWDER, LYOPHILIZED, FOR SOLUTION INTRAVENOUS at 20:44

## 2021-01-14 RX ADMIN — METOPROLOL TARTRATE 25 MG: 25 TABLET, FILM COATED ORAL at 10:35

## 2021-01-14 RX ADMIN — DEXAMETHASONE SODIUM PHOSPHATE 6 MG: 4 INJECTION, SOLUTION INTRAMUSCULAR; INTRAVENOUS at 01:51

## 2021-01-14 RX ADMIN — METOPROLOL TARTRATE 25 MG: 25 TABLET, FILM COATED ORAL at 01:51

## 2021-01-14 RX ADMIN — PANTOPRAZOLE SODIUM 40 MG: 40 TABLET, DELAYED RELEASE ORAL at 05:59

## 2021-01-14 RX ADMIN — ATORVASTATIN CALCIUM 40 MG: 40 TABLET, FILM COATED ORAL at 20:54

## 2021-01-14 RX ADMIN — LISINOPRIL 20 MG: 20 TABLET ORAL at 10:36

## 2021-01-14 ASSESSMENT — PAIN SCALES - GENERAL
PAINLEVEL_OUTOF10: 4
PAINLEVEL_OUTOF10: 0

## 2021-01-14 ASSESSMENT — PAIN DESCRIPTION - ORIENTATION: ORIENTATION: RIGHT;LEFT;LOWER;MID

## 2021-01-14 ASSESSMENT — PAIN DESCRIPTION - LOCATION: LOCATION: GENERALIZED

## 2021-01-14 ASSESSMENT — PAIN DESCRIPTION - PROGRESSION: CLINICAL_PROGRESSION: NOT CHANGED

## 2021-01-14 ASSESSMENT — PAIN DESCRIPTION - ONSET: ONSET: ON-GOING

## 2021-01-14 NOTE — PLAN OF CARE
Problem: Airway Clearance - Ineffective  Goal: Achieve or maintain patent airway  Outcome: Ongoing     Problem: Gas Exchange - Impaired  Goal: Absence of hypoxia  Outcome: Ongoing  Goal: Promote optimal lung function  Outcome: Ongoing     Problem: Breathing Pattern - Ineffective  Goal: Ability to achieve and maintain a regular respiratory rate  Outcome: Ongoing     Problem: Risk for Fluid Volume Deficit  Goal: Maintain normal heart rhythm  Outcome: Ongoing  Goal: Maintain normal serum potassium, sodium, calcium, phosphorus, and pH  Outcome: Ongoing     Problem: Fatigue  Goal: Verbalize increase energy and improved vitality  Outcome: Ongoing     Problem: Patient Education: Go to Patient Education Activity  Goal: Patient/Family Education  Outcome: Ongoing

## 2021-01-14 NOTE — ED PROVIDER NOTES
HPI:     Jocelyn Sexton is a 82 y.o. male presenting to the ED for fever, beginning 3 days ago. The complaint has been persistent, moderate in severity, and worsened by nothing. Patient states that he has positive for COVID-19 has been having mild cough, fever and sweats. States that he is woke up soaked in sweat. States that he had a fever with a T-max of 102 °F and has been alternating Tylenol and Motrin and he is still febrile. Otherwise, he states that he does not feel unwell. He states that largely he feels well when his fever goes down. Review of Systems:   Please see HPI above. All bolded are positive. All un-bolded are negative.     Constitutional Symptoms: fever, chills, fatigue, generalized weakness, diaphoresis, increase in thirst, loss of appetite  Eyes: vision change   Ears, Nose, Mouth, Throat: hearing loss, nasal congestion, sores in the mouth  Cardiovascular: chest pain, chest heaviness, palpitations  Respiratory: shortness of breath, wheezing, coughing  Gastrointestinal: abdominal pain, nausea, vomiting, diarrhea, constipation, melena, hematochezia, hematemesis  Genitourinary: dysuria, hematuria, increased frequency  Musculoskeletal: lower extremity edema, myalgias, arthralgias, back pain  Integumentary: rashes, itching   Neurological: headache, lightheadedness, dizziness, confusion, syncope, numbness, tingling, focal weakness  Psychiatric: depression, suicidal ideation, anxiety  Endocrine: unintentional weight change  Hematologic/Lymphatic: lymphadenopathy, easy bruising, easy bleeding   Allergic/Immunologic: recurrent infections            --------------------------------------------- PAST HISTORY ---------------------------------------------  Past Medical History:  has a past medical history of CA prostate, [facility] CAD (coronary artery disease), Cancer ([facility] Chronic bilateral low back pain without sciatica, Cobalamin deficiency, Hyperlipidemia, Hypertension, Osteoarthritis, Osteochondropathy, Osteopenia, Pain in lower limb, Peripheral venous insufficiency, PONV (postoperative nausea and vomiting), Prolonged emergence from general anesthesia, and PVD (peripheral vascular disease) with [facility]ication [facility] Past Surgical History:  has a past surgical history that includes Cholecystectomy; hernia repair (1993); Femur fracture surgery (1981); lymph node dissection (10/2007); Prostate surgery (10/31/07); Total knee arthroplasty (2010); Coronary artery bypass graft (Dec. 1996); Acetabulum fracture surgery (3/24/11); Total hip arthroplasty (11/30/11); Colonoscopy (2016); Endoscopy, colon, diagnostic (2016); Testicle removal (Bilateral, 02/2019); Tonsillectomy; Pain management procedure (N/A, 5/19/2020); joint replacement; Anesthesia Nerve Block (Bilateral, 6/16/2020); Pain management procedure (8/4/20); Pain management procedure (Right, 8/4/2020); Pain management procedure (Left, 9/10/2020); and eye surgery (Bilateral, 01/2018). Social History:  reports that he quit smoking about 26 years ago. His smoking use included cigarettes. He has a 40.00 pack-year smoking history. He has quit using smokeless tobacco.  His smokeless tobacco use included chew. He reports current alcohol use of about 1.0 standard drinks of alcohol per week. He reports that he does not use drugs. Family History: family history includes Cancer in his father and mother; Stroke in his father and mother. The patients home medications have been reviewed.     Allergies: Iodine and Shellfish-derived products    -------------------------------------------------- RESULTS -------------------------------------------------  All laboratory and radiology results have been personally reviewed by myself   LABS:  Results for orders placed or performed during the hospital encounter of 01/13/21   CBC auto differential   Result Value Ref Range    WBC 5.4 4.5 - 11.5 E9/L    RBC 3.67 (L) 3.80 - 5.80 E12/L    Hemoglobin 10.6 (L) 12.5 - 16.5 g/dL    Hematocrit 33.2 (L) 37.0 - 54.0 %    MCV 90.5 80.0 - 99.9 fL    MCH 28.9 26.0 - 35.0 pg    MCHC 31.9 (L) 32.0 - 34.5 %    RDW 13.3 11.5 - 15.0 fL    Platelets 602 353 - 450 E9/L    MPV 10.7 7.0 - 12.0 fL    Neutrophils % 88.4 (H) 43.0 - 80.0 %    Immature Granulocytes % 0.4 0.0 - 5.0 %    Lymphocytes % 7.6 (L) 20.0 - 42.0 %    Monocytes % 3.0 2.0 - 12.0 %    Eosinophils % 0.4 0.0 - 6.0 %    Basophils % 0.2 0.0 - 2.0 %    Neutrophils Absolute 4.74 1.80 - 7.30 E9/L    Immature Granulocytes # 0.02 E9/L    Lymphocytes Absolute 0.41 (L) 1.50 - 4.00 E9/L    Monocytes Absolute 0.16 0.10 - 0.95 E9/L    Eosinophils Absolute 0.02 (L) 0.05 - 0.50 E9/L    Basophils Absolute 0.01 0.00 - 0.20 E9/L    RBC Morphology Normal    Comprehensive metabolic panel   Result Value Ref Range    Sodium 138 132 - 146 mmol/L    Potassium 4.2 3.5 - 5.0 mmol/L    Chloride 100 98 - 107 mmol/L    CO2 26 22 - 29 mmol/L    Anion Gap 12 7 - 16 mmol/L    Glucose 113 (H) 74 - 99 mg/dL    BUN 22 8 - 23 mg/dL    CREATININE 1.4 (H) 0.7 - 1.2 mg/dL    GFR Non-African American 48 >=60 mL/min/1.73    GFR African American 58     Calcium 9.3 8.6 - 10.2 mg/dL    Total Protein 7.4 6.4 - 8.3 g/dL    Alb 4.0 3.5 - 5.2 g/dL    Total Bilirubin 0.6 0.0 - 1.2 mg/dL    Alkaline Phosphatase 105 40 - 129 U/L    ALT 66 (H) 0 - 40 U/L    AST 70 (H) 0 - 39 U/L   Troponin   Result Value Ref Range    Troponin <0.01 0.00 - 0.03 ng/mL   Urinalysis with Microscopic   Result Value Ref Range    Color, UA Yellow Straw/Yellow    Clarity, UA Clear Clear    Glucose, Ur Negative Negative mg/dL    Bilirubin Urine Negative Negative    Ketones, Urine 15 (A) Negative mg/dL    Specific Gravity, UA 1.025 1.005 - 1.030    Blood, Urine Negative Negative    pH, UA 5.5 5.0 - 9.0    Protein, UA 30 (A) Negative mg/dL    Urobilinogen, Urine 0.2 <2.0 E.U./dL    Nitrite, Urine Negative Negative    Leukocyte Esterase, Urine Negative Negative    Hyaline Casts, UA 0-2 0 - 2 /LPF    Mucus, UA Present (A) None Seen /LPF    WBC, UA 0-1 0 - 5 /HPF    RBC, UA 0-1 0 - 2 /HPF    Bacteria, UA NONE SEEN None Seen /HPF   Lactate, Sepsis   Result Value Ref Range    Lactic Acid, Sepsis 1.4 0.5 - 1.9 mmol/L   D-Dimer, Quantitative   Result Value Ref Range    D-Dimer, Quant 557 ng/mL DDU   Magnesium   Result Value Ref Range    Magnesium 1.7 1.6 - 2.6 mg/dL   Brain Natriuretic Peptide   Result Value Ref Range    Pro- (H) 0 - 450 pg/mL   EKG 12 Lead   Result Value Ref Range    Ventricular Rate 107 BPM    Atrial Rate 107 BPM    P-R Interval 168 ms    QRS Duration 140 ms    Q-T Interval 380 ms    QTc Calculation (Bazett) 507 ms    P Axis 42 degrees    R Axis -60 degrees    T Axis 15 degrees       RADIOLOGY:  Interpreted by Radiologist.  CTA PULMONARY W CONTRAST   Final Result   No evidence of pulmonary embolism. Diffuse and bilateral areas of ground-glass appearance consistent with   pneumonia. COVID pneumonia suspected. Mild splenomegaly. 2.0 cm oval subpleural nodule posterosuperior segment right lower lobe. This   was not present on 11/08/2013.  3 to six-month follow-up recommended. Small hiatal hernia. Probable hepatic cysts. XR CHEST PORTABLE   Final Result   New right mid and lower lung infiltrates may reflect edema and/or   atelectasis. Differential includes new multifocal pneumonia, bacterial   versus viral. While nonspecific, this appearance can be seen with COVID-19 . Multifocal bilateral rib osteoblastic metastases remain present without   significant change, in patient with history of prostate cancer          ------------------------- NURSING NOTES AND VITALS REVIEWED ---------------------------   The nursing notes within the ED encounter and vital signs as below have been reviewed.    BP (!) 143/68   Pulse 101   Temp 101.3 °F (38.5 °C) (Oral)   Resp 20   Ht 5' 10\" (1.778 m)   Wt 225 lb (102.1 kg)   SpO2 (!) 88%   BMI 32.28 kg/m²   Oxygen Saturation Interpretation: Abnormal      ---------------------------------------------------PHYSICAL EXAM--------------------------------------      Constitutional/General: Alert and oriented x3, well appearing, non toxic in NAD  Head: Normocephalic and atraumatic  Eyes: PERRL, EOMI  Mouth: Oropharynx clear, handling secretions, no trismus  Neck: Supple, full ROM, normal phonation  Pulmonary: Lungs clear to auscultation bilaterally, no wheezes, rales, or rhonchi. Not in respiratory distress  Cardiovascular:  Regular rate and rhythm, no murmurs, gallops, or rubs. 2+ distal pulses  Abdomen: Soft, non tender, non distended,   Extremities: Moves all extremities x 4. Warm and well perfused  Skin: warm and dry without rash  Neurologic: GCS 15, no focal deficit noted  Psych: Normal Affect        EKG: This EKG is signed and interpreted by me.     Rate: 107  Rhythm: Sinus  Interpretation: Sinus tachycardia, right bundle branch block, left anterior fascicular block, bifascicular block noted with a QTC of 507 ms, no acute changes  Comparison: Left anterior fascicular block and by extension bifascicular block is new    ------------------------------ ED COURSE/MEDICAL DECISION MAKING----------------------  Medications   dexamethasone (PF) (DECADRON) injection 6 mg (has no administration in time range)   remdesivir 200 mg in sodium chloride 0.9 % 250 mL IVPB (has no administration in time range)     Followed by   remdesivir 100 mg in sodium chloride 0.9 % 250 mL IVPB (has no administration in time range)   0.9 % sodium chloride bolus (has no administration in time range)   diphenhydrAMINE (BENADRYL) tablet 50 mg (50 mg Oral Given 1/13/21 1805)   acetaminophen (TYLENOL) tablet 1,000 mg (1,000 mg Oral Given 1/13/21 1804)   iopamidol (ISOVUE-370) 76 % injection 75 mL (75 mLs Intravenous Given 1/13/21 1811)         ED COURSE:       Medical Decision Making:    Patient presented to the ER today with chief complaint of fevers in the context of positive COVID-19 infection. Patient is indeed febrile here in the ER. Despite being given a gram of Tylenol, while his temperature has decreased he is still febrile. Patient is well-appearing and feels well here in the ER, however upon ambulation he did desaturate to 88% on room air. Given these findings, I feel that patient would benefit from admission. I did speak to hospitalist who is agreeable to admission at this time. All questions have been answered. Patient is agreeable as well. Counseling: The emergency provider has spoken with the patient and discussed todays results, in addition to providing specific details for the plan of care and counseling regarding the diagnosis and prognosis. Questions are answered at this time and they are agreeable with the plan.      --------------------------------- IMPRESSION AND DISPOSITION ---------------------------------    IMPRESSION  1. COVID-19    2. Hypoxia        New Prescriptions    No medications on file       DISPOSITION  Disposition: Admit to telemetry  Patient condition is serious      NOTE: This report was transcribed using voice recognition software.  Every effort was made to ensure accuracy; however, inadvertent computerized transcription errors may be present       DO Mamadou  Resident  01/13/21 4572

## 2021-01-14 NOTE — CARE COORDINATION
CM NOTE: Per QFR---covid positive. SR on monitor. Using O2 1L which he does not have ta home. Day #2 IV RDV. Independent & ambulating in room. CM spoke with pt by phone to discuss role, anticipated LOS & current plan of care. Reports living with wife in 1 story home. Is able to use steps as needed. Does not use ADs or O2 at home & is not diabetic. No hx JOSE or HHC. Discussed dx, current use of O2 & medications. Plan is home at discharge. Declines need for [address] & states his wife takes good care of him. No preference for dme if needed. CM & SW will continue to follow pt.

## 2021-01-14 NOTE — H&P
[facility] History and Physical      CHIEF COMPLAINT:  fever    History of Present Illness:  Patient is an 82year old male with past medical history significant for CA, hypertension, prostate cancer. He presented to the ED with complaints of 3 days of fever. He had a positive Covid swab 9 days ago. He has also complained of mild nonproductive cough and sweats. He checked his temperature at home, and it was 102 °F.  He reports that he has been taking ibuprofen and acetaminophen, with no improvement. His ED work-up today includes a chest x-ray and a CTA of the chest showing infiltrates suspected to be due to Covid pneumonia, but no PE. Patient reportedly dropped into the 80s on pulse ox with ambulation. Medicine was asked to admit for COVID-19 pneumonia and hypoxia.     REVIEW OF SYSTEMS:  A comprehensive review of systems was negative except for: what is in the HPI    PMH:  Past Medical History:   Diagnosis Date    CA prostate, adenoca ([facility] [address] 4/25/2019    CAD (coronary artery disease)     Dr. Gene Lainez [facility])     prostate- PO chemotherapy     Chronic bilateral low back pain without sciatica 3/6/2020    Cobalamin deficiency     Hyperlipidemia     Hypertension     Osteoarthritis     Osteochondropathy     Osteopenia     Pain in lower limb     Peripheral venous insufficiency     PONV (postoperative nausea and vomiting)     Prolonged emergence from general anesthesia     PVD (peripheral vascular disease) with claudication ([facility] [address] 4/25/2019     Surgical History:  Past Surgical History:   Procedure Laterality Date    ACETABULUM FRACTURE SURGERY  3/24/11    ANESTHESIA NERVE BLOCK Bilateral 6/16/2020    BILATERAL L3 L4 L5 MEDIAL NERVE BRANCH BLOCK performed by Sarmad Kearney DO at [facility]  2016   [facility]    [facility]/ Dr. Julene Fothergill, COLON, DIAGNOSTIC  2016    EYE SURGERY Provider, MD   metoprolol tartrate (LOPRESSOR) 25 MG tablet Take 25 mg by mouth 2 times daily    Historical Provider, MD   Coenzyme Q10 (COQ10) 50 MG CAPS Take 1 capsule by mouth daily LD 6-11-20    Historical Provider, MD   aspirin 81 MG tablet Take 81 mg by mouth daily     Historical Provider, MD   Cyanocobalamin (VITAMIN B 12 PO) Take  by mouth daily. sublingual  2/8/11   Historical Provider, MD     Allergies:    Iodine and Shellfish-derived products    Social History:    reports that he quit smoking about 26 years ago. His smoking use included cigarettes. He has a 40.00 pack-year smoking history. He has quit using smokeless tobacco.  His smokeless tobacco use included chew. He reports current alcohol use of about 1.0 standard drinks of alcohol per week. He reports that he does not use drugs. Family History:   family history includes Cancer in his father and mother; Stroke in his father and mother.      PHYSICAL EXAM:  Vitals:  BP (!) 143/68   Pulse 101   Temp 101.3 °F (38.5 °C) (Oral)   Resp 20   Ht 5' 10\" (1.778 m)   Wt 225 lb (102.1 kg)   SpO2 (!) 88%   BMI 32.28 kg/m²     General Appearance: alert and oriented to person, place and time and in no acute distress  Skin: warm and dry  Head: normocephalic and atraumatic  Eyes: pupils equal, round, and reactive to light, extraocular eye movements intact, conjunctivae normal  Neck: neck supple and non tender without mass   Pulmonary/Chest: clear to auscultation bilaterally- no wheezes, rales or rhonchi, normal air movement, no respiratory distress  Cardiovascular: normal rate, normal S1 and S2 and no carotid bruits  Abdomen: soft, non-tender, non-distended, normal bowel sounds, no masses or organomegaly  Extremities: no cyanosis, no clubbing and no edema  Neurologic: no cranial nerve deficit and speech normal    LABS:  Recent Labs     01/13/21  1435      K 4.2      CO2 26   BUN 22   CREATININE 1.4*   GLUCOSE 113*   CALCIUM 9.3       Recent Labs 01/13/21  1435   WBC 5.4   RBC 3.67*   HGB 10.6*   HCT 33.2*   MCV 90.5   MCH 28.9   MCHC 31.9*   RDW 13.3      MPV 10.7       No results for input(s): POCGLU in the last 72 hours. CBC:   Lab Results   Component Value Date    WBC 5.4 01/13/2021    RBC 3.67 01/13/2021    HGB 10.6 01/13/2021    HCT 33.2 01/13/2021    MCV 90.5 01/13/2021    MCH 28.9 01/13/2021    MCHC 31.9 01/13/2021    RDW 13.3 01/13/2021     01/13/2021    MPV 10.7 01/13/2021     CMP:    Lab Results   Component Value Date     01/13/2021    K 4.2 01/13/2021     01/13/2021    CO2 26 01/13/2021    BUN 22 01/13/2021    CREATININE 1.4 01/13/2021    GFRAA 58 01/13/2021    LABGLOM 48 01/13/2021    GLUCOSE 113 01/13/2021    GLUCOSE 113 03/28/2011    PROT 7.4 01/13/2021    LABALBU 4.0 01/13/2021    LABALBU 2.9 03/26/2011    CALCIUM 9.3 01/13/2021    BILITOT 0.6 01/13/2021    ALKPHOS 105 01/13/2021    AST 70 01/13/2021    ALT 66 01/13/2021       Radiology:   CTA PULMONARY W CONTRAST   Final Result   No evidence of pulmonary embolism. Diffuse and bilateral areas of ground-glass appearance consistent with   pneumonia. COVID pneumonia suspected. Mild splenomegaly. 2.0 cm oval subpleural nodule posterosuperior segment right lower lobe. This   was not present on 11/08/2013.  3 to six-month follow-up recommended. Small hiatal hernia. Probable hepatic cysts. XR CHEST PORTABLE   Final Result   New right mid and lower lung infiltrates may reflect edema and/or   atelectasis. Differential includes new multifocal pneumonia, bacterial   versus viral. While nonspecific, this appearance can be seen with COVID-19 . Multifocal bilateral rib osteoblastic metastases remain present without   significant change, in patient with history of prostate cancer        ASSESSMENT:      Active Problems:    COVID-19    Pneumonia due to COVID-19 virus  Resolved Problems:    * No resolved hospital problems.  *    PLAN:    Acute Hypoxic respiratory failure  SARS-CoV-2 (COVID-19) Pneumonia  82year old male presenting with complaints of fever, CXR showing COVID pneumonia, COVID+, no hypoxia at rest, but became hypoxic with ambulation. CTA of chest shows no PE.  -admit to medical unit  -decadron daily  -remdesivir  -supplemental oxygen, wean as tolerated    CAD  -continue home dose of atorvastatin and ASA    Essential Hypertension  -continue home dose of lisinopril, metoprolol    GERD  -continue home dose of PPI    Code Status: Full  DVT prophylaxis: Lovenox      NOTE: This report was transcribed using voice recognition software. Every effort was made to ensure accuracy; however, inadvertent computerized transcription errors may be present.   Electronically signed by Melva Miller DO on 1/13/2021 at 8:41 PM

## 2021-01-14 NOTE — PROGRESS NOTES
Pharmacy Note    An order for Zofran and/or Phenergan has been discontinued for this patient due to the risk of QT prolongation. If an antiemetic is indicated for your patient, please consider use of Tigan or Compazine. Current QTc = 507  Please contact the Pharmacy with any questions.   DELMY Rueda [facility]  1/14/2021  1:27 AM

## 2021-01-15 LAB
ALBUMIN SERPL-MCNC: 3 G/DL (ref 3.5–5.2)
ALP BLD-CCNC: 81 U/L (ref 40–129)
ALT SERPL-CCNC: 82 U/L (ref 0–40)
ANION GAP SERPL CALCULATED.3IONS-SCNC: 9 MMOL/L (ref 7–16)
AST SERPL-CCNC: 80 U/L (ref 0–39)
BASOPHILS ABSOLUTE: 0.01 E9/L (ref 0–0.2)
BASOPHILS RELATIVE PERCENT: 0.3 % (ref 0–2)
BILIRUB SERPL-MCNC: 0.3 MG/DL (ref 0–1.2)
BUN BLDV-MCNC: 24 MG/DL (ref 8–23)
CALCIUM SERPL-MCNC: 8.5 MG/DL (ref 8.6–10.2)
CHLORIDE BLD-SCNC: 107 MMOL/L (ref 98–107)
CO2: 24 MMOL/L (ref 22–29)
CREAT SERPL-MCNC: 1.2 MG/DL (ref 0.7–1.2)
EOSINOPHILS ABSOLUTE: 0.01 E9/L (ref 0.05–0.5)
EOSINOPHILS RELATIVE PERCENT: 0.3 % (ref 0–6)
GFR AFRICAN AMERICAN: >60
GFR NON-AFRICAN AMERICAN: 58 ML/MIN/1.73
GLUCOSE BLD-MCNC: 96 MG/DL (ref 74–99)
HCT VFR BLD CALC: 26.8 % (ref 37–54)
HEMOGLOBIN: 8.8 G/DL (ref 12.5–16.5)
IMMATURE GRANULOCYTES #: 0.01 E9/L
IMMATURE GRANULOCYTES %: 0.3 % (ref 0–5)
LYMPHOCYTES ABSOLUTE: 0.61 E9/L (ref 1.5–4)
LYMPHOCYTES RELATIVE PERCENT: 18.8 % (ref 20–42)
MCH RBC QN AUTO: 29.2 PG (ref 26–35)
MCHC RBC AUTO-ENTMCNC: 32.8 % (ref 32–34.5)
MCV RBC AUTO: 89 FL (ref 80–99.9)
MONOCYTES ABSOLUTE: 0.18 E9/L (ref 0.1–0.95)
MONOCYTES RELATIVE PERCENT: 5.6 % (ref 2–12)
NEUTROPHILS ABSOLUTE: 2.42 E9/L (ref 1.8–7.3)
NEUTROPHILS RELATIVE PERCENT: 74.7 % (ref 43–80)
PDW BLD-RTO: 13.2 FL (ref 11.5–15)
PLATELET # BLD: 139 E9/L (ref 130–450)
PMV BLD AUTO: 10.2 FL (ref 7–12)
POTASSIUM SERPL-SCNC: 3.9 MMOL/L (ref 3.5–5)
RBC # BLD: 3.01 E12/L (ref 3.8–5.8)
SODIUM BLD-SCNC: 140 MMOL/L (ref 132–146)
TOTAL PROTEIN: 5.9 G/DL (ref 6.4–8.3)
WBC # BLD: 3.2 E9/L (ref 4.5–11.5)

## 2021-01-15 PROCEDURE — 6360000002 HC RX W HCPCS: Performed by: INTERNAL MEDICINE

## 2021-01-15 PROCEDURE — 2580000003 HC RX 258: Performed by: INTERNAL MEDICINE

## 2021-01-15 PROCEDURE — 85025 COMPLETE CBC W/AUTO DIFF WBC: CPT

## 2021-01-15 PROCEDURE — 2500000003 HC RX 250 WO HCPCS: Performed by: INTERNAL MEDICINE

## 2021-01-15 PROCEDURE — 99232 SBSQ HOSP IP/OBS MODERATE 35: CPT | Performed by: INTERNAL MEDICINE

## 2021-01-15 PROCEDURE — 80053 COMPREHEN METABOLIC PANEL: CPT

## 2021-01-15 PROCEDURE — 6370000000 HC RX 637 (ALT 250 FOR IP): Performed by: INTERNAL MEDICINE

## 2021-01-15 PROCEDURE — 2700000000 HC OXYGEN THERAPY PER DAY

## 2021-01-15 PROCEDURE — 2060000000 HC ICU INTERMEDIATE R&B

## 2021-01-15 PROCEDURE — 36415 COLL VENOUS BLD VENIPUNCTURE: CPT

## 2021-01-15 RX ADMIN — Medication 10 ML: at 20:38

## 2021-01-15 RX ADMIN — METOPROLOL TARTRATE 25 MG: 25 TABLET, FILM COATED ORAL at 20:36

## 2021-01-15 RX ADMIN — PANTOPRAZOLE SODIUM 40 MG: 40 TABLET, DELAYED RELEASE ORAL at 09:53

## 2021-01-15 RX ADMIN — METOPROLOL TARTRATE 25 MG: 25 TABLET, FILM COATED ORAL at 09:53

## 2021-01-15 RX ADMIN — Medication 10 ML: at 09:55

## 2021-01-15 RX ADMIN — ASPIRIN 81 MG: 81 TABLET, CHEWABLE ORAL at 09:53

## 2021-01-15 RX ADMIN — DEXAMETHASONE SODIUM PHOSPHATE 6 MG: 4 INJECTION, SOLUTION INTRAMUSCULAR; INTRAVENOUS at 03:40

## 2021-01-15 RX ADMIN — LISINOPRIL 20 MG: 20 TABLET ORAL at 09:53

## 2021-01-15 RX ADMIN — ENOXAPARIN SODIUM 40 MG: 40 INJECTION SUBCUTANEOUS at 09:53

## 2021-01-15 RX ADMIN — ATORVASTATIN CALCIUM 40 MG: 40 TABLET, FILM COATED ORAL at 20:37

## 2021-01-15 RX ADMIN — REMDESIVIR 100 MG: 100 INJECTION, POWDER, LYOPHILIZED, FOR SOLUTION INTRAVENOUS at 20:37

## 2021-01-15 ASSESSMENT — PAIN SCALES - GENERAL
PAINLEVEL_OUTOF10: 0
PAINLEVEL_OUTOF10: 0

## 2021-01-15 NOTE — CARE COORDINATION
CM NOTE: Per QFR---covid positive. Day #3 IV RDV. Using O2 1L which he does not have at home. Denies any issues this am. Plan remains home at discharge. WILL NEED O2 TESTING. No pref if dme needed. CM & SW continue to follow pt.

## 2021-01-15 NOTE — PROGRESS NOTES
Erika Matos Hospitalist   Progress Note    Admitting Date and Time: 1/13/2021  2:31 PM  Admit Dx: TCROH-14 [U07.1]  Pneumonia due to COVID-19 virus [U07.1, J12.82]    Subjective:    Patient was admitted with COVID-19 [U07.1]  Pneumonia due to COVID-19 virus [U07.1, J12.82]. Patient denies fever, chills, cp, n/v. Pt with some sob.       aspirin  81 mg Oral Daily    atorvastatin  40 mg Oral Daily    lisinopril  20 mg Oral Daily    metoprolol tartrate  25 mg Oral BID    pantoprazole  40 mg Oral QAM AC    sodium chloride flush  10 mL Intravenous 2 times per day    enoxaparin  40 mg Subcutaneous Daily    dexamethasone  6 mg Intravenous Q24H    remdesivir IVPB  100 mg Intravenous Q24H         sodium chloride flush, 10 mL, PRN      potassium chloride, 40 mEq, PRN    Or      potassium alternative oral replacement, 40 mEq, PRN    Or      potassium chloride, 10 mEq, PRN      polyethylene glycol, 17 g, Daily PRN      acetaminophen, 650 mg, Q6H PRN    Or      acetaminophen, 650 mg, Q6H PRN      sodium chloride, 30 mL, PRN         Objective:    /60   Pulse 73   Temp 98.1 °F (36.7 °C) (Oral)   Resp 18   Ht 5' 10\" (1.778 m)   Wt 215 lb 3.2 oz (97.6 kg)   SpO2 94%   BMI 30.88 kg/m²   Skin: warm and dry, no rash or erythema  Pulmonary/Chest: clear to auscultation bilaterally- no wheezes, rales or rhonchi, normal air movement, no respiratory distress  Cardiovascular: rhythm reg at rate of 72  Abdomen: soft, non-tender, non-distended, normal bowel sounds, no masses or organomegaly  Extremities: no cyanosis, no clubbing and no edema      Recent Labs     01/13/21  1435 01/14/21  0333    136   K 4.2 4.4    103   CO2 26 24   BUN 22 20   CREATININE 1.4* 1.2   GLUCOSE 113* 153*   CALCIUM 9.3 9.0       Recent Labs     01/13/21  1435 01/14/21  0333   WBC 5.4 2.7*   RBC 3.67* 3.13*   HGB 10.6* 9.4*   HCT 33.2* 27.3*   MCV 90.5 87.2   MCH 28.9 30.0   MCHC 31.9* 34.4   RDW 13.3 13.3   PLT 148 123*   MPV 10.7 10.7       CBC:   Lab Results   Component Value Date    WBC 2.7 01/14/2021    RBC 3.13 01/14/2021    HGB 9.4 01/14/2021    HCT 27.3 01/14/2021    MCV 87.2 01/14/2021    MCH 30.0 01/14/2021    MCHC 34.4 01/14/2021    RDW 13.3 01/14/2021     01/14/2021    MPV 10.7 01/14/2021     BMP:    Lab Results   Component Value Date     01/14/2021    K 4.4 01/14/2021     01/14/2021    CO2 24 01/14/2021    BUN 20 01/14/2021    LABALBU 4.0 01/13/2021    LABALBU 2.9 03/26/2011    CREATININE 1.2 01/14/2021    CALCIUM 9.0 01/14/2021    GFRAA >60 01/14/2021    LABGLOM 58 01/14/2021    GLUCOSE 153 01/14/2021    GLUCOSE 113 03/28/2011        Radiology:   CTA PULMONARY W CONTRAST   Final Result   No evidence of pulmonary embolism. Diffuse and bilateral areas of ground-glass appearance consistent with   pneumonia. COVID pneumonia suspected. Mild splenomegaly. 2.0 cm oval subpleural nodule posterosuperior segment right lower lobe. This   was not present on 11/08/2013.  3 to six-month follow-up recommended. Small hiatal hernia. Probable hepatic cysts. XR CHEST PORTABLE   Final Result   New right mid and lower lung infiltrates may reflect edema and/or   atelectasis. Differential includes new multifocal pneumonia, bacterial   versus viral. While nonspecific, this appearance can be seen with COVID-19 . Multifocal bilateral rib osteoblastic metastases remain present without   significant change, in patient with history of prostate cancer          Assessment:    Active Problems:    COVID-19    Pneumonia due to COVID-19 virus  Resolved Problems:    * No resolved hospital problems. *      Plan:  1. Acute respiratory failure with hypoxia adjust o2 as needed  2. Pneumonia due to covid 19 steroids and remdesivir  3. htn continue med  4. gerd continue med  5.  Elevated lfts monitor        Electronically signed by Luis Gann DO on 1/14/2021 at 8:02 PM

## 2021-01-16 VITALS
TEMPERATURE: 98 F | RESPIRATION RATE: 17 BRPM | HEART RATE: 81 BPM | SYSTOLIC BLOOD PRESSURE: 110 MMHG | DIASTOLIC BLOOD PRESSURE: 71 MMHG | WEIGHT: 218.8 LBS | BODY MASS INDEX: 31.32 KG/M2 | HEIGHT: 70 IN | OXYGEN SATURATION: 97 %

## 2021-01-16 LAB
ALBUMIN SERPL-MCNC: 3.4 G/DL (ref 3.5–5.2)
ALP BLD-CCNC: 88 U/L (ref 40–129)
ALT SERPL-CCNC: 122 U/L (ref 0–40)
ANION GAP SERPL CALCULATED.3IONS-SCNC: 11 MMOL/L (ref 7–16)
ANISOCYTOSIS: ABNORMAL
AST SERPL-CCNC: 96 U/L (ref 0–39)
BASOPHILS ABSOLUTE: 0 E9/L (ref 0–0.2)
BASOPHILS RELATIVE PERCENT: 0 % (ref 0–2)
BILIRUB SERPL-MCNC: 0.3 MG/DL (ref 0–1.2)
BUN BLDV-MCNC: 26 MG/DL (ref 8–23)
CALCIUM SERPL-MCNC: 9 MG/DL (ref 8.6–10.2)
CHLORIDE BLD-SCNC: 106 MMOL/L (ref 98–107)
CO2: 22 MMOL/L (ref 22–29)
CREAT SERPL-MCNC: 1.1 MG/DL (ref 0.7–1.2)
EOSINOPHILS ABSOLUTE: 0.01 E9/L (ref 0.05–0.5)
EOSINOPHILS RELATIVE PERCENT: 0.3 % (ref 0–6)
GFR AFRICAN AMERICAN: >60
GFR NON-AFRICAN AMERICAN: >60 ML/MIN/1.73
GLUCOSE BLD-MCNC: 140 MG/DL (ref 74–99)
HCT VFR BLD CALC: 29.4 % (ref 37–54)
HEMOGLOBIN: 9.8 G/DL (ref 12.5–16.5)
IMMATURE GRANULOCYTES #: 0.06 E9/L
IMMATURE GRANULOCYTES %: 1.6 % (ref 0–5)
LYMPHOCYTES ABSOLUTE: 0.48 E9/L (ref 1.5–4)
LYMPHOCYTES RELATIVE PERCENT: 13.1 % (ref 20–42)
MCH RBC QN AUTO: 29.7 PG (ref 26–35)
MCHC RBC AUTO-ENTMCNC: 33.3 % (ref 32–34.5)
MCV RBC AUTO: 89.1 FL (ref 80–99.9)
MONOCYTES ABSOLUTE: 0.11 E9/L (ref 0.1–0.95)
MONOCYTES RELATIVE PERCENT: 3 % (ref 2–12)
NEUTROPHILS ABSOLUTE: 3.01 E9/L (ref 1.8–7.3)
NEUTROPHILS RELATIVE PERCENT: 82 % (ref 43–80)
OVALOCYTES: ABNORMAL
PDW BLD-RTO: 13.2 FL (ref 11.5–15)
PHOSPHORUS: 3.8 MG/DL (ref 2.5–4.5)
PLATELET # BLD: 209 E9/L (ref 130–450)
PMV BLD AUTO: 10.6 FL (ref 7–12)
POIKILOCYTES: ABNORMAL
POLYCHROMASIA: ABNORMAL
POTASSIUM REFLEX MAGNESIUM: 4.4 MMOL/L (ref 3.5–5)
RBC # BLD: 3.3 E12/L (ref 3.8–5.8)
SODIUM BLD-SCNC: 139 MMOL/L (ref 132–146)
TOTAL PROTEIN: 6.7 G/DL (ref 6.4–8.3)
URINE CULTURE, ROUTINE: NORMAL
WBC # BLD: 3.7 E9/L (ref 4.5–11.5)

## 2021-01-16 PROCEDURE — 36415 COLL VENOUS BLD VENIPUNCTURE: CPT

## 2021-01-16 PROCEDURE — 6360000002 HC RX W HCPCS: Performed by: INTERNAL MEDICINE

## 2021-01-16 PROCEDURE — 99239 HOSP IP/OBS DSCHRG MGMT >30: CPT | Performed by: INTERNAL MEDICINE

## 2021-01-16 PROCEDURE — 2580000003 HC RX 258: Performed by: INTERNAL MEDICINE

## 2021-01-16 PROCEDURE — 80053 COMPREHEN METABOLIC PANEL: CPT

## 2021-01-16 PROCEDURE — 84100 ASSAY OF PHOSPHORUS: CPT

## 2021-01-16 PROCEDURE — 6370000000 HC RX 637 (ALT 250 FOR IP): Performed by: INTERNAL MEDICINE

## 2021-01-16 PROCEDURE — 85025 COMPLETE CBC W/AUTO DIFF WBC: CPT

## 2021-01-16 RX ORDER — DEXAMETHASONE 6 MG/1
6 TABLET ORAL
Qty: 6 TABLET | Refills: 0 | Status: SHIPPED | OUTPATIENT
Start: 2021-01-16 | End: 2021-01-22

## 2021-01-16 RX ADMIN — DEXAMETHASONE SODIUM PHOSPHATE 6 MG: 4 INJECTION, SOLUTION INTRAMUSCULAR; INTRAVENOUS at 06:09

## 2021-01-16 RX ADMIN — DEXAMETHASONE SODIUM PHOSPHATE 6 MG: 4 INJECTION, SOLUTION INTRAMUSCULAR; INTRAVENOUS at 11:23

## 2021-01-16 RX ADMIN — SODIUM CHLORIDE, PRESERVATIVE FREE 10 ML: 5 INJECTION INTRAVENOUS at 06:09

## 2021-01-16 RX ADMIN — ATORVASTATIN CALCIUM 40 MG: 40 TABLET, FILM COATED ORAL at 11:24

## 2021-01-16 RX ADMIN — LISINOPRIL 20 MG: 20 TABLET ORAL at 11:24

## 2021-01-16 RX ADMIN — ENOXAPARIN SODIUM 40 MG: 40 INJECTION SUBCUTANEOUS at 11:24

## 2021-01-16 RX ADMIN — ASPIRIN 81 MG: 81 TABLET, CHEWABLE ORAL at 11:24

## 2021-01-16 RX ADMIN — PANTOPRAZOLE SODIUM 40 MG: 40 TABLET, DELAYED RELEASE ORAL at 06:09

## 2021-01-16 RX ADMIN — Medication 10 ML: at 11:25

## 2021-01-16 RX ADMIN — METOPROLOL TARTRATE 25 MG: 25 TABLET, FILM COATED ORAL at 11:23

## 2021-01-16 ASSESSMENT — PAIN SCALES - GENERAL
PAINLEVEL_OUTOF10: 0
PAINLEVEL_OUTOF10: 0

## 2021-01-16 NOTE — DISCHARGE SUMMARY
options d/w pt. Pt declined blood thinner and opted to stay active and self-monitor for blood clots. Discussed with pt that he has elevated lfts and its significance at discharge and pt will arrange with his pcp for repeat labwork        Discharge Exam:  Vitals:    01/16/21 0600 01/16/21 0845 01/16/21 1500 01/16/21 1515   BP: 134/61 (!) 142/66 110/71    Pulse: 68 81     Resp: 18 17     Temp: 98 °F (36.7 °C) 98.4 °F (36.9 °C) 98 °F (36.7 °C)    TempSrc: Oral Oral Oral    SpO2: 93% 94% 97% 97%   Weight:  218 lb 12.8 oz (99.2 kg)     Height:           Skin: warm and dry, no rash or erythema  Pulmonary/Chest: clear to auscultation bilaterally- no wheezes, rales or rhonchi, normal air movement, no respiratory distress  Cardiovascular: rhythm reg at rate of 72  Abdomen: soft, non-tender, non-distended, normal bowel sounds, no masses or organomegaly  Extremities: no cyanosis, no clubbing and no edema  I/O last 3 completed shifts: In: 240 [P.O.:240]  Out: -   No intake/output data recorded. LABS:  Recent Labs     01/14/21  0333 01/15/21  0408 01/16/21  1504    140 139   K 4.4 3.9 4.4    107 106   CO2 24 24 22   BUN 20 24* 26*   CREATININE 1.2 1.2 1.1   GLUCOSE 153* 96 140*   CALCIUM 9.0 8.5* 9.0       Recent Labs     01/14/21  0333 01/15/21  0408 01/16/21  1504   WBC 2.7* 3.2* 3.7*   RBC 3.13* 3.01* 3.30*   HGB 9.4* 8.8* 9.8*   HCT 27.3* 26.8* 29.4*   MCV 87.2 89.0 89.1   MCH 30.0 29.2 29.7   MCHC 34.4 32.8 33.3   RDW 13.3 13.2 13.2   * 139 209   MPV 10.7 10.2 10.6       No results for input(s): POCGLU in the last 72 hours.     CBC with Differential:    Lab Results   Component Value Date    WBC 3.7 01/16/2021    RBC 3.30 01/16/2021    HGB 9.8 01/16/2021    HCT 29.4 01/16/2021     01/16/2021    MCV 89.1 01/16/2021    MCH 29.7 01/16/2021    MCHC 33.3 01/16/2021    RDW 13.2 01/16/2021    SEGSPCT 77 03/24/2011    LYMPHOPCT 13.1 01/16/2021    MONOPCT 3.0 01/16/2021    BASOPCT 0.0 01/16/2021 MONOSABS 0.11 01/16/2021    LYMPHSABS 0.48 01/16/2021    EOSABS 0.01 01/16/2021    BASOSABS 0.00 01/16/2021     CMP:    Lab Results   Component Value Date     01/16/2021    K 4.4 01/16/2021     01/16/2021    CO2 22 01/16/2021    BUN 26 01/16/2021    CREATININE 1.1 01/16/2021    GFRAA >60 01/16/2021    LABGLOM >60 01/16/2021    GLUCOSE 140 01/16/2021    GLUCOSE 113 03/28/2011    PROT 6.7 01/16/2021    LABALBU 3.4 01/16/2021    LABALBU 2.9 03/26/2011    CALCIUM 9.0 01/16/2021    BILITOT 0.3 01/16/2021    ALKPHOS 88 01/16/2021    AST 96 01/16/2021     01/16/2021     Phosphorus:    Lab Results   Component Value Date    PHOS 3.8 01/16/2021       Imaging:   CTA PULMONARY W CONTRAST   Final Result   No evidence of pulmonary embolism. Diffuse and bilateral areas of ground-glass appearance consistent with   pneumonia. COVID pneumonia suspected. Mild splenomegaly. 2.0 cm oval subpleural nodule posterosuperior segment right lower lobe. This   was not present on 11/08/2013.  3 to six-month follow-up recommended. Small hiatal hernia. Probable hepatic cysts. XR CHEST PORTABLE   Final Result   New right mid and lower lung infiltrates may reflect edema and/or   atelectasis. Differential includes new multifocal pneumonia, bacterial   versus viral. While nonspecific, this appearance can be seen with COVID-19 .    Multifocal bilateral rib osteoblastic metastases remain present without   significant change, in patient with history of prostate cancer          Patient Instructions:      Medication List      START taking these medications    dexamethasone 6 MG tablet  Commonly known as: DECADRON  Take 1 tablet by mouth daily (with breakfast) for 6 days        CONTINUE taking these medications    aspirin 81 MG tablet     atorvastatin 40 MG tablet  Commonly known as: LIPITOR  Take 1 tablet by mouth daily     CoQ10 50 MG Caps     Erleada 60 MG Tabs  Generic drug: Apalutamide     lisinopril 20 MG tablet  Commonly known as: PRINIVIL;ZESTRIL  Take 1 tablet by mouth daily     meclizine 25 MG tablet  Commonly known as: ANTIVERT     metoprolol tartrate 25 MG tablet  Commonly known as: LOPRESSOR     omega-3 acid ethyl esters 1 g capsule  Commonly known as: LOVAZA  Take 1 capsule by mouth 2 times daily     omeprazole 20 MG delayed release capsule  Commonly known as: PRILOSEC  Take 1 capsule by mouth daily     Prolia 60 MG/ML Sosy SC injection  Generic drug: denosumab  Inject 1 mL into the skin every 6 months     therapeutic multivitamin-minerals tablet     VITAMIN B 12 PO           Where to Get Your Medications      These medications were sent to [address], [address] [address] [address] [facility], [address] [place] [address]    Phone: [phone]   · dexamethasone 6 MG tablet           Total time for discharge is 37 min    Signed:  Electronically signed by Judy Mak DO on 1/16/2021 at 6:52 PM

## 2021-01-16 NOTE — PROGRESS NOTES
Erika Matos Hospitalist   Progress Note    Admitting Date and Time: 1/13/2021  2:31 PM  Admit Dx: JINNV-94 [U07.1]  Pneumonia due to COVID-19 virus [U07.1, J12.82]    Subjective:    Patient was admitted with COVID-19 [U07.1]  Pneumonia due to COVID-19 virus [U07.1, J12.82]. Patient denies fever, chills, cp, n/v.  Pt with some improvement in sob     aspirin  81 mg Oral Daily    atorvastatin  40 mg Oral Daily    lisinopril  20 mg Oral Daily    metoprolol tartrate  25 mg Oral BID    pantoprazole  40 mg Oral QAM AC    sodium chloride flush  10 mL Intravenous 2 times per day    enoxaparin  40 mg Subcutaneous Daily    dexamethasone  6 mg Intravenous Q24H    remdesivir IVPB  100 mg Intravenous Q24H         sodium chloride flush, 10 mL, PRN      potassium chloride, 40 mEq, PRN    Or      potassium alternative oral replacement, 40 mEq, PRN    Or      potassium chloride, 10 mEq, PRN      polyethylene glycol, 17 g, Daily PRN      acetaminophen, 650 mg, Q6H PRN    Or      acetaminophen, 650 mg, Q6H PRN      sodium chloride, 30 mL, PRN         Objective:    BP (!) 148/65   Pulse 62   Temp 97.7 °F (36.5 °C) (Oral)   Resp 15   Ht 5' 10\" (1.778 m)   Wt 218 lb 12.8 oz (99.2 kg)   SpO2 93%   BMI 31.39 kg/m²   Skin: warm and dry, no rash or erythema  Pulmonary/Chest: clear to auscultation bilaterally- no wheezes, rales or rhonchi, normal air movement, no respiratory distress  Cardiovascular: rhythm reg at rate of 64  Abdomen: soft, non-tender, non-distended, normal bowel sounds, no masses or organomegaly  Extremities: no cyanosis, no clubbing and no edema      Recent Labs     01/13/21  1435 01/14/21  0333 01/15/21  0408    136 140   K 4.2 4.4 3.9    103 107   CO2 26 24 24   BUN 22 20 24*   CREATININE 1.4* 1.2 1.2   GLUCOSE 113* 153* 96   CALCIUM 9.3 9.0 8.5*       Recent Labs     01/13/21  1435 01/14/21  0333 01/15/21  0408   WBC 5.4 2.7* 3.2*   RBC 3.67* 3.13* 3.01*   HGB 10.6* 9.4* 8.8*   HCT 33.2* 27.3* 26.8*   MCV 90.5 87.2 89.0   MCH 28.9 30.0 29.2   MCHC 31.9* 34.4 32.8   RDW 13.3 13.3 13.2    123* 139   MPV 10.7 10.7 10.2       CBC with Differential:    Lab Results   Component Value Date    WBC 3.2 01/15/2021    RBC 3.01 01/15/2021    HGB 8.8 01/15/2021    HCT 26.8 01/15/2021     01/15/2021    MCV 89.0 01/15/2021    MCH 29.2 01/15/2021    MCHC 32.8 01/15/2021    RDW 13.2 01/15/2021    SEGSPCT 77 03/24/2011    LYMPHOPCT 18.8 01/15/2021    MONOPCT 5.6 01/15/2021    BASOPCT 0.3 01/15/2021    MONOSABS 0.18 01/15/2021    LYMPHSABS 0.61 01/15/2021    EOSABS 0.01 01/15/2021    BASOSABS 0.01 01/15/2021     CMP:    Lab Results   Component Value Date     01/15/2021    K 3.9 01/15/2021    K 4.4 01/14/2021     01/15/2021    CO2 24 01/15/2021    BUN 24 01/15/2021    CREATININE 1.2 01/15/2021    GFRAA >60 01/15/2021    LABGLOM 58 01/15/2021    GLUCOSE 96 01/15/2021    GLUCOSE 113 03/28/2011    PROT 5.9 01/15/2021    LABALBU 3.0 01/15/2021    LABALBU 2.9 03/26/2011    CALCIUM 8.5 01/15/2021    BILITOT 0.3 01/15/2021    ALKPHOS 81 01/15/2021    AST 80 01/15/2021    ALT 82 01/15/2021        Radiology:   CTA PULMONARY W CONTRAST   Final Result   No evidence of pulmonary embolism. Diffuse and bilateral areas of ground-glass appearance consistent with   pneumonia. COVID pneumonia suspected. Mild splenomegaly. 2.0 cm oval subpleural nodule posterosuperior segment right lower lobe. This   was not present on 11/08/2013.  3 to six-month follow-up recommended. Small hiatal hernia. Probable hepatic cysts. XR CHEST PORTABLE   Final Result   New right mid and lower lung infiltrates may reflect edema and/or   atelectasis. Differential includes new multifocal pneumonia, bacterial   versus viral. While nonspecific, this appearance can be seen with COVID-19 .    Multifocal bilateral rib osteoblastic metastases remain present without   significant change, in patient with history of prostate cancer          Assessment:    Active Problems:    COVID-19    Pneumonia due to COVID-19 virus  Resolved Problems:    * No resolved hospital problems. *      Plan:  1. Sepsis(fever, tachycardia, infection)POA supportive care and tx underlying infection improving  2. Acute respiratory failure with hypoxia wean o2 as able  3. Pneumonia due to covid 19 continue steroids and remdesivir  4. htn continue med  5. gerd continue med  6.  Elevated lfts monitor          Electronically signed by Beata Gray DO on 1/15/2021 at 11:35 PM

## 2021-01-16 NOTE — PLAN OF CARE
Problem: Airway Clearance - Ineffective  Goal: Achieve or maintain patent airway  Outcome: Ongoing     Problem: Gas Exchange - Impaired  Goal: Absence of hypoxia  Outcome: Ongoing     Problem: Fatigue  Goal: Verbalize increase energy and improved vitality  Outcome: Ongoing     Problem: Falls - Risk of:  Goal: Will remain free from falls  Description: Will remain free from falls  Outcome: Met This Shift  Goal: Absence of physical injury  Description: Absence of physical injury  Outcome: Met This Shift

## 2021-01-16 NOTE — PROGRESS NOTES
Pulse ox was __97___% on room air at rest.  Ambulated patient on room air. Oxygen saturation was __97____% on room air while ambulating. Recovery pulse ox was __97____% on __0_____ liters of oxygen while ambulating.     Patient did not require oxygen to ambulate

## 2021-01-18 ENCOUNTER — CARE COORDINATION (OUTPATIENT)
Dept: CASE MANAGEMENT | Age: 86
End: 2021-01-18

## 2021-01-18 LAB
BLOOD CULTURE, ROUTINE: NORMAL
CULTURE, BLOOD 2: NORMAL

## 2021-01-18 NOTE — CARE COORDINATION
Covid-19 Outreach call, no answer.   Left VM with contact information and request  for return call at [facility], [facility] Coordination Transition

## 2021-01-19 ENCOUNTER — CARE COORDINATION (OUTPATIENT)
Dept: CASE MANAGEMENT | Age: 86
End: 2021-01-19

## 2021-01-21 ENCOUNTER — HOSPITAL ENCOUNTER (OUTPATIENT)
Age: 86
Discharge: HOME | End: 2021-01-21
Payer: MEDICARE

## 2021-01-21 ENCOUNTER — TELEPHONE (OUTPATIENT)
Dept: PRIMARY CARE CLINIC | Age: 86
End: 2021-01-21

## 2021-01-21 DIAGNOSIS — E78.2 MIXED HYPERLIPIDEMIA: ICD-10-CM

## 2021-01-21 DIAGNOSIS — E53.8 VITAMIN B12 DEFICIENCY: ICD-10-CM

## 2021-01-21 DIAGNOSIS — E03.2 HYPOTHYROIDISM DUE TO MEDICATION: ICD-10-CM

## 2021-01-21 DIAGNOSIS — E88.9 METABOLIC DISORDER: ICD-10-CM

## 2021-01-21 DIAGNOSIS — I10 ESSENTIAL HYPERTENSION: ICD-10-CM

## 2021-01-21 DIAGNOSIS — M81.0 AGE-RELATED OSTEOPOROSIS WITHOUT CURRENT PATHOLOGICAL FRACTURE: ICD-10-CM

## 2021-01-21 DIAGNOSIS — E55.9 VITAMIN D DEFICIENCY: ICD-10-CM

## 2021-01-21 DIAGNOSIS — D61.818 PANCYTOPENIA (HCC): ICD-10-CM

## 2021-01-21 DIAGNOSIS — N28.9 RENAL INSUFFICIENCY: ICD-10-CM

## 2021-01-21 LAB
ALBUMIN SERPL-MCNC: 4 G/DL (ref 3.5–5.2)
ALP BLD-CCNC: 65 U/L (ref 40–129)
ALT SERPL-CCNC: 69 U/L (ref 0–40)
ANION GAP SERPL CALCULATED.3IONS-SCNC: 13 MMOL/L (ref 7–16)
AST SERPL-CCNC: 25 U/L (ref 0–39)
BASOPHILS ABSOLUTE: 0 E9/L (ref 0–0.2)
BASOPHILS RELATIVE PERCENT: 0 % (ref 0–2)
BILIRUB SERPL-MCNC: 0.7 MG/DL (ref 0–1.2)
BILIRUBIN URINE: NEGATIVE
BLOOD, URINE: NEGATIVE
BUN BLDV-MCNC: 29 MG/DL (ref 8–23)
CALCIUM SERPL-MCNC: 9.9 MG/DL (ref 8.6–10.2)
CHLORIDE BLD-SCNC: 102 MMOL/L (ref 98–107)
CHOLESTEROL, TOTAL: 112 MG/DL (ref 0–199)
CLARITY: CLEAR
CO2: 26 MMOL/L (ref 22–29)
COLOR: YELLOW
CREAT SERPL-MCNC: 1.3 MG/DL (ref 0.7–1.2)
CREATININE URINE: 220 MG/DL (ref 40–278)
EOSINOPHILS ABSOLUTE: 0.07 E9/L (ref 0.05–0.5)
EOSINOPHILS RELATIVE PERCENT: 1 % (ref 0–6)
FOLATE: >20 NG/ML (ref 4.8–24.2)
GFR AFRICAN AMERICAN: >60
GFR NON-AFRICAN AMERICAN: 53 ML/MIN/1.73
GLUCOSE BLD-MCNC: 90 MG/DL (ref 74–99)
GLUCOSE URINE: NEGATIVE MG/DL
HBA1C MFR BLD: 5.3 % (ref 4–5.6)
HCT VFR BLD CALC: 35.9 % (ref 37–54)
HDLC SERPL-MCNC: 39 MG/DL
HEMOGLOBIN: 12.2 G/DL (ref 12.5–16.5)
KETONES, URINE: NEGATIVE MG/DL
LDL CHOLESTEROL CALCULATED: 22 MG/DL (ref 0–99)
LEUKOCYTE ESTERASE, URINE: NEGATIVE
LYMPHOCYTES ABSOLUTE: 1.42 E9/L (ref 1.5–4)
LYMPHOCYTES RELATIVE PERCENT: 20 % (ref 20–42)
MAGNESIUM: 2.2 MG/DL (ref 1.6–2.6)
MCH RBC QN AUTO: 30 PG (ref 26–35)
MCHC RBC AUTO-ENTMCNC: 34 % (ref 32–34.5)
MCV RBC AUTO: 88.4 FL (ref 80–99.9)
METAMYELOCYTES RELATIVE PERCENT: 1 % (ref 0–1)
MICROALBUMIN UR-MCNC: 59.6 MG/L
MICROALBUMIN/CREAT UR-RTO: 27.1 (ref 0–30)
MONOCYTES ABSOLUTE: 0.57 E9/L (ref 0.1–0.95)
MONOCYTES RELATIVE PERCENT: 8 % (ref 2–12)
NEUTROPHILS ABSOLUTE: 5.04 E9/L (ref 1.8–7.3)
NEUTROPHILS RELATIVE PERCENT: 70 % (ref 43–80)
NITRITE, URINE: NEGATIVE
PDW BLD-RTO: 13.6 FL (ref 11.5–15)
PH UA: 6 (ref 5–9)
PHOSPHORUS: 4 MG/DL (ref 2.5–4.5)
PLATELET # BLD: 289 E9/L (ref 130–450)
PMV BLD AUTO: 9.3 FL (ref 7–12)
POTASSIUM SERPL-SCNC: 4.4 MMOL/L (ref 3.5–5)
PROTEIN UA: NEGATIVE MG/DL
RBC # BLD: 4.06 E12/L (ref 3.8–5.8)
SODIUM BLD-SCNC: 141 MMOL/L (ref 132–146)
SPECIFIC GRAVITY UA: 1.02 (ref 1–1.03)
T4 FREE: 1.39 NG/DL (ref 0.93–1.7)
TOTAL PROTEIN: 7.1 G/DL (ref 6.4–8.3)
TRIGL SERPL-MCNC: 257 MG/DL (ref 0–149)
TSH SERPL DL<=0.05 MIU/L-ACNC: 4.76 UIU/ML (ref 0.27–4.2)
UROBILINOGEN, URINE: 0.2 E.U./DL
VITAMIN B-12: 1539 PG/ML (ref 211–946)
VITAMIN D 25-HYDROXY: 48 NG/ML (ref 30–100)
VLDLC SERPL CALC-MCNC: 51 MG/DL
WBC # BLD: 7.1 E9/L (ref 4.5–11.5)

## 2021-01-21 PROCEDURE — 36415 COLL VENOUS BLD VENIPUNCTURE: CPT

## 2021-01-21 PROCEDURE — 82570 ASSAY OF URINE CREATININE: CPT

## 2021-01-21 PROCEDURE — 84439 ASSAY OF FREE THYROXINE: CPT

## 2021-01-21 PROCEDURE — 82746 ASSAY OF FOLIC ACID SERUM: CPT

## 2021-01-21 PROCEDURE — 83036 HEMOGLOBIN GLYCOSYLATED A1C: CPT

## 2021-01-21 PROCEDURE — 85025 COMPLETE CBC W/AUTO DIFF WBC: CPT

## 2021-01-21 PROCEDURE — 80061 LIPID PANEL: CPT

## 2021-01-21 PROCEDURE — 80053 COMPREHEN METABOLIC PANEL: CPT

## 2021-01-21 PROCEDURE — 84100 ASSAY OF PHOSPHORUS: CPT

## 2021-01-21 PROCEDURE — 82306 VITAMIN D 25 HYDROXY: CPT

## 2021-01-21 PROCEDURE — 82044 UR ALBUMIN SEMIQUANTITATIVE: CPT

## 2021-01-21 PROCEDURE — 84443 ASSAY THYROID STIM HORMONE: CPT

## 2021-01-21 PROCEDURE — 83735 ASSAY OF MAGNESIUM: CPT

## 2021-01-21 PROCEDURE — 81003 URINALYSIS AUTO W/O SCOPE: CPT

## 2021-01-21 PROCEDURE — 82607 VITAMIN B-12: CPT

## 2021-01-21 NOTE — TELEPHONE ENCOUNTER
How much longer is he supposed to take it? If not tolerating, can discontinue. The alternative would be trying to cut it in half to see if he tolerates that better, but still finishing as previously scheduled/intended.

## 2021-01-21 NOTE — PROGRESS NOTES
Physician Progress Note      PATIENT:               Laron Perez  CSN #:                  [id number]  :                       1935  ADMIT DATE:       2021 2:31 PM  [address] [place] DATE:        2021 7:30 PM  RESPONDING  PROVIDER #:        Trent SMITH DO          QUERY TEXT:    Dear Admitting Physician,    Pt admitted with COVID-19 pneumonia. . Pt noted to have fever, tachycardia,   decreased wbc, respiratory failure. If possible, please document in the   progress notes and discharge summary if you are evaluating and /or treating   any of the following: The medical record reflects the following:  Risk Factors: COVID-19 viral pneumonia  Clinical Indicators: T 102.4, , 88% on room air, WBC 2.7, respiratory   failure  Treatment: Remdesivir, Decadron    Thank you,  Birgit Carmichael RN, CCDS  Clinical Documentation Improvement Specialist  [email or web address]  [phone]  Options provided:  -- Viral Sepsis, present on admission  -- Viral Sepsis, not present on admission  -- No Viral Sepsis, COVID-19 pneumonia only  -- Sepsis was ruled out  -- Other - I will add my own diagnosis  -- Disagree - Not applicable / Not valid  -- Disagree - Clinically unable to determine / Unknown  -- Refer to Clinical Documentation Reviewer    PROVIDER RESPONSE TEXT:    This patient has viral sepsis which was present on admission.     Query created by: Ivan Victoria on 1/15/2021 11:04 AM      Electronically signed by:  April Fountain DO 2021 4:46 AM

## 2021-01-27 ENCOUNTER — VIRTUAL VISIT (OUTPATIENT)
Dept: PRIMARY CARE CLINIC | Age: 86
End: 2021-01-27
Payer: MEDICARE

## 2021-01-27 DIAGNOSIS — E53.8 VITAMIN B12 DEFICIENCY: ICD-10-CM

## 2021-01-27 DIAGNOSIS — E55.9 VITAMIN D DEFICIENCY: ICD-10-CM

## 2021-01-27 DIAGNOSIS — E88.9 METABOLIC DISORDER: ICD-10-CM

## 2021-01-27 DIAGNOSIS — Z00.00 HEALTH MAINTENANCE EXAMINATION: ICD-10-CM

## 2021-01-27 DIAGNOSIS — I25.10 CORONARY ARTERY DISEASE INVOLVING NATIVE HEART WITHOUT ANGINA PECTORIS, UNSPECIFIED VESSEL OR LESION TYPE: ICD-10-CM

## 2021-01-27 DIAGNOSIS — E78.2 MIXED HYPERLIPIDEMIA: ICD-10-CM

## 2021-01-27 DIAGNOSIS — I73.9 PVD (PERIPHERAL VASCULAR DISEASE) WITH CLAUDICATION (HCC): ICD-10-CM

## 2021-01-27 DIAGNOSIS — J01.90 ACUTE BACTERIAL SINUSITIS: ICD-10-CM

## 2021-01-27 DIAGNOSIS — M81.0 AGE-RELATED OSTEOPOROSIS WITHOUT CURRENT PATHOLOGICAL FRACTURE: ICD-10-CM

## 2021-01-27 DIAGNOSIS — N28.9 RENAL INSUFFICIENCY: ICD-10-CM

## 2021-01-27 DIAGNOSIS — U07.1 COVID-19: Primary | ICD-10-CM

## 2021-01-27 DIAGNOSIS — E03.2 HYPOTHYROIDISM DUE TO MEDICATION: ICD-10-CM

## 2021-01-27 DIAGNOSIS — B96.89 ACUTE BACTERIAL SINUSITIS: ICD-10-CM

## 2021-01-27 DIAGNOSIS — H40.9 GLAUCOMA OF LEFT EYE, UNSPECIFIED GLAUCOMA TYPE: ICD-10-CM

## 2021-01-27 DIAGNOSIS — D64.9 ANEMIA, UNSPECIFIED TYPE: ICD-10-CM

## 2021-01-27 DIAGNOSIS — C61 PROSTATE CA (HCC): ICD-10-CM

## 2021-01-27 DIAGNOSIS — R79.0 LOW MAGNESIUM LEVEL: ICD-10-CM

## 2021-01-27 DIAGNOSIS — C61 PROSTATE CANCER METASTATIC TO BONE (HCC): ICD-10-CM

## 2021-01-27 DIAGNOSIS — I10 ESSENTIAL HYPERTENSION: ICD-10-CM

## 2021-01-27 DIAGNOSIS — C79.51 PROSTATE CANCER METASTATIC TO BONE (HCC): ICD-10-CM

## 2021-01-27 PROCEDURE — G8484 FLU IMMUNIZE NO ADMIN: HCPCS | Performed by: FAMILY MEDICINE

## 2021-01-27 PROCEDURE — 99215 OFFICE O/P EST HI 40 MIN: CPT | Performed by: FAMILY MEDICINE

## 2021-01-27 PROCEDURE — 1111F DSCHRG MED/CURRENT MED MERGE: CPT | Performed by: FAMILY MEDICINE

## 2021-01-27 PROCEDURE — 1123F ACP DISCUSS/DSCN MKR DOCD: CPT | Performed by: FAMILY MEDICINE

## 2021-01-27 PROCEDURE — 4040F PNEUMOC VAC/ADMIN/RCVD: CPT | Performed by: FAMILY MEDICINE

## 2021-01-27 PROCEDURE — 1036F TOBACCO NON-USER: CPT | Performed by: FAMILY MEDICINE

## 2021-01-27 PROCEDURE — G8427 DOCREV CUR MEDS BY ELIG CLIN: HCPCS | Performed by: FAMILY MEDICINE

## 2021-01-27 PROCEDURE — G8417 CALC BMI ABV UP PARAM F/U: HCPCS | Performed by: FAMILY MEDICINE

## 2021-01-27 RX ORDER — DOXYCYCLINE HYCLATE 100 MG/1
100 CAPSULE ORAL 2 TIMES DAILY
Qty: 20 CAPSULE | Refills: 0 | Status: SHIPPED | OUTPATIENT
Start: 2021-01-27 | End: 2021-02-06

## 2021-01-27 ASSESSMENT — PATIENT HEALTH QUESTIONNAIRE - PHQ9
1. LITTLE INTEREST OR PLEASURE IN DOING THINGS: 0
SUM OF ALL RESPONSES TO PHQ9 QUESTIONS 1 & 2: 0
SUM OF ALL RESPONSES TO PHQ QUESTIONS 1-9: 0
SUM OF ALL RESPONSES TO PHQ QUESTIONS 1-9: 0

## 2021-01-27 NOTE — PROGRESS NOTES
TeleMedicine Patient Consent    This visit was performed as a virtual video visit using a synchronous, two-way, audio-video telehealth technology platform. Patient identification was verified at the start of the visit, including the patient's telephone number and physical location. I discussed with the patient the nature of our telehealth visits, that:     1. Due to the nature of an audio- video modality, the only components of a physical exam that could be done are the elements supported by direct observation. 2. I would evaluate the patient and recommend diagnostics and treatments based on my assessment. 3. If it was felt that the patient should be evaluated in clinic or an emergency room setting, then they would be directed there. 4. Our sessions are not being recorded and that personal health information is protected. 5. Our team would provide follow up care in person if/when the patient needs it. Patient does agree to proceed with telemedicine consultation. Patient's location: home address in [facility]    Physician  location other address in [place]     Other people involved in call:   Spouse    This visit was completed virtually using Doxy. me    2021    TELEHEALTH EVALUATION -- Audio/Visual (During [address] emergency)    Chief Complaint   Patient presents with    Discuss Labs           HPI:    Deanna Andrade (:  1935) has requested an audio/video evaluation for the following concern(s):        Patient presents today for follow-up. Still feels weak after Covid but slowly recovering. Denies shortness of breath leg pain swelling chest pain dizziness syncope or near syncope. Falls precautions reviewed. Last chest x-ray showed new mid right and lower lung infiltrates, chronic metastasis, CT showed no PE but diffuse and bilateral areas of groundglass appearance mild splenomegaly.     He feels like he has a classic sinus infection with sinus pressure thick rhinorrhea postnasal drainage. Normally his nose runs but is very stopped up. No sense of smell and taste. Tolerating the EMILY MARCO better. There is a 25% risk of increased TSH with a low risk of hypothyroidism, TSH is elevated free T4 normal, monitor. Creatinine is elevated somewhat as well, they blame hydration. Much improved. Blood work reviewed,Creatinine elevated but stable at 1.3, magnesium 2.2, triglycerides elevated 257 LDL low 22 HDL 38, hemoglobin A1c only 5.3 ALT come down from 1 22-69, TSH remains elevated at 4.760 but free T4 1.39, vitamin D 48, white count normal 7.1 hemoglobin improved 12.2 platelet count 162, N14 elevated 6736 counseled folic acid greater than 20 urinalysis negative, microalbumin creatinine 27.1        ROS:  Const: Denies chills, fever, malaise and sweats. Eyes: Denies discharge, pain, redness and visual disturbance. ENMT: Denies earaches, other ear symptoms. Denies nasal or sinus symptoms other than stated  above. Denies mouth and tongue lesions and sore throat. CV: Denies chest discomfort, pain; diaphoresis, dizziness, edema, lightheadedness, orthopnea,  palpitations, syncope and near syncopal episode or any exertional symptoms  Resp: Denies cough, hemoptysis, pleuritic pain, SOB, sputum production and wheezing. GI: Denies abdominal pain, change in bowel habits, hematochezia, melena, nausea and vomiting. : Denies urinary symptoms including dysuria , urgency, frequency or hematuria. Musculo: Denies musculoskeletal symptoms, chronic pain stable. Skin: Denies bruising and rash.   Neuro: Denies headache, numbness, stiff neck, tingling and focal weakness slurred speech or facial  droop  Hema/Lymph: Denies bleeding/bruising tendency and enlarged lymph nodes         Current Outpatient Medications:     doxycycline hyclate (VIBRAMYCIN) 100 MG capsule, Take 1 capsule by mouth 2 times daily for 10 days, Disp: 20 capsule, Rfl: 0    omega-3 acid ethyl esters (LOVAZA) 1 g capsule, Take 1 capsule by mouth 2 times daily, Disp: 180 capsule, Rfl: 3    denosumab (PROLIA) 60 MG/ML SOSY SC injection, Inject 1 mL into the skin every 6 months, Disp: 1 Syringe, Rfl: 1    lisinopril (PRINIVIL;ZESTRIL) 20 MG tablet, Take 1 tablet by mouth daily, Disp: 90 tablet, Rfl: 3    atorvastatin (LIPITOR) 40 MG tablet, Take 1 tablet by mouth daily, Disp: 90 tablet, Rfl: 3    omeprazole (PRILOSEC) 20 MG delayed release capsule, Take 1 capsule by mouth daily, Disp: 90 capsule, Rfl: 3    ERLEADA 60 MG TABS, daily , Disp: , Rfl:     meclizine (ANTIVERT) 25 MG tablet, 1/2 - 1 by mouth every 6 hours as needed, Disp: , Rfl:     Multiple Vitamins-Minerals (THERAPEUTIC MULTIVITAMIN-MINERALS) tablet, Take 1 tablet by mouth daily LD 6-11-20, Disp: , Rfl:     metoprolol tartrate (LOPRESSOR) 25 MG tablet, Take 25 mg by mouth 2 times daily, Disp: , Rfl:     Coenzyme Q10 (COQ10) 50 MG CAPS, Take 1 capsule by mouth daily LD 6-11-20, Disp: , Rfl:     aspirin 81 MG tablet, Take 81 mg by mouth daily , Disp: , Rfl:     Cyanocobalamin (VITAMIN B 12 PO), Take  by mouth daily.  sublingual , Disp: , Rfl:   Allergies   Allergen Reactions    Iodine Hives    Shellfish-Derived Products        Past Medical History:   Diagnosis Date    CA prostate, adenoca ([facility] [address] 4/25/2019    CAD (coronary artery disease)     Dr. Raoul Jones [facility])     prostate- PO chemotherapy     Chronic bilateral low back pain without sciatica 3/6/2020    Cobalamin deficiency     Hyperlipidemia     Hypertension     Osteoarthritis     Osteochondropathy     Osteopenia     Pain in lower limb     Peripheral venous insufficiency     PONV (postoperative nausea and vomiting)     Prolonged emergence from general anesthesia     PVD (peripheral vascular disease) with claudication ([address] 4/25/2019     Past Surgical History:   Procedure Laterality Date    ACETABULUM FRACTURE SURGERY  3/24/11    ANESTHESIA NERVE BLOCK Bilateral 6/16/2020 BILATERAL L3 L4 L5 MEDIAL NERVE BRANCH BLOCK performed by Corrine Mayorga DO at [facility] COLONOSCOPY  2016    CORONARY ARTERY BYPASS GRAFT  Dec. 1996    [facility]/ Dr. Veto Perez, DIAGNOSTIC  2016    EYE SURGERY Bilateral 2018    cataract, glaucoma -left eye    FEMUR FRACTURE SURGERY  1981    left   [address]    JOINT REPLACEMENT      bilat knee , righ thip 2011     LYMPH NODE DISSECTION  10/2007    PAIN MANAGEMENT PROCEDURE N/A 2020    CAUDAL EPIDURAL STEROID INJECTION performed by Corrine Mayorga DO at [facility]  20    PAIN MANAGEMENT PROCEDURE Right 2020    RIGHT L3 4 5 MEDIAL BRANCH RADIOFREQUENCY ABLATION performed by Corrine Mayorga DO at [facility] [address] Left 9/10/2020    LEFT L3 4 5 MEDIAL BRANCH RADIOFREQUENCY ABLATION     +++IODINE ALLERGY+++   CPT: 79252 [id number] performed by Corrine Mayorga DO at / [facility]  10/31/07    AMIRAH STREETER M.D./ DA LETICIA LAPAROSCOPIC PROSTATECTOMY    TESTICLE REMOVAL Bilateral 2019    hx prostate ca-    TONSILLECTOMY      TOTAL HIP ARTHROPLASTY  11    right    TOTAL KNEE ARTHROPLASTY  2010    bilateral     Family History   Problem Relation Age of Onset    Stroke Mother     Cancer Mother         pancreatic    Cancer Father         prostate    Stroke Father      Social History     Tobacco Use    Smoking status: Former Smoker     Packs/day: 1.00     Years: 40.00     Pack years: 40.00     Types: Cigarettes     Quit date: 10/1/1994     Years since quittin.3    Smokeless tobacco: Former User     Types: Chew   Substance Use Topics    Alcohol use:  Yes     Alcohol/week: 1.0 standard drinks     Types: 1 Cans of beer per week     Comment: rare    Drug use: No     Social History     Social History Narrative    Problem List: History of polyp of colon, Disorder of bone density and structure, unspecified, Osteochondropathy, Carcinoma in situ of prostate, Aneurysm of thoracic aorta, Anemia, Coronary    arteriosclerosis, Cobalamin deficiency, Multiple closed fractures of pelvis with disruption of pelvic [place],    Hyperlipidemia, Essential hypertension    Health Maintenance:    Bone Density Test Screening - (3/5/2019)    Bone Density Scan - (12/18/2015)    Influenza Vaccination - (10/7/2016)    Colonoscopy - (4/3/2012)    Couseled on Home Safety - (11/23/2015)    Colonoscopy Screening - (4/3/2012)    US Liver - 8/1/08    Medical Problems:    Coronary Artery Disease (CAD), Hypertension, Hyperlipidemia, Prostate Cancer    Surgical Hx:    Prostatectomy, Coronary Artery Bypass Graft (CABG)            FH: Father:    . (Hx)    Mother:    . (Hx)        SH: Marital:  - retired . Personal Habits: Cigarette Use: former cigarette smoker, Nonsmoker. Alcohol: Occasionally    consumes alcohol. PHYSICAL EXAMINATION:  [ INSTRUCTIONS:  \"[x]\" Indicates a positive item  \"[]\" Indicates a negative item  -- DELETE ALL ITEMS NOT EXAMINED]  Vital Signs: (As obtained by patient/caregiver or practitioner observation)    There were no vitals filed for this visit. Blood pressure-  Heart rate-    Respiratory rate-    Temperature-  Pulse oximetry-     Constitutional: [x] Appears well-developed and well-nourished [x] No apparent distress      [] Abnormal-   Mental status  [x] Alert and awake  [x] Oriented to person/place/time [x]Able to follow commands      Eyes:  EOM    [x]  Normal  [] Abnormal-  Sclera  [x]  Normal  [] Abnormal -         Discharge [x]  None visible  [] Abnormal -    HENT:   [x] Normocephalic, atraumatic.   [] Abnormal   [x] Mouth/Throat: Mucous membranes are moist.     External Ears [x] Normal  [] Abnormal-     Neck: [x] No visualized mass     Pulmonary/Chest: [x] Respiratory effort normal.  [x] No visualized signs of difficulty breathing or respiratory distress        [] Abnormal- Musculoskeletal:   [x] Normal gait with no signs of ataxia         [x] Normal range of motion of neck        [] Abnormal-       Neurological:        [x] No Facial Asymmetry (Cranial nerve 7 motor function) (limited exam to video visit)          [x] No gaze palsy        [] Abnormal-         Skin:        [x] No significant exanthematous lesions or discoloration noted on facial skin         [] Abnormal-            Psychiatric:       [x] Normal Affect [x] No Hallucinations        [] Abnormal-     Other pertinent observable physical exam findings-     ASSESSMENT/PLAN:   Diagnosis Orders   1. COVID-19  Ambulatory referral to Pulmonology    XR CHEST (2 VW)   2. Acute bacterial sinusitis  doxycycline hyclate (VIBRAMYCIN) 100 MG capsule   3. Mixed hyperlipidemia  Comprehensive Metabolic Panel    CK    Lipid Panel   4. Age-related osteoporosis without current pathological fracture     5. Coronary artery disease involving native heart without angina pectoris, unspecified vessel or lesion type     6. Prostate CA ([facility] [address]     7. Vitamin D deficiency  Vitamin D 25 Hydroxy   8. Vitamin B12 deficiency  Vitamin B12 & Folate   9. Essential hypertension     10. Renal insufficiency  Urinalysis    Microalbumin / Creatinine Urine Ratio   11. Hypothyroidism due to medication  TSH without Reflex    T4, Free   12. Anemia, unspecified type  CBC Auto Differential   13. Glaucoma of left eye, unspecified glaucoma type     14. PVD (peripheral vascular disease) with claudication ([address]     15. Low magnesium level  Magnesium   16. Metabolic disorder     17. Prostate cancer metastatic to bone ([address]     18. Health maintenance examination         No problem-specific Assessment & Plan notes found for this encounter. COVID-19  Counseled. Risk of this virus reviewed. Risks of post hospital phase especially first 30 days reviewed. Low threshold for being seen again in person, office if mild, ER if any serious symptoms. Refer back to Dr. Sidney Medina.   Check x-ray. Other than symptoms of sinusitis which could be secondary bacterial, only other symptom is residual but improving weakness. Disc disease, degenerative, lumbar or lumbosacral  Counseled extensively. Differential reviewed, including serious etiologies. Was Following with [facility], had to change because of insurance, now seeing Dr. Nasra Pedroza pain management, saw Dr. Alfonso Bernstein. Previously saw [place] [facility]. Counseled on injections, radiofrequency ablation etc.  Failed physical therapy. . Had recent radiofrequency ablation and doing well. Bilateral carpal tunnel syndrome  Had emg/ncs Nov 2019 [facility]. Referred to [facility] but did not go because no symptoms    Health maintenance examination  Health maintenance issues discussed at length 9/20, encouraged yearly. Pancytopenia ([facility] [address]  Counseled extensively. Differential reviewed, including serious etiologies. Possibly from medication. .  Declines hematology. Monitor. Hemoglobin actually improved, white count and platelets normalized. Proper hydration reviewed. Asymptomatic. Low magnesium level  Counseled. Goals reviewed. Now on magnesium oxide 400 mg daily. Monitor. Vitamin D deficiency  Continue current therapy. Monitor. Vitamin B12 deficiency  Appropriate supplementation reviewed.    Vitamin Z08 and folic acid elevated last time, currently just on multivitamin, Monitor.  Risk of deficiency reviewed including anemia and neuropathy.  No longer on injection. Hyperglycemia  Counseled as below. Monitor. Essential hypertension  Counseled. The risks of hypertension and hypotension reviewed. Watch closely ambulatory. Hyper and hypotensive precautions and parameters reviewed and previously written as well as parameters on pulse, call if out of range, ER dangers numbers. Lifestyle modification reviewed. Tolerating therapy.           Tubular adenoma   Last colonoscopy Dr. Óscar Mejia, 2/15 complaining repeat 3 but he chose to go on Lipitor instead, tolerating, declines change in dose, goals reviewed, risk benefits ADRs reviewed. Counseling CoQ10 and vitamin D. .  We discussed appropriate dosing given his risk factors       Metabolic disorder  Counseled. Blood sugar had been borderline, amidst a lot of sweets, globin A1c has been excellent not interested in insulin sensitizer. Lifestyle modification reviewed. Micro-macrovascular complications monitor.       Coronary artery disease involving native heart without angina pectoris  Continue per cardiology. Sees Dr. Gunnar Goncalves in August, had stress test 3/15. Asymptomatic now. He did have ectopy on exam but refuses EKG or other evaluation/treatment. There is a risk that the Erleada could cause cardiac arrhythmia     Age-related osteoporosis without current pathological fracture  Counseled extensively. He tolerated Fosamax but discontinued after he took from 7/09 through 7/16. Morbidity/mortality related to fracture reviewed. 11/13 bone density showed improvement, 12/15 -1.3 stable, 3/19 showed L2 -2.6, hip -1.0. Discussed the option of restarting alendronate with pros and cons reviewed, he has taken it twice. Now on prolia, had June 2020 q 6mos. Needs bw within 2wks. He will go to infusion center for this now. Gastroesophageal reflux disease without esophagitis  Asymptomatic as long as takes medication, had EGD in the past.  Risk of meds reviewed including electrolyte imbalance, renal etc.  Wants to continue. Glaucoma of left eye  Recent. Follows with eye care. Pressures came down with drops. 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 Sinusitis  Counseled. Differential reviewed. Antibiotic as per EMR, standard precautions reviewed including C. Difficile. R/B probiotics reviewed. Mucinex as directed with precautions. Potential interactions reviewed. Proper hydration reviewed. Coolmist vaporizer as directed.   Mono precautions reviewed. Counseled on nasal saline. Counseled on nasal steroid. Doxycycline with precautions including C. difficile      Counseled extensively and differential diagnoses of above were reviewed, including serious etiologies. Side effects and interactions of medications were reviewed. Plan as above:  Counseled extensively. As long as symptoms steadily improve/resolve Follow-up video 1 week sooner as needed. Labs ordered for 3 months. Refer back to Dr. Zahira Gutierrez. Check chest x-ray. As long as symptoms steadily improve/resolve and medical conditions are following the expected course, FU as below, sooner PRN      Return in about 1 week (around 2/3/2021), or if symptoms worsen or fail to improve. Time spent: Greater than Not billed by time      Cale Hines is a 82 y.o. male being evaluated by a Virtual Visit (video visit) encounter to address concerns as mentioned above. A caregiver was present when appropriate. Due to this being a TeleHealth encounter (During [facility] public health emergency), evaluation of the following organ systems was limited: Vitals/Constitutional/EENT/Resp/CV/GI//MS/Neuro/Skin/Heme-Lymph-Imm. Pursuant to the emergency declaration under the [facility], [address] [place] [address] authority and the [employer] and [employer] Act, this Virtual Visit was conducted with patient's (and/or legal guardian's) consent, to reduce the patient's risk of exposure to COVID-19 and provide necessary medical care. The patient (and/or legal guardian) has also been advised to contact this office for worsening conditions or problems, and seek emergency medical treatment and/or call 911 if deemed necessary. Services were provided through a video synchronous discussion virtually to substitute for in-person clinic visit. Various options to be seen in person were discussed.      Patients are advised to check with insurance company to ensure coverage and to fully understand benefits and cost prior to any testing to try to avoid unexpected charges. This note was created with the assistance of voice recognition software. Inadvertent errors may be present. Signs and symptoms to watch for were discussed. Serious signs and symptoms reviewed. ER if any    --Kodak Johnson MD on 1/27/2021 at 2:26 PM    An electronic signature was used to authenticate this note.

## 2021-01-28 ENCOUNTER — APPOINTMENT (OUTPATIENT)
Dept: GENERAL RADIOLOGY | Age: 86
End: 2021-01-28
Payer: MEDICARE

## 2021-01-28 ENCOUNTER — HOSPITAL ENCOUNTER (EMERGENCY)
Age: 86
Discharge: HOME | End: 2021-01-28
Attending: EMERGENCY MEDICINE
Payer: MEDICARE

## 2021-01-28 ENCOUNTER — PATIENT MESSAGE (OUTPATIENT)
Dept: PRIMARY CARE CLINIC | Age: 86
End: 2021-01-28

## 2021-01-28 VITALS
RESPIRATION RATE: 16 BRPM | SYSTOLIC BLOOD PRESSURE: 119 MMHG | DIASTOLIC BLOOD PRESSURE: 74 MMHG | BODY MASS INDEX: 29.78 KG/M2 | WEIGHT: 208 LBS | HEIGHT: 70 IN | TEMPERATURE: 97.8 F | HEART RATE: 85 BPM | OXYGEN SATURATION: 97 %

## 2021-01-28 DIAGNOSIS — E86.0 DEHYDRATION: ICD-10-CM

## 2021-01-28 DIAGNOSIS — R03.1 LOW BLOOD PRESSURE READING: Primary | ICD-10-CM

## 2021-01-28 LAB
ALBUMIN SERPL-MCNC: 3.8 G/DL (ref 3.5–5.2)
ALP BLD-CCNC: 61 U/L (ref 40–129)
ALT SERPL-CCNC: 27 U/L (ref 0–40)
ANION GAP SERPL CALCULATED.3IONS-SCNC: 8 MMOL/L (ref 7–16)
AST SERPL-CCNC: 21 U/L (ref 0–39)
BASOPHILS ABSOLUTE: 0.03 E9/L (ref 0–0.2)
BASOPHILS RELATIVE PERCENT: 0.4 % (ref 0–2)
BILIRUB SERPL-MCNC: 0.9 MG/DL (ref 0–1.2)
BUN BLDV-MCNC: 28 MG/DL (ref 8–23)
CALCIUM SERPL-MCNC: 9.4 MG/DL (ref 8.6–10.2)
CHLORIDE BLD-SCNC: 101 MMOL/L (ref 98–107)
CO2: 28 MMOL/L (ref 22–29)
CREAT SERPL-MCNC: 1.3 MG/DL (ref 0.7–1.2)
EOSINOPHILS ABSOLUTE: 0.11 E9/L (ref 0.05–0.5)
EOSINOPHILS RELATIVE PERCENT: 1.5 % (ref 0–6)
GFR AFRICAN AMERICAN: >60
GFR NON-AFRICAN AMERICAN: 53 ML/MIN/1.73
GLUCOSE BLD-MCNC: 100 MG/DL (ref 74–99)
HCT VFR BLD CALC: 36.1 % (ref 37–54)
HEMOGLOBIN: 11.6 G/DL (ref 12.5–16.5)
IMMATURE GRANULOCYTES #: 0.07 E9/L
IMMATURE GRANULOCYTES %: 0.9 % (ref 0–5)
LACTIC ACID: 1 MMOL/L (ref 0.5–2.2)
LIPASE: 44 U/L (ref 13–60)
LYMPHOCYTES ABSOLUTE: 1.01 E9/L (ref 1.5–4)
LYMPHOCYTES RELATIVE PERCENT: 13.6 % (ref 20–42)
MCH RBC QN AUTO: 29.3 PG (ref 26–35)
MCHC RBC AUTO-ENTMCNC: 32.1 % (ref 32–34.5)
MCV RBC AUTO: 91.2 FL (ref 80–99.9)
MONOCYTES ABSOLUTE: 0.67 E9/L (ref 0.1–0.95)
MONOCYTES RELATIVE PERCENT: 9.1 % (ref 2–12)
NEUTROPHILS ABSOLUTE: 5.51 E9/L (ref 1.8–7.3)
NEUTROPHILS RELATIVE PERCENT: 74.5 % (ref 43–80)
PDW BLD-RTO: 15.1 FL (ref 11.5–15)
PLATELET # BLD: 173 E9/L (ref 130–450)
PMV BLD AUTO: 10.1 FL (ref 7–12)
POTASSIUM SERPL-SCNC: 4.5 MMOL/L (ref 3.5–5)
RBC # BLD: 3.96 E12/L (ref 3.8–5.8)
SODIUM BLD-SCNC: 137 MMOL/L (ref 132–146)
TOTAL PROTEIN: 6.6 G/DL (ref 6.4–8.3)
TROPONIN: <0.01 NG/ML (ref 0–0.03)
WBC # BLD: 7.4 E9/L (ref 4.5–11.5)

## 2021-01-28 PROCEDURE — 83605 ASSAY OF LACTIC ACID: CPT

## 2021-01-28 PROCEDURE — 99283 EMERGENCY DEPT VISIT LOW MDM: CPT

## 2021-01-28 PROCEDURE — 93005 ELECTROCARDIOGRAM TRACING: CPT | Performed by: PHYSICIAN ASSISTANT

## 2021-01-28 PROCEDURE — 83690 ASSAY OF LIPASE: CPT

## 2021-01-28 PROCEDURE — 84484 ASSAY OF TROPONIN QUANT: CPT

## 2021-01-28 PROCEDURE — 85025 COMPLETE CBC W/AUTO DIFF WBC: CPT

## 2021-01-28 PROCEDURE — 80053 COMPREHEN METABOLIC PANEL: CPT

## 2021-01-28 PROCEDURE — 71045 X-RAY EXAM CHEST 1 VIEW: CPT

## 2021-01-28 RX ORDER — 0.9 % SODIUM CHLORIDE 0.9 %
1000 INTRAVENOUS SOLUTION INTRAVENOUS ONCE
Status: DISCONTINUED | OUTPATIENT
Start: 2021-01-28 | End: 2021-01-28 | Stop reason: HOSPADM

## 2021-01-28 ASSESSMENT — ENCOUNTER SYMPTOMS
EYE DISCHARGE: 0
EYE PAIN: 0
SINUS PRESSURE: 0
EYE REDNESS: 0
DIARRHEA: 0
WHEEZING: 0
BACK PAIN: 0
SORE THROAT: 0
COUGH: 0
ABDOMINAL PAIN: 0
SHORTNESS OF BREATH: 0
VOMITING: 0
NAUSEA: 0

## 2021-01-28 NOTE — ED PROVIDER NOTES
ED  Provider Note  Admit Date/RoomTime: 1/28/2021  2:05 PM  ED Room: [place] [address]     HPI:   Nan Cerna is a 82 y.o. male presenting to the ED for low blood pressure, beginning a few hours ago. History comes primarily from the patient. Past medical history includes ***. The complaint has been {Desc; intermittent/persistent/constant:67748}, {DESC; MILD/MOD/SEVERE:97046} in severity, improved by {Modify factor:02549} and worsened by {Modify factor:35091}.   ***             Review of Systems     Physical Exam     Procedures     MDM

## 2021-01-28 NOTE — ED NOTES
FIRST PROVIDER CONTACT ASSESSMENT NOTE      Department of Emergency Medicine   ED  First Provider Note   1/28/21  1:41 PM EST    Chief Complaint: Dizziness (hypotensive at home, BP 25'E systolic at home, dx with covid 1/13) and Nausea      History of Present Illness:    Katie Jones is a 82 y.o. male who presents to the ED by private car for hypertension. Patient reports a history of blood pressure in the 60s/40s at home. Reports he was diagnosed with coronavirus on January 14, 2021. Also complaining of lightheadedness. Focused Screening Exam:  Constitutional:  Alert, appears stated age and is in no distress.     *ALLERGIES*     Iodine and Shellfish-derived products     ED Triage Vitals [01/28/21 1340]   BP Temp Temp src Pulse Resp SpO2 Height Weight   (!) 102/57 97.8 °F (36.6 °C) -- 80 16 98 % 5' 10\" (1.778 m) 208 lb (94.3 kg)        Initial Plan of Care:  Initiate Treatment-Testing, Proceed toTreatment Area When Bed Available for ED Attending/MLP to Continue Care    -----------------END OF FIRST PROVIDER CONTACT ASSESSMENT NOTE--------------  Electronically signed by Alexandro Greer PA-C   DD: 1/28/21         Alexandro Greer PA-C  01/28/21 [id number]

## 2021-01-28 NOTE — ED PROVIDER NOTES
Exam  Vitals signs reviewed. Constitutional:       General: He is not in acute distress. Appearance: Normal appearance. He is well-developed. He is not ill-appearing, toxic-appearing or diaphoretic. HENT:      Head: Normocephalic and atraumatic. Mouth/Throat:      Mouth: Mucous membranes are moist.      Dentition: Abnormal dentition. Eyes:      Pupils: Pupils are equal, round, and reactive to light. Neck:      Musculoskeletal: Normal range of motion. Vascular: No JVD. Trachea: No tracheal deviation. Cardiovascular:      Rate and Rhythm: Normal rate and regular rhythm. Heart sounds: No murmur. No friction rub. No gallop. Pulmonary:      Effort: Pulmonary effort is normal. No respiratory distress. Breath sounds: Normal breath sounds. No stridor. No wheezing or rales. Chest:      Chest wall: No tenderness. Abdominal:      General: Bowel sounds are normal. There is no distension. Palpations: Abdomen is soft. Tenderness: There is no abdominal tenderness. There is no guarding. Musculoskeletal: Normal range of motion. Skin:     General: Skin is warm and dry. Capillary Refill: Capillary refill takes less than 2 seconds. Neurological:      General: No focal deficit present. Mental Status: He is alert. Cranial Nerves: No cranial nerve deficit. Psychiatric:         Mood and Affect: Mood normal.         Behavior: Behavior normal.        EKG #1:  Interpreted by emergency department physician unless otherwise noted. Time:  1405    Rate: 75 bpm  Rhythm: Sinus rhythm. Interpretation: Normal sinus rhythm and with Right Bundle Branch Block.       Procedures     MDM  Number of Diagnoses or Management Options  Dehydration  Low blood pressure reading  Diagnosis management comments: Emergency Department evaluation was notable for a patient who had stable vital signs throughout the entirety of his emergency department stay, no recurrence of hypotension, no orthostatic hypotension, no desaturation or lightheadedness with ambulation, normal laboratory studies (including balanced electrolytes, baseline renal function, no leukocytosis or anemia), unremarkable EKG, and a chest x-ray that is consistent with known Covid infection. Patient is considered stable for discharge to home    They were advised to return to the emergency department should they develop fever, chills, night sweats, nausea, vomiting, diarrhea, chest pain, shortness of breath, or worsening of their symptoms despite treatment from this emergency department visit. They were instructed to follow-up with their primary care provider in 2 days. This information was relayed to the patient who understood this plan of care and was amenable to the plan.            --------------------------------------------- PAST HISTORY ---------------------------------------------  Past Medical History:  has a past medical history of CA prostate, adenoca ([address] CAD (coronary artery disease), Cancer ([address] Chronic bilateral low back pain without sciatica, Cobalamin deficiency, Hyperlipidemia, Hypertension, Osteoarthritis, Osteochondropathy, Osteopenia, Pain in lower limb, Peripheral venous insufficiency, PONV (postoperative nausea and vomiting), Prolonged emergence from general anesthesia, and PVD (peripheral vascular disease) with claudication ([address] Past Surgical History:  has a past surgical history that includes Cholecystectomy; hernia repair (1993); Femur fracture surgery (1981); lymph node dissection (10/2007); Prostate surgery (10/31/07); Total knee arthroplasty (2010); Coronary artery bypass graft (Dec. 1996); Acetabulum fracture surgery (3/24/11); Total hip arthroplasty (11/30/11); Colonoscopy (2016); Endoscopy, colon, diagnostic (2016); Testicle removal (Bilateral, 02/2019); Tonsillectomy; Pain management procedure (N/A, 5/19/2020); joint replacement; Anesthesia Nerve Block (Bilateral, 6/16/2020);  Pain management procedure (8/4/20); Pain management procedure (Right, 8/4/2020); Pain management procedure (Left, 9/10/2020); and eye surgery (Bilateral, 01/2018). Social History:  reports that he quit smoking about 26 years ago. His smoking use included cigarettes. He has a 40.00 pack-year smoking history. He has quit using smokeless tobacco.  His smokeless tobacco use included chew. He reports current alcohol use of about 1.0 standard drinks of alcohol per week. He reports that he does not use drugs. Family History: family history includes Cancer in his father and mother; Stroke in his father and mother. The patients home medications have been reviewed.     Allergies: Iodine and Shellfish-derived products    -------------------------------------------------- RESULTS -------------------------------------------------  Labs:  Results for orders placed or performed during the hospital encounter of 01/28/21   Comprehensive Metabolic Panel   Result Value Ref Range    Sodium 137 132 - 146 mmol/L    Potassium 4.5 3.5 - 5.0 mmol/L    Chloride 101 98 - 107 mmol/L    CO2 28 22 - 29 mmol/L    Anion Gap 8 7 - 16 mmol/L    Glucose 100 (H) 74 - 99 mg/dL    BUN 28 (H) 8 - 23 mg/dL    CREATININE 1.3 (H) 0.7 - 1.2 mg/dL    GFR Non-African American 53 >=60 mL/min/1.73    GFR African American >60     Calcium 9.4 8.6 - 10.2 mg/dL    Total Protein 6.6 6.4 - 8.3 g/dL    Albumin 3.8 3.5 - 5.2 g/dL    Total Bilirubin 0.9 0.0 - 1.2 mg/dL    Alkaline Phosphatase 61 40 - 129 U/L    ALT 27 0 - 40 U/L    AST 21 0 - 39 U/L   CBC Auto Differential   Result Value Ref Range    WBC 7.4 4.5 - 11.5 E9/L    RBC 3.96 3.80 - 5.80 E12/L    Hemoglobin 11.6 (L) 12.5 - 16.5 g/dL    Hematocrit 36.1 (L) 37.0 - 54.0 %    MCV 91.2 80.0 - 99.9 fL    MCH 29.3 26.0 - 35.0 pg    MCHC 32.1 32.0 - 34.5 %    RDW 15.1 (H) 11.5 - 15.0 fL    Platelets 888 599 - 624 E9/L    MPV 10.1 7.0 - 12.0 fL    Neutrophils % 74.5 43.0 - 80.0 %    Immature Granulocytes % 0.9 0.0 - 5.0 % Lymphocytes % 13.6 (L) 20.0 - 42.0 %    Monocytes % 9.1 2.0 - 12.0 %    Eosinophils % 1.5 0.0 - 6.0 %    Basophils % 0.4 0.0 - 2.0 %    Neutrophils Absolute 5.51 1.80 - 7.30 E9/L    Immature Granulocytes # 0.07 E9/L    Lymphocytes Absolute 1.01 (L) 1.50 - 4.00 E9/L    Monocytes Absolute 0.67 0.10 - 0.95 E9/L    Eosinophils Absolute 0.11 0.05 - 0.50 E9/L    Basophils Absolute 0.03 0.00 - 0.20 E9/L   Lipase   Result Value Ref Range    Lipase 44 13 - 60 U/L   Lactic Acid, Plasma   Result Value Ref Range    Lactic Acid 1.0 0.5 - 2.2 mmol/L   Troponin   Result Value Ref Range    Troponin <0.01 0.00 - 0.03 ng/mL   EKG 12 Lead   Result Value Ref Range    Ventricular Rate 75 BPM    Atrial Rate 75 BPM    P-R Interval 166 ms    QRS Duration 132 ms    Q-T Interval 394 ms    QTc Calculation (Bazett) 439 ms    P Axis 55 degrees    R Axis -22 degrees    T Axis 1 degrees       Radiology:  XR CHEST PORTABLE   Final Result   Multifocal opacities in both lungs may represent multifocal pneumonia.          ------------------------- NURSING NOTES AND VITALS REVIEWED ---------------------------  Date / Time Roomed:  1/28/2021  2:05 PM  ED Bed Assignment:  [facility]    The nursing notes within the ED encounter and vital signs as below have been reviewed. /74   Pulse 85   Temp 97.8 °F (36.6 °C)   Resp 16   Ht 5' 10\" (1.778 m)   Wt 208 lb (94.3 kg)   SpO2 97%   BMI 29.84 kg/m²   Oxygen Saturation Interpretation: Normal      ------------------------------------------ PROGRESS NOTES ------------------------------------------  6:08 PM EST  I have spoken with the patient and discussed todays results, in addition to providing specific details for the plan of care and counseling regarding the diagnosis and prognosis. Their questions are answered at this time and they are agreeable with the plan. I discussed at length with them reasons for immediate return here for re evaluation.  They will followup with their primary care physician by calling their office tomorrow. --------------------------------- ADDITIONAL PROVIDER NOTES ---------------------------------  At this time the patient is without objective evidence of an acute process requiring hospitalization or inpatient management. They have remained hemodynamically stable throughout their entire ED visit and are stable for discharge with outpatient follow-up. The plan has been discussed in detail and they are aware of the specific conditions for emergent return, as well as the importance of follow-up. Discharge Medication List as of 1/28/2021  4:32 PM          Diagnosis:  1. Low blood pressure reading    2. Dehydration        Disposition:  Patient's disposition: Discharge to home  Patient's condition is stable.                   [address] DO  Resident  01/28/21 3099

## 2021-01-29 ENCOUNTER — TELEPHONE (OUTPATIENT)
Dept: PRIMARY CARE CLINIC | Age: 86
End: 2021-01-29

## 2021-01-29 LAB
EKG ATRIAL RATE: 75 BPM
EKG P AXIS: 55 DEGREES
EKG P-R INTERVAL: 166 MS
EKG Q-T INTERVAL: 394 MS
EKG QRS DURATION: 132 MS
EKG QTC CALCULATION (BAZETT): 439 MS
EKG R AXIS: -22 DEGREES
EKG T AXIS: 1 DEGREES
EKG VENTRICULAR RATE: 75 BPM

## 2021-01-29 PROCEDURE — 93010 ELECTROCARDIOGRAM REPORT: CPT | Performed by: INTERNAL MEDICINE

## 2021-01-29 NOTE — TELEPHONE ENCOUNTER
Pt has a virtual appt with you on 2/3, he was in er yesterday for low bp reading and was advised to f/u w/in the next couple of days. Wife asking if they can just keep the appt on 2/3.

## 2021-02-03 ENCOUNTER — VIRTUAL VISIT (OUTPATIENT)
Dept: PRIMARY CARE CLINIC | Age: 86
End: 2021-02-03
Payer: MEDICARE

## 2021-02-03 DIAGNOSIS — J01.90 ACUTE BACTERIAL SINUSITIS: ICD-10-CM

## 2021-02-03 DIAGNOSIS — D64.9 ANEMIA, UNSPECIFIED TYPE: ICD-10-CM

## 2021-02-03 DIAGNOSIS — R91.1 LUNG NODULE: ICD-10-CM

## 2021-02-03 DIAGNOSIS — J30.9 ALLERGIC RHINITIS, UNSPECIFIED SEASONALITY, UNSPECIFIED TRIGGER: ICD-10-CM

## 2021-02-03 DIAGNOSIS — N28.9 RENAL INSUFFICIENCY: ICD-10-CM

## 2021-02-03 DIAGNOSIS — U07.1 COVID-19: Primary | ICD-10-CM

## 2021-02-03 DIAGNOSIS — I10 ESSENTIAL HYPERTENSION: ICD-10-CM

## 2021-02-03 DIAGNOSIS — B96.89 ACUTE BACTERIAL SINUSITIS: ICD-10-CM

## 2021-02-03 PROCEDURE — 1111F DSCHRG MED/CURRENT MED MERGE: CPT | Performed by: FAMILY MEDICINE

## 2021-02-03 PROCEDURE — G8427 DOCREV CUR MEDS BY ELIG CLIN: HCPCS | Performed by: FAMILY MEDICINE

## 2021-02-03 PROCEDURE — 1036F TOBACCO NON-USER: CPT | Performed by: FAMILY MEDICINE

## 2021-02-03 PROCEDURE — G8417 CALC BMI ABV UP PARAM F/U: HCPCS | Performed by: FAMILY MEDICINE

## 2021-02-03 PROCEDURE — G8484 FLU IMMUNIZE NO ADMIN: HCPCS | Performed by: FAMILY MEDICINE

## 2021-02-03 PROCEDURE — 99214 OFFICE O/P EST MOD 30 MIN: CPT | Performed by: FAMILY MEDICINE

## 2021-02-03 PROCEDURE — 4040F PNEUMOC VAC/ADMIN/RCVD: CPT | Performed by: FAMILY MEDICINE

## 2021-02-03 PROCEDURE — 1123F ACP DISCUSS/DSCN MKR DOCD: CPT | Performed by: FAMILY MEDICINE

## 2021-02-03 RX ORDER — LISINOPRIL 10 MG/1
10 TABLET ORAL DAILY
Qty: 30 TABLET | Refills: 3
Start: 2021-02-03 | End: 2021-08-31 | Stop reason: SDUPTHER

## 2021-02-03 NOTE — PROGRESS NOTES
TeleMedicine Patient Consent    This visit was performed as a virtual video visit using a synchronous, two-way, audio-video telehealth technology platform. Patient identification was verified at the start of the visit, including the patient's telephone number and physical location. I discussed with the patient the nature of our telehealth visits, that:     1. Due to the nature of an audio- video modality, the only components of a physical exam that could be done are the elements supported by direct observation. 2. I would evaluate the patient and recommend diagnostics and treatments based on my assessment. 3. If it was felt that the patient should be evaluated in clinic or an emergency room setting, then they would be directed there. 4. Our sessions are not being recorded and that personal health information is protected. 5. Our team would provide follow up care in person if/when the patient needs it. Patient does agree to proceed with telemedicine consultation. Patient's location: home address in [facility]    Physician  location other address in [place]     Other people involved in call:   Spouse    This visit was completed virtually using Doxy. me    2/3/2021    TELEHEALTH EVALUATION -- Audio/Visual (During [id number] public health emergency)    Chief Complaint   Patient presents with    Concern For COVID-19     follow up     Medication Check     discuss lisinopril and iron            HPI:    Darryle Bray (:  1935) has requested an audio/video evaluation for the following concern(s):        Patient presents today for follow-up. He continues to feel better. The day after I saw him , he complained of \"not feeling well\" and his wife states his blood pressure was at a high 90/60. He went to the ER. They felt he may be dehydrated. They were not sure if her cuff was working correctly. They hydrated him. He was sent home. Labs are stable. They felt he had some new abnormalities on his EKG.   He subsequently saw the cardiologist yesterday. He felt the abnormalities were stable but did lower his lisinopril 10 mg daily. He continues to feel better. His wife is wonder if he should take iron, we will check ferritin with next blood work but his hemoglobin has been stable. BUN/creatinine was elevated but relatively stable. Other labs stable. Chest x-ray Continue to show bilateral opacities. I did review the CT with him from January 13 which, among other things, showed a 2 cm oval subpleural nodule posterior superior segment right lower lobe that was not present 11/2013. They recommended 3 to 6-month follow-up. They will discuss it further with Dr. Deborah Brink on April 8. He has had chronic rhinorrhea for many many decades, especially in the morning and after he eats. He used to get recurrent URIs, was found to have a septal deviation, had a \"Roto-Rooter\" at [facility] clinic many years ago and it did temporarily help. We did discuss the use of nasal steroid. He still has loss of smell and taste from Covid but can taste mouthwash, not coffee, hopefully will recover. Tolerating the EMILY MARCO better. There is a 25% risk of increased TSH with a low risk of hypothyroidism, TSH is elevated free T4 normal, monitor. Creatinine is elevated somewhat as well, they blame hydration. Much improved.       Most Recent Labs  CBC  Lab Results   Component Value Date    WBC 7.4 01/28/2021    WBC 7.1 01/21/2021    WBC 3.7 01/16/2021    RBC 3.96 01/28/2021    RBC 4.06 01/21/2021    RBC 3.30 01/16/2021    HGB 11.6 01/28/2021    HGB 12.2 01/21/2021    HGB 9.8 01/16/2021    HCT 36.1 01/28/2021    HCT 35.9 01/21/2021    HCT 29.4 01/16/2021    MCV 91.2 01/28/2021    MCV 88.4 01/21/2021    MCV 89.1 01/16/2021     01/28/2021     01/21/2021     01/16/2021      CMP  Lab Results   Component Value Date     01/28/2021     01/21/2021     01/16/2021    K 4.5 01/28/2021    K 4.4 01/21/2021    K 4.4 01/16/2021    K 3.9 01/15/2021    K 4.4 01/14/2021     01/28/2021     01/21/2021     01/16/2021    CO2 28 01/28/2021    CO2 26 01/21/2021    CO2 22 01/16/2021    ANIONGAP 8 01/28/2021    ANIONGAP 13 01/21/2021    ANIONGAP 11 01/16/2021    GLUCOSE 100 01/28/2021    GLUCOSE 90 01/21/2021    GLUCOSE 140 01/16/2021    GLUCOSE 113 03/28/2011    GLUCOSE 93 03/28/2011    GLUCOSE 101 03/26/2011    BUN 28 01/28/2021    BUN 29 01/21/2021    BUN 26 01/16/2021    CREATININE 1.3 01/28/2021    CREATININE 1.3 01/21/2021    CREATININE 1.1 01/16/2021    LABGLOM 53 01/28/2021    LABGLOM 53 01/21/2021    LABGLOM >60 01/16/2021    GFRAA >60 01/28/2021    GFRAA >60 01/21/2021    GFRAA >60 01/16/2021    CALCIUM 9.4 01/28/2021    CALCIUM 9.9 01/21/2021    CALCIUM 9.0 01/16/2021    PROT 6.6 01/28/2021    PROT 7.1 01/21/2021    PROT 6.7 01/16/2021    LABALBU 3.8 01/28/2021    LABALBU 4.0 01/21/2021    LABALBU 3.4 01/16/2021    LABALBU 2.9 03/26/2011    LABALBU 3.0 03/25/2011    LABALBU 3.4 03/24/2011    BILITOT 0.9 01/28/2021    BILITOT 0.7 01/21/2021    BILITOT 0.3 01/16/2021    ALKPHOS 61 01/28/2021    ALKPHOS 65 01/21/2021    ALKPHOS 88 01/16/2021    AST 21 01/28/2021    AST 25 01/21/2021    AST 96 01/16/2021    ALT 27 01/28/2021    ALT 69 01/21/2021     01/16/2021     A1C  Lab Results   Component Value Date    LABA1C 5.3 01/21/2021    LABA1C 4.8 09/09/2020    LABA1C 4.6 08/01/2019     TSH  Lab Results   Component Value Date    TSH 4.760 01/21/2021    TSH 5.010 09/09/2020    TSH 5.120 06/08/2020     FREET4  Lab Results   Component Value Date    K1QCGSV 9.5 01/31/2011     LIPID  Lab Results   Component Value Date    CHOL 112 01/21/2021    CHOL 123 09/09/2020    CHOL 157 06/08/2020    HDL 39 01/21/2021    HDL 45 09/09/2020    HDL 55 06/08/2020    LDLCALC 22 01/21/2021    LDLCALC 37 09/09/2020    LDLCALC 77 06/08/2020    TRIG 257 01/21/2021    TRIG 205 09/09/2020    TRIG 126 06/08/2020 VITAMIN D  Lab Results   Component Value Date    VITD25 48 01/21/2021    VITD25 55 09/09/2020    VITD25 42 06/08/2020     MAGNESIUM  Lab Results   Component Value Date    MG 2.2 01/21/2021    MG 1.7 01/13/2021    MG 1.9 09/09/2020      PHOS  Lab Results   Component Value Date    PHOS 4.0 01/21/2021    PHOS 3.8 01/16/2021    PHOS 4.1 06/08/2020      PAWAN   No results found for: PAWAN  RHEUMATOID FACTOR  No results found for: RF  PSA  Lab Results   Component Value Date    PSA <0.03 12/08/2020    PSA <0.03 07/27/2020    PSA <0.03 06/15/2020      HEPATITIS C  No results found for: HCVABI  HIV  No results found for: NCJ3ENR, HIV1QT  UA  Lab Results   Component Value Date    COLORU Yellow 01/21/2021    COLORU Yellow 01/13/2021    COLORU Yellow 09/09/2020    CLARITYU Clear 01/21/2021    CLARITYU Clear 01/13/2021    CLARITYU Clear 09/09/2020    GLUCOSEU Negative 01/21/2021    GLUCOSEU Negative 01/13/2021    GLUCOSEU Negative 09/09/2020    GLUCOSEU NEGATIVE 12/21/2010    BILIRUBINUR Negative 01/21/2021    BILIRUBINUR Negative 01/13/2021    BILIRUBINUR Negative 09/09/2020    BILIRUBINUR NEGATIVE 12/21/2010    KETUA Negative 01/21/2021    KETUA 15 01/13/2021    KETUA Negative 09/09/2020    SPECGRAV 1.025 01/21/2021    SPECGRAV 1.025 01/13/2021    SPECGRAV >=1.030 09/09/2020    BLOODU Negative 01/21/2021    BLOODU Negative 01/13/2021    BLOODU Negative 09/09/2020    PHUR 6.0 01/21/2021    PHUR 5.5 01/13/2021    PHUR 5.5 09/09/2020    PROTEINU Negative 01/21/2021    PROTEINU 30 01/13/2021    PROTEINU Negative 09/09/2020    UROBILINOGEN 0.2 01/21/2021    UROBILINOGEN 0.2 01/13/2021    UROBILINOGEN 0.2 09/09/2020    NITRU Negative 01/21/2021    NITRU Negative 01/13/2021    NITRU Negative 09/09/2020    LEUKOCYTESUR Negative 01/21/2021    LEUKOCYTESUR Negative 01/13/2021    LEUKOCYTESUR Negative 09/09/2020     Urine Micro/Albumin Ratio  Lab Results   Component Value Date    MALBCR 27.1 01/21/2021    MALBCR 11.2 09/09/2020    MALBCR 11.7 06/08/2020     Xr Chest Portable    Result Date: 1/28/2021  EXAMINATION: ONE XRAY VIEW OF THE CHEST 1/28/2021 3:20 pm COMPARISON: January 13, 2021 HISTORY: ORDERING SYSTEM PROVIDED HISTORY: weak, hypotensive, COVID + TECHNOLOGIST PROVIDED HISTORY: Reason for exam:->weak, hypotensive, COVID + FINDINGS: There are increasing multifocal areas of opacity in both right and left lungs. No pneumothorax. The costophrenic angles are clear. The heart is mildly enlarged, status post CABG. Multifocal opacities in both lungs may represent multifocal pneumonia. Xr Chest Portable    Result Date: 1/13/2021  EXAMINATION: ONE XRAY VIEW OF THE CHEST 1/13/2021 2:40 pm COMPARISON: 01/04/2021 HISTORY: ORDERING SYSTEM PROVIDED HISTORY: sob TECHNOLOGIST PROVIDED HISTORY: Reason for exam:->sob History of prostate cancer and CABG FINDINGS: Postsurgical changes with midline median sternotomy wires and mediastinal clips are again noted and suggest prior CABG procedure. Multifocal bilateral rib osteoblastic metastases remain present without significant change. New patchy, ground-glass, and interstitial opacity is noted in the right mid lung. Thoracic aorta calcification again most compatible with atherosclerosis. There is no pulmonary consolidation. No evidence of pleural effusion. No radiographically visible pneumothorax. Cardiac silhouette size is normal without vascular congestion. The regional skeleton is without acute pathology. New right mid and lower lung infiltrates may reflect edema and/or atelectasis. Differential includes new multifocal pneumonia, bacterial versus viral. While nonspecific, this appearance can be seen with COVID-19 .  Multifocal bilateral rib osteoblastic metastases remain present without significant change, in patient with history of prostate cancer    Cta Pulmonary W Contrast    Result Date: 1/13/2021  EXAMINATION: CTA OF THE CHEST 1/13/2021 5:12 pm TECHNIQUE: CTA of the chest was performed after the administration of intravenous contrast.  Multiplanar reformatted images are provided for review. MIP images are provided for review. Dose modulation, iterative reconstruction, and/or weight based adjustment of the mA/kV was utilized to reduce the radiation dose to as low as reasonably achievable. COMPARISON: 02/08/2017 HISTORY: ORDERING SYSTEM PROVIDED HISTORY: concern for PE TECHNOLOGIST PROVIDED HISTORY: Reason for exam:->concern for PE FINDINGS: Pulmonary Arteries: Pulmonary arteries are adequately opacified for evaluation. No evidence of intraluminal filling defect to suggest pulmonary embolism. Main pulmonary artery is normal in caliber. Mediastinum: No evidence of mediastinal lymphadenopathy. The heart and pericardium demonstrate no acute abnormality. There is no acute abnormality of the thoracic aorta. Lungs/pleura: Diffuse and bilateral areas of ground-glass appearance consistent with pneumonia. COVID pneumonia suspected. No evidence of pleural effusion or pneumothorax. 2.0 cm oval subpleural nodule like density superior segment right lower lobe posteriorly. Upper Abdomen: Small hiatal hernia. Mildly prominent spleen at 13.0 cm. Probable hepatic cysts. Soft Tissues/Bones: No acute bone or soft tissue abnormality. No evidence of pulmonary embolism. Diffuse and bilateral areas of ground-glass appearance consistent with pneumonia. COVID pneumonia suspected. Mild splenomegaly. 2.0 cm oval subpleural nodule posterosuperior segment right lower lobe. This was not present on 11/08/2013.  3 to six-month follow-up recommended. Small hiatal hernia. Probable hepatic cysts. ROS:  Const: Denies chills, fever, malaise and sweats. Eyes: Denies discharge, pain, redness and visual disturbance. ENMT: Denies earaches, other ear symptoms. Denies nasal or sinus symptoms other than stated  above. Denies mouth and tongue lesions and sore throat.   CV: Denies chest discomfort, pain; diaphoresis, dizziness, edema, lightheadedness, orthopnea,  palpitations, syncope and near syncopal episode or any exertional symptoms  Resp: Denies cough, hemoptysis, pleuritic pain, SOB, sputum production and wheezing. GI: Denies abdominal pain, change in bowel habits, hematochezia, melena, nausea and vomiting. : Denies urinary symptoms including dysuria , urgency, frequency or hematuria. Musculo: Denies musculoskeletal symptoms, chronic pain stable. Skin: Denies bruising and rash. Neuro: Denies headache, numbness, stiff neck, tingling and focal weakness slurred speech or facial  droop  Hema/Lymph: Denies bleeding/bruising tendency and enlarged lymph nodes         Current Outpatient Medications:     lisinopril (PRINIVIL;ZESTRIL) 10 MG tablet, Take 1 tablet by mouth daily, Disp: 30 tablet, Rfl: 3    doxycycline hyclate (VIBRAMYCIN) 100 MG capsule, Take 1 capsule by mouth 2 times daily for 10 days, Disp: 20 capsule, Rfl: 0    omega-3 acid ethyl esters (LOVAZA) 1 g capsule, Take 1 capsule by mouth 2 times daily, Disp: 180 capsule, Rfl: 3    denosumab (PROLIA) 60 MG/ML SOSY SC injection, Inject 1 mL into the skin every 6 months, Disp: 1 Syringe, Rfl: 1    atorvastatin (LIPITOR) 40 MG tablet, Take 1 tablet by mouth daily, Disp: 90 tablet, Rfl: 3    omeprazole (PRILOSEC) 20 MG delayed release capsule, Take 1 capsule by mouth daily, Disp: 90 capsule, Rfl: 3    ERLEADA 60 MG TABS, daily , Disp: , Rfl:     meclizine (ANTIVERT) 25 MG tablet, 1/2 - 1 by mouth every 6 hours as needed, Disp: , Rfl:     Multiple Vitamins-Minerals (THERAPEUTIC MULTIVITAMIN-MINERALS) tablet, Take 1 tablet by mouth daily LD 6-11-20, Disp: , Rfl:     metoprolol tartrate (LOPRESSOR) 25 MG tablet, Take 25 mg by mouth 2 times daily, Disp: , Rfl:     Coenzyme Q10 (COQ10) 50 MG CAPS, Take 1 capsule by mouth daily LD 6-11-20, Disp: , Rfl:     aspirin 81 MG tablet, Take 81 mg by mouth daily , Disp: , Rfl:     Cyanocobalamin (VITAMIN B 12 PO), Take  by mouth daily. sublingual , Disp: , Rfl:   Allergies   Allergen Reactions    Iodine Hives    Shellfish-Derived Products        Past Medical History:   Diagnosis Date    CA prostate, adenoca ([facility] [address] 4/25/2019    CAD (coronary artery disease)     [facility]     prostate- PO chemotherapy     Chronic bilateral low back pain without sciatica 3/6/2020    Cobalamin deficiency     Hyperlipidemia     Hypertension     Osteoarthritis     Osteochondropathy     Osteopenia     Pain in lower limb     Peripheral venous insufficiency     PONV (postoperative nausea and vomiting)     Prolonged emergence from general anesthesia     PVD (peripheral vascular disease) with claudication ([facility] [address] 4/25/2019     Past Surgical History:   Procedure Laterality Date    ACETABULUM FRACTURE SURGERY  3/24/11    ANESTHESIA NERVE BLOCK Bilateral 6/16/2020    BILATERAL L3 L4 L5 MEDIAL NERVE BRANCH BLOCK performed by Rico Yanez DO at [address] [place] [address]  2016   [facility] 18 GRAFT  Dec. 1996    [facility]/ Dr. Breanna Whitehead, DIAGNOSTIC  2016    EYE SURGERY Bilateral 01/2018    cataract, glaucoma 2020-left eye    FEMUR FRACTURE SURGERY  1981    left   [place]    JOINT REPLACEMENT      bilat knee 2010, padmini natarajan 2011     LYMPH NODE DISSECTION  10/2007    PAIN MANAGEMENT PROCEDURE N/A 5/19/2020    CAUDAL EPIDURAL STEROID INJECTION performed by Rico Yanez DO at [place] [address]  8/4/20    PAIN MANAGEMENT PROCEDURE Right 8/4/2020    RIGHT L3 4 5 MEDIAL BRANCH RADIOFREQUENCY ABLATION performed by Rico Yanez DO at [place] [id number] Left 9/10/2020    LEFT L3 4 5 MEDIAL BRANCH RADIOFREQUENCY ABLATION     +++IODINE ALLERGY+++   CPT: 69171 [id number] performed by Rico Yanez DO at [place] [id number]  10/31/07    AMIRAH STREETER M.D./ [place] LAPAROSCOPIC PROSTATECTOMY    TESTICLE REMOVAL Bilateral 2019    hx prostate ca-    TONSILLECTOMY      TOTAL HIP ARTHROPLASTY  11    right    TOTAL KNEE ARTHROPLASTY  2010    bilateral     Family History   Problem Relation Age of Onset    Stroke Mother     Cancer Mother         pancreatic    Cancer Father         prostate    Stroke Father      Social History     Tobacco Use    Smoking status: Former Smoker     Packs/day: 1.00     Years: 40.00     Pack years: 40.00     Types: Cigarettes     Quit date: 10/1/1994     Years since quittin.3    Smokeless tobacco: Former User     Types: Chew   Substance Use Topics    Alcohol use: Yes     Alcohol/week: 1.0 standard drinks     Types: 1 Cans of beer per week     Comment: rare    Drug use: No     Social History     Social History Narrative    Problem List: History of polyp of colon, Disorder of bone density and structure, unspecified,    Osteochondropathy, Carcinoma in situ of prostate, Aneurysm of thoracic aorta, Anemia, Coronary    arteriosclerosis, Cobalamin deficiency, Multiple closed fractures of pelvis with disruption of pelvic [place],    Hyperlipidemia, Essential hypertension    Health Maintenance:    Bone Density Test Screening - (3/5/2019)    Bone Density Scan - (2015)    Influenza Vaccination - (10/7/2016)    Colonoscopy - (4/3/2012)    Couseled on Home Safety - (2015)    Colonoscopy Screening - (4/3/2012)    US Liver - 08    Medical Problems:    Coronary Artery Disease (CAD), Hypertension, Hyperlipidemia, Prostate Cancer    Surgical Hx:    Prostatectomy, Coronary Artery Bypass Graft (CABG)            FH: Father:    . (Hx)    Mother:    . (Hx)        SH: Marital:  - retired . Personal Habits: Cigarette Use: former cigarette smoker, Nonsmoker. Alcohol: Occasionally    consumes alcohol.                  PHYSICAL EXAMINATION:  [ INSTRUCTIONS:  \"[x]\" Indicates a positive item  \"[]\" Indicates a negative item  -- DELETE ALL ITEMS NOT EXAMINED]  Vital Signs: (As obtained by patient/caregiver or practitioner observation)    There were no vitals filed for this visit. Patient-Reported Vitals 2/3/2021   Patient-Reported Weight 212   Patient-Reported Height 5'10\"   Patient-Reported Systolic 233   Patient-Reported Diastolic 74   Patient-Reported Pulse 81   Patient-Reported Temperature 97.4   Patient-Reported SpO2 97        Blood pressure-  Heart rate-    Respiratory rate-    Temperature-  Pulse oximetry-     Constitutional: [x] Appears well-developed and well-nourished [x] No apparent distress      [] Abnormal-   Mental status  [x] Alert and awake  [x] Oriented to person/place/time [x]Able to follow commands      Eyes:  EOM    [x]  Normal  [] Abnormal-  Sclera  [x]  Normal  [] Abnormal -         Discharge [x]  None visible  [] Abnormal -    HENT:   [x] Normocephalic, atraumatic. [] Abnormal   [x] Mouth/Throat: Mucous membranes are moist.     External Ears [x] Normal  [] Abnormal-     Neck: [x] No visualized mass     Pulmonary/Chest: [x] Respiratory effort normal.  [x] No visualized signs of difficulty breathing or respiratory distress        [] Abnormal-      Musculoskeletal:   [x] Normal gait with no signs of ataxia         [x] Normal range of motion of neck        [] Abnormal-       Neurological:        [x] No Facial Asymmetry (Cranial nerve 7 motor function) (limited exam to video visit)          [x] No gaze palsy        [] Abnormal-         Skin:        [x] No significant exanthematous lesions or discoloration noted on facial skin         [] Abnormal-            Psychiatric:       [x] Normal Affect [x] No Hallucinations        [] Abnormal-     Other pertinent observable physical exam findings-     ASSESSMENT/PLAN:   Diagnosis Orders   1. COVID-19  XR CHEST (2 VW)   2. Essential hypertension  lisinopril (PRINIVIL;ZESTRIL) 10 MG tablet   3. Lung nodule     4. Renal insufficiency     5. Anemia, unspecified type  Ferritin   6. Acute bacterial sinusitis     7.  Allergic rhinitis, unspecified seasonality, unspecified trigger         Lung nodule  Noted on CAT scan 1/21. Counseled extensively. Differential reviewed, including serious etiologies. He will discuss with him follow-up with Dr. Gorge Payne early April. He is agreeable to chest x-ray in 2 weeks. Allergic rhinitis  Counseled, chronic. Counseled on use of nasal steroid, vaporizer, nasal saline as well as additional treatment options for vasomotor rhinitis    COVID-19  Counseled. Risk of this virus reviewed. Risks of post hospital phase especially first 30 days reviewed. Low threshold for being seen again in person, office if mild, ER if any serious symptoms. Sees Dr. Gorge Payne early April. Repeat chest x-ray about 2 weeks. Disc disease, degenerative, lumbar or lumbosacral  Counseled extensively. Differential reviewed, including serious etiologies. Was Following with [facility], had to change because of insurance, now seeing Dr. Ambrocio Noonan pain management, saw Dr. Janny Pham. Previously saw [place] [facility]. Counseled on injections, radiofrequency ablation etc.  Failed physical therapy. . Had recent radiofrequency ablation and doing well. Bilateral carpal tunnel syndrome  Had emg/ncs Nov 2019 [facility]. Referred to [facility] but did not go because no symptoms    Health maintenance examination  Health maintenance issues discussed at length 9/20, encouraged yearly. Pancytopenia ([address]  Counseled extensively. Differential reviewed, including serious etiologies. Possibly from medication. .  Declines hematology. Monitor. Hemoglobin stable, white count and platelets normalized. Proper hydration reviewed. Asymptomatic. In regard to discussion, will check ferritin with next blood work but I would hold off additional supplementation at this time    Low magnesium level  Counseled. Goals reviewed. Now on magnesium oxide 400 mg daily. Monitor. Vitamin D deficiency  Continue current therapy. Monitor. Vitamin B12 deficiency  Appropriate supplementation reviewed.    Vitamin R69 and folic acid elevated last time, currently just on multivitamin, Monitor.  Risk of deficiency reviewed including anemia and neuropathy.  No longer on injection. Hyperglycemia  Counseled as below. Monitor. Essential hypertension  Counseled. Became hypotensive. Cardiology low-dose lisinopril 10 mg. Now stable. Monitor. The risks of hypertension and hypotension reviewed. Watch closely ambulatory. Hyper and hypotensive precautions and parameters reviewed and previously written as well as parameters on pulse, call if out of range, ER dangers numbers. Lifestyle modification reviewed. Tolerating therapy. Tubular adenoma   Last colonoscopy Dr. Glen Jeans, 2/15 complaining repeat 3 years from then. Overdue. Declines now, fit cards or Cologuard. . Hemoglobin consistently low but stable. Declines any intervention now, declines any tests or procedures now.     Thyroid nodule  Counseled, incidental 7 mm right thyroid nodule on carotid ultrasound 3/19. Declines workup or follow-up now     Thoracic aneurysm, not ruptured  Counseled, potential serious is reviewed, if any symptoms directly to ER. Continue per Dr. Pollo Martinez. He saw him 3/15 and had CT at least then. Dr. Pollo Martinez discharged him at this point unless symptoms and he not interested in follow-up imaging. Of note CT was done for Covid 1/21 and they stated no acute abnormality of the thoracic aorta     PVD (peripheral vascular disease) with claudication [facility])  He saw Dr. Patrizia Jerez, who had him on pletal, had minimal results. Now follows with Dr Lelo Nicole vascular in [place] who states he did have a femoral blockage proximal that would require bypass as well as some distal blockages that would require stenting but they felt observation most appropriate. He did not feel medication was necessary or helpful at this time. They state Dr. Patrizia Jerez agrees with this plan. He is comfortable with this plan at this point. Serious signs and symptoms watch were reviewed. States he missed his March appointment because of Covid-19 pandemic, they were planning surgery but after discussion, and the risks of complications and the complexity of the surgery he is declining intervention now. States he watches the distance he walks but otherwise doing well. No sores etc.      Prostate CA (HCC)  Metastatic. Continue per Dr. Celeste Darby. PSA less than 0.01-0.07-0.3 , he had been on Lupron, had metastasis, since orchiectomy, did not tolerate xtandi so he stopped it , overall feeling well, on Erleada, recently saw Dr. Ally Castro      Mixed hyperlipidemia  Did very well on d.a.w. Crestor unfortunately severe myalgias on generic. He could try to [address] d.a.w., but he chose to go on Lipitor instead, tolerating, declines change in dose, goals reviewed, risk benefits ADRs reviewed. Counseling CoQ10 and vitamin D. .  We discussed appropriate dosing given his risk factors       Metabolic disorder  Counseled. Blood sugar had been borderline, amidst a lot of sweets, globin A1c has been excellent not interested in insulin sensitizer. Lifestyle modification reviewed. Micro-macrovascular complications monitor.       Coronary artery disease involving native heart without angina pectoris  Continue per cardiology. Sees Dr. Aparna Escamilla in August, had stress test 3/15. Asymptomatic now. He did have ectopy on exam but refuses EKG or other evaluation/treatment. There is a risk that the Erleada could cause cardiac arrhythmia     Age-related osteoporosis without current pathological fracture  Counseled extensively. He tolerated Fosamax but discontinued after he took from 7/09 through 7/16. Morbidity/mortality related to fracture reviewed. 11/13 bone density showed improvement, 12/15 -1.3 stable, 3/19 showed L2 -2.6, hip -1.0.  Discussed the option of restarting alendronate with pros and cons reviewed, he has taken it twice. Now on prolia, had June 2020 q 6mos. Needs bw within 2wks. He will go to [facility] center for this now. Gastroesophageal reflux disease without esophagitis  Asymptomatic as long as takes medication, had EGD in the past.  Risk of meds reviewed including electrolyte imbalance, renal etc.  Wants to continue. Glaucoma of left eye  Recent. Follows with eye care. Pressures came down with drops. 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 Sinusitis  Counseled. Differential reviewed. Resolving on doxycycline. Standard precautions reviewed including C. Difficile. R/B probiotics reviewed. Mucinex as directed with precautions. Potential interactions reviewed. Proper hydration reviewed. Coolmist vaporizer as directed. Mono precautions reviewed. Counseled on nasal saline. Counseled on nasal steroid. Doxycycline with precautions including C. difficile      Counseled extensively and differential diagnoses of above were reviewed, including serious etiologies. Side effects and interactions of medications were reviewed. Plan as above:  Counseled extensively. As long as symptoms steadily improve/resolve, he is going to plan to simply follow-up with me at the end of April with blood work, sooner as needed. Low threshold for being seen in person for above acute issues if any persistence or worsening or ER if any serious signs or symptoms. Repeat chest x-ray 2 weeks. Call for any pending results. Continue per specialist.  Sees Dr. Ct Carter in April. Needs follow-up on lung nodule. As long as symptoms steadily improve/resolve and medical conditions are following the expected course, FU as below, sooner PRN      No follow-ups on file. Time spent: Greater than Not billed by time      Kati Camacho is a 82 y.o. male being evaluated by a Virtual Visit (video visit) encounter to address concerns as mentioned above. A caregiver was present when appropriate.  Due to this

## 2021-02-03 NOTE — ASSESSMENT & PLAN NOTE
Noted on CAT scan 1/21. Counseled extensively. Differential reviewed, including serious etiologies. He will discuss with him follow-up with Dr. Sis Clark early April. He is agreeable to chest x-ray in 2 weeks.

## 2021-02-04 ENCOUNTER — TELEPHONE (OUTPATIENT)
Dept: PRIMARY CARE CLINIC | Age: 86
End: 2021-02-04

## 2021-02-04 RX ORDER — ALBUTEROL SULFATE 90 UG/1
AEROSOL, METERED RESPIRATORY (INHALATION)
Qty: 1 INHALER | Refills: 0 | Status: SHIPPED
Start: 2021-02-04 | End: 2021-03-17 | Stop reason: ALTCHOICE

## 2021-02-04 NOTE — TELEPHONE ENCOUNTER
I was thinking he already had an inhaler. Make sure he has had one before. Standard precautions on use. I will call in albuterol.

## 2021-02-04 NOTE — TELEPHONE ENCOUNTER
He can use his inhaler as needed as directed, if it is working. Latest study showed increased mortality on prednisone if not on oxygen/not in hospital.  As we mentioned yesterday, threshold for being seen in person should be very low with his underlying risk factors. He can come in to express care now to be reevaluated, or ER for any serious signs or symptoms.

## 2021-02-05 ENCOUNTER — PATIENT MESSAGE (OUTPATIENT)
Dept: PRIMARY CARE CLINIC | Age: 86
End: 2021-02-05

## 2021-02-05 DIAGNOSIS — D48.5 NEOPLASM OF UNCERTAIN BEHAVIOR OF SKIN: Primary | ICD-10-CM

## 2021-02-22 LAB
LEFT VENTRICULAR EJECTION FRACTION MODE: NORMAL
LV EF: NORMAL %

## 2021-03-09 ENCOUNTER — HOSPITAL ENCOUNTER (OUTPATIENT)
Age: 86
Discharge: HOME | End: 2021-03-09
Payer: MEDICARE

## 2021-03-09 LAB — PROSTATE SPECIFIC ANTIGEN: <0.03 NG/ML (ref 0–4)

## 2021-03-09 PROCEDURE — 36415 COLL VENOUS BLD VENIPUNCTURE: CPT

## 2021-03-09 PROCEDURE — 84153 ASSAY OF PSA TOTAL: CPT

## 2021-03-09 NOTE — PROGRESS NOTES
[facility] Pain Management  [facility] [address]  [facility], [facility]    Follow up Note      Sherita Bergeron     Date of Visit:  3/10/2021    CC:  Patient presents for follow up   Chief Complaint   Patient presents with    Follow-up    Lower Back Pain     HPI:    Pain is worse   Pain now returning. Appropriate analgesia with current medications regimen: None. Change in quality of symptoms:no. Medication side effects:none. Recent diagnostic testing:none. Recent interventional procedures:none. He has been on anticoagulation medications to include ASA and has not been on herbal supplements.  He is not diabetic.     Imaging:   Lumbar MRI 4/2020 -  1. Metastatic lesions are noted in the right iliac bone and first   sacral body, but are not associated with neural encroachment. Other   metastases previously documented in the pelvis are not included on   this exam. Metastatic foci do enhance. 2. There is grade 1 anterolisthesis of L5 on S1 and loss of the L4-5   disc space. 3. Lateral portions of the circumferential disc bulge at the L5-S1   level could encroach on the L5 nerve roots lateral to the foramina on   either side. 4. Telescoping of facets and disc bulging results in severe central   stenosis of the right-sided L4 and L5 nerve roots and moderately   severe stenosis of the left-sided L2-L5 root levels, more severe   inferiorly.      Lumbar MRI 7/2019 -   IMPRESSION:    Bone lesions at S1, S2 and in the right iliac wing worrisome for a marrow   infiltrative process.  Recommend clinical correlation and correlation   with nuclear medicine bone scanning. Dextroconvex scoliosis and multilevel malalignment associated with   multilevel spondylosis as detailed. L2-3 level, disc bulging with mild spinal canal stenosis and mild to   moderate right foraminal stenosis. L3-4 level, disc bulging with mild spinal canal stenosis and mild to   moderate left foraminal stenosis.     L4-5 level, disc degeneration and disc osteophyte complex with mild to   moderate foraminal stenosis. L5-S1 level, grade 1 anterolisthesis from bilateral L5 pars defects and   degenerative disc disease with moderate to severe foraminal stenosis and   impingement of the L5 nerve roots. Anatomic Thoracic/Lumbar Variant: None.  L4-5 is considered the level of   the iliac crest and assume there are 5 lumbar-type vertebrae. There are hepatic and renal cysts.  Recommend correlation with ultrasound.     Cervical MRI 2019 -   IMPRESSION:    10 mm low signal intensity lesion at the right side of the C6 vertebral   body and 7 mm signal intensity lesion at the base of C2 are worrisome for   a marrow infiltrative process such as osteoblastic metastasis.  Clinical   correlation and correlation with nuclear medicine bone scanning   recommended for further evaluation. Levoconvex scoliosis and multilevel malalignment associated multilevel   spondylosis most notably moderate to advanced degenerative disc disease   at C6-7 and mild to moderate degenerative disc disease at C5-6 and C4-5. There is no substantial spinal canal stenosis. There is multilevel acquired foraminal stenosis most notably moderate to   severe left and moderate right foraminal stenosis at C5-6. Anatomic Variant:  None.  Assume 7 cervical vertebrae with counting from   the craniocervical junction.                                         Potential Aberrant Drug-Related Behavior:  no     Urine Drug Screenin2020 consistent     OARRS report:  2020 consistent  2020 consistent  2020 consistent      Opioid Agreement:  Date enacted:  2020  Renewal date:    Past Medical History:   Diagnosis Date    CA prostate, adenoca ([address] 2019    CAD [facility]coronary artery disease)     [facility]     prostate- PO chemotherapy     Chronic bilateral low back pain without sciatica 3/6/2020    Cobalamin deficiency     Hyperlipidemia  Hypertension     Osteoarthritis     Osteochondropathy     Osteopenia     Pain in lower limb     Peripheral venous insufficiency     PONV (postoperative nausea and vomiting)     Prolonged emergence from general anesthesia     PVD (peripheral vascular disease) with claudication ([address] 4/25/2019       Past Surgical History:   Procedure Laterality Date    ACETABULUM FRACTURE SURGERY  3/24/11    ANESTHESIA NERVE BLOCK Bilateral 6/16/2020    BILATERAL L3 L4 L5 MEDIAL NERVE BRANCH BLOCK performed by Ramez Garcia DO at / [facility]  2016    CORONARY ARTERY BYPASS GRAFT  Dec. 1996    [facility]/ Dr. Isidro Recinos, DIAGNOSTIC  2016    EYE SURGERY Bilateral 01/2018    cataract, glaucoma 2020-left eye    FEMUR FRACTURE SURGERY  1981    left   [facility]    JOINT REPLACEMENT      bilat knee 2010, rig thip 2011     LYMPH NODE DISSECTION  10/2007    PAIN MANAGEMENT PROCEDURE N/A 5/19/2020    CAUDAL EPIDURAL STEROID INJECTION performed by Ramez Garcia DO at [place] [id number]  8/4/20    PAIN MANAGEMENT PROCEDURE Right 8/4/2020    RIGHT L3 4 5 MEDIAL BRANCH RADIOFREQUENCY ABLATION performed by Ramez Garcia DO at [place] [address] Left 9/10/2020    LEFT L3 4 5 MEDIAL BRANCH RADIOFREQUENCY ABLATION     +++IODINE ALLERGY+++   CPT: 89920 [id number] performed by Ramez Garcia DO at [facility].  10/31/07    AMIRAH STREETER M.D./ DA LETICIA LAPAROSCOPIC PROSTATECTOMY    TESTICLE REMOVAL Bilateral 02/2019    hx prostate ca-    TONSILLECTOMY      TOTAL HIP ARTHROPLASTY  11/30/11    right    TOTAL KNEE ARTHROPLASTY  2010    bilateral       Prior to Admission medications    Medication Sig Start Date End Date Taking?  Authorizing Provider   COMBIGAN 0.2-0.5 % ophthalmic solution INSTILL 1 DROP INTO THE RIGHT EYE TWO TIMES A DAY. 2/8/21  Yes Historical Provider, MD   latanoprost (XALATAN) 0.005 % ophthalmic solution INSTILL 1 DROP IN RIGHT EYE EVERY DAY AT BEDTIME 2/16/21  Yes Historical Provider, MD   lisinopril (PRINIVIL;ZESTRIL) 10 MG tablet Take 1 tablet by mouth daily 2/3/21  Yes Oanh Foster MD   omega-3 acid ethyl esters (LOVAZA) 1 g capsule Take 1 capsule by mouth 2 times daily 12/11/20  Yes Oanh Foster MD   atorvastatin (LIPITOR) 40 MG tablet Take 1 tablet by mouth daily 9/16/20  Yes Oanh Foster MD   omeprazole (PRILOSEC) 20 MG delayed release capsule Take 1 capsule by mouth daily 9/16/20  Yes Oanh Foster MD   ERLEADA 60 MG TABS daily  3/2/20  Yes Historical Provider, MD   meclizine (ANTIVERT) 25 MG tablet 1/2 - 1 by mouth every 6 hours as needed   Yes Historical Provider, MD   Multiple Vitamins-Minerals (THERAPEUTIC MULTIVITAMIN-MINERALS) tablet Take 1 tablet by mouth daily LD 6-11-20   Yes Historical Provider, MD   metoprolol tartrate (LOPRESSOR) 25 MG tablet Take 25 mg by mouth 2 times daily   Yes Historical Provider, MD   Coenzyme Q10 (COQ10) 50 MG CAPS Take 1 capsule by mouth daily LD 6-11-20   Yes Historical Provider, MD   aspirin 81 MG tablet Take 81 mg by mouth daily    Yes Historical Provider, MD   Cyanocobalamin (VITAMIN B 12 PO) Take  by mouth daily.  sublingual  2/8/11  Yes Historical Provider, MD   albuterol sulfate  (90 Base) MCG/ACT inhaler 1-2 puffs q4-6 hrs prn  Patient not taking: Reported on 3/10/2021 2/4/21   Oanh Foster MD   denosumab (PROLIA) 60 MG/ML SOSY SC injection Inject 1 mL into the skin every 6 months  Patient not taking: Reported on 3/10/2021 10/29/20   Oanh Foster MD       Allergies   Allergen Reactions    Iodine Hives    Shellfish-Derived Products        Social History     Socioeconomic History    Marital status:      Spouse name: Not on file    Number of children: Not on file    Years of education: Not on file    Highest education level: Not on file   Occupational History    Not on file   Social Needs    Financial resource strain: Not on file    Food insecurity     Worry: Not on file     Inability: Not on file    Transportation needs     Medical: Not on file     Non-medical: Not on file   Tobacco Use    Smoking status: Former Smoker     Packs/day: 1.00     Years: 40.00     Pack years: 40.00     Types: Cigarettes     Quit date: 10/1/1994     Years since quittin.4    Smokeless tobacco: Former User     Types: Chew   Substance and Sexual Activity    Alcohol use:  Yes     Alcohol/week: 1.0 standard drinks     Types: 1 Cans of beer per week     Comment: rare    Drug use: No    Sexual activity: Not on file   Lifestyle    Physical activity     Days per week: Not on file     Minutes per session: Not on file    Stress: Not on file   Relationships    Social connections     Talks on phone: Not on file     Gets together: Not on file     Attends Shinto service: Not on file     Active member of club or organization: Not on file     Attends meetings of clubs or organizations: Not on file     Relationship status: Not on file    Intimate partner violence     Fear of current or ex partner: Not on file     Emotionally abused: Not on file     Physically abused: Not on file     Forced sexual activity: Not on file   Other Topics Concern    Not on file   Social History Narrative    Problem List: History of polyp of colon, Disorder of bone density and structure, unspecified,    Osteochondropathy, Carcinoma in situ of prostate, Aneurysm of thoracic aorta, Anemia, Coronary    arteriosclerosis, Cobalamin deficiency, Multiple closed fractures of pelvis with disruption of pelvic [place],    Hyperlipidemia, Essential hypertension    Health Maintenance:    Bone Density Test Screening - (3/5/2019)    Bone Density Scan - (2015)    Influenza Vaccination - (10/7/2016)    Colonoscopy - (4/3/2012)    Couseled on Home Safety - (2015)    Colonoscopy Screening - (4/3/2012)    US Liver - 08    Medical Problems:    Coronary Artery Disease (CAD), Hypertension, Hyperlipidemia, Prostate Cancer    Surgical Hx:    Prostatectomy, Coronary Artery Bypass Graft (CABG)            FH: Father:    . (Hx)    Mother:    . (Hx)        SH: Marital:  - retired . Personal Habits: Cigarette Use: former cigarette smoker, Nonsmoker. Alcohol: Occasionally    consumes alcohol. Family History   Problem Relation Age of Onset    Stroke Mother     Cancer Mother         pancreatic    Cancer Father         prostate    Stroke Father        REVIEW OF SYSTEMS:     Jarod Viveros denies fever/chills, chest pain, shortness of breath, new bowel or bladder complaints. All other review of systems was negative. PHYSICAL EXAMINATION:      /80   Pulse 78   Temp 97.3 °F (36.3 °C)   Resp 16   Ht 5' 10\" (1.778 m)   Wt 208 lb (94.3 kg)   SpO2 97%   BMI 29.84 kg/m²     General:      General appearance:pleasant and well-hydrated, in mild discomfort and A & O x3  Build:Overweight    HEENT:    Head:normocephalic and atraumatic  Sclera: icterus absent    Lungs:    Breathing:Normal expansion. No accessory muscle use. Abdomen:    Shape:non-distended and normal    Lumbar spine:    Tenderness:+ bilateral paraspinals, + pain with facet loading. Range of motion:abnormal mildly in lateral bending, flexion, extension rotation bilateral and is painful. Extremities:    Tremors:None bilaterally upper and lower  Range of motion:pain with internal rotation of hips not done.   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 Howard iSlva  Referred by Dr. Bhanu Buck for consideration for further injections as Dr. Jolee Kehr no longer takes his insurance  Pt's wife states she has discussed his LE symptoms and they have determined they feel his LE weakness is a vascular issue, pt states he can stand still                  Plan:     OARRS reviewed  Tramadol 50 mg #30 was stopped. No longer needed. He would like to stay away from medications. Caudal EVGENY done with 60% relief of lumbar symptoms x 2-3 days until he did yardwork              R and left L3,4,5 RFA with 100% relief x 1.5 months, repeat as bilateral per patient request                We discussed with the patient that combining opioids, benzodiazepines, alcohol, illicit drugs or sleep aids increases the risk of respiratory depression including death. We discussed that these medications may cause drowsiness, sedation or dizziness and have counseled the patient not to drive or operate machinery. We have discussed that these medications will be prescribed only by one provider. We have discussed with the patient about age related risk factors and have thoroughly discussed the importance of taking these medications as prescribed. The patient verbalizes understanding.     Cc: Referring physician

## 2021-03-10 ENCOUNTER — PREP FOR PROCEDURE (OUTPATIENT)
Dept: PAIN MANAGEMENT | Age: 86
End: 2021-03-10

## 2021-03-10 ENCOUNTER — OFFICE VISIT (OUTPATIENT)
Dept: PAIN MANAGEMENT | Age: 86
End: 2021-03-10
Payer: MEDICARE

## 2021-03-10 VITALS
TEMPERATURE: 97.3 F | WEIGHT: 208 LBS | SYSTOLIC BLOOD PRESSURE: 132 MMHG | DIASTOLIC BLOOD PRESSURE: 80 MMHG | OXYGEN SATURATION: 97 % | HEART RATE: 78 BPM | RESPIRATION RATE: 16 BRPM | HEIGHT: 70 IN | BODY MASS INDEX: 29.78 KG/M2

## 2021-03-10 DIAGNOSIS — G89.29 CHRONIC BILATERAL LOW BACK PAIN WITHOUT SCIATICA: ICD-10-CM

## 2021-03-10 DIAGNOSIS — M47.817 LUMBOSACRAL SPONDYLOSIS WITHOUT MYELOPATHY: ICD-10-CM

## 2021-03-10 DIAGNOSIS — G89.4 CHRONIC PAIN SYNDROME: Primary | ICD-10-CM

## 2021-03-10 DIAGNOSIS — M43.16 SPONDYLOLISTHESIS OF LUMBAR REGION: ICD-10-CM

## 2021-03-10 DIAGNOSIS — M47.817 LUMBOSACRAL SPONDYLOSIS WITHOUT MYELOPATHY: Primary | ICD-10-CM

## 2021-03-10 DIAGNOSIS — M54.50 CHRONIC BILATERAL LOW BACK PAIN WITHOUT SCIATICA: ICD-10-CM

## 2021-03-10 PROCEDURE — G8417 CALC BMI ABV UP PARAM F/U: HCPCS | Performed by: PAIN MEDICINE

## 2021-03-10 PROCEDURE — 99213 OFFICE O/P EST LOW 20 MIN: CPT | Performed by: PAIN MEDICINE

## 2021-03-10 PROCEDURE — 1123F ACP DISCUSS/DSCN MKR DOCD: CPT | Performed by: PAIN MEDICINE

## 2021-03-10 PROCEDURE — 4040F PNEUMOC VAC/ADMIN/RCVD: CPT | Performed by: PAIN MEDICINE

## 2021-03-10 PROCEDURE — G8484 FLU IMMUNIZE NO ADMIN: HCPCS | Performed by: PAIN MEDICINE

## 2021-03-10 PROCEDURE — G8427 DOCREV CUR MEDS BY ELIG CLIN: HCPCS | Performed by: PAIN MEDICINE

## 2021-03-10 PROCEDURE — 1036F TOBACCO NON-USER: CPT | Performed by: PAIN MEDICINE

## 2021-03-10 RX ORDER — BRIMONIDINE TARTRATE/TIMOLOL 0.2%-0.5%
DROPS OPHTHALMIC (EYE)
COMMUNITY
Start: 2021-02-08 | End: 2022-06-07

## 2021-03-10 RX ORDER — LATANOPROST 50 UG/ML
SOLUTION/ DROPS OPHTHALMIC
COMMUNITY
Start: 2021-02-16 | End: 2022-06-07

## 2021-03-17 NOTE — PROGRESS NOTES
[facility]  INSTRUCTIONS  . .......................................................................................................................................... [] Parking the day of Surgery is located in the [facility].   Upon entering the door, someone will be there to greet you    []  Bring photo ID and insurance card     [] You may have a light breakfast day of procedure    []  Wear loose comfortable clothing    []  Please follow instructions for medications as given per Dr's office     [] Stop blood thinners as per Dr's office instructions    [] You can expect a call the business day prior to procedure to notify you of your arrival time     [] Please arrange for     []  Other instructions

## 2021-03-17 NOTE — PROGRESS NOTES
Have you been tested for COVID  Yes           Have you been told you were positive for COVID Yes  Have you had any known exposure to someone that is positive for COVID No  Do you have a cough                   No              Do you have shortness of breath No                 Do you have a sore throat            No                Are you having chills                    No                Are you having muscle aches. No                    Please come to the hospital wearing a mask and have your significant other wear a mask as well. Both of you should check your temperature before leaving to come here,  if it is 100 or higher please call [phone] for instruction.

## 2021-03-25 ENCOUNTER — HOSPITAL ENCOUNTER (OUTPATIENT)
Age: 86
Setting detail: OUTPATIENT SURGERY
Discharge: HOME | End: 2021-03-25
Attending: PAIN MEDICINE | Admitting: PAIN MEDICINE
Payer: MEDICARE

## 2021-03-25 ENCOUNTER — HOSPITAL ENCOUNTER (OUTPATIENT)
Dept: GENERAL RADIOLOGY | Age: 86
Setting detail: OUTPATIENT SURGERY
Discharge: HOME | End: 2021-03-27
Attending: PAIN MEDICINE
Payer: MEDICARE

## 2021-03-25 VITALS
OXYGEN SATURATION: 97 % | RESPIRATION RATE: 18 BRPM | TEMPERATURE: 97.5 F | SYSTOLIC BLOOD PRESSURE: 146 MMHG | BODY MASS INDEX: 30.78 KG/M2 | HEART RATE: 54 BPM | HEIGHT: 70 IN | WEIGHT: 215 LBS | DIASTOLIC BLOOD PRESSURE: 71 MMHG

## 2021-03-25 DIAGNOSIS — R52 PAIN MANAGEMENT: ICD-10-CM

## 2021-03-25 PROCEDURE — 7100000010 HC PHASE II RECOVERY - FIRST 15 MIN: Performed by: PAIN MEDICINE

## 2021-03-25 PROCEDURE — 2500000003 HC RX 250 WO HCPCS: Performed by: PAIN MEDICINE

## 2021-03-25 PROCEDURE — 2709999900 HC NON-CHARGEABLE SUPPLY: Performed by: PAIN MEDICINE

## 2021-03-25 PROCEDURE — 7100000011 HC PHASE II RECOVERY - ADDTL 15 MIN: Performed by: PAIN MEDICINE

## 2021-03-25 PROCEDURE — 64635 DESTROY LUMB/SAC FACET JNT: CPT | Performed by: PAIN MEDICINE

## 2021-03-25 PROCEDURE — 3600000002 HC SURGERY LEVEL 2 BASE: Performed by: PAIN MEDICINE

## 2021-03-25 PROCEDURE — 6360000002 HC RX W HCPCS: Performed by: PAIN MEDICINE

## 2021-03-25 PROCEDURE — 3209999900 FLUORO FOR SURGICAL PROCEDURES

## 2021-03-25 PROCEDURE — 3600000012 HC SURGERY LEVEL 2 ADDTL 15MIN: Performed by: PAIN MEDICINE

## 2021-03-25 PROCEDURE — 64636 DESTROY L/S FACET JNT ADDL: CPT | Performed by: PAIN MEDICINE

## 2021-03-25 RX ORDER — METHYLPREDNISOLONE ACETATE 40 MG/ML
INJECTION, SUSPENSION INTRA-ARTICULAR; INTRALESIONAL; INTRAMUSCULAR; SOFT TISSUE PRN
Status: DISCONTINUED | OUTPATIENT
Start: 2021-03-25 | End: 2021-03-25 | Stop reason: ALTCHOICE

## 2021-03-25 RX ORDER — BUPIVACAINE HYDROCHLORIDE 2.5 MG/ML
INJECTION, SOLUTION EPIDURAL; INFILTRATION; INTRACAUDAL PRN
Status: DISCONTINUED | OUTPATIENT
Start: 2021-03-25 | End: 2021-03-25 | Stop reason: ALTCHOICE

## 2021-03-25 RX ORDER — LIDOCAINE HYDROCHLORIDE 20 MG/ML
INJECTION, SOLUTION INFILTRATION; PERINEURAL PRN
Status: DISCONTINUED | OUTPATIENT
Start: 2021-03-25 | End: 2021-03-25 | Stop reason: ALTCHOICE

## 2021-03-25 ASSESSMENT — PAIN DESCRIPTION - DESCRIPTORS: DESCRIPTORS: ACHING;DISCOMFORT

## 2021-03-25 NOTE — H&P
[facility]        [address] [facility], [facility] [address]  Dept: [phone]    Procedure History & Physical      Abigail Esposito     HPI:    Patient  is here for LBP for b/l L3,4,5 RFA  Labs/imaging studies reviewed   All question and concerns addressed including R/B/A associated with the procedure    Past Medical History:   Diagnosis Date    CA prostate, adenoca ([facility] [address] 4/25/2019    CAD (coronary artery disease)     Dr. Katherine Cervantes - [facility]     prostate- PO chemotherapy     Chronic bilateral low back pain without sciatica 3/6/2020    Cobalamin deficiency     Hyperlipidemia     Hypertension     Osteoarthritis     Osteochondropathy     Osteopenia     Pain in lower limb     Peripheral venous insufficiency     PONV (postoperative nausea and vomiting)     Prolonged emergence from general anesthesia     PVD (peripheral vascular disease) with claudication ([facility] [address] 4/25/2019       Past Surgical History:   Procedure Laterality Date    ACETABULUM FRACTURE SURGERY  3/24/11    ANESTHESIA NERVE BLOCK Bilateral 6/16/2020    BILATERAL L3 L4 L5 MEDIAL NERVE BRANCH BLOCK performed by Tran Myers DO at [facility]  2016   [address] GRAFT  Dec. 1996    [facility]/ Dr. Jefferson Velez, DIAGNOSTIC  2016    EYE SURGERY Bilateral 01/2018    cataract, glaucoma 2020-left eye    FEMUR FRACTURE SURGERY  1981    left   [address]    JOINT REPLACEMENT      bilat knee 2010, padmini dunne 2011     LYMPH NODE DISSECTION  10/2007    PAIN MANAGEMENT PROCEDURE N/A 5/19/2020    CAUDAL EPIDURAL STEROID INJECTION performed by Tran Myers DO at [address] [place] [address]  8/4/20    PAIN MANAGEMENT PROCEDURE Right 8/4/2020    RIGHT L3 4 5 MEDIAL BRANCH RADIOFREQUENCY ABLATION performed by Tran Myers DO at [address] [place] [address] Left 9/10/2020    LEFT L3 4 [address] ABLATION     +++IODINE ALLERGY+++   CPT: [id number] performed by Sherley Lesch, DO at [address] [facility].  10/31/07    AMIRAH STREETER M.D./ DA LETICIA LAPAROSCOPIC PROSTATECTOMY    TESTICLE REMOVAL Bilateral 02/2019    hx prostate ca-    TONSILLECTOMY      TOTAL HIP ARTHROPLASTY  11/30/11    right    TOTAL KNEE ARTHROPLASTY  2010    bilateral       Prior to Admission medications    Medication Sig Start Date End Date Taking? Authorizing Provider   COMBIGAN 0.2-0.5 % ophthalmic solution INSTILL 1 DROP INTO THE RIGHT EYE TWO TIMES A DAY. 2/8/21  Yes Historical Provider, MD   latanoprost (XALATAN) 0.005 % ophthalmic solution INSTILL 1 DROP IN RIGHT EYE EVERY DAY AT BEDTIME 2/16/21  Yes Historical Provider, MD   lisinopril (PRINIVIL;ZESTRIL) 10 MG tablet Take 1 tablet by mouth daily 2/3/21  Yes Sheryle Pollard, MD   omega-3 acid ethyl esters (LOVAZA) 1 g capsule Take 1 capsule by mouth 2 times daily 12/11/20  Yes Sheryle Pollard, MD   atorvastatin (LIPITOR) 40 MG tablet Take 1 tablet by mouth daily 9/16/20  Yes Sheryle Pollard, MD   omeprazole (PRILOSEC) 20 MG delayed release capsule Take 1 capsule by mouth daily 9/16/20  Yes Sheryle Pollard, MD   ERLEADA 60 MG TABS 60 mg Every 4 days 3/2/20  Yes Historical Provider, MD   Multiple Vitamins-Minerals (THERAPEUTIC MULTIVITAMIN-MINERALS) tablet Take 1 tablet by mouth daily    Yes Historical Provider, MD   metoprolol tartrate (LOPRESSOR) 25 MG tablet Take 25 mg by mouth 2 times daily   Yes Historical Provider, MD   Coenzyme Q10 (COQ10) 50 MG CAPS Take 1 capsule by mouth daily    Yes Historical Provider, MD   aspirin 81 MG tablet Take 81 mg by mouth daily    Yes Historical Provider, MD   Cyanocobalamin (VITAMIN B 12 PO) Take  by mouth daily.  sublingual  2/8/11  Yes Historical Provider, MD   denosumab (PROLIA) 60 MG/ML SOSY SC injection Inject 1 mL into the skin every 6 months 10/29/20   Sheryle Pollard, MD   meclizine (ANTIVERT) 25 MG tablet 1/2 - 1 by mouth every 6 hours as needed    Historical Provider, MD       Allergies   Allergen Reactions    Iodine Hives    Shellfish-Derived Products        Social History     Socioeconomic History    Marital status:      Spouse name: Not on file    Number of children: Not on file    Years of education: Not on file    Highest education level: Not on file   Occupational History    Not on file   Social Needs    Financial resource strain: Not on file    Food insecurity     Worry: Not on file     Inability: Not on file    Transportation needs     Medical: Not on file     Non-medical: Not on file   Tobacco Use    Smoking status: Former Smoker     Packs/day: 1.00     Years: 40.00     Pack years: 40.00     Types: Cigarettes     Quit date: 10/1/1994     Years since quittin.4    Smokeless tobacco: Former User     Types: Chew   Substance and Sexual Activity    Alcohol use:  Yes     Alcohol/week: 1.0 standard drinks     Types: 1 Cans of beer per week     Comment: rare    Drug use: No    Sexual activity: Not on file   Lifestyle    Physical activity     Days per week: Not on file     Minutes per session: Not on file    Stress: Not on file   Relationships    Social connections     Talks on phone: Not on file     Gets together: Not on file     Attends Sikhism service: Not on file     Active member of club or organization: Not on file     Attends meetings of clubs or organizations: Not on file     Relationship status: Not on file    Intimate partner violence     Fear of current or ex partner: Not on file     Emotionally abused: Not on file     Physically abused: Not on file     Forced sexual activity: Not on file   Other Topics Concern    Not on file   Social History Narrative    Problem List: History of polyp of colon, Disorder of bone density and structure, unspecified,    Osteochondropathy, Carcinoma in situ of prostate, Aneurysm of thoracic aorta, Anemia, Coronary    arteriosclerosis, Cobalamin deficiency, Multiple closed fractures of pelvis with disruption of pelvic [place],    Hyperlipidemia, Essential hypertension    Health Maintenance:    Bone Density Test Screening - (3/5/2019)    Bone Density Scan - (12/18/2015)    Influenza Vaccination - (10/7/2016)    Colonoscopy - (4/3/2012)    Couseled on Home Safety - (11/23/2015)    Colonoscopy Screening - (4/3/2012)    US Liver - 8/1/08    Medical Problems:    Coronary Artery Disease (CAD), Hypertension, Hyperlipidemia, Prostate Cancer    Surgical Hx:    Prostatectomy, Coronary Artery Bypass Graft (CABG)            FH: Father:    . (Hx)    Mother:    . (Hx)        SH: Marital:  - retired . Personal Habits: Cigarette Use: former cigarette smoker, Nonsmoker. Alcohol: Occasionally    consumes alcohol. Family History   Problem Relation Age of Onset    Stroke Mother     Cancer Mother         pancreatic    Cancer Father         prostate    Stroke Father          REVIEW OF SYSTEMS:    CONSTITUTIONAL:  negative for  fevers, chills, sweats and fatigue    RESPIRATORY:  negative for  dry cough, cough with sputum, dyspnea, wheezing and chest pain    CARDIOVASCULAR:  negative for chest pain, dyspnea, palpitations, syncope    GASTROINTESTINAL:  negative for nausea, vomiting, change in bowel habits, diarrhea, constipation and abdominal pain    MUSCULOSKELETAL: negative for muscle weakness    SKIN: negative for itching or rashes.     BEHAVIOR/PSYCH:  negative for poor appetite, increased appetite, decreased sleep and poor concentration    All other systems negative      PHYSICAL EXAM:    VITALS:  /64   Pulse 74   Temp 97.5 °F (36.4 °C) (Temporal)   Resp 15   Ht 5' 10\" (1.778 m)   Wt 215 lb (97.5 kg)   SpO2 98%   BMI 30.85 kg/m²     CONSTITUTIONAL:  awake, alert, cooperative, no apparent distress, and appears stated age    EYES: PERRLA, EOMI    LUNGS:  No increased work of breathing, no audible wheezing    CARDIOVASCULAR:  regular rate and rhythm    ABDOMEN:  Soft non tender non distended     EXTREMITIES: no signs of clubbing or cyanosis. MUSCULOSKELETAL: negative for flaccid muscle tone or spastic movements. SKIN: gross examination reveals no signs of rashes, or diaphoresis. NEURO: Cranial nerves II-XII grossly intact. No signs of agitated mood.        Assessment/Plan:    LB pain for b/l L3,4,5 RFA

## 2021-03-25 NOTE — OP NOTE
3/25/2021    Patient: Evy Molina  :  1935  Age:  82 y.o. Sex:  male     PRE-OPERATIVE DIAGNOSIS: Bilateral Lumbar spondylosis, lumbar facet arthropathy. POST-OPERATIVE DIAGNOSIS: Same. PROCEDURE:  Fluoroscopic-guided Bilateral  Lumbar facet medial branch radiofrequency ablation at levels L3,4,5 (anesthetizing the L4-5 and L5-S1 facet joints). SURGEON:  NAVARRO Carballo. ANESTHESIA: local    ESTIMATED BLOOD LOSS: None.  ______________________________________________________________________  HISTORY & PHYSICAL: Evy Molina presents today for fluoroscopic-guided Bilateral lumbar facet medial branch radiofrequency ablation at levels L3,4,5. The potential complications of the procedure were explained to [facility] again today and he has elected to undergo the aforementioned procedure. Antoni complete History & Physical examination were reviewed in depth, a copy of which is in the chart. DESCRIPTION OF PROCEDURE:    After confirming written and informed consent, a time-out was performed and Antoni name and date of birth, the procedure to be performed as well as the plan for the location of the needle insertion were confirmed. The patient was brought into the procedure room and placed in the prone position on the fluoroscopy table. Standard monitors were placed and vital signs were observed throughout the procedure. The area of the lumbar spine and upper buttocks was sterilely prepped with chloraprep and draped in a sterile manner. AP fluoroscopy was used to identify and christian bartons point at the targeted area. A 22 gauge 10 mm radiofrequency probe was advanced toward each of these points. Once bone was contacted, negative aspiration for blood and CSF was confirmed, sensory stimulation was performed at 50 Hz and at 0.4 volts generating a pressure sensation. Motor stimulation < 2.0 volts elicited multifidus twitching without any radicular symptoms.  1 ml of 2% lidocaine was injected prior to lesioning, which was performed for 90 seconds at 90 degrees centigrade. Once the lesions were complete, a solution of 0.25% marcaine 3 cc and 40 mg DepoMedrol was injected and distributed equally through each probe. The probes were removed . The patient's back was cleaned and bandages were placed over the needle insertion sites. Disposition the patient tolerated the procedure well and there were no complications . Vital signs remained stable throughout the procedure. The patient was escorted to the recovery area where they remained until discharge and written discharge instructions for the procedure were given. Plan: Fredi Cunningham will return to our pain management center as scheduled.      Preeti Dixon,

## 2021-03-31 NOTE — PROGRESS NOTES
[facility]  [address]  [facility]    Follow up Note      Castro Ochoa     Date of Visit:  4/1/2021    CC:  Patient presents for follow up   Chief Complaint   Patient presents with    Follow-up     Fluoroscopic-guided Bilateral  Lumbar facet medial branch radiofrequency ablation at levels L3,4,5 (anesthetizing the L4-5 and L5-S1 facet joints).  Lower Back Pain     HPI:    Pain is better      Appropriate analgesia with current medications regimen: None. Change in quality of symptoms:no. Medication side effects:none. Recent diagnostic testing:none. Recent interventional procedures:b/l L3,4,5 RFA with near 100% relief. He has been on anticoagulation medications to include ASA and has not been on herbal supplements.  He is not diabetic.     Imaging:   Lumbar MRI 4/2020 -  1. Metastatic lesions are noted in the right iliac bone and first   sacral body, but are not associated with neural encroachment. Other   metastases previously documented in the pelvis are not included on   this exam. Metastatic foci do enhance. 2. There is grade 1 anterolisthesis of L5 on S1 and loss of the L4-5   disc space. 3. Lateral portions of the circumferential disc bulge at the L5-S1   level could encroach on the L5 nerve roots lateral to the foramina on   either side. 4. Telescoping of facets and disc bulging results in severe central   stenosis of the right-sided L4 and L5 nerve roots and moderately   severe stenosis of the left-sided L2-L5 root levels, more severe   inferiorly.      Lumbar MRI 7/2019 -   IMPRESSION:    Bone lesions at S1, S2 and in the right iliac wing worrisome for a marrow   infiltrative process.  Recommend clinical correlation and correlation   with nuclear medicine bone scanning. Dextroconvex scoliosis and multilevel malalignment associated with   multilevel spondylosis as detailed.     L2-3 level, disc bulging with mild spinal canal stenosis and mild to moderate right foraminal stenosis. L3-4 level, disc bulging with mild spinal canal stenosis and mild to   moderate left foraminal stenosis. L4-5 level, disc degeneration and disc osteophyte complex with mild to   moderate foraminal stenosis. L5-S1 level, grade 1 anterolisthesis from bilateral L5 pars defects and   degenerative disc disease with moderate to severe foraminal stenosis and   impingement of the L5 nerve roots. Anatomic Thoracic/Lumbar Variant: None.  L4-5 is considered the level of   the iliac crest and assume there are 5 lumbar-type vertebrae. There are hepatic and renal cysts.  Recommend correlation with ultrasound.     Cervical MRI 2019 -   IMPRESSION:    10 mm low signal intensity lesion at the right side of the C6 vertebral   body and 7 mm signal intensity lesion at the base of C2 are worrisome for   a marrow infiltrative process such as osteoblastic metastasis.  Clinical   correlation and correlation with nuclear medicine bone scanning   recommended for further evaluation. Levoconvex scoliosis and multilevel malalignment associated multilevel   spondylosis most notably moderate to advanced degenerative disc disease   at C6-7 and mild to moderate degenerative disc disease at C5-6 and C4-5. There is no substantial spinal canal stenosis. There is multilevel acquired foraminal stenosis most notably moderate to   severe left and moderate right foraminal stenosis at C5-6. Anatomic Variant:  None.  Assume 7 cervical vertebrae with counting from   the craniocervical junction.                                         Potential Aberrant Drug-Related Behavior:  no     Urine Drug Screenin2020 consistent     OARRS report:  2020 consistent  2020 consistent  2020 consistent      Opioid Agreement:  Date enacted:  2020  Renewal date:    Past Medical History:   Diagnosis Date    CA prostate, adenoca ([address] 2019    CAD (coronary artery disease)     Dr. Charo Gill - [place] 1996     Cancer [facility])     prostate- PO chemotherapy     Chronic bilateral low back pain without sciatica 3/6/2020    Cobalamin deficiency     Hyperlipidemia     Hypertension     Osteoarthritis     Osteochondropathy     Osteopenia     Pain in lower limb     Peripheral venous insufficiency     PONV (postoperative nausea and vomiting)     Prolonged emergence from general anesthesia     PVD (peripheral vascular disease) with claudication ([facility] [address] 4/25/2019       Past Surgical History:   Procedure Laterality Date    ACETABULUM FRACTURE SURGERY  3/24/11    ANESTHESIA NERVE BLOCK Bilateral 6/16/2020    BILATERAL L3 L4 L5 MEDIAL NERVE BRANCH BLOCK performed by Rafal Pace DO at [address] [facility]  2016   [address] GRAFT  Dec. 1996    [facility]/ Dr. Say Tran, DIAGNOSTIC  2016    EYE SURGERY Bilateral 01/2018    cataract, glaucoma 2020-left eye    FEMUR FRACTURE SURGERY  1981    left   [address] [facility]    JOINT REPLACEMENT      bilat knee 2010, righ thip 2011     LYMPH NODE DISSECTION  10/2007    PAIN MANAGEMENT PROCEDURE N/A 5/19/2020    CAUDAL EPIDURAL STEROID INJECTION performed by Rafal Pace DO at [address] [facility]  8/4/20    PAIN MANAGEMENT PROCEDURE Right 8/4/2020    RIGHT L3 4 5 MEDIAL BRANCH RADIOFREQUENCY ABLATION performed by Rafal Pace DO at [address] [facility] Left 9/10/2020    LEFT L3 4 5 MEDIAL BRANCH RADIOFREQUENCY ABLATION     +++IODINE ALLERGY+++   CPT: [id number] performed by Rafal Pace DO at [address] [facility] Bilateral 3/25/2021    BILATERAL L3,4,5 MEDIAL NERVE BRANCH RADIOFREQUENCY ABLATION performed by Rafal Pace DO at [address]  10/31/07    AMIRAH STREETER M.D./ DA LETICIA LAPAROSCOPIC PROSTATECTOMY    TESTICLE REMOVAL Bilateral 02/2019    hx prostate ca-    TONSILLECTOMY      TOTAL HIP ARTHROPLASTY  11/30/11 right    TOTAL KNEE ARTHROPLASTY  2010    bilateral       Prior to Admission medications    Medication Sig Start Date End Date Taking? Authorizing Provider   COMBIGAN 0.2-0.5 % ophthalmic solution INSTILL 1 DROP INTO THE RIGHT EYE TWO TIMES A DAY. 2/8/21  Yes Historical Provider, MD   latanoprost (XALATAN) 0.005 % ophthalmic solution INSTILL 1 DROP IN RIGHT EYE EVERY DAY AT BEDTIME 2/16/21  Yes Historical Provider, MD   lisinopril (PRINIVIL;ZESTRIL) 10 MG tablet Take 1 tablet by mouth daily 2/3/21  Yes Julissa Pete MD   omega-3 acid ethyl esters (LOVAZA) 1 g capsule Take 1 capsule by mouth 2 times daily 12/11/20  Yes Julissa Pete MD   atorvastatin (LIPITOR) 40 MG tablet Take 1 tablet by mouth daily 9/16/20  Yes Julissa Pete MD   omeprazole (PRILOSEC) 20 MG delayed release capsule Take 1 capsule by mouth daily 9/16/20  Yes Julissa Pete MD   ERLEADA 60 MG TABS 60 mg Every 4 days 3/2/20  Yes Historical Provider, MD   meclizine (ANTIVERT) 25 MG tablet 1/2 - 1 by mouth every 6 hours as needed   Yes Historical Provider, MD   Multiple Vitamins-Minerals (THERAPEUTIC MULTIVITAMIN-MINERALS) tablet Take 1 tablet by mouth daily    Yes Historical Provider, MD   metoprolol tartrate (LOPRESSOR) 25 MG tablet Take 25 mg by mouth 2 times daily   Yes Historical Provider, MD   Coenzyme Q10 (COQ10) 50 MG CAPS Take 1 capsule by mouth daily    Yes Historical Provider, MD   aspirin 81 MG tablet Take 81 mg by mouth daily    Yes Historical Provider, MD   Cyanocobalamin (VITAMIN B 12 PO) Take  by mouth daily.  sublingual  2/8/11  Yes Historical Provider, MD   denosumab (PROLIA) 60 MG/ML SOSY SC injection Inject 1 mL into the skin every 6 months  Patient not taking: Reported on 4/1/2021 10/29/20   Julissa Pete MD       Allergies   Allergen Reactions    Iodine Hives    Shellfish-Derived Products        Social History     Socioeconomic History    Marital status:      Spouse name: Not on file    Number of children: Not (4/3/2012)    Couseled on Home Safety - (11/23/2015)    Colonoscopy Screening - (4/3/2012)    US Liver - 8/1/08    Medical Problems:    Coronary Artery Disease (CAD), Hypertension, Hyperlipidemia, Prostate Cancer    Surgical Hx:    Prostatectomy, Coronary Artery Bypass Graft (CABG)            FH: Father:    . (Hx)    Mother:    . (Hx)        SH: Marital:  - retired . Personal Habits: Cigarette Use: former cigarette smoker, Nonsmoker. Alcohol: Occasionally    consumes alcohol. Family History   Problem Relation Age of Onset    Stroke Mother     Cancer Mother         pancreatic    Cancer Father         prostate    Stroke Father        REVIEW OF SYSTEMS:     Virginia Iyerval denies fever/chills, chest pain, shortness of breath, new bowel or bladder complaints. All other review of systems was negative. PHYSICAL EXAMINATION:      /80   Pulse 69   Temp 97.1 °F (36.2 °C) (Infrared)   Resp 16   Ht 5' 10\" (1.778 m)   Wt 215 lb (97.5 kg)   SpO2 99%   BMI 30.85 kg/m²     General:      General appearance:pleasant and well-hydrated, in mild discomfort and A & O x3  Build:Normal Weight    HEENT:    Head:normocephalic and atraumatic  Sclera: icterus absent    Lungs:    Breathing:Normal expansion. No accessory muscle use. Abdomen:    Shape:non-distended and normal    Lumbar spine:    Tenderness:- bilateral paraspinals  Range of motion:normal in lateral bending, flexion, extension rotation bilateral and is not painful. Extremities:    Tremors:None bilaterally upper and lower  Range of motion:pain with internal rotation of hips not done.   Intact:Yes  Edema:None    Neurological:           [address]    Dermatology:    Skin:no unusual rashes, no skin lesions    Impression:    Axial LBP in region of L-S junction particularly when walking   2020 Lumbar MRI shows metastatic (prostate CA) S1-S2 lesion and L5-S1 grade 1-2 anterolisthesis with b/l pars defect  Has had metastatic (to sacrum and

## 2021-04-01 ENCOUNTER — OFFICE VISIT (OUTPATIENT)
Dept: PAIN MANAGEMENT | Age: 86
End: 2021-04-01
Payer: MEDICARE

## 2021-04-01 VITALS
DIASTOLIC BLOOD PRESSURE: 80 MMHG | WEIGHT: 215 LBS | HEART RATE: 69 BPM | RESPIRATION RATE: 16 BRPM | SYSTOLIC BLOOD PRESSURE: 132 MMHG | HEIGHT: 70 IN | BODY MASS INDEX: 30.78 KG/M2 | TEMPERATURE: 97.1 F | OXYGEN SATURATION: 99 %

## 2021-04-01 DIAGNOSIS — G89.4 CHRONIC PAIN SYNDROME: Primary | ICD-10-CM

## 2021-04-01 DIAGNOSIS — M43.16 SPONDYLOLISTHESIS OF LUMBAR REGION: ICD-10-CM

## 2021-04-01 DIAGNOSIS — M54.16 LUMBAR RADICULOPATHY: ICD-10-CM

## 2021-04-01 DIAGNOSIS — G89.29 CHRONIC BILATERAL LOW BACK PAIN WITHOUT SCIATICA: ICD-10-CM

## 2021-04-01 DIAGNOSIS — C61 PROSTATE CANCER METASTATIC TO BONE (HCC): ICD-10-CM

## 2021-04-01 DIAGNOSIS — C79.51 PROSTATE CANCER METASTATIC TO BONE (HCC): ICD-10-CM

## 2021-04-01 DIAGNOSIS — M47.817 LUMBOSACRAL SPONDYLOSIS WITHOUT MYELOPATHY: ICD-10-CM

## 2021-04-01 DIAGNOSIS — M54.50 CHRONIC BILATERAL LOW BACK PAIN WITHOUT SCIATICA: ICD-10-CM

## 2021-04-01 PROCEDURE — 4040F PNEUMOC VAC/ADMIN/RCVD: CPT | Performed by: PAIN MEDICINE

## 2021-04-01 PROCEDURE — 1036F TOBACCO NON-USER: CPT | Performed by: PAIN MEDICINE

## 2021-04-01 PROCEDURE — G8427 DOCREV CUR MEDS BY ELIG CLIN: HCPCS | Performed by: PAIN MEDICINE

## 2021-04-01 PROCEDURE — 99213 OFFICE O/P EST LOW 20 MIN: CPT | Performed by: PAIN MEDICINE

## 2021-04-01 PROCEDURE — G8417 CALC BMI ABV UP PARAM F/U: HCPCS | Performed by: PAIN MEDICINE

## 2021-04-01 PROCEDURE — 1123F ACP DISCUSS/DSCN MKR DOCD: CPT | Performed by: PAIN MEDICINE

## 2021-04-01 PROCEDURE — 99024 POSTOP FOLLOW-UP VISIT: CPT | Performed by: PAIN MEDICINE

## 2021-04-01 NOTE — PROGRESS NOTES
Do you currently have any of the following:    Fever: No  Headache:  No  Cough: No  Shortness of breath: No  Exposed to anyone with these symptoms: No         Mackenzie De Souza [facility] the [facility]. Nimco Cedeño is complaining of pain lower back. The pain is intermittent. The pain is described as dull. Pain is rated on his best day at a 1, on his worst day at a 1, and on average at a 1 on the VAS scale. He took his last dose of nothing . Any procedures since your last visit: Yes, with 90 % still relief. Pacemaker or defibrillator: No managed by . He is not on NSAIDS and is not on anticoagulation medications to include ASA and is managed by Diane Lombard, MD  .     Medication Contract and Consent for Opioid Use Documents Filed     Patient Documents       Type of Document Status Date Received Received By Description     Medication Contract Received 3/6/2020 10:13 AM Holley TINAJERO KIMMY med contract                /80   Pulse 69   Temp 97.1 °F (36.2 °C) (Infrared)   Resp 16   Ht 5' 10\" (1.778 m)   Wt 215 lb (97.5 kg)   SpO2 99%   BMI 30.85 kg/m²      No LMP for male patient.

## 2021-04-26 ENCOUNTER — HOSPITAL ENCOUNTER (OUTPATIENT)
Age: 86
End: 2021-04-26
Payer: MEDICARE

## 2021-04-26 ENCOUNTER — HOSPITAL ENCOUNTER (OUTPATIENT)
Age: 86
Discharge: HOME | End: 2021-04-26
Payer: MEDICARE

## 2021-04-26 ENCOUNTER — HOSPITAL ENCOUNTER (OUTPATIENT)
Age: 86
Discharge: HOME | End: 2021-04-28
Payer: MEDICARE

## 2021-04-26 ENCOUNTER — HOSPITAL ENCOUNTER (OUTPATIENT)
Dept: GENERAL RADIOLOGY | Age: 86
Discharge: HOME | End: 2021-04-28
Payer: MEDICARE

## 2021-04-26 DIAGNOSIS — E53.8 VITAMIN B12 DEFICIENCY: ICD-10-CM

## 2021-04-26 DIAGNOSIS — U07.1 COVID-19: ICD-10-CM

## 2021-04-26 DIAGNOSIS — D64.9 ANEMIA, UNSPECIFIED TYPE: ICD-10-CM

## 2021-04-26 DIAGNOSIS — E03.2 HYPOTHYROIDISM DUE TO MEDICATION: ICD-10-CM

## 2021-04-26 DIAGNOSIS — E55.9 VITAMIN D DEFICIENCY: ICD-10-CM

## 2021-04-26 DIAGNOSIS — R79.0 LOW MAGNESIUM LEVEL: ICD-10-CM

## 2021-04-26 DIAGNOSIS — N28.9 RENAL INSUFFICIENCY: ICD-10-CM

## 2021-04-26 DIAGNOSIS — E78.2 MIXED HYPERLIPIDEMIA: ICD-10-CM

## 2021-04-26 LAB
ALBUMIN SERPL-MCNC: 4.3 G/DL (ref 3.5–5.2)
ALP BLD-CCNC: 61 U/L (ref 40–129)
ALT SERPL-CCNC: 31 U/L (ref 0–40)
ANION GAP SERPL CALCULATED.3IONS-SCNC: 12 MMOL/L (ref 7–16)
AST SERPL-CCNC: 32 U/L (ref 0–39)
BACTERIA: NORMAL /HPF
BASOPHILS ABSOLUTE: 0.01 E9/L (ref 0–0.2)
BASOPHILS RELATIVE PERCENT: 0.3 % (ref 0–2)
BILIRUB SERPL-MCNC: 0.6 MG/DL (ref 0–1.2)
BILIRUBIN URINE: ABNORMAL
BLOOD, URINE: NEGATIVE
BUN BLDV-MCNC: 28 MG/DL (ref 6–23)
CALCIUM SERPL-MCNC: 10 MG/DL (ref 8.6–10.2)
CHLORIDE BLD-SCNC: 104 MMOL/L (ref 98–107)
CHOLESTEROL, TOTAL: 125 MG/DL (ref 0–199)
CLARITY: CLEAR
CO2: 27 MMOL/L (ref 22–29)
COLOR: YELLOW
CREAT SERPL-MCNC: 1.5 MG/DL (ref 0.7–1.2)
CREATININE URINE: 379 MG/DL (ref 40–278)
EOSINOPHILS ABSOLUTE: 0.06 E9/L (ref 0.05–0.5)
EOSINOPHILS RELATIVE PERCENT: 1.7 % (ref 0–6)
EPITHELIAL CELLS, UA: NORMAL /HPF
FERRITIN: 349 NG/ML
FOLATE: >20 NG/ML (ref 4.8–24.2)
GFR AFRICAN AMERICAN: 54
GFR NON-AFRICAN AMERICAN: 45 ML/MIN/1.73
GLUCOSE BLD-MCNC: 121 MG/DL (ref 74–99)
GLUCOSE URINE: NEGATIVE MG/DL
HCT VFR BLD CALC: 34.5 % (ref 37–54)
HDLC SERPL-MCNC: 40 MG/DL
HEMOGLOBIN: 11.5 G/DL (ref 12.5–16.5)
IMMATURE GRANULOCYTES #: 0.02 E9/L
IMMATURE GRANULOCYTES %: 0.6 % (ref 0–5)
KETONES, URINE: ABNORMAL MG/DL
LDL CHOLESTEROL CALCULATED: 45 MG/DL (ref 0–99)
LEUKOCYTE ESTERASE, URINE: NEGATIVE
LYMPHOCYTES ABSOLUTE: 0.83 E9/L (ref 1.5–4)
LYMPHOCYTES RELATIVE PERCENT: 23.7 % (ref 20–42)
MAGNESIUM: 1.9 MG/DL (ref 1.6–2.6)
MCH RBC QN AUTO: 29.5 PG (ref 26–35)
MCHC RBC AUTO-ENTMCNC: 33.3 % (ref 32–34.5)
MCV RBC AUTO: 88.5 FL (ref 80–99.9)
MICROALBUMIN UR-MCNC: 34 MG/L
MICROALBUMIN/CREAT UR-RTO: 9 (ref 0–30)
MONOCYTES ABSOLUTE: 0.34 E9/L (ref 0.1–0.95)
MONOCYTES RELATIVE PERCENT: 9.7 % (ref 2–12)
NEUTROPHILS ABSOLUTE: 2.24 E9/L (ref 1.8–7.3)
NEUTROPHILS RELATIVE PERCENT: 64 % (ref 43–80)
NITRITE, URINE: NEGATIVE
PDW BLD-RTO: 13.8 FL (ref 11.5–15)
PH UA: 5 (ref 5–9)
PLATELET # BLD: 140 E9/L (ref 130–450)
PMV BLD AUTO: 9.2 FL (ref 7–12)
POTASSIUM SERPL-SCNC: 4.6 MMOL/L (ref 3.5–5)
PROTEIN UA: ABNORMAL MG/DL
RBC # BLD: 3.9 E12/L (ref 3.8–5.8)
RBC UA: NORMAL /HPF (ref 0–2)
SODIUM BLD-SCNC: 143 MMOL/L (ref 132–146)
SPECIFIC GRAVITY UA: >=1.03 (ref 1–1.03)
T4 FREE: 1.24 NG/DL (ref 0.93–1.7)
TOTAL CK: 49 U/L (ref 20–200)
TOTAL PROTEIN: 7.2 G/DL (ref 6.4–8.3)
TRIGL SERPL-MCNC: 202 MG/DL (ref 0–149)
TSH SERPL DL<=0.05 MIU/L-ACNC: 5.74 UIU/ML (ref 0.27–4.2)
UROBILINOGEN, URINE: 0.2 E.U./DL
VITAMIN B-12: 1163 PG/ML (ref 211–946)
VITAMIN D 25-HYDROXY: 47 NG/ML (ref 30–100)
VLDLC SERPL CALC-MCNC: 40 MG/DL
WBC # BLD: 3.5 E9/L (ref 4.5–11.5)
WBC UA: NORMAL /HPF (ref 0–5)

## 2021-04-26 PROCEDURE — 36415 COLL VENOUS BLD VENIPUNCTURE: CPT

## 2021-04-26 PROCEDURE — 82306 VITAMIN D 25 HYDROXY: CPT

## 2021-04-26 PROCEDURE — 82607 VITAMIN B-12: CPT

## 2021-04-26 PROCEDURE — 82570 ASSAY OF URINE CREATININE: CPT

## 2021-04-26 PROCEDURE — 82728 ASSAY OF FERRITIN: CPT

## 2021-04-26 PROCEDURE — 82044 UR ALBUMIN SEMIQUANTITATIVE: CPT

## 2021-04-26 PROCEDURE — 83735 ASSAY OF MAGNESIUM: CPT

## 2021-04-26 PROCEDURE — 84443 ASSAY THYROID STIM HORMONE: CPT

## 2021-04-26 PROCEDURE — 85025 COMPLETE CBC W/AUTO DIFF WBC: CPT

## 2021-04-26 PROCEDURE — 71046 X-RAY EXAM CHEST 2 VIEWS: CPT

## 2021-04-26 PROCEDURE — 80053 COMPREHEN METABOLIC PANEL: CPT

## 2021-04-26 PROCEDURE — 80061 LIPID PANEL: CPT

## 2021-04-26 PROCEDURE — 82550 ASSAY OF CK (CPK): CPT

## 2021-04-26 PROCEDURE — 82746 ASSAY OF FOLIC ACID SERUM: CPT

## 2021-04-26 PROCEDURE — 81001 URINALYSIS AUTO W/SCOPE: CPT

## 2021-04-26 PROCEDURE — 84439 ASSAY OF FREE THYROXINE: CPT

## 2021-04-28 ENCOUNTER — OFFICE VISIT (OUTPATIENT)
Dept: PRIMARY CARE CLINIC | Age: 86
End: 2021-04-28
Payer: MEDICARE

## 2021-04-28 VITALS
DIASTOLIC BLOOD PRESSURE: 70 MMHG | SYSTOLIC BLOOD PRESSURE: 132 MMHG | BODY MASS INDEX: 30.42 KG/M2 | WEIGHT: 212 LBS | OXYGEN SATURATION: 98 % | HEART RATE: 85 BPM | TEMPERATURE: 97.9 F

## 2021-04-28 DIAGNOSIS — E53.8 VITAMIN B12 DEFICIENCY: ICD-10-CM

## 2021-04-28 DIAGNOSIS — R79.0 LOW MAGNESIUM LEVEL: ICD-10-CM

## 2021-04-28 DIAGNOSIS — I71.20 THORACIC AORTIC ANEURYSM WITHOUT RUPTURE: ICD-10-CM

## 2021-04-28 DIAGNOSIS — R73.9 HYPERGLYCEMIA: ICD-10-CM

## 2021-04-28 DIAGNOSIS — J30.9 ALLERGIC RHINITIS, UNSPECIFIED SEASONALITY, UNSPECIFIED TRIGGER: ICD-10-CM

## 2021-04-28 DIAGNOSIS — I10 ESSENTIAL HYPERTENSION: Primary | ICD-10-CM

## 2021-04-28 DIAGNOSIS — Z00.00 HEALTH MAINTENANCE EXAMINATION: ICD-10-CM

## 2021-04-28 DIAGNOSIS — E55.9 VITAMIN D DEFICIENCY: ICD-10-CM

## 2021-04-28 DIAGNOSIS — N28.9 RENAL INSUFFICIENCY: ICD-10-CM

## 2021-04-28 DIAGNOSIS — R91.1 LUNG NODULE: ICD-10-CM

## 2021-04-28 DIAGNOSIS — M81.0 AGE-RELATED OSTEOPOROSIS WITHOUT CURRENT PATHOLOGICAL FRACTURE: ICD-10-CM

## 2021-04-28 DIAGNOSIS — I25.10 CORONARY ARTERY DISEASE INVOLVING NATIVE HEART WITHOUT ANGINA PECTORIS, UNSPECIFIED VESSEL OR LESION TYPE: ICD-10-CM

## 2021-04-28 DIAGNOSIS — E78.2 MIXED HYPERLIPIDEMIA: ICD-10-CM

## 2021-04-28 DIAGNOSIS — D36.9 TUBULAR ADENOMA: ICD-10-CM

## 2021-04-28 DIAGNOSIS — E03.2 HYPOTHYROIDISM DUE TO MEDICATION: ICD-10-CM

## 2021-04-28 DIAGNOSIS — U07.1 COVID-19: ICD-10-CM

## 2021-04-28 DIAGNOSIS — C61 PROSTATE CA (HCC): ICD-10-CM

## 2021-04-28 DIAGNOSIS — R07.89 CHEST WALL PAIN: ICD-10-CM

## 2021-04-28 DIAGNOSIS — D64.9 ANEMIA, UNSPECIFIED TYPE: ICD-10-CM

## 2021-04-28 PROCEDURE — G8417 CALC BMI ABV UP PARAM F/U: HCPCS | Performed by: FAMILY MEDICINE

## 2021-04-28 PROCEDURE — 1123F ACP DISCUSS/DSCN MKR DOCD: CPT | Performed by: FAMILY MEDICINE

## 2021-04-28 PROCEDURE — 1036F TOBACCO NON-USER: CPT | Performed by: FAMILY MEDICINE

## 2021-04-28 PROCEDURE — G8427 DOCREV CUR MEDS BY ELIG CLIN: HCPCS | Performed by: FAMILY MEDICINE

## 2021-04-28 PROCEDURE — 4040F PNEUMOC VAC/ADMIN/RCVD: CPT | Performed by: FAMILY MEDICINE

## 2021-04-28 PROCEDURE — 99215 OFFICE O/P EST HI 40 MIN: CPT | Performed by: FAMILY MEDICINE

## 2021-04-28 NOTE — ASSESSMENT & PLAN NOTE
Counseled, differential reviewed, chest x-ray overall negative in this respect. Declines follow-up unless continues or worsens. Recommended topicals and discussed use of Tylenol.   Has CT pending from Dr. Hayden Coleman

## 2021-04-28 NOTE — PROGRESS NOTES
19  Dany Renteria : 1935 Sex: male  Age: 82 y.o. Chief Complaint   Patient presents with    Discuss Labs       HPI:       Patient presents today for follow-up. He has not felt the same since Covid. Lack of stamina. No specific symptoms. No chest pain or shortness of breath. He does have a murmur, echo completed  and follows up with cardiology . Had 2 ablation procedures for low back, did help. Was leaning on the floor tightening bolts on the toilet and noticed a cracking sensation with right chest wall pain afterward, had x-ray negative for fracture, it is improving. Recently saw Dr. Kirby Bravo and has appointment for CT in about a week    Overall feels he is tolerating the Erleada. There is a 25% risk of increased TSH with a low risk of hypothyroidism, TSH is elevated free T4 normal, monitor.      Blood work reviewed, BUN 28 creatinine 1.5 which is elevated but fluctuates, relatively stable, glucose borderline at 121 triglycerides down to 202 LDL up to 45 HDL 40TSH elevated at 5.740 but free T4 1.24, vitamin D 47 hemoglobin up to 11.5 white count down to 3.5 and counseled platelet count 719 vitamin B12 elevated at 5035 folic acid iron 20 ferritin 349 small bilirubin trace ketones and high specific gravity on urine, microalbumin creatinine ratio 9  Last chest x-ray reviewed,   1.  Residual infiltrates in lung parenchyma will be better demonstrated by CT   scan of the chest if this is needed for clinical management.       2.  No significant recurrence of infiltrates observed in the lung parenchyma   bilaterally.       3.  No signs for volume overload.           Most Recent Labs  CBC  Lab Results   Component Value Date    WBC 3.5 2021    WBC 7.4 2021    WBC 7.1 2021    RBC 3.90 2021    RBC 3.96 2021    RBC 4.06 2021    HGB 11.5 2021    HGB 11.6 2021    HGB 12.2 2021    HCT 34.5 2021    HCT 36.1 2021    HCT 35.9 CHOL 125 04/26/2021    CHOL 112 01/21/2021    CHOL 123 09/09/2020    HDL 40 04/26/2021    HDL 39 01/21/2021    HDL 45 09/09/2020    LDLCALC 45 04/26/2021    LDLCALC 22 01/21/2021    LDLCALC 37 09/09/2020    TRIG 202 04/26/2021    TRIG 257 01/21/2021    TRIG 205 09/09/2020     VITAMIN D  Lab Results   Component Value Date    VITD25 47 04/26/2021    VITD25 48 01/21/2021    VITD25 55 09/09/2020     MAGNESIUM  Lab Results   Component Value Date    MG 1.9 04/26/2021    MG 2.2 01/21/2021    MG 1.7 01/13/2021      PHOS  Lab Results   Component Value Date    PHOS 4.0 01/21/2021    PHOS 3.8 01/16/2021    PHOS 4.1 06/08/2020      PAWAN   No results found for: PAWAN  RHEUMATOID FACTOR  No results found for: RF  PSA  Lab Results   Component Value Date    PSA <0.03 03/09/2021    PSA <0.03 12/08/2020    PSA <0.03 07/27/2020      HEPATITIS C  No results found for: HCVABI  HIV  No results found for: PVE5WFR, HIV1QT  UA  Lab Results   Component Value Date    COLORU Yellow 04/26/2021    COLORU Yellow 01/21/2021    COLORU Yellow 01/13/2021    CLARITYU Clear 04/26/2021    CLARITYU Clear 01/21/2021    CLARITYU Clear 01/13/2021    GLUCOSEU Negative 04/26/2021    GLUCOSEU Negative 01/21/2021    GLUCOSEU Negative 01/13/2021    GLUCOSEU NEGATIVE 12/21/2010    BILIRUBINUR SMALL 04/26/2021    BILIRUBINUR Negative 01/21/2021    BILIRUBINUR Negative 01/13/2021    BILIRUBINUR NEGATIVE 12/21/2010    KETUA TRACE 04/26/2021    KETUA Negative 01/21/2021    KETUA 15 01/13/2021    SPECGRAV >=1.030 04/26/2021    SPECGRAV 1.025 01/21/2021    SPECGRAV 1.025 01/13/2021    BLOODU Negative 04/26/2021    BLOODU Negative 01/21/2021    BLOODU Negative 01/13/2021    PHUR 5.0 04/26/2021    PHUR 6.0 01/21/2021    PHUR 5.5 01/13/2021    PROTEINU TRACE 04/26/2021    PROTEINU Negative 01/21/2021    PROTEINU 30 01/13/2021    UROBILINOGEN 0.2 04/26/2021    UROBILINOGEN 0.2 01/21/2021    UROBILINOGEN 0.2 01/13/2021    NITRU Negative 04/26/2021    NITRU Negative 01/21/2021 NITRU Negative 01/13/2021    LEUKOCYTESUR Negative 04/26/2021    LEUKOCYTESUR Negative 01/21/2021    LEUKOCYTESUR Negative 01/13/2021     Urine Micro/Albumin Ratio  Lab Results   Component Value Date    MALBCR 9.0 04/26/2021    MALBCR 27.1 01/21/2021    MALBCR 11.2 09/09/2020               Review of Systems  ROS:    Const: Denies chills, fever, malaise and sweats. Eyes: Denies discharge, pain, redness and visual disturbance. ENMT: Denies earaches, other ear symptoms. Denies nasal or sinus symptoms other than if stated  above. Denies mouth and tongue lesions and sore throat. CV: Denies chest discomfort, pain; diaphoresis, dizziness, edema, lightheadedness, orthopnea,  palpitations, syncope and near syncopal episode or any exertional symptoms  Resp: Denies cough, hemoptysis, pleuritic pain, SOB, sputum production and wheezing. GI: Denies abdominal pain, change in bowel habits, hematochezia, melena, nausea and vomiting. : Denies urinary problems including dysuria. Musculo: Denies musculoskeletal symptoms other than above. Skin: Denies bruising and rash or changing skin lesions. Neuro: Denies headache, numbness, stiff neck, tingling and focal weakness slurred speech or facial  Droop  Extremities: Denies calf pain, redness or swelling. Hema/Lymph: Denies bleeding/bruising tendency and enlarged lymph nodes.         Current Outpatient Medications:     denosumab (PROLIA) 60 MG/ML SOSY SC injection, Inject 1 mL into the skin every 6 months, Disp: 1 mL, Rfl: 1    magnesium gluconate (MAGONATE) 500 MG tablet, Take 500 mg by mouth 2 times daily, Disp: , Rfl:     COMBIGAN 0.2-0.5 % ophthalmic solution, INSTILL 1 DROP INTO THE RIGHT EYE TWO TIMES A DAY., Disp: , Rfl:     latanoprost (XALATAN) 0.005 % ophthalmic solution, INSTILL 1 DROP IN RIGHT EYE EVERY DAY AT BEDTIME, Disp: , Rfl:     lisinopril (PRINIVIL;ZESTRIL) 10 MG tablet, Take 1 tablet by mouth daily, Disp: 30 tablet, Rfl: 3    omega-3 acid ethyl esters (LOVAZA) 1 g capsule, Take 1 capsule by mouth 2 times daily, Disp: 180 capsule, Rfl: 3    atorvastatin (LIPITOR) 40 MG tablet, Take 1 tablet by mouth daily, Disp: 90 tablet, Rfl: 3    omeprazole (PRILOSEC) 20 MG delayed release capsule, Take 1 capsule by mouth daily, Disp: 90 capsule, Rfl: 3    ERLEADA 60 MG TABS, 60 mg Every 4 days, Disp: , Rfl:     meclizine (ANTIVERT) 25 MG tablet, 1/2 - 1 by mouth every 6 hours as needed, Disp: , Rfl:     Multiple Vitamins-Minerals (THERAPEUTIC MULTIVITAMIN-MINERALS) tablet, Take 1 tablet by mouth daily , Disp: , Rfl:     metoprolol tartrate (LOPRESSOR) 25 MG tablet, Take 25 mg by mouth 2 times daily, Disp: , Rfl:     Coenzyme Q10 (COQ10) 50 MG CAPS, Take 1 capsule by mouth daily , Disp: , Rfl:     aspirin 81 MG tablet, Take 81 mg by mouth daily , Disp: , Rfl:     Cyanocobalamin (VITAMIN B 12 PO), Take  by mouth daily.  sublingual , Disp: , Rfl:   Allergies   Allergen Reactions    Iodine Hives    Shellfish-Derived Products        Past Medical History:   Diagnosis Date    CA prostate, adenoca ([facility] [address] 4/25/2019    CAD (coronary artery disease)     Dr. [facility])     prostate- PO chemotherapy     Chronic bilateral low back pain without sciatica 3/6/2020    Cobalamin deficiency     Hyperlipidemia     Hypertension     Osteoarthritis     Osteochondropathy     Osteopenia     Pain in lower limb     Peripheral venous insufficiency     PONV (postoperative nausea and vomiting)     Prolonged emergence from general anesthesia     PVD (peripheral vascular disease) with claudication ([address] 4/25/2019     Past Surgical History:   Procedure Laterality Date    ACETABULUM FRACTURE SURGERY  3/24/11    ANESTHESIA NERVE BLOCK Bilateral 6/16/2020    BILATERAL L3 L4 L5 MEDIAL NERVE BRANCH BLOCK performed by Margarita Barker DO at [facility]  2016   [address] [place] [address]  Dec. 1996    [facility]/  [place]    ENDOSCOPY, COLON, DIAGNOSTIC  2016    EYE SURGERY Bilateral 01/2018    cataract, glaucoma 2020-left eye    FEMUR FRACTURE SURGERY  1981    left    HERNIA REPAIR  1993    JOINT REPLACEMENT      bilat knee 2010, padmini dunne 2011     LYMPH NODE DISSECTION  10/2007    PAIN MANAGEMENT PROCEDURE N/A 5/19/2020    CAUDAL EPIDURAL STEROID INJECTION performed by Francis Delgado DO at [place] [address]  8/4/20    PAIN MANAGEMENT PROCEDURE Right 8/4/2020    RIGHT L3 4 5 MEDIAL BRANCH RADIOFREQUENCY ABLATION performed by Francis Delgado DO at [place] [address] Left 9/10/2020    LEFT L3 4 5 MEDIAL BRANCH RADIOFREQUENCY ABLATION     +++IODINE ALLERGY+++   CPT: 52032 [id number] performed by Francis Delgado DO at [facility] OR    PAIN MANAGEMENT PROCEDURE Bilateral 3/25/2021    BILATERAL L3,4,5 MEDIAL NERVE BRANCH RADIOFREQUENCY ABLATION performed by Francis Delgado DO at [address] [place] [address]  10/31/07    [address] M[address] [facility] [place] LAPAROSCOPIC PROSTATECTOMY    TESTICLE REMOVAL Bilateral 02/2019    hx prostate ca-    TONSILLECTOMY      TOTAL HIP ARTHROPLASTY  11/30/11    right    TOTAL KNEE ARTHROPLASTY  2010    bilateral     Family History   Problem Relation Age of Onset    Stroke Mother     Cancer Mother         pancreatic    Cancer Father         prostate    Stroke Father      Social History     Socioeconomic History    Marital status:      Spouse name: Not on file    Number of children: Not on file    Years of education: Not on file    Highest education level: Not on file   Occupational History    Not on file   Social Needs    Financial resource strain: Not on file    Food insecurity     Worry: Not on file     Inability: Not on file    Transportation needs     Medical: Not on file     Non-medical: Not on file   Tobacco Use    Smoking status: Former Smoker     Packs/day: 1.00     Years: 40.00     Pack years: 40.00     Types: Cigarettes Start date: 5     Quit date: 10/1/1994     Years since quittin.5    Smokeless tobacco: Former User     Types: Chew     Quit date:    Substance and Sexual Activity    Alcohol use: Yes     Alcohol/week: 1.0 standard drinks     Types: 1 Cans of beer per week     Comment: rare    Drug use: No    Sexual activity: Not on file   Lifestyle    Physical activity     Days per week: Not on file     Minutes per session: Not on file    Stress: Not on file   Relationships    Social connections     Talks on phone: Not on file     Gets together: Not on file     Attends Taoism service: Not on file     Active member of club or organization: Not on file     Attends meetings of clubs or organizations: Not on file     Relationship status: Not on file    Intimate partner violence     Fear of current or ex partner: Not on file     Emotionally abused: Not on file     Physically abused: Not on file     Forced sexual activity: Not on file   Other Topics Concern    Not on file   Social History Narrative    Problem List: History of polyp of colon, Disorder of bone density and structure, unspecified,    Osteochondropathy, Carcinoma in situ of prostate, Aneurysm of thoracic aorta, Anemia, Coronary    arteriosclerosis, Cobalamin deficiency, Multiple closed fractures of pelvis with disruption of pelvic [place],    Hyperlipidemia, Essential hypertension    Health Maintenance:    Bone Density Test Screening - (3/5/2019)    Bone Density Scan - (2015)    Influenza Vaccination - (10/7/2016)    Colonoscopy - (4/3/2012)    Couseled on Home Safety - (2015)    Colonoscopy Screening - (4/3/2012)    US Liver - 08    Medical Problems:    Coronary Artery Disease (CAD), Hypertension, Hyperlipidemia, Prostate Cancer    Surgical Hx:    Prostatectomy, Coronary Artery Bypass Graft (CABG)            FH: Father:    . (Hx)    Mother:    . (Hx)        SH: Marital:  - retired .     Personal Habits: Cigarette Use: former cigarette smoker, Nonsmoker. Alcohol: Occasionally    consumes alcohol. Vitals:    04/28/21 1307   BP: 132/70   Pulse: 85   Temp: 97.9 °F (36.6 °C)   SpO2: 98%   Weight: 212 lb (96.2 kg)      Wt Readings from Last 3 Encounters:   04/28/21 212 lb (96.2 kg)   04/22/21 213 lb 14.4 oz (97 kg)   04/01/21 215 lb (97.5 kg)        Physical Exam  Exam:  Const: Appears comfortable. No signs of acute distress present. Head/Face: Atraumatic on inspection. Eyes: EOMI in both eyes. No discharge from the eyes. PERRL. Sclerae clear. ENMT: Auditory canals normal. Tympanic membranes: intact and translucent. External nose WNL. Nasal mucosa is clear. Oropharynx: No erythema or exudate. Posterior pharynx is normal.  Neck: Supple. Palpation reveals no adenopathy. No masses appreciated. No JVD. Carotids: no  bruits. Resp: Respirations are unlabored. Clear to auscultation. No rales, rhonchi or wheezes appreciated  over the lungs bilaterally. CV: Rate is regular. Rhythm regular today. 3/6 TRIPP  Abdomen: Bowel sounds are normoactive. Abdomen is soft, nontender, and nondistended. No  abdominal masses. No palpable hepatosplenomegaly. Lymph: No palpable or visible regional lymphadenopathy. Skin: Dry and warm with no rash or concerning lesions. Extremities: No clubbing cyanosis or edema. No calf inflammation or tenderness. Normal pulses. Neuro: Alert and oriented. Affect: appropriate. Upper Extremities: 5/5 bilaterally. Lower Extremities:  5/5 bilaterally. Sensation intact to light touch. Reflexes: DTR's are symmetric and 2+ bilaterally. .  Cranial Nerves: Cranial nerves grossly intact. Muscular skeletal- limited range of motion low back. Mild right sided chest wall discomfort    Lab Results   Component Value Date    PSA <0.03 03/09/2021    PSA <0.03 12/08/2020    PSA <0.03 07/27/2020    PSADIA 0.02 12/09/2014    PSADIA <0.01 09/05/2014    PSADIA 0.32 06/03/2014          Assessment and Plan:   Diagnosis Orders   1. Essential hypertension  Comprehensive Metabolic Panel   2. Renal insufficiency  Comprehensive Metabolic Panel    Urinalysis    Microalbumin / Creatinine Urine Ratio   3. Anemia, unspecified type  CBC Auto Differential    Ferritin   4. COVID-19  Covid-19, Antibody   5. Lung nodule     6. Mixed hyperlipidemia  Lipid Panel    Comprehensive Metabolic Panel    CK   7. Allergic rhinitis, unspecified seasonality, unspecified trigger     8. Age-related osteoporosis without current pathological fracture  Phosphorus    Magnesium    denosumab (PROLIA) 60 MG/ML SOSY SC injection    DEXA BONE DENSITY AXIAL SKELETON   9. Coronary artery disease involving native heart without angina pectoris, unspecified vessel or lesion type     10. Prostate CA ([facility] [address]     11. Vitamin D deficiency  Vitamin D 25 Hydroxy   12. Vitamin B12 deficiency  Vitamin B12 & Folate   13. Hypothyroidism due to medication  TSH without Reflex    T4, Free   14. Health maintenance examination     15. Hyperglycemia  Hemoglobin A1C   16. Low magnesium level     17. Tubular adenoma     18. Thoracic aortic aneurysm without rupture ([facility] [address]     19. Chest wall pain       Murmur  Counseled extensively. Differential reviewed, including serious etiologies. Had echo 2/2021. Cont per dr Davion Lugo, sees 7/1    Lung nodule  Noted on CAT scan 1/21. Scheduled repeat 5/4. Saw Dr Eliseo Cowart last wk Counseled extensively. Differential reviewed, including serious etiologies. Allergic rhinitis  Counseled, chronic. Counseled on use of nasal steroid, vaporizer, nasal saline as well as additional treatment options for vasomotor rhinitis    COVID-19  Counseled. Risk of this virus reviewed. Has not felt quite right since but nonspecific. Importance of following with cardiology reviewed. He recently saw Dr. Ethan Bowie, and has CT ordered for early next week. Disc disease, degenerative, lumbar or lumbosacral  Counseled extensively. Differential reviewed, including serious etiologies.  Was Following with [facility], had to change because of insurance, now seeing Dr. Racquel Cardenas pain management, saw Dr. Elmer Benton. Previously saw [facility]. Counseled on injections, radiofrequency ablation etc.  Failed physical therapy. . Had recent radiofrequency ablation x2 and overall doing well    Bilateral carpal tunnel syndrome  Had emg/ncs Nov 2019 [facility]. Referred to [facility] but did not go because no symptoms    Health maintenance examination  Health maintenance issues discussed at length 9/20, encouraged yearly. Pancytopenia ([facility] [address]  Counseled extensively. Differential reviewed, including serious etiologies. Possibly from medication. .  Declines hematology. Monitor. Hemoglobin stable, increased, white count fluctuates, mildly low, platelets normalized. Proper hydration reviewed. Asymptomatic. Ferritin stable, monitor      Low magnesium level  Counseled. Goals reviewed. Now on magnesium oxide 400 mg daily. Monitor. Vitamin D deficiency  Continue current therapy. Monitor. Vitamin B12 deficiency  Appropriate supplementation reviewed.    Vitamin L78 and folic acid elevated last time, currently just on multivitamin, Monitor.  Risk of deficiency reviewed including anemia and neuropathy.  No longer on injection. Hyperglycemia  Counseled, not interested in meds now, monitor. Check hemoglobin A1c      Essential hypertension  Counseled. Became hypotensive. Cardiology low-dose lisinopril 10 mg. Now stable. Monitor. The risks of hypertension and hypotension reviewed. Watch closely ambulatory. Hyper and hypotensive precautions and parameters reviewed and previously written as well as parameters on pulse, call if out of range, ER dangers numbers. Lifestyle modification reviewed. Tolerating therapy. Tubular adenoma   Last colonoscopy Dr. Clay Ruth, 2/15 complaining repeat 3 years from then. Overdue. Declines now, fit cards or Cologuard. . Hemoglobin consistently low but stable. Declines any intervention now, declines any tests or procedures now.     Thyroid nodule  Counseled, incidental 7 mm right thyroid nodule on carotid ultrasound 3/19. Declines workup or follow-up now     Thoracic aneurysm, not ruptured  Counseled, potential serious is reviewed, if any symptoms directly to ER. Continue per Dr. Frances Piedra. He saw him 3/15 and had CT at least then. Dr. Frances Piedra discharged him at this point unless symptoms and he not interested in follow-up imaging. Of note CT was done for Covid 1/21 and they stated no acute abnormality of the thoracic aorta     PVD (peripheral vascular disease) with claudication [facility])  He saw Dr. Elba Isabel, who had him on pletal, had minimal results. Now follows with Dr Iliana Pearson vascular in [place] who states he did have a femoral blockage proximal that would require bypass as well as some distal blockages that would require stenting but they felt observation most appropriate. He did not feel medication was necessary or helpful at this time. They state Dr. Elba Isabel agrees with this plan. He is comfortable with this plan at this point. Serious signs and symptoms watch were reviewed. Admits to missing an appointment because of Covid pandemic. They were planning surgery but after discussion, and the risks of complications and the complexity of the surgery he is declining intervention now. States he watches the distance he walks but otherwise doing well. No sores etc.      Prostate CA (HCC)  Metastatic. Continue per Dr. Vadim Fink. PSA less than 0.01-0.07-0.3 , he had been on Lupron, had metastasis, since orchiectomy, did not tolerate xtandi so he stopped it , overall feeling well, on Erleada, follows regularly with Dr. Bong Kincaid      Mixed hyperlipidemia  Did very well on d.a.w. Crestor unfortunately severe myalgias on generic.  He could try to preauthorize d.a.w., but he chose to go on Lipitor instead, tolerating, declines change in dose, goals reviewed, risk benefits ADRs reviewed. Counseling CoQ10 and vitamin D. .  We discussed appropriate dosing given his risk factors       Metabolic disorder  Counseled. Blood sugar had been borderline, amidst a lot of sweets, globin A1c has been excellent not interested in insulin sensitizer. Lifestyle modification reviewed. Micro-macrovascular complications monitor. Monitor     Coronary artery disease involving native heart without angina pectoris  Continue per cardiology. Sees Dr. Wily Dickens in  , Had stress test 3/15. Asymptomatic now. He did have ectopy on exam but refuses EKG or other evaluation/treatment. There is a risk that the Erleada could cause cardiac arrhythmia     Age-related osteoporosis without current pathological fracture  Counseled extensively. He tolerated Fosamax but discontinued after he took from 7/09 through 7/16. Morbidity/mortality related to fracture reviewed. 11/13 bone density showed improvement, 12/15 -1.3 stable, 3/19 showed L2 -2.6, hip -1.0. Discussed the option of restarting alendronate with pros and cons reviewed, he has taken it twice. Was on prolia, , it has been over a   Year now. He agrees again. Needs bw within 2wks. He will go to [facility] for this now. Agrees to repeat dexa. Reordered Prolia    Gastroesophageal reflux disease without esophagitis  Asymptomatic as long as takes medication, had EGD in the past.  Risk of meds reviewed including electrolyte imbalance, renal etc.  Wants to continue. Glaucoma of left eye  Recent. Follows with eye care. Pressures came down with drops. 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 Renal insufficiency  Counseled, differential reviewed. Emphasize proper hydration and avoidance of nephrotoxic agents. He simply wants basic monitoring. Chest wall pain  Counseled, differential reviewed, chest x-ray overall negative in this respect.   Declines follow-up unless continues or

## 2021-04-29 ENCOUNTER — TELEPHONE (OUTPATIENT)
Dept: PRIMARY CARE CLINIC | Age: 86
End: 2021-04-29

## 2021-04-29 DIAGNOSIS — M81.0 AGE-RELATED OSTEOPOROSIS WITHOUT CURRENT PATHOLOGICAL FRACTURE: ICD-10-CM

## 2021-04-29 NOTE — TELEPHONE ENCOUNTER
Pt in need of a referral/order to the infusion center for his prolia injections.   Order was sent to the pharmacy instead of infusion center

## 2021-04-30 DIAGNOSIS — M81.0 SENILE OSTEOPOROSIS: ICD-10-CM

## 2021-05-05 ENCOUNTER — TELEPHONE (OUTPATIENT)
Dept: INFUSION THERAPY | Age: 86
End: 2021-05-05

## 2021-05-05 DIAGNOSIS — M81.0 AGE-RELATED OSTEOPOROSIS WITHOUT CURRENT PATHOLOGICAL FRACTURE: ICD-10-CM

## 2021-05-05 NOTE — RESULT ENCOUNTER NOTE
D/W Jenelle Jean. Sounds like he recently scheduled a Prolia injection for the near future. But I reviewed with him his bone density scan that has nearly normalized, T score -1.3 L1 and -1.2 neck which is an improvement of 14.7% in the lumbar spine and   a slight worsening of 2.6% in the hip since previous study. He had been on a course of alendronate 2 different times. The rationale of the Prolia was with the expected osteoporosis as shown on DEXA scan 2 years ago but again nearly normal at this time although false negatives reviewed.   After discussion we are going to hold off on Prolia, continue current management, falls precautions, weightbearing exercise, appropriate calcium/D and recheck bone density in 1 year sooner as needed

## 2021-05-05 NOTE — TELEPHONE ENCOUNTER
Dr. Enrrique Hernandez called patient and reviewed bone density test and has decided patient does not need Prolia injection. Appt. cancelled per wife and if they need us in future  will start a new referral.  Therefore this referral will be closed.

## 2021-06-08 ENCOUNTER — HOSPITAL ENCOUNTER (EMERGENCY)
Age: 86
End: 2021-06-08
Payer: MEDICARE

## 2021-06-08 ENCOUNTER — HOSPITAL ENCOUNTER (OUTPATIENT)
Age: 86
Discharge: HOME | End: 2021-06-08
Payer: MEDICARE

## 2021-06-08 LAB — PROSTATE SPECIFIC ANTIGEN: <0.03 NG/ML (ref 0–4)

## 2021-06-08 PROCEDURE — 84153 ASSAY OF PSA TOTAL: CPT

## 2021-06-08 PROCEDURE — 36415 COLL VENOUS BLD VENIPUNCTURE: CPT

## 2021-07-16 DIAGNOSIS — E78.2 MIXED HYPERLIPIDEMIA: ICD-10-CM

## 2021-07-16 DIAGNOSIS — K21.9 GASTROESOPHAGEAL REFLUX DISEASE WITHOUT ESOPHAGITIS: ICD-10-CM

## 2021-07-16 RX ORDER — ATORVASTATIN CALCIUM 40 MG/1
40 TABLET, FILM COATED ORAL DAILY
Qty: 90 TABLET | Refills: 3 | Status: SHIPPED | OUTPATIENT
Start: 2021-07-16

## 2021-07-16 RX ORDER — OMEPRAZOLE 20 MG/1
20 CAPSULE, DELAYED RELEASE ORAL DAILY
Qty: 90 CAPSULE | Refills: 3 | Status: SHIPPED
Start: 2021-07-16 | End: 2022-05-04 | Stop reason: SDUPTHER

## 2021-08-25 ENCOUNTER — HOSPITAL ENCOUNTER (OUTPATIENT)
Age: 86
Discharge: HOME | End: 2021-08-25
Payer: MEDICARE

## 2021-08-25 DIAGNOSIS — E53.8 VITAMIN B12 DEFICIENCY: ICD-10-CM

## 2021-08-25 DIAGNOSIS — M81.0 AGE-RELATED OSTEOPOROSIS WITHOUT CURRENT PATHOLOGICAL FRACTURE: ICD-10-CM

## 2021-08-25 DIAGNOSIS — E03.2 HYPOTHYROIDISM DUE TO MEDICATION: ICD-10-CM

## 2021-08-25 DIAGNOSIS — U07.1 COVID-19: ICD-10-CM

## 2021-08-25 DIAGNOSIS — I10 ESSENTIAL HYPERTENSION: ICD-10-CM

## 2021-08-25 DIAGNOSIS — N28.9 RENAL INSUFFICIENCY: ICD-10-CM

## 2021-08-25 DIAGNOSIS — D64.9 ANEMIA, UNSPECIFIED TYPE: ICD-10-CM

## 2021-08-25 DIAGNOSIS — R73.9 HYPERGLYCEMIA: ICD-10-CM

## 2021-08-25 DIAGNOSIS — E78.2 MIXED HYPERLIPIDEMIA: ICD-10-CM

## 2021-08-25 DIAGNOSIS — E55.9 VITAMIN D DEFICIENCY: ICD-10-CM

## 2021-08-25 LAB
ALBUMIN SERPL-MCNC: 4 G/DL (ref 3.5–5.2)
ALP BLD-CCNC: 46 U/L (ref 40–129)
ALT SERPL-CCNC: 17 U/L (ref 0–40)
ANION GAP SERPL CALCULATED.3IONS-SCNC: 9 MMOL/L (ref 7–16)
AST SERPL-CCNC: 23 U/L (ref 0–39)
BASOPHILS ABSOLUTE: 0.03 E9/L (ref 0–0.2)
BASOPHILS RELATIVE PERCENT: 0.8 % (ref 0–2)
BILIRUB SERPL-MCNC: 0.5 MG/DL (ref 0–1.2)
BILIRUBIN URINE: NEGATIVE
BLOOD, URINE: NEGATIVE
BUN BLDV-MCNC: 22 MG/DL (ref 6–23)
CALCIUM SERPL-MCNC: 9.8 MG/DL (ref 8.6–10.2)
CHLORIDE BLD-SCNC: 108 MMOL/L (ref 98–107)
CHOLESTEROL, TOTAL: 126 MG/DL (ref 0–199)
CLARITY: CLEAR
CO2: 27 MMOL/L (ref 22–29)
COLOR: YELLOW
CREAT SERPL-MCNC: 1.3 MG/DL (ref 0.7–1.2)
CREATININE URINE: 160 MG/DL (ref 40–278)
EOSINOPHILS ABSOLUTE: 0.08 E9/L (ref 0.05–0.5)
EOSINOPHILS RELATIVE PERCENT: 2 % (ref 0–6)
FERRITIN: 202 NG/ML
FOLATE: >20 NG/ML (ref 4.8–24.2)
GFR AFRICAN AMERICAN: >60
GFR NON-AFRICAN AMERICAN: 53 ML/MIN/1.73
GLUCOSE BLD-MCNC: 110 MG/DL (ref 74–99)
GLUCOSE URINE: NEGATIVE MG/DL
HBA1C MFR BLD: 4.7 % (ref 4–5.6)
HCT VFR BLD CALC: 33.9 % (ref 37–54)
HDLC SERPL-MCNC: 41 MG/DL
HEMOGLOBIN: 11.4 G/DL (ref 12.5–16.5)
IMMATURE GRANULOCYTES #: 0.02 E9/L
IMMATURE GRANULOCYTES %: 0.5 % (ref 0–5)
KETONES, URINE: NEGATIVE MG/DL
LDL CHOLESTEROL CALCULATED: 39 MG/DL (ref 0–99)
LEUKOCYTE ESTERASE, URINE: NEGATIVE
LYMPHOCYTES ABSOLUTE: 1.11 E9/L (ref 1.5–4)
LYMPHOCYTES RELATIVE PERCENT: 28 % (ref 20–42)
MAGNESIUM: 1.8 MG/DL (ref 1.6–2.6)
MCH RBC QN AUTO: 29.8 PG (ref 26–35)
MCHC RBC AUTO-ENTMCNC: 33.6 % (ref 32–34.5)
MCV RBC AUTO: 88.7 FL (ref 80–99.9)
MICROALBUMIN UR-MCNC: <12 MG/L
MICROALBUMIN/CREAT UR-RTO: ABNORMAL (ref 0–30)
MONOCYTES ABSOLUTE: 0.3 E9/L (ref 0.1–0.95)
MONOCYTES RELATIVE PERCENT: 7.6 % (ref 2–12)
NEUTROPHILS ABSOLUTE: 2.42 E9/L (ref 1.8–7.3)
NEUTROPHILS RELATIVE PERCENT: 61.1 % (ref 43–80)
NITRITE, URINE: NEGATIVE
PDW BLD-RTO: 14.3 FL (ref 11.5–15)
PH UA: 5.5 (ref 5–9)
PHOSPHORUS: 3.9 MG/DL (ref 2.5–4.5)
PLATELET # BLD: 129 E9/L (ref 130–450)
PMV BLD AUTO: 10 FL (ref 7–12)
POTASSIUM SERPL-SCNC: 4.4 MMOL/L (ref 3.5–5)
PROTEIN UA: NEGATIVE MG/DL
RBC # BLD: 3.82 E12/L (ref 3.8–5.8)
SODIUM BLD-SCNC: 144 MMOL/L (ref 132–146)
SPECIFIC GRAVITY UA: 1.02 (ref 1–1.03)
T4 FREE: 1.07 NG/DL (ref 0.93–1.7)
TOTAL CK: 62 U/L (ref 20–200)
TOTAL PROTEIN: 6.6 G/DL (ref 6.4–8.3)
TRIGL SERPL-MCNC: 230 MG/DL (ref 0–149)
TSH SERPL DL<=0.05 MIU/L-ACNC: 4.55 UIU/ML (ref 0.27–4.2)
UROBILINOGEN, URINE: 0.2 E.U./DL
VITAMIN B-12: 1280 PG/ML (ref 211–946)
VITAMIN D 25-HYDROXY: 41 NG/ML (ref 30–100)
VLDLC SERPL CALC-MCNC: 46 MG/DL
WBC # BLD: 4 E9/L (ref 4.5–11.5)

## 2021-08-25 PROCEDURE — 82607 VITAMIN B-12: CPT

## 2021-08-25 PROCEDURE — 86769 SARS-COV-2 COVID-19 ANTIBODY: CPT

## 2021-08-25 PROCEDURE — 85025 COMPLETE CBC W/AUTO DIFF WBC: CPT

## 2021-08-25 PROCEDURE — 82570 ASSAY OF URINE CREATININE: CPT

## 2021-08-25 PROCEDURE — 82550 ASSAY OF CK (CPK): CPT

## 2021-08-25 PROCEDURE — 82728 ASSAY OF FERRITIN: CPT

## 2021-08-25 PROCEDURE — 36415 COLL VENOUS BLD VENIPUNCTURE: CPT

## 2021-08-25 PROCEDURE — 83036 HEMOGLOBIN GLYCOSYLATED A1C: CPT

## 2021-08-25 PROCEDURE — 84439 ASSAY OF FREE THYROXINE: CPT

## 2021-08-25 PROCEDURE — 82306 VITAMIN D 25 HYDROXY: CPT

## 2021-08-25 PROCEDURE — 84100 ASSAY OF PHOSPHORUS: CPT

## 2021-08-25 PROCEDURE — 82746 ASSAY OF FOLIC ACID SERUM: CPT

## 2021-08-25 PROCEDURE — 84443 ASSAY THYROID STIM HORMONE: CPT

## 2021-08-25 PROCEDURE — 80053 COMPREHEN METABOLIC PANEL: CPT

## 2021-08-25 PROCEDURE — 81003 URINALYSIS AUTO W/O SCOPE: CPT

## 2021-08-25 PROCEDURE — 82044 UR ALBUMIN SEMIQUANTITATIVE: CPT

## 2021-08-25 PROCEDURE — 83735 ASSAY OF MAGNESIUM: CPT

## 2021-08-25 PROCEDURE — 80061 LIPID PANEL: CPT

## 2021-08-27 LAB — SARS-COV-2 ANTIBODY, TOTAL: REACTIVE

## 2021-08-31 ENCOUNTER — OFFICE VISIT (OUTPATIENT)
Dept: PRIMARY CARE CLINIC | Age: 86
End: 2021-08-31
Payer: MEDICARE

## 2021-08-31 VITALS
HEART RATE: 57 BPM | OXYGEN SATURATION: 97 % | BODY MASS INDEX: 30.82 KG/M2 | TEMPERATURE: 97.5 F | SYSTOLIC BLOOD PRESSURE: 134 MMHG | WEIGHT: 214.8 LBS | DIASTOLIC BLOOD PRESSURE: 72 MMHG

## 2021-08-31 DIAGNOSIS — E55.9 VITAMIN D DEFICIENCY: ICD-10-CM

## 2021-08-31 DIAGNOSIS — E53.8 VITAMIN B12 DEFICIENCY: ICD-10-CM

## 2021-08-31 DIAGNOSIS — M81.0 AGE-RELATED OSTEOPOROSIS WITHOUT CURRENT PATHOLOGICAL FRACTURE: ICD-10-CM

## 2021-08-31 DIAGNOSIS — D64.9 ANEMIA, UNSPECIFIED TYPE: ICD-10-CM

## 2021-08-31 DIAGNOSIS — J30.9 ALLERGIC RHINITIS, UNSPECIFIED SEASONALITY, UNSPECIFIED TRIGGER: ICD-10-CM

## 2021-08-31 DIAGNOSIS — K21.9 GASTROESOPHAGEAL REFLUX DISEASE WITHOUT ESOPHAGITIS: ICD-10-CM

## 2021-08-31 DIAGNOSIS — E78.2 MIXED HYPERLIPIDEMIA: ICD-10-CM

## 2021-08-31 DIAGNOSIS — Z86.16 HISTORY OF COVID-19: ICD-10-CM

## 2021-08-31 DIAGNOSIS — E03.2 HYPOTHYROIDISM DUE TO MEDICATION: ICD-10-CM

## 2021-08-31 DIAGNOSIS — C61 PROSTATE CA (HCC): ICD-10-CM

## 2021-08-31 DIAGNOSIS — N28.9 RENAL INSUFFICIENCY: ICD-10-CM

## 2021-08-31 DIAGNOSIS — I10 ESSENTIAL HYPERTENSION: Primary | ICD-10-CM

## 2021-08-31 DIAGNOSIS — I25.10 CORONARY ARTERY DISEASE INVOLVING NATIVE HEART WITHOUT ANGINA PECTORIS, UNSPECIFIED VESSEL OR LESION TYPE: ICD-10-CM

## 2021-08-31 DIAGNOSIS — R79.0 LOW MAGNESIUM LEVEL: ICD-10-CM

## 2021-08-31 PROCEDURE — 1123F ACP DISCUSS/DSCN MKR DOCD: CPT | Performed by: FAMILY MEDICINE

## 2021-08-31 PROCEDURE — G8427 DOCREV CUR MEDS BY ELIG CLIN: HCPCS | Performed by: FAMILY MEDICINE

## 2021-08-31 PROCEDURE — 99214 OFFICE O/P EST MOD 30 MIN: CPT | Performed by: FAMILY MEDICINE

## 2021-08-31 PROCEDURE — G8417 CALC BMI ABV UP PARAM F/U: HCPCS | Performed by: FAMILY MEDICINE

## 2021-08-31 PROCEDURE — 4040F PNEUMOC VAC/ADMIN/RCVD: CPT | Performed by: FAMILY MEDICINE

## 2021-08-31 PROCEDURE — 1036F TOBACCO NON-USER: CPT | Performed by: FAMILY MEDICINE

## 2021-08-31 RX ORDER — LISINOPRIL 10 MG/1
10 TABLET ORAL DAILY
Qty: 90 TABLET | Refills: 3 | Status: SHIPPED | OUTPATIENT
Start: 2021-08-31

## 2021-08-31 SDOH — ECONOMIC STABILITY: FOOD INSECURITY: WITHIN THE PAST 12 MONTHS, THE FOOD YOU BOUGHT JUST DIDN'T LAST AND YOU DIDN'T HAVE MONEY TO GET MORE.: NEVER TRUE

## 2021-08-31 SDOH — ECONOMIC STABILITY: FOOD INSECURITY: WITHIN THE PAST 12 MONTHS, YOU WORRIED THAT YOUR FOOD WOULD RUN OUT BEFORE YOU GOT MONEY TO BUY MORE.: NEVER TRUE

## 2021-08-31 ASSESSMENT — SOCIAL DETERMINANTS OF HEALTH (SDOH): HOW HARD IS IT FOR YOU TO PAY FOR THE VERY BASICS LIKE FOOD, HOUSING, MEDICAL CARE, AND HEATING?: NOT HARD AT ALL

## 2021-08-31 NOTE — PROGRESS NOTES
19  Verito Hahn : 1935 Sex: male  Age: 82 y.o. Chief Complaint   Patient presents with    Hypertension    Hypothyroidism       HPI:       Patient presents today for follow-up. He is here with his wife. Overall he feels very well, no complaints or concerns today.     blood work reviewed,CMP shows chloride 108 creatinine elevated but stable at 1.3 glucose 110 triglycerides 230 LDL 39 HDL 41 LFTs normal TSH remains elevated at 4.5 with a normal free T4 of 1.07 hemoglobin A1c only 4.7 vitamin D 47 white count up to 4.0 hemoglobin low but stable 11.4 platelet count 792 folic acid greater than 20 ferritin 202 vitamin B12 1280, urinalysis negative microalbumin creatinine ratio-NC  Most Recent Labs  CBC  Lab Results   Component Value Date    WBC 4.0 2021    WBC 3.5 2021    WBC 7.4 2021    RBC 3.82 2021    RBC 3.90 2021    RBC 3.96 2021    HGB 11.4 2021    HGB 11.5 2021    HGB 11.6 2021    HCT 33.9 2021    HCT 34.5 2021    HCT 36.1 2021    MCV 88.7 2021    MCV 88.5 2021    MCV 91.2 2021     2021     2021     2021      CMP  Lab Results   Component Value Date     2021     2021     2021    K 4.4 2021    K 4.6 2021    K 4.5 2021    K 4.4 2021    K 4.4 2021     2021     2021     2021    CO2 27 2021    CO2 27 2021    CO2 28 2021    ANIONGAP 9 2021    ANIONGAP 12 2021    ANIONGAP 8 2021    GLUCOSE 110 2021    GLUCOSE 121 2021    GLUCOSE 100 2021    GLUCOSE 113 2011    GLUCOSE 93 2011    GLUCOSE 101 2011    BUN 22 2021    BUN 28 2021    BUN 28 2021    CREATININE 1.3 2021    CREATININE 1.5 2021    CREATININE 1.3 2021    LABGLOM 53 2021    LABGLOM 45 2021    LABGLOM 53 01/28/2021    GFRAA >60 08/25/2021    GFRAA 54 04/26/2021    GFRAA >60 01/28/2021    CALCIUM 9.8 08/25/2021    CALCIUM 10.0 04/26/2021    CALCIUM 9.4 01/28/2021    PROT 6.6 08/25/2021    PROT 7.2 04/26/2021    PROT 6.6 01/28/2021    LABALBU 4.0 08/25/2021    LABALBU 4.3 04/26/2021    LABALBU 3.8 01/28/2021    LABALBU 2.9 03/26/2011    LABALBU 3.0 03/25/2011    LABALBU 3.4 03/24/2011    BILITOT 0.5 08/25/2021    BILITOT 0.6 04/26/2021    BILITOT 0.9 01/28/2021    ALKPHOS 46 08/25/2021    ALKPHOS 61 04/26/2021    ALKPHOS 61 01/28/2021    AST 23 08/25/2021    AST 32 04/26/2021    AST 21 01/28/2021    ALT 17 08/25/2021    ALT 31 04/26/2021    ALT 27 01/28/2021     A1C  Lab Results   Component Value Date    LABA1C 4.7 08/25/2021    LABA1C 5.3 01/21/2021    LABA1C 4.8 09/09/2020     TSH  Lab Results   Component Value Date    TSH 4.550 08/25/2021    TSH 5.740 04/26/2021    TSH 4.760 01/21/2021     FREET4  Lab Results   Component Value Date    L6BYXWN 9.5 01/31/2011     LIPID  Lab Results   Component Value Date    CHOL 126 08/25/2021    CHOL 125 04/26/2021    CHOL 112 01/21/2021    HDL 41 08/25/2021    HDL 40 04/26/2021    HDL 39 01/21/2021    LDLCALC 39 08/25/2021    LDLCALC 45 04/26/2021    LDLCALC 22 01/21/2021    TRIG 230 08/25/2021    TRIG 202 04/26/2021    TRIG 257 01/21/2021     VITAMIN D  Lab Results   Component Value Date    VITD25 41 08/25/2021    VITD25 47 04/26/2021    VITD25 48 01/21/2021     MAGNESIUM  Lab Results   Component Value Date    MG 1.8 08/25/2021    MG 1.9 04/26/2021    MG 2.2 01/21/2021      PHOS  Lab Results   Component Value Date    PHOS 3.9 08/25/2021    PHOS 4.0 01/21/2021    PHOS 3.8 01/16/2021      PAWAN   No results found for: PAWAN  RHEUMATOID FACTOR  No results found for: RF  PSA  Lab Results   Component Value Date    PSA <0.03 06/08/2021    PSA <0.03 03/09/2021    PSA <0.03 12/08/2020      HEPATITIS C  No results found for: HCVABI  HIV  No results found for: IMY4NNA, HIV1QT  UA  Lab Results   Component Value Date    COLORU Yellow 08/25/2021    COLORU Yellow 04/26/2021    COLORU Yellow 01/21/2021    CLARITYU Clear 08/25/2021    CLARITYU Clear 04/26/2021    CLARITYU Clear 01/21/2021    GLUCOSEU Negative 08/25/2021    GLUCOSEU Negative 04/26/2021    GLUCOSEU Negative 01/21/2021    GLUCOSEU NEGATIVE 12/21/2010    BILIRUBINUR Negative 08/25/2021    BILIRUBINUR SMALL 04/26/2021    BILIRUBINUR Negative 01/21/2021    BILIRUBINUR NEGATIVE 12/21/2010    KETUA Negative 08/25/2021    KETUA TRACE 04/26/2021    KETUA Negative 01/21/2021    SPECGRAV 1.025 08/25/2021    SPECGRAV >=1.030 04/26/2021    SPECGRAV 1.025 01/21/2021    BLOODU Negative 08/25/2021    BLOODU Negative 04/26/2021    BLOODU Negative 01/21/2021    PHUR 5.5 08/25/2021    PHUR 5.0 04/26/2021    PHUR 6.0 01/21/2021    PROTEINU Negative 08/25/2021    PROTEINU TRACE 04/26/2021    PROTEINU Negative 01/21/2021    UROBILINOGEN 0.2 08/25/2021    UROBILINOGEN 0.2 04/26/2021    UROBILINOGEN 0.2 01/21/2021    NITRU Negative 08/25/2021    NITRU Negative 04/26/2021    NITRU Negative 01/21/2021    LEUKOCYTESUR Negative 08/25/2021    LEUKOCYTESUR Negative 04/26/2021    LEUKOCYTESUR Negative 01/21/2021     Urine Micro/Albumin Ratio  Lab Results   Component Value Date    MALBCR - 08/25/2021    MALBCR 9.0 04/26/2021    MALBCR 27.1 01/21/2021               Review of Systems  ROS:    Const: Denies chills, fever, malaise and sweats. Eyes: Denies discharge, pain, redness and visual disturbance. ENMT: Denies earaches, other ear symptoms. Denies nasal or sinus symptoms other than if stated  above. Denies mouth and tongue lesions and sore throat. CV: Denies chest discomfort, pain; diaphoresis, dizziness, edema, lightheadedness, orthopnea,  palpitations, syncope and near syncopal episode or any exertional symptoms  Resp: Denies cough, hemoptysis, pleuritic pain, SOB, sputum production and wheezing.   GI: Denies abdominal pain, change in bowel habits, hematochezia, melena, nausea and vomiting. : Denies urinary problems including dysuria. Musculo: Denies musculoskeletal symptoms   Skin: Denies bruising and rash or changing skin lesions. Neuro: Denies headache, numbness, stiff neck, tingling and focal weakness slurred speech or facial  Droop  Extremities: Denies calf pain, redness or swelling. Hema/Lymph: Denies bleeding/bruising tendency and enlarged lymph nodes. Current Outpatient Medications:     lisinopril (PRINIVIL;ZESTRIL) 10 MG tablet, Take 1 tablet by mouth daily, Disp: 90 tablet, Rfl: 3    omeprazole (PRILOSEC) 20 MG delayed release capsule, Take 1 capsule by mouth daily, Disp: 90 capsule, Rfl: 3    atorvastatin (LIPITOR) 40 MG tablet, Take 1 tablet by mouth daily, Disp: 90 tablet, Rfl: 3    magnesium gluconate (MAGONATE) 500 MG tablet, Take 500 mg by mouth 2 times daily, Disp: , Rfl:     COMBIGAN 0.2-0.5 % ophthalmic solution, INSTILL 1 DROP INTO THE RIGHT EYE TWO TIMES A DAY., Disp: , Rfl:     latanoprost (XALATAN) 0.005 % ophthalmic solution, INSTILL 1 DROP IN RIGHT EYE EVERY DAY AT BEDTIME, Disp: , Rfl:     omega-3 acid ethyl esters (LOVAZA) 1 g capsule, Take 1 capsule by mouth 2 times daily, Disp: 180 capsule, Rfl: 3    ERLEADA 60 MG TABS, 60 mg Every 4 days, Disp: , Rfl:     meclizine (ANTIVERT) 25 MG tablet, 1/2 - 1 by mouth every 6 hours as needed, Disp: , Rfl:     Multiple Vitamins-Minerals (THERAPEUTIC MULTIVITAMIN-MINERALS) tablet, Take 1 tablet by mouth daily , Disp: , Rfl:     metoprolol tartrate (LOPRESSOR) 25 MG tablet, Take 25 mg by mouth 2 times daily, Disp: , Rfl:     Coenzyme Q10 (COQ10) 50 MG CAPS, Take 1 capsule by mouth daily , Disp: , Rfl:     aspirin 81 MG tablet, Take 81 mg by mouth daily , Disp: , Rfl:     Cyanocobalamin (VITAMIN B 12 PO), Take  by mouth daily.  sublingual , Disp: , Rfl:   Allergies   Allergen Reactions    Iodine Hives    Shellfish-Derived Products        Past Medical History:   Diagnosis Date    CA prostate, adenoca ([facility] [address] 4/25/2019    CAD (coronary artery disease)     [facility]     prostate- PO chemotherapy     Chronic bilateral low back pain without sciatica 3/6/2020    Cobalamin deficiency     Hyperlipidemia     Hypertension     Osteoarthritis     Osteochondropathy     Osteopenia     Pain in lower limb     Peripheral venous insufficiency     PONV (postoperative nausea and vomiting)     Prolonged emergence from general anesthesia     PVD (peripheral vascular disease) with claudication ([facility] [address] 4/25/2019     Past Surgical History:   Procedure Laterality Date    ACETABULUM FRACTURE SURGERY  3/24/11    ANESTHESIA NERVE BLOCK Bilateral 6/16/2020    BILATERAL L3 L4 L5 MEDIAL NERVE BRANCH BLOCK performed by Nicole Blue DO at [address] [facility]  2016   [facility] 18 GRAFT  Dec. 1996    [facility]/ Dr. Tavon Finch, DIAGNOSTIC  2016    EYE SURGERY Bilateral 01/2018    cataract, glaucoma 2020-left eye    FEMUR FRACTURE SURGERY  1981    left   [address]    JOINT REPLACEMENT      bilat knee 2010, rig thi 2011     LYMPH NODE DISSECTION  10/2007    PAIN MANAGEMENT PROCEDURE N/A 5/19/2020    CAUDAL EPIDURAL STEROID INJECTION performed by Nicole Blue DO at [address]  8/4/20    PAIN MANAGEMENT PROCEDURE Right 8/4/2020    RIGHT L3 4 5 MEDIAL BRANCH RADIOFREQUENCY ABLATION performed by Nicole Blue DO at [facility] Left 9/10/2020    LEFT L3 4 5 MEDIAL BRANCH RADIOFREQUENCY ABLATION     +++IODINE ALLERGY+++   CPT: [id number] performed by Nicole Blue DO at [facility] Bilateral 3/25/2021    BILATERAL L3,4,5 MEDIAL NERVE BRANCH RADIOFREQUENCY ABLATION performed by Nicole Blue DO at [address] [facility].  10/31/07    AMIRAH STREETER M.D./ [facility] LAPAROSCOPIC PROSTATECTOMY    TESTICLE REMOVAL Bilateral 2019    hx prostate ca-    TONSILLECTOMY      TOTAL HIP ARTHROPLASTY  11    right    TOTAL KNEE ARTHROPLASTY  2010    bilateral     Family History   Problem Relation Age of Onset    Stroke Mother     Cancer Mother         pancreatic    Cancer Father         prostate    Stroke Father      Social History     Socioeconomic History    Marital status:      Spouse name: Not on file    Number of children: Not on file    Years of education: Not on file    Highest education level: Not on file   Occupational History    Not on file   Tobacco Use    Smoking status: Former Smoker     Packs/day: 1.00     Years: 40.00     Pack years: 40.00     Types: Cigarettes     Start date: 5     Quit date: 10/1/1994     Years since quittin.9    Smokeless tobacco: Former User     Types: [address] date:    Vaping Use    Vaping Use: Never used   Substance and Sexual Activity    Alcohol use:  Yes     Alcohol/week: 1.0 standard drinks     Types: 1 Cans of beer per week     Comment: rare    Drug use: No    Sexual activity: Not on file   Other Topics Concern    Not on file   Social History Narrative    Problem List: History of polyp of colon, Disorder of bone density and structure, unspecified,    Osteochondropathy, Carcinoma in situ of prostate, Aneurysm of thoracic aorta, Anemia, Coronary    arteriosclerosis, Cobalamin deficiency, Multiple closed fractures of pelvis with disruption of pelvic [place],    Hyperlipidemia, Essential hypertension    Health Maintenance:    Bone Density Test Screening - (3/5/2019)    Bone Density Scan - (2015)    Influenza Vaccination - (10/7/2016)    Colonoscopy - (4/3/2012)    Couseled on Home Safety - (2015)    Colonoscopy Screening - (4/3/2012)    US Liver - 08    Medical Problems:    Coronary Artery Disease (CAD), Hypertension, Hyperlipidemia, Prostate Cancer    Surgical Hx:    Prostatectomy, Coronary Artery Bypass Graft (CABG)            FH: Father:    . (Hx)    Mother:    . (Hx)        SH: Marital:  - retired . Personal Habits: Cigarette Use: former cigarette smoker, Nonsmoker. Alcohol: Occasionally    consumes alcohol. Social Determinants of Health     Financial Resource Strain: Low Risk     Difficulty of Paying Living Expenses: Not hard at all   Food Insecurity: No Food Insecurity    Worried About Running Out of Food in the Last Year: Never true    Deondre of Food in the Last Year: Never true   Transportation Needs:     Lack of Transportation (Medical):  Lack of Transportation (Non-Medical):    Physical Activity:     Days of Exercise per Week:     Minutes of Exercise per Session:    Stress:     Feeling of Stress :    Social Connections:     Frequency of Communication with Friends and Family:     Frequency of Social Gatherings with Friends and Family:     Attends Jewish Services:     Active Member of Clubs or Organizations:     Attends Club or Organization Meetings:     Marital Status:    Intimate Partner Violence:     Fear of Current or Ex-Partner:     Emotionally Abused:     Physically Abused:     Sexually Abused:        Vitals:    08/31/21 1402   BP: 134/72   Site: Right Upper Arm   Position: Sitting   Pulse: 57   Temp: 97.5 °F (36.4 °C)   TempSrc: Temporal   SpO2: 97%   Weight: 214 lb 12.8 oz (97.4 kg)      Wt Readings from Last 3 Encounters:   08/31/21 214 lb 12.8 oz (97.4 kg)   06/03/21 210 lb (95.3 kg)   04/28/21 212 lb (96.2 kg)        Physical Exam  Exam:  Const: Appears comfortable. No signs of acute distress present. Head/Face: Atraumatic on inspection. Eyes: EOMI in both eyes. No discharge from the eyes. PERRL. Sclerae clear. ENMT: Auditory canals normal. Tympanic membranes: intact and translucent. External nose WNL. Nasal mucosa is clear. Oropharynx: No erythema or exudate. Posterior pharynx is normal.  Neck: Supple. Palpation reveals no adenopathy. No masses appreciated. No JVD. COVID-19  Covid-19, Antibody     Murmur  Counseled extensively. Differential reviewed, including serious etiologies. Had echo 2/2021. Cont per dr Nella Jimenez, saw 7/1/21, no change, sees 6mos  Lung nodule  Noted on CAT scan 1/21. Planning repeat ct through Dr Angelia Mcgill in dec 2021. Counseled extensively. Differential reviewed, including serious etiologies. Allergic rhinitis  Counseled, chronic. Counseled on use of nasal steroid, vaporizer, nasal saline as well as additional treatment options for vasomotor rhinitis    COVID-19  Counseled. Risk of this virus reviewed. Has not felt quite right since but nonspecific. Importance of following with cardiology reviewed. He recently saw Dr. Lex Freedman, sees again with ct dec    Disc disease, degenerative, lumbar or lumbosacral  Counseled extensively. Differential reviewed, including serious etiologies. Was Following with [facility], had to change because of insurance, now seeing Dr. Maximilian Almodovar pain management, saw Dr. Maura Gonzalez. Previously [facility]. Counseled on injections, radiofrequency ablation etc.  Failed physical therapy. . Had recent radiofrequency ablation x2 and overall doing well    Bilateral carpal tunnel syndrome  Had emg/ncs Nov 2019 [facility]. Referred to [facility] but did not go because no symptoms    Health maintenance examination  Health maintenance issues discussed at length 9/20, encouraged yearly. Pancytopenia ([address]  Counseled extensively. Differential reviewed, including serious etiologies. Possibly from medication. .  Declines hematology. Monitor. Hemoglobin stable,  white count fluctuates, mildly low, platelets one-point low. Proper hydration reviewed. Asymptomatic. Ferritin stable, monitor      Low magnesium level  Counseled. Goals reviewed. Now on magnesium oxide 400 mg daily. Monitor. Vitamin D deficiency  Continue current therapy. Monitor.     Vitamin B12 deficiency  Appropriate supplementation reviewed.    Vitamin O92 and folic acid elevated last time, currently just on multivitamin, Monitor.  Risk of deficiency reviewed including anemia and neuropathy.  No longer on injection. Hyperglycemia  Counseled, not interested in meds now, monitor. A1c excellent      Essential hypertension  Counseled. Became hypotensive. Cardiology low-dose lisinopril 10 mg. Now stable. Monitor. The risks of hypertension and hypotension reviewed. Watch closely ambulatory. Hyper and hypotensive precautions and parameters reviewed and previously written as well as parameters on pulse, call if out of range, ER dangers numbers. Lifestyle modification reviewed. Tolerating therapy. Tubular adenoma   Last colonoscopy Dr. Effie Davey, 2/15 complaining repeat 3 years from then. Overdue. Declines now, fit cards or Cologuard. . Hemoglobin consistently low but stable. Declines any intervention now, declines any tests or procedures now.     Thyroid nodule  Counseled, incidental 7 mm right thyroid nodule on carotid ultrasound 3/19. Declines workup or follow-up now     Thoracic aneurysm, not ruptured  Counseled, potential serious is reviewed, if any symptoms directly to ER. Continue per Dr. Carrol Baeza. He saw him 3/15 and had CT at least then. Dr. Carrol Baeza discharged him at this point unless symptoms and he not interested in follow-up imaging. Of note CT was done for Covid 1/21 and they stated no acute abnormality of the thoracic aorta     PVD (peripheral vascular disease) with claudication [facility])  He saw Dr. Celsa Patterson, who had him on pletal, had minimal results. Now follows with Dr Bre Escobar vascular in [place] who states he did have a femoral blockage proximal that would require bypass as well as some distal blockages that would require stenting but they felt observation most appropriate. He did not feel medication was necessary or helpful at this time. They state Dr. Celsa Patterson agrees with this plan.   He is comfortable with this in the lumbar spine and   a slight worsening of 2.6% in the hip since previous study. [facility] had been on a course of alendronate 2 different times.  The rationale of the Prolia was with the expected osteoporosis as shown on DEXA scan 2 years ago but again nearly normal at this time although false negatives reviewed.  After discussion it was decided to hold off on Prolia, continue current management, falls precautions, weightbearing exercise, appropriate calcium/D and recheck bone density in 1 year sooner as needed    Gastroesophageal reflux disease without esophagitis  Asymptomatic as long as takes medication, had EGD in the past.  Risk of meds reviewed including electrolyte imbalance, renal etc.  Wants to continue. Glaucoma of left eye  Recent. Follows with eye care. Pressures came down with drops. 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 Renal insufficiency  Counseled, differential reviewed. Emphasize proper hydration and avoidance of nephrotoxic agents. He simply wants basic monitoring. PLAN AS ABOVE:      Counseled extensively and differential diagnoses relevant to above were reviewed, including serious etiologies. Side effects and interactions of medications were reviewed. New current management, continue per specialist, blood work and follow-up in 3 months sooner as needed    Return in about 3 months (around 11/30/2021), or if symptoms worsen or fail to improve. Signs and symptoms to watch for discussed, serious signs and symptoms reviewed. ER if any. Melissa Hobbs MD    Patients are advised to check with insurance company to ensure coverage and to fully understand benefits and cost prior to any testing. This note was created with the assistance of voice recognition software. Document was reviewed however may contain grammatical errors.

## 2021-09-08 ENCOUNTER — HOSPITAL ENCOUNTER (OUTPATIENT)
Age: 86
Discharge: HOME | End: 2021-09-08
Payer: MEDICARE

## 2021-09-08 LAB — PROSTATE SPECIFIC ANTIGEN: <0.03 NG/ML (ref 0–4)

## 2021-09-08 PROCEDURE — 36415 COLL VENOUS BLD VENIPUNCTURE: CPT

## 2021-09-08 PROCEDURE — 84153 ASSAY OF PSA TOTAL: CPT

## 2021-11-04 ENCOUNTER — TELEPHONE (OUTPATIENT)
Dept: PRIMARY CARE CLINIC | Age: 86
End: 2021-11-04

## 2021-11-22 ENCOUNTER — HOSPITAL ENCOUNTER (OUTPATIENT)
Age: 86
Discharge: HOME | End: 2021-11-22
Payer: MEDICARE

## 2021-11-22 DIAGNOSIS — E78.2 MIXED HYPERLIPIDEMIA: ICD-10-CM

## 2021-11-22 DIAGNOSIS — Z86.16 HISTORY OF COVID-19: ICD-10-CM

## 2021-11-22 DIAGNOSIS — E53.8 VITAMIN B12 DEFICIENCY: ICD-10-CM

## 2021-11-22 DIAGNOSIS — R79.0 LOW MAGNESIUM LEVEL: ICD-10-CM

## 2021-11-22 DIAGNOSIS — E03.2 HYPOTHYROIDISM DUE TO MEDICATION: ICD-10-CM

## 2021-11-22 DIAGNOSIS — I10 ESSENTIAL HYPERTENSION: ICD-10-CM

## 2021-11-22 DIAGNOSIS — D64.9 ANEMIA, UNSPECIFIED TYPE: ICD-10-CM

## 2021-11-22 DIAGNOSIS — E55.9 VITAMIN D DEFICIENCY: ICD-10-CM

## 2021-11-22 LAB
ALBUMIN SERPL-MCNC: 4.4 G/DL (ref 3.5–5.2)
ALP BLD-CCNC: 56 U/L (ref 40–129)
ALT SERPL-CCNC: 18 U/L (ref 0–40)
ANION GAP SERPL CALCULATED.3IONS-SCNC: 12 MMOL/L (ref 7–16)
AST SERPL-CCNC: 25 U/L (ref 0–39)
BASOPHILS ABSOLUTE: 0.03 E9/L (ref 0–0.2)
BASOPHILS RELATIVE PERCENT: 0.8 % (ref 0–2)
BILIRUB SERPL-MCNC: 0.6 MG/DL (ref 0–1.2)
BILIRUBIN URINE: NEGATIVE
BLOOD, URINE: NEGATIVE
BUN BLDV-MCNC: 23 MG/DL (ref 6–23)
CALCIUM SERPL-MCNC: 10.1 MG/DL (ref 8.6–10.2)
CHLORIDE BLD-SCNC: 105 MMOL/L (ref 98–107)
CHOLESTEROL, TOTAL: 125 MG/DL (ref 0–199)
CLARITY: CLEAR
CO2: 26 MMOL/L (ref 22–29)
COLOR: YELLOW
CREAT SERPL-MCNC: 1.3 MG/DL (ref 0.7–1.2)
CREATININE URINE: 183 MG/DL (ref 40–278)
EOSINOPHILS ABSOLUTE: 0.11 E9/L (ref 0.05–0.5)
EOSINOPHILS RELATIVE PERCENT: 2.9 % (ref 0–6)
FOLATE: 19 NG/ML (ref 4.8–24.2)
GFR AFRICAN AMERICAN: >60
GFR NON-AFRICAN AMERICAN: 53 ML/MIN/1.73
GLUCOSE BLD-MCNC: 107 MG/DL (ref 74–99)
GLUCOSE URINE: NEGATIVE MG/DL
HBA1C MFR BLD: 5 % (ref 4–5.6)
HCT VFR BLD CALC: 34.6 % (ref 37–54)
HDLC SERPL-MCNC: 44 MG/DL
HEMOGLOBIN: 11.8 G/DL (ref 12.5–16.5)
IMMATURE GRANULOCYTES #: 0.01 E9/L
IMMATURE GRANULOCYTES %: 0.3 % (ref 0–5)
KETONES, URINE: NEGATIVE MG/DL
LDL CHOLESTEROL CALCULATED: 49 MG/DL (ref 0–99)
LEUKOCYTE ESTERASE, URINE: NEGATIVE
LYMPHOCYTES ABSOLUTE: 1.03 E9/L (ref 1.5–4)
LYMPHOCYTES RELATIVE PERCENT: 27.2 % (ref 20–42)
MAGNESIUM: 1.8 MG/DL (ref 1.6–2.6)
MCH RBC QN AUTO: 30.1 PG (ref 26–35)
MCHC RBC AUTO-ENTMCNC: 34.1 % (ref 32–34.5)
MCV RBC AUTO: 88.3 FL (ref 80–99.9)
MICROALBUMIN UR-MCNC: 29.6 MG/L
MICROALBUMIN/CREAT UR-RTO: 16.2 (ref 0–30)
MONOCYTES ABSOLUTE: 0.29 E9/L (ref 0.1–0.95)
MONOCYTES RELATIVE PERCENT: 7.7 % (ref 2–12)
NEUTROPHILS ABSOLUTE: 2.31 E9/L (ref 1.8–7.3)
NEUTROPHILS RELATIVE PERCENT: 61.1 % (ref 43–80)
NITRITE, URINE: NEGATIVE
PDW BLD-RTO: 13.6 FL (ref 11.5–15)
PH UA: 5 (ref 5–9)
PLATELET # BLD: 145 E9/L (ref 130–450)
PMV BLD AUTO: 10.1 FL (ref 7–12)
POTASSIUM SERPL-SCNC: 4.5 MMOL/L (ref 3.5–5)
PROTEIN UA: NEGATIVE MG/DL
RBC # BLD: 3.92 E12/L (ref 3.8–5.8)
SODIUM BLD-SCNC: 143 MMOL/L (ref 132–146)
SPECIFIC GRAVITY UA: >=1.03 (ref 1–1.03)
T4 FREE: 1.19 NG/DL (ref 0.93–1.7)
TOTAL CK: 66 U/L (ref 20–200)
TOTAL PROTEIN: 7 G/DL (ref 6.4–8.3)
TRIGL SERPL-MCNC: 162 MG/DL (ref 0–149)
TSH SERPL DL<=0.05 MIU/L-ACNC: 5.04 UIU/ML (ref 0.27–4.2)
UROBILINOGEN, URINE: 0.2 E.U./DL
VITAMIN B-12: 959 PG/ML (ref 211–946)
VITAMIN D 25-HYDROXY: 50 NG/ML (ref 30–100)
VLDLC SERPL CALC-MCNC: 32 MG/DL
WBC # BLD: 3.8 E9/L (ref 4.5–11.5)

## 2021-11-22 PROCEDURE — 82607 VITAMIN B-12: CPT

## 2021-11-22 PROCEDURE — 82746 ASSAY OF FOLIC ACID SERUM: CPT

## 2021-11-22 PROCEDURE — 84443 ASSAY THYROID STIM HORMONE: CPT

## 2021-11-22 PROCEDURE — 86769 SARS-COV-2 COVID-19 ANTIBODY: CPT

## 2021-11-22 PROCEDURE — 82044 UR ALBUMIN SEMIQUANTITATIVE: CPT

## 2021-11-22 PROCEDURE — 80061 LIPID PANEL: CPT

## 2021-11-22 PROCEDURE — 82306 VITAMIN D 25 HYDROXY: CPT

## 2021-11-22 PROCEDURE — 84439 ASSAY OF FREE THYROXINE: CPT

## 2021-11-22 PROCEDURE — 81003 URINALYSIS AUTO W/O SCOPE: CPT

## 2021-11-22 PROCEDURE — 36415 COLL VENOUS BLD VENIPUNCTURE: CPT

## 2021-11-22 PROCEDURE — 82550 ASSAY OF CK (CPK): CPT

## 2021-11-22 PROCEDURE — 82570 ASSAY OF URINE CREATININE: CPT

## 2021-11-22 PROCEDURE — 80053 COMPREHEN METABOLIC PANEL: CPT

## 2021-11-22 PROCEDURE — 85025 COMPLETE CBC W/AUTO DIFF WBC: CPT

## 2021-11-22 PROCEDURE — 83036 HEMOGLOBIN GLYCOSYLATED A1C: CPT

## 2021-11-22 PROCEDURE — 83735 ASSAY OF MAGNESIUM: CPT

## 2021-11-23 LAB — SARS-COV-2 ANTIBODY, TOTAL: REACTIVE

## 2021-11-30 ENCOUNTER — OFFICE VISIT (OUTPATIENT)
Dept: PRIMARY CARE CLINIC | Age: 86
End: 2021-11-30
Payer: MEDICARE

## 2021-11-30 VITALS
DIASTOLIC BLOOD PRESSURE: 74 MMHG | SYSTOLIC BLOOD PRESSURE: 120 MMHG | TEMPERATURE: 97.3 F | BODY MASS INDEX: 31.31 KG/M2 | HEART RATE: 75 BPM | OXYGEN SATURATION: 96 % | WEIGHT: 218.2 LBS

## 2021-11-30 DIAGNOSIS — E03.2 HYPOTHYROIDISM DUE TO MEDICATION: ICD-10-CM

## 2021-11-30 DIAGNOSIS — D64.9 ANEMIA, UNSPECIFIED TYPE: ICD-10-CM

## 2021-11-30 DIAGNOSIS — N28.9 RENAL INSUFFICIENCY: ICD-10-CM

## 2021-11-30 DIAGNOSIS — R79.0 LOW MAGNESIUM LEVEL: ICD-10-CM

## 2021-11-30 DIAGNOSIS — I10 ESSENTIAL HYPERTENSION: ICD-10-CM

## 2021-11-30 DIAGNOSIS — M19.011 LOCALIZED OSTEOARTHRITIS OF RIGHT SHOULDER: ICD-10-CM

## 2021-11-30 DIAGNOSIS — C61 PROSTATE CA (HCC): ICD-10-CM

## 2021-11-30 DIAGNOSIS — E53.8 VITAMIN B12 DEFICIENCY: ICD-10-CM

## 2021-11-30 DIAGNOSIS — M81.0 AGE-RELATED OSTEOPOROSIS WITHOUT CURRENT PATHOLOGICAL FRACTURE: ICD-10-CM

## 2021-11-30 DIAGNOSIS — J30.9 ALLERGIC RHINITIS, UNSPECIFIED SEASONALITY, UNSPECIFIED TRIGGER: ICD-10-CM

## 2021-11-30 DIAGNOSIS — E55.9 VITAMIN D DEFICIENCY: ICD-10-CM

## 2021-11-30 DIAGNOSIS — Z86.16 HISTORY OF COVID-19: ICD-10-CM

## 2021-11-30 DIAGNOSIS — K21.9 GASTROESOPHAGEAL REFLUX DISEASE WITHOUT ESOPHAGITIS: ICD-10-CM

## 2021-11-30 DIAGNOSIS — I25.10 CORONARY ARTERY DISEASE INVOLVING NATIVE HEART WITHOUT ANGINA PECTORIS, UNSPECIFIED VESSEL OR LESION TYPE: ICD-10-CM

## 2021-11-30 DIAGNOSIS — Z00.00 HEALTH MAINTENANCE EXAMINATION: ICD-10-CM

## 2021-11-30 DIAGNOSIS — E78.2 MIXED HYPERLIPIDEMIA: Primary | ICD-10-CM

## 2021-11-30 PROCEDURE — 1123F ACP DISCUSS/DSCN MKR DOCD: CPT | Performed by: FAMILY MEDICINE

## 2021-11-30 PROCEDURE — G8484 FLU IMMUNIZE NO ADMIN: HCPCS | Performed by: FAMILY MEDICINE

## 2021-11-30 PROCEDURE — 99215 OFFICE O/P EST HI 40 MIN: CPT | Performed by: FAMILY MEDICINE

## 2021-11-30 PROCEDURE — 4040F PNEUMOC VAC/ADMIN/RCVD: CPT | Performed by: FAMILY MEDICINE

## 2021-11-30 PROCEDURE — G8417 CALC BMI ABV UP PARAM F/U: HCPCS | Performed by: FAMILY MEDICINE

## 2021-11-30 PROCEDURE — G8427 DOCREV CUR MEDS BY ELIG CLIN: HCPCS | Performed by: FAMILY MEDICINE

## 2021-11-30 PROCEDURE — 1036F TOBACCO NON-USER: CPT | Performed by: FAMILY MEDICINE

## 2021-11-30 RX ORDER — OMEGA-3-ACID ETHYL ESTERS 1 G/1
1 CAPSULE, LIQUID FILLED ORAL 2 TIMES DAILY
Qty: 180 CAPSULE | Refills: 3 | Status: SHIPPED | OUTPATIENT
Start: 2021-11-30

## 2021-11-30 NOTE — PROGRESS NOTES
19  Olive Nair : 1935 Sex: male  Age: 82 y.o. Chief Complaint   Patient presents with    Hypertension    Hypothyroidism       HPI:       Patient presents today for follow-up. He is here with his wife. Overall he feels well. New complaints today is right shoulder pain. Although he states it has been there for years it is worsened over the past few months, catching and causing severe pain with certain movements. No numbness tingling weakness and he does not feel frozen although his wife is concerned it is getting there.     Blood work reviewed,  CMP normal except creatinine elevated stable at 1.3 glucose 107 hemoglobin A1c 5.0 LFTs normal triglycerides down to 162 on Lovaza 1 twice a day, not interested in higher dose, risk benefits reviewed, LDL 49 HDL 44 TSH remains elevated at 5.0 but free T4 normal at 1.19 vitamin D 50 white count low but stable at 3.8 hemoglobin low but stable at 11.8 platelet count 556 vitamin U35 610 folic acid 19 urinalysis negative microalbumin creatinine ratio 16 Covid antibody remains positive    Most Recent Labs  CBC  Lab Results   Component Value Date    WBC 3.8 2021    WBC 4.0 2021    WBC 3.5 2021    RBC 3.92 2021    RBC 3.82 2021    RBC 3.90 2021    HGB 11.8 2021    HGB 11.4 2021    HGB 11.5 2021    HCT 34.6 2021    HCT 33.9 2021    HCT 34.5 2021    MCV 88.3 2021    MCV 88.7 2021    MCV 88.5 2021     2021     2021     2021      CMP  Lab Results   Component Value Date     2021     2021     2021    K 4.5 2021    K 4.4 2021    K 4.6 2021    K 4.4 2021    K 4.4 2021     2021     2021     2021    CO2 26 2021    CO2 27 2021    CO2 27 2021    ANIONGAP 12 2021    ANIONGAP 9 2021    ANIONGAP 12 2021 GLUCOSE 107 11/22/2021    GLUCOSE 110 08/25/2021    GLUCOSE 121 04/26/2021    GLUCOSE 113 03/28/2011    GLUCOSE 93 03/28/2011    GLUCOSE 101 03/26/2011    BUN 23 11/22/2021    BUN 22 08/25/2021    BUN 28 04/26/2021    CREATININE 1.3 11/22/2021    CREATININE 1.3 08/25/2021    CREATININE 1.5 04/26/2021    LABGLOM 53 11/22/2021    LABGLOM 53 08/25/2021    LABGLOM 45 04/26/2021    GFRAA >60 11/22/2021    GFRAA >60 08/25/2021    GFRAA 54 04/26/2021    CALCIUM 10.1 11/22/2021    CALCIUM 9.8 08/25/2021    CALCIUM 10.0 04/26/2021    PROT 7.0 11/22/2021    PROT 6.6 08/25/2021    PROT 7.2 04/26/2021    LABALBU 4.4 11/22/2021    LABALBU 4.0 08/25/2021    LABALBU 4.3 04/26/2021    LABALBU 2.9 03/26/2011    LABALBU 3.0 03/25/2011    LABALBU 3.4 03/24/2011    BILITOT 0.6 11/22/2021    BILITOT 0.5 08/25/2021    BILITOT 0.6 04/26/2021    ALKPHOS 56 11/22/2021    ALKPHOS 46 08/25/2021    ALKPHOS 61 04/26/2021    AST 25 11/22/2021    AST 23 08/25/2021    AST 32 04/26/2021    ALT 18 11/22/2021    ALT 17 08/25/2021    ALT 31 04/26/2021     A1C  Lab Results   Component Value Date    LABA1C 5.0 11/22/2021    LABA1C 4.7 08/25/2021    LABA1C 5.3 01/21/2021     TSH  Lab Results   Component Value Date    TSH 5.040 11/22/2021    TSH 4.550 08/25/2021    TSH 5.740 04/26/2021     FREET4  Lab Results   Component Value Date    [id number] 9.5 01/31/2011     LIPID  Lab Results   Component Value Date    CHOL 125 11/22/2021    CHOL 126 08/25/2021    CHOL 125 04/26/2021    HDL 44 11/22/2021    HDL 41 08/25/2021    HDL 40 04/26/2021    LDLCALC 49 11/22/2021    LDLCALC 39 08/25/2021    LDLCALC 45 04/26/2021    TRIG 162 11/22/2021    TRIG 230 08/25/2021    TRIG 202 04/26/2021     VITAMIN D  Lab Results   Component Value Date    VITD25 50 11/22/2021    VITD25 41 08/25/2021    VITD25 47 04/26/2021     MAGNESIUM  Lab Results   Component Value Date    MG 1.8 11/22/2021    MG 1.8 08/25/2021    MG 1.9 04/26/2021      PHOS  Lab Results   Component Value Date PHOS 3.9 08/25/2021    PHOS 4.0 01/21/2021    PHOS 3.8 01/16/2021      PAWAN   No results found for: PAWAN  RHEUMATOID FACTOR  No results found for: RF  PSA  Lab Results   Component Value Date    PSA <0.03 09/08/2021    PSA <0.03 06/08/2021    PSA <0.03 03/09/2021      HEPATITIS C  No results found for: HCVABI  HIV  No results found for: [id number], HIV1QT  UA  Lab Results   Component Value Date    COLORU Yellow 11/22/2021    COLORU Yellow 08/25/2021    COLORU Yellow 04/26/2021    CLARITYU Clear 11/22/2021    CLARITYU Clear 08/25/2021    CLARITYU Clear 04/26/2021    GLUCOSEU Negative 11/22/2021    GLUCOSEU Negative 08/25/2021    GLUCOSEU Negative 04/26/2021    GLUCOSEU NEGATIVE 12/21/2010    BILIRUBINUR Negative 11/22/2021    BILIRUBINUR Negative 08/25/2021    BILIRUBINUR SMALL 04/26/2021    BILIRUBINUR NEGATIVE 12/21/2010    KETUA Negative 11/22/2021    KETUA Negative 08/25/2021    KETUA TRACE 04/26/2021    SPECGRAV >=1.030 11/22/2021    SPECGRAV 1.025 08/25/2021    SPECGRAV >=1.030 04/26/2021    BLOODU Negative 11/22/2021    BLOODU Negative 08/25/2021    BLOODU Negative 04/26/2021    PHUR 5.0 11/22/2021    PHUR 5.5 08/25/2021    PHUR 5.0 04/26/2021    PROTEINU Negative 11/22/2021    PROTEINU Negative 08/25/2021    PROTEINU TRACE 04/26/2021    UROBILINOGEN 0.2 11/22/2021    UROBILINOGEN 0.2 08/25/2021    UROBILINOGEN 0.2 04/26/2021    NITRU Negative 11/22/2021    NITRU Negative 08/25/2021    NITRU Negative 04/26/2021    LEUKOCYTESUR Negative 11/22/2021    LEUKOCYTESUR Negative 08/25/2021    LEUKOCYTESUR Negative 04/26/2021     Urine Micro/Albumin Ratio  Lab Results   Component Value Date    MALBCR 16.2 11/22/2021    MALBCR - 08/25/2021    MALBCR 9.0 04/26/2021               Review of Systems  ROS:    Const: Denies chills, fever, malaise and sweats. Eyes: Denies discharge, pain, redness and visual disturbance. ENMT: Denies earaches, other ear symptoms. Denies nasal or sinus symptoms other than if stated  above.  Denies mouth and tongue lesions and sore throat. CV: Denies chest discomfort, pain; diaphoresis, dizziness, edema, lightheadedness, orthopnea,  palpitations, syncope and near syncopal episode or any exertional symptoms  Resp: Denies cough, hemoptysis, pleuritic pain, SOB, sputum production and wheezing. GI: Denies abdominal pain, change in bowel habits, hematochezia, melena, nausea and vomiting. : Denies urinary problems including dysuria. Musculo: Right shoulder pain as above  Skin: Denies bruising and rash or changing skin lesions. Neuro: Denies headache, numbness, stiff neck, tingling and focal weakness slurred speech or facial  Droop  Extremities: Denies calf pain, redness or swelling. Hema/Lymph: Denies bleeding/bruising tendency and enlarged lymph nodes.         Current Outpatient Medications:     omega-3 acid ethyl esters (LOVAZA) 1 g capsule, Take 1 capsule by mouth 2 times daily, Disp: 180 capsule, Rfl: 3    Handicap Placard MISC, by Does not apply route Duration: 5 years, Disp: 1 each, Rfl: 0    lisinopril (PRINIVIL;ZESTRIL) 10 MG tablet, Take 1 tablet by mouth daily, Disp: 90 tablet, Rfl: 3    omeprazole (PRILOSEC) 20 MG delayed release capsule, Take 1 capsule by mouth daily, Disp: 90 capsule, Rfl: 3    atorvastatin (LIPITOR) 40 MG tablet, Take 1 tablet by mouth daily, Disp: 90 tablet, Rfl: 3    magnesium gluconate (MAGONATE) 500 MG tablet, Take 500 mg by mouth 2 times daily, Disp: , Rfl:     COMBIGAN 0.2-0.5 % ophthalmic solution, INSTILL 1 DROP INTO THE RIGHT EYE TWO TIMES A DAY., Disp: , Rfl:     latanoprost (XALATAN) 0.005 % ophthalmic solution, INSTILL 1 DROP IN RIGHT EYE EVERY DAY AT BEDTIME, Disp: , Rfl:     ERLEADA 60 MG TABS, 60 mg Every 4 days, Disp: , Rfl:     meclizine (ANTIVERT) 25 MG tablet, 1/2 - 1 by mouth every 6 hours as needed, Disp: , Rfl:     Multiple Vitamins-Minerals (THERAPEUTIC MULTIVITAMIN-MINERALS) tablet, Take 1 tablet by mouth daily , Disp: , Rfl:     metoprolol tartrate (LOPRESSOR) 25 MG tablet, Take 25 mg by mouth 2 times daily, Disp: , Rfl:     Coenzyme Q10 (COQ10) 50 MG CAPS, Take 1 capsule by mouth daily , Disp: , Rfl:     aspirin 81 MG tablet, Take 81 mg by mouth daily , Disp: , Rfl:     Cyanocobalamin (VITAMIN B 12 PO), Take  by mouth daily.  sublingual , Disp: , Rfl:   Allergies   Allergen Reactions    Iodine Hives    Shellfish-Derived Products        Past Medical History:   Diagnosis Date    CA prostate, adenoca ([facility] [address] 4/25/2019    CAD (coronary artery disease)     Dr. [facility])     prostate- PO chemotherapy     Chronic bilateral low back pain without sciatica 3/6/2020    Cobalamin deficiency     Hyperlipidemia     Hypertension     Osteoarthritis     Osteochondropathy     Osteopenia     Pain in lower limb     Peripheral venous insufficiency     PONV (postoperative nausea and vomiting)     Prolonged emergence from general anesthesia     PVD (peripheral vascular disease) with claudication ([facility] [address] 4/25/2019     Past Surgical History:   Procedure Laterality Date    ACETABULUM FRACTURE SURGERY  3/24/11    ANESTHESIA NERVE BLOCK Bilateral 6/16/2020    BILATERAL L3 L4 L5 MEDIAL NERVE BRANCH BLOCK performed by Zack Willard DO at [facility]  2016   [facility] 18 GRAFT  Dec. 1996    [facility]/ Dr. Mario Tinoco, DIAGNOSTIC  2016    EYE SURGERY Bilateral 01/2018    cataract, glaucoma 2020-left eye    FEMUR FRACTURE SURGERY  1981    left   [facility]    JOINT REPLACEMENT      bilat knee 2010, faisal thi 2011     LYMPH NODE DISSECTION  10/2007    PAIN MANAGEMENT PROCEDURE N/A 5/19/2020    CAUDAL EPIDURAL STEROID INJECTION performed by Zack Willard DO at [facility]  8/4/20    PAIN MANAGEMENT PROCEDURE Right 8/4/2020    RIGHT L3 4 5 MEDIAL BRANCH RADIOFREQUENCY ABLATION performed by Zack Willard DO at [address] MANAGEMENT PROCEDURE Left 9/10/2020    LEFT L3 4 5 MEDIAL BRANCH RADIOFREQUENCY ABLATION     +++IODINE ALLERGY+++   CPT: [id number] [facility] Bilateral 3/25/2021    BILATERAL L3,4,5 MEDIAL NERVE BRANCH RADIOFREQUENCY ABLATION performed by Jennifer Spears DO at [address]  10/31/07    [address] M[address] [facility] [place] LAPAROSCOPIC PROSTATECTOMY    TESTICLE REMOVAL Bilateral 2019    hx prostate ca-    TONSILLECTOMY      TOTAL HIP ARTHROPLASTY  11    right    TOTAL KNEE ARTHROPLASTY  2010    bilateral     Family History   Problem Relation Age of Onset    Stroke Mother     Cancer Mother         pancreatic    Cancer Father         prostate    Stroke Father      Social History     Socioeconomic History    Marital status:      Spouse name: Not on file    Number of children: Not on file    Years of education: Not on file    Highest education level: Not on file   Occupational History    Not on file   Tobacco Use    Smoking status: Former Smoker     Packs/day: 1.00     Years: 40.00     Pack years: 40.00     Types: Cigarettes     Start date:      Quit date: 10/1/1994     Years since quittin.1    Smokeless tobacco: Former User     Types: Chew     Quit date:    Vaping Use    Vaping Use: Never used   Substance and Sexual Activity    Alcohol use:  Yes     Alcohol/week: 1.0 standard drink     Types: 1 Cans of beer per week     Comment: rare    Drug use: No    Sexual activity: Not on file   Other Topics Concern    Not on file   Social History Narrative    Problem List: History of polyp of colon, Disorder of bone density and structure, unspecified,    Osteochondropathy, Carcinoma in situ of prostate, Aneurysm of thoracic aorta, Anemia, Coronary    arteriosclerosis, Cobalamin deficiency, Multiple closed fractures of pelvis with disruption of pelvic [place],    Hyperlipidemia, Essential hypertension    Health Maintenance:    Bone Density Test Screening - (3/5/2019)    Bone Density Scan - (12/18/2015)    Influenza Vaccination - (10/7/2016)    Colonoscopy - (4/3/2012)    Couseled on Home Safety - (11/23/2015)    Colonoscopy Screening - (4/3/2012)    US Liver - 8/1/08    Medical Problems:    Coronary Artery Disease (CAD), Hypertension, Hyperlipidemia, Prostate Cancer    Surgical Hx:    Prostatectomy, Coronary Artery Bypass Graft (CABG)            FH: Father:    . (Hx)    Mother:    . (Hx)        SH: Marital:  - retired . Personal Habits: Cigarette Use: former cigarette smoker, Nonsmoker. Alcohol: Occasionally    consumes alcohol. Social Determinants of Health     Financial Resource Strain: Low Risk     Difficulty of Paying Living Expenses: Not hard at all   Food Insecurity: No Food Insecurity    Worried About Running Out of Food in the Last Year: Never true    Deondre of Food in the Last Year: Never true   Transportation Needs:     Lack of Transportation (Medical): Not on file    Lack of Transportation (Non-Medical):  Not on file   Physical Activity:     Days of Exercise per Week: Not on file    Minutes of Exercise per Session: Not on file   Stress:     Feeling of Stress : Not on file   Social Connections:     Frequency of Communication with Friends and Family: Not on file    Frequency of Social Gatherings with Friends and Family: Not on file    Attends [employer] Services: Not on file    Active Member of [address] [place] [address] or Organizations: Not on file    Attends Club or Organization Meetings: Not on file    Marital Status: Not on file   Intimate Partner Violence:     Fear of Current or Ex-Partner: Not on file    Emotionally Abused: Not on file    Physically Abused: Not on file    Sexually Abused: Not on file   Housing Stability:     Unable to Pay for Housing in the Last Year: Not on file    Number of [place] in the Last Year: Not on file    Unstable Housing in the Last Year: Not on 09/05/2014    PSADIA 0.32 06/03/2014          Assessment and Plan:   Diagnosis Orders   1. Mixed hyperlipidemia  omega-3 acid ethyl esters (LOVAZA) 1 g capsule    Lipid Panel    Comprehensive Metabolic Panel    CBC Auto Differential    CK   2. Localized osteoarthritis of right shoulder  Amb External Referral To Orthopedic Surgery   3. Essential hypertension  CBC Auto Differential    Urinalysis    Microalbumin / Creatinine Urine Ratio   4. Age-related osteoporosis without current pathological fracture     5. Renal insufficiency     6. Prostate CA ([address]     7. Gastroesophageal reflux disease without esophagitis     8. Coronary artery disease involving native heart without angina pectoris, unspecified vessel or lesion type     9. Vitamin D deficiency  Vitamin D 25 Hydroxy   10. Low magnesium level  Magnesium   11. Allergic rhinitis, unspecified seasonality, unspecified trigger     12. Vitamin B12 deficiency  Vitamin B12 & Folate   13. Hypothyroidism due to medication  TSH without Reflex    T4, Free   14. Anemia, unspecified type     15. Health maintenance examination     16. History of COVID-19  Covid-19, Antibody   Right shoulder impingement  Counseled extensively. Differential reviewed, including serious etiologies. Likely arthritis, cannot rule out tear. He would like referred to Dr. Andrade Rivero. He defers imaging work-up to him. Counseled on the use of Tylenol and topicals in the meantime. Gentle range of motion      Murmur  Counseled extensively. Differential reviewed, including serious etiologies. Had echo 2/2021. Cont per dr Fonseca Husbands, saw 7/1/21, no change, sees 6mos      Lung nodule  Noted on CAT scan 1/21. Planning repeat ct through Dr Roberto Thomson in dec 2021. Counseled extensively. Differential reviewed, including serious etiologies. Allergic rhinitis  Counseled, chronic.    Counseled on use of nasal steroid, vaporizer, nasal saline as well as additional treatment options for vasomotor rhinitis    History of COVID-19  Counseled. Resolved. Counseled on vaccine      Disc disease, degenerative, lumbar or lumbosacral  Counseled extensively. Differential reviewed, including serious etiologies. Was Following with [facility], had to change because of insurance, now seeing Dr. Srinath Mojica pain management, saw Dr. Deborah Yanes. Previously [facility]. Counseled on injections, radiofrequency ablation etc.  Failed physical therapy. . Had recent radiofrequency ablation x2 and overall doing well    Bilateral carpal tunnel syndrome  Had emg/ncs Nov 2019 [facility]. Referred to [facility] but did not go because no symptoms    Health maintenance examination  Health maintenance issues discussed at length 9/20, encouraged yearly. He will schedule this within the month for annual wellness      Pancytopenia ([facility] [address]  Counseled extensively. Differential reviewed, including serious etiologies. Possibly from medication. .  Declines hematology. Monitor. Hemoglobin stable,  white count fluctuates, mildly low, platelets normalized. Proper hydration reviewed. Asymptomatic. Low magnesium level  Counseled. Goals reviewed. Currently 1.8. He would like to continue magnesium oxide 4 mg daily. Vitamin D deficiency  Continue current therapy. Monitor. Vitamin B12 deficiency  Appropriate supplementation reviewed.     Vitamin X02 elevated, folic acid now normal, appropriate supplementation reviewed, monitor, risk of low and high reviewed, no longer on injection       Hyperglycemia  Counseled, not interested in meds now, monitor. A1c excellent      Essential hypertension  Counseled. Became hypotensive. Cardiology low-dose lisinopril 10 mg. Now stable. Monitor. The risks of hypertension and hypotension reviewed. Watch closely ambulatory. Hyper and hypotensive precautions and parameters reviewed and previously written as well as parameters on pulse, call if out of range, ER dangers numbers.  Lifestyle modification reviewed. Tolerating therapy. Tubular adenoma   Last colonoscopy Dr. Damon Carolina, 2/15 complaining repeat 3 years from then. Overdue. Declines now, fit cards or Cologuard. . Hemoglobin consistently low but stable. Declines any intervention now, declines any tests or procedures now.     Thyroid nodule  Counseled, incidental 7 mm right thyroid nodule on carotid ultrasound 3/19. Declines workup or follow-up now     Thoracic aneurysm, not ruptured  Counseled, potential serious is reviewed, if any symptoms directly to ER. Continue per Dr. Hunter Carter. He saw him 3/15 and had CT at least then. Dr. Hunter Carter discharged him at this point unless symptoms and he not interested in follow-up imaging. Of note CT was done for Covid 1/21 and they stated no acute abnormality of the thoracic aorta     PVD (peripheral vascular disease) with claudication [facility])  He saw Dr. Jean Sahu, who had him on pletal, had minimal results. Now follows with Dr Henrry Chavez vascular in [place] who states he did have a femoral blockage proximal that would require bypass as well as some distal blockages that would require stenting but they felt observation most appropriate. He did not feel medication was necessary or helpful at this time. They state Dr. Jean Sahu agrees with this plan. He is comfortable with this plan at this point. Serious signs and symptoms watch were reviewed. Admits to missing an appointment because of Covid pandemic. They were planning surgery but after discussion, and the risks of complications and the complexity of the surgery he is declining intervention now. States he watches the distance he walks but otherwise doing well. No sores etc.      Prostate CA (HCC)  Metastatic. Continue per Dr. Marco Ramos.  PSA less than 0.01-0.07-0.3 , he had been on Lupron, had metastasis, since orchiectomy, did not tolerate xtandi so he stopped it , overall feeling well, on Erleada, follows regularly with Dr. Juan Manuel Pitts Mixed hyperlipidemia  Did very well on d.a.w. Crestor unfortunately severe myalgias on generic. He could try to [address] d.a.w., but he chose to go on Lipitor instead, tolerating, declines change in dose, goals reviewed, risk benefits ADRs reviewed. Counseling CoQ10 and vitamin D. .  We discussed appropriate dosing given his risk factors. Also on Lovaza 1 twice a day, declines increase at this time. Metabolic disorder  Counseled. Blood sugar had been borderline, amidst a lot of sweets, globin A1c has been excellent not interested in insulin sensitizer. Lifestyle modification reviewed. Micro-macrovascular complications monitor. Monitor     Coronary artery disease involving native heart without angina pectoris  Continue per cardiology. Sees Dr. Benjamin Albarran in  , Had stress test 3/15. Asymptomatic now. He did have ectopy on exam but refuses EKG or other evaluation/treatment. There is a risk that the Erleada could cause cardiac arrhythmia     Age-related osteoporosis without current pathological fracture  Counseled extensively. He tolerated Fosamax but discontinued after he took from 7/09 through 7/16. Morbidity/mortality related to fracture reviewed.  11/13 bone density showed improvement, 12/15 -1.3 stable, 3/19 showed L2 -2.6, hip -1.0.   bone density scan 5/21 had nearly normalized, T score -1.3 L1 and -1.2 neck which is an improvement of 14.7% in the lumbar spine and   a slight worsening of 2.6% in the hip since previous study. [facility] had been on a course of alendronate 2 different times.  The rationale of the Prolia was with the expected osteoporosis as shown on DEXA scan 2 years ago but again nearly normal at this time although false negatives reviewed.  After discussion it was decided to hold off on Prolia, continue current management, falls precautions, weightbearing exercise, appropriate calcium/D and recheck bone density in 1 year sooner as needed    Gastroesophageal reflux disease without esophagitis  Asymptomatic as long as takes medication, had EGD in the past.  Risk of meds reviewed including electrolyte imbalance, renal etc.  Wants to continue. Glaucoma of left eye  Recent. Follows with eye care. Pressures came down with drops. 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 Renal insufficiency  Counseled, differential reviewed. Emphasize proper hydration and avoidance of nephrotoxic agents. He simply wants basic monitoring. PLAN AS ABOVE:      Counseled extensively and differential diagnoses relevant to above were reviewed, including serious etiologies. Side effects and interactions of medications were reviewed. Continue current management, continue per specialists, refer to Dr. Mary Tafoya, requested meds refilled, blood work and follow-up in 3 months sooner as needed. Also annual wellness visit within the month. Return in about 3 months (around 2/28/2022), or if symptoms worsen or fail to improve, for and virtual appt soon before end of year for awv. Signs and symptoms to watch for discussed, serious signs and symptoms reviewed. ER if any. Over 40 minutes  spent with the patient in reviewing records, reviewing with patient/family, counseling, ordering,  prescribing, completing h&p, etc., with over 50% of the time spent face to face counseling. Rafael Alberts MD    Patients are advised to check with insurance company to ensure coverage and to fully understand benefits and cost prior to any testing. This note was created with the assistance of voice recognition software. Document was reviewed however may contain grammatical errors.

## 2021-12-06 ENCOUNTER — HOSPITAL ENCOUNTER (OUTPATIENT)
Age: 86
Discharge: HOME | End: 2021-12-06
Payer: MEDICARE

## 2021-12-06 PROCEDURE — G0103 PSA SCREENING: HCPCS

## 2021-12-06 PROCEDURE — 84153 ASSAY OF PSA TOTAL: CPT

## 2021-12-06 PROCEDURE — 36415 COLL VENOUS BLD VENIPUNCTURE: CPT

## 2021-12-07 ASSESSMENT — LIFESTYLE VARIABLES
HOW OFTEN DURING THE LAST YEAR HAVE YOU HAD A FEELING OF GUILT OR REMORSE AFTER DRINKING: NEVER
HAVE YOU OR SOMEONE ELSE BEEN INJURED AS A RESULT OF YOUR DRINKING: NO
HOW OFTEN DURING THE LAST YEAR HAVE YOU FAILED TO DO WHAT WAS NORMALLY EXPECTED FROM YOU BECAUSE OF DRINKING: 0
HOW OFTEN DO YOU HAVE A DRINK CONTAINING ALCOHOL: MONTHLY OR LESS
HOW OFTEN DO YOU HAVE SIX OR MORE DRINKS ON ONE OCCASION: NEVER
AUDIT TOTAL SCORE: 1
AUDIT TOTAL SCORE: 0
HOW OFTEN DO YOU HAVE SIX OR MORE DRINKS ON ONE OCCASION: 0
AUDIT-C TOTAL SCORE: 1
HOW MANY STANDARD DRINKS CONTAINING ALCOHOL DO YOU HAVE ON A TYPICAL DAY: 0
HOW OFTEN DURING THE LAST YEAR HAVE YOU NEEDED AN ALCOHOLIC DRINK FIRST THING IN THE MORNING TO GET YOURSELF GOING AFTER A NIGHT OF HEAVY DRINKING: NEVER
HOW OFTEN DURING THE LAST YEAR HAVE YOU NEEDED AN ALCOHOLIC DRINK FIRST THING IN THE MORNING TO GET YOURSELF GOING AFTER A NIGHT OF HEAVY DRINKING: 0
HOW MANY STANDARD DRINKS CONTAINING ALCOHOL DO YOU HAVE ON A TYPICAL DAY: ONE OR TWO
HOW OFTEN DURING THE LAST YEAR HAVE YOU BEEN UNABLE TO REMEMBER WHAT HAPPENED THE NIGHT BEFORE BECAUSE YOU HAD BEEN DRINKING: NEVER
HOW OFTEN DURING THE LAST YEAR HAVE YOU FOUND THAT YOU WERE NOT ABLE TO STOP DRINKING ONCE YOU HAD STARTED: NEVER
HOW OFTEN DURING THE LAST YEAR HAVE YOU FOUND THAT YOU WERE NOT ABLE TO STOP DRINKING ONCE YOU HAD STARTED: 0
HOW OFTEN DURING THE LAST YEAR HAVE YOU BEEN UNABLE TO REMEMBER WHAT HAPPENED THE NIGHT BEFORE BECAUSE YOU HAD BEEN DRINKING: 0
HAS A RELATIVE, FRIEND, DOCTOR, OR ANOTHER HEALTH PROFESSIONAL EXPRESSED CONCERN ABOUT YOUR DRINKING OR SUGGESTED YOU CUT DOWN: 0
HOW OFTEN DURING THE LAST YEAR HAVE YOU HAD A FEELING OF GUILT OR REMORSE AFTER DRINKING: 0
HAS A RELATIVE, FRIEND, DOCTOR, OR ANOTHER HEALTH PROFESSIONAL EXPRESSED CONCERN ABOUT YOUR DRINKING OR SUGGESTED YOU CUT DOWN: NO
HOW OFTEN DO YOU HAVE A DRINK CONTAINING ALCOHOL: 1
HOW OFTEN DURING THE LAST YEAR HAVE YOU FAILED TO DO WHAT WAS NORMALLY EXPECTED FROM YOU BECAUSE OF DRINKING: NEVER
AUDIT-C TOTAL SCORE: 0
HAVE YOU OR SOMEONE ELSE BEEN INJURED AS A RESULT OF YOUR DRINKING: 0

## 2021-12-07 ASSESSMENT — PATIENT HEALTH QUESTIONNAIRE - PHQ9
SUM OF ALL RESPONSES TO PHQ9 QUESTIONS 1 & 2: 0
1. LITTLE INTEREST OR PLEASURE IN DOING THINGS: 0
SUM OF ALL RESPONSES TO PHQ QUESTIONS 1-9: 0
2. FEELING DOWN, DEPRESSED OR HOPELESS: 0

## 2021-12-08 ENCOUNTER — VIRTUAL VISIT (OUTPATIENT)
Dept: PRIMARY CARE CLINIC | Age: 86
End: 2021-12-08
Payer: MEDICARE

## 2021-12-08 DIAGNOSIS — Z00.00 ROUTINE GENERAL MEDICAL EXAMINATION AT A HEALTH CARE FACILITY: Primary | ICD-10-CM

## 2021-12-08 DIAGNOSIS — Z71.89 ACP (ADVANCE CARE PLANNING): ICD-10-CM

## 2021-12-08 PROCEDURE — 1123F ACP DISCUSS/DSCN MKR DOCD: CPT | Performed by: FAMILY MEDICINE

## 2021-12-08 PROCEDURE — 99497 ADVNCD CARE PLAN 30 MIN: CPT | Performed by: FAMILY MEDICINE

## 2021-12-08 PROCEDURE — 4040F PNEUMOC VAC/ADMIN/RCVD: CPT | Performed by: FAMILY MEDICINE

## 2021-12-08 PROCEDURE — G0439 PPPS, SUBSEQ VISIT: HCPCS | Performed by: FAMILY MEDICINE

## 2021-12-08 PROCEDURE — G8484 FLU IMMUNIZE NO ADMIN: HCPCS | Performed by: FAMILY MEDICINE

## 2021-12-08 NOTE — PROGRESS NOTES
Medicare Annual Wellness Visit  Name: Leonila Ours Date: 2021   MRN: [id number] Sex: Male   Age: 82 y.o. Ethnicity: Non- / Non    : 1935 Race: White (non-)      Manuel Curtis is here for Medicare AWV    Screenings for behavioral, psychosocial and functional/safety risks, and cognitive dysfunction are all negative except as indicated below. These results, as well as other patient data from the [address] form, are documented in Flowsheets linked to this Encounter. Friday night developed sudden left hip pain. Much better Saturday. Still sore, using walker. Improved. No swelling or other assoc sxs. Using some tylenol every once in a while. Counseled on topicals. Counseled on risk of hairline fracture, labral tear and other concerns. They will defer to specialist at this time. [facility], had x-rays. Sees Dr. Varsha Lyon . Also chronic right shoulder pain, following with specialist for this as well, has appointment . Health Maintenance: Well balanced/proper diet reviewed. Counseled on vitamins/supplements. Exercise recommendations reviewed. Reviewed recommendations regarding Covid, had clinical 2021, no vaccine and declines, counseled. Td (Tdap) (), pneumovax (10/13), prevnar 13 (10/18) flu shot (counseled, declining), Hepatitis vaccines and shingles vaccine (counseled, declining).       Importance of Regular  Eye and Dental exams reviewed. Risks/Benefits of ASA reviewed and discussed current  recommendations. Caffeine risks/limits and Sun protection reviewed. Notify if any new or changing  moles/skin lesions etc. sees kassy. Dexa Scan indications/ risk factors for osteoportic fractures  and prevention reviewed. Previously fosamax, now prolia. 3/19 DEXA showed TSCORE -2.6 L2, -1.0 HIP. was on alendronate, then 1 or 2 doses of prolia, followed by dexa . bone density scan that has nearly normalized, T score -1.3 L1 and -1.2 neck which is an improvement of 14.7% in the lumbar spine and   a slight worsening of 2.6% in the hip since previous study.  He had been on a course of alendronate 2 different times.  The rationale of the Prolia was with the expected osteoporosis as shown on DEXA scan 2 years ago but again nearly normal at this time although false negatives reviewed.  After discussion we are going to hold off on Prolia, continue current management, falls precautions, weightbearing exercise, appropriate calcium/D and recheck bone density in 1 year sooner as needed  Counseled  on testicular exams, prostate exams. Dr Addison Mendoza. Colonoscopy recommendations reviewed.      Had  4/12 [facility]. Had 2/15 Dr Neetu Camarena. Was supposed to have 2018, refusing this or cologuard now. .  Pt denies change in bowel [facility] or [facility] of colon polyps/CA. Heme Cards  Discussed, declines.      Defers EKG to Dr Solitario Bergeron with Dr Yudelka Duff. [facility] testing to him. Reviewed indications for  PFT's. Also counseled on indications for imaging.  dasx other than as per specialists        Allergies   Allergen Reactions    Iodine Hives    Shellfish-Derived Products          Prior to Visit Medications    Medication Sig Taking? Authorizing Provider   omega-3 acid ethyl esters (LOVAZA) 1 g capsule Take 1 capsule by mouth 2 times daily  Kenrick Thomas MD   Handicap Placard MISC by Does not apply route Duration: 5 years  Kenrick Thomas MD   lisinopril (PRINIVIL;ZESTRIL) 10 MG tablet Take 1 tablet by mouth daily  Kenrick Thomas MD   omeprazole (PRILOSEC) 20 MG delayed release capsule Take 1 capsule by mouth daily  Kenrick Thomas MD   atorvastatin (LIPITOR) 40 MG tablet Take 1 tablet by mouth daily  Kenrick Thomas MD   magnesium gluconate (MAGONATE) 500 MG tablet Take 500 mg by mouth 2 times daily  Historical Provider, MD   COMBIGAN 0.2-0.5 % ophthalmic solution INSTILL 1 DROP INTO THE RIGHT EYE TWO TIMES A DAY.   Historical Provider, MD   latanoprost (XALATAN) 0.005 % ophthalmic solution INSTILL 1 DROP IN RIGHT EYE EVERY DAY AT BEDTIME  Historical Provider, MD   ERLEADA 60 MG TABS 60 mg Every 4 days  Historical Provider, MD   meclizine (ANTIVERT) 25 MG tablet 1/2 - 1 by mouth every 6 hours as needed  Historical Provider, MD   Multiple Vitamins-Minerals (THERAPEUTIC MULTIVITAMIN-MINERALS) tablet Take 1 tablet by mouth daily   Historical Provider, MD   metoprolol tartrate (LOPRESSOR) 25 MG tablet Take 25 mg by mouth 2 times daily  Historical Provider, MD   Coenzyme Q10 (COQ10) 50 MG CAPS Take 1 capsule by mouth daily   Historical Provider, MD   aspirin 81 MG tablet Take 81 mg by mouth daily   Historical Provider, MD   Cyanocobalamin (VITAMIN B 12 PO) Take  by mouth daily.  sublingual   Historical Provider, MD         Past Medical History:   Diagnosis Date    CA prostate, adenoca ([facility] [address] 4/25/2019    CAD (coronary artery disease)     Dr. Shahzad Dalton - [facility])     prostate- PO chemotherapy     Chronic bilateral low back pain without sciatica 3/6/2020    Cobalamin deficiency     Hyperlipidemia     Hypertension     Osteoarthritis     Osteochondropathy     Osteopenia     Pain in lower limb     Peripheral venous insufficiency     PONV (postoperative nausea and vomiting)     Prolonged emergence from general anesthesia     PVD (peripheral vascular disease) with claudication ([facility] [address] 4/25/2019       Past Surgical History:   Procedure Laterality Date    ACETABULUM FRACTURE SURGERY  3/24/11    ANESTHESIA NERVE BLOCK Bilateral 6/16/2020    BILATERAL L3 L4 L5 MEDIAL NERVE BRANCH BLOCK performed by Sol Steward DO at [facility]  2016    CORONARY ARTERY BYPASS GRAFT  Dec. 1996    [facility]/ Dr. Codie Graff, JAMEEL, DIAGNOSTIC  2016    EYE SURGERY Bilateral 01/2018    cataract, glaucoma 2020-left eye   [address]    left   [address]    JOINT REPLACEMENT      bilat knee 2010, righ thip 2011     LYMPH NODE DISSECTION  10/2007    PAIN MANAGEMENT PROCEDURE N/A 5/19/2020    CAUDAL EPIDURAL STEROID INJECTION performed by Jennifer Spears DO at [facility]  8/4/20    PAIN MANAGEMENT PROCEDURE Right 8/4/2020    RIGHT L3 4 5 MEDIAL BRANCH RADIOFREQUENCY ABLATION performed by Jennifer Spears DO at [facility] [address] Left 9/10/2020    LEFT L3 4 5 MEDIAL BRANCH RADIOFREQUENCY ABLATION     +++IODINE ALLERGY+++   CPT: 09423 [id number] performed by Jennifer Spears DO at [facility] [address] Bilateral 3/25/2021    BILATERAL L3,4,5 MEDIAL NERVE BRANCH RADIOFREQUENCY ABLATION performed by Jennifer Spears DO at [address] [facility].  10/31/07    [address] DA LETICIA LAPAROSCOPIC PROSTATECTOMY    TESTICLE REMOVAL Bilateral 02/2019    hx prostate ca-    TONSILLECTOMY      TOTAL HIP ARTHROPLASTY  11/30/11    right    TOTAL KNEE ARTHROPLASTY  2010    bilateral         Family History   Problem Relation Age of Onset    Stroke Mother     Cancer Mother         pancreatic    Cancer Father         prostate    Stroke Father        CareTeam (Including outside providers/suppliers regularly involved in providing care):   Patient Care Team:  Rafael Alberts MD as PCP - Sarah Bond MD as PCP - [address] [place] [address] Provider  Giacomo Lefort, MD (Cardiology)  Daysi Michael MD (Urology)  Fernando Kc MD (Radiation Oncology)  Aimee Alfaro DO as Surgeon (Cardiothoracic Surgery)  Manuela Cochran MD as Consulting Physician (Cardiology)  Remy Jeff MD as Consulting Physician (Pulmonary Disease)    Wt Readings from Last 3 Encounters:   11/30/21 218 lb 3.2 oz (99 kg)   08/31/21 214 lb 12.8 oz (97.4 kg)   06/03/21 210 lb (95.3 kg)     Patient-Reported Vitals 2/3/2021   Patient-Reported Weight 212   Patient-Reported Height 5'10\"   Patient-Reported Systolic 588   Patient-Reported Diastolic 74   Patient-Reported Pulse 81   Patient-Reported Temperature 97.4   Patient-Reported SpO2 97      There is no height or weight on file to calculate BMI. Based upon direct observation of the patient, evaluation of cognition reveals recent and remote memory intact. Patient's complete Health Risk Assessment and screening values have been reviewed and are found in Flowsheets. The following problems were reviewed today and where indicated follow up appointments were made and/or referrals ordered. Positive Risk Factor Screenings with Interventions:            General Health and ACP:  General  In general, how would you say your health is?: Good  In the past 7 days, have you experienced any of the following? New or Increased Pain, New or Increased Fatigue, Loneliness, Social Isolation, Stress or Anger?: (!) New or Increased Pain  Do you get the social and emotional support that you need?: Yes  Do you have a Living Will?: (!) No  Advance Directives     Power of  Living Will ACP-Advance Directive ACP-Power of     Not on File Not on File Not on File Not on File      General Health Risk Interventions:  · counseled    Health Habits/Nutrition:  Health Habits/Nutrition  Do you exercise for at least 20 minutes 2-3 times per week?: (!) No  Have you lost any weight without trying in the past 3 months?: No  Do you eat only one meal per day?: No  Have you seen the dentist within the past year?: N/A - wear dentures     Health Habits/Nutrition Interventions:  · counseled      ADL:  ADLs  In the past 7 days, did you need help from others to perform any of the following everyday activities? Eating, dressing, grooming, bathing, toileting, or walking/balance?: (!) Walking/Balance  In the past 7 days, did you need help from others to take care of any of the following?  Laundry, housekeeping, banking/finances, shopping, telephone use, food preparation, transportation, or taking medications?: None  ADL above, 12/8/2021 . Encouraged yearly physicals. Plan as above. Counseled extensively and differential diagnoses relevant to above were reviewed, including serious etiologies. Side effects and interactions of medications were reviewed. Counseled, he is going to cont per specialists, otherwise simply wants to get planned bw and keep fu march, sooner prn. Sees dr dAam Gupta in 1week. As long as symptoms steadily improve/resolve, and medical conditions follow the expected course, FU as below, as previously directed and sooner PRN. Return for Medicare Annual Wellness Visit in 1 year, and Keep Scheduled FU, SOONER PRN. Signs and symptoms to watch for discussed, serious signs and symptoms reviewed. ER if any. Cedrick Minor MD    Patients are advised to check with insurance company to ensure coverage and to fully understand benefits and cost prior to any testing. This note was created with the assistance of voice recognition software. Document was reviewed however may contain grammatical errors. Marcello Cunningham is a 82 y.o. male being evaluated by a Virtual Visit (video and audio) encounter to address concerns as mentioned above. A caregiver was present when appropriate. Due to this being a TeleHealth encounter (During BBDRI-05 public health emergency), evaluation of the following organ systems was limited: Vitals/Constitutional/EENT/Resp/CV/GI//MS/Neuro/Skin/Heme-Lymph-Imm. Pursuant to the emergency declaration under the [address] [place] [address], [address] [place] [address] authority and the [employer] General Act, this Virtual Visit was conducted with patient's (and/or legal guardian's) consent, to reduce the patient's risk of exposure to COVID-19 and provide necessary medical care.   The patient (and/or legal guardian) has also been advised to contact this office for worsening conditions or problems, and seek emergency medical treatment and/or call 911 if deemed necessary. Patient identification was verified at the start of the visit: Yes    Services were provided through a video synchronous discussion virtually to substitute for in-person clinic visit. Patient and provider were located at their individual homes. --Genesis Washington MD on 12/8/2021 at 1:58 PM    An electronic signature was used to authenticate this note. Advance Care Planning   Advanced Care Planning: Discussed the patients choices for care and treatment in case of a health event that adversely affects decision-making abilities. Also discussed the patients long-term treatment options. Reviewed the process of designating a competent adult as an Agent (or -in-fact) that could take make health care decisions for the patient if incompetent. Patient was asked to complete the declaration forms, either acknowledge the forms by a public notary or an eligible witness and provide a signed copy to the practice office.   Time spent (minutes): 3

## 2021-12-08 NOTE — PATIENT INSTRUCTIONS
Personalized Preventive Plan for [facility] - 12/8/2021  Medicare offers a range of preventive health benefits. Some of the tests and screenings are paid in full while other may be subject to a deductible, co-insurance, and/or copay. Some of these benefits include a comprehensive review of your medical history including lifestyle, illnesses that may run in your family, and various assessments and screenings as appropriate. After reviewing your medical record and screening and assessments performed today your provider may have ordered immunizations, labs, imaging, and/or referrals for you. A list of these orders (if applicable) as well as your Preventive Care list are included within your After Visit Summary for your review. Other Preventive Recommendations:    · A preventive eye exam performed by an eye specialist is recommended every 1-2 years to screen for glaucoma; cataracts, macular degeneration, and other eye disorders. · A preventive dental visit is recommended every 6 months. · Try to get at least 150 minutes of exercise per week or 10,000 steps per day on a pedometer . · Order or download the FREE \"Exercise & Physical Activity: Your Everyday Guide\" from [employer]. Call 2-244.867.7965 or search [employer] online. · You need 3736-7326 mg of calcium and 3652-5504 IU of vitamin D per day. It is possible to meet your calcium requirement with diet alone, but a vitamin D supplement is usually necessary to meet this goal.  · When exposed to the sun, use a sunscreen that protects against both UVA and UVB radiation with an SPF of 30 or greater. Reapply every 2 to 3 hours or after sweating, drying off with a towel, or swimming. · Always wear a seat belt when traveling in a car. Always wear a helmet when riding a bicycle or motorcycle.

## 2021-12-13 LAB
PROSTATE SPECIFIC ANTIGEN: <0.03 NG/ML (ref 0–4)
PROSTATE SPECIFIC ANTIGEN: ABNORMAL NG/ML (ref 0–4)

## 2021-12-28 ENCOUNTER — HOSPITAL ENCOUNTER (OUTPATIENT)
Dept: PET IMAGING | Age: 86
Discharge: HOME | End: 2021-12-30
Payer: MEDICARE

## 2021-12-28 DIAGNOSIS — R91.1 LUNG NODULE: ICD-10-CM

## 2021-12-28 LAB — METER GLUCOSE: 107 MG/DL (ref 74–99)

## 2021-12-28 PROCEDURE — 3430000000 HC RX DIAGNOSTIC RADIOPHARMACEUTICAL: Performed by: RADIOLOGY

## 2021-12-28 PROCEDURE — 82962 GLUCOSE BLOOD TEST: CPT

## 2021-12-28 PROCEDURE — A9552 F18 FDG: HCPCS | Performed by: RADIOLOGY

## 2021-12-28 PROCEDURE — 78815 PET IMAGE W/CT SKULL-THIGH: CPT

## 2021-12-28 RX ORDER — FLUDEOXYGLUCOSE F 18 200 MCI/ML
15 INJECTION, SOLUTION INTRAVENOUS
Status: COMPLETED | OUTPATIENT
Start: 2021-12-28 | End: 2021-12-28

## 2021-12-28 RX ADMIN — FLUDEOXYGLUCOSE F 18 15 MILLICURIE: 200 INJECTION, SOLUTION INTRAVENOUS at 10:13

## 2022-01-07 PROBLEM — Z00.00 ROUTINE GENERAL MEDICAL EXAMINATION AT A HEALTH CARE FACILITY: Status: RESOLVED | Noted: 2021-12-08 | Resolved: 2022-01-07

## 2022-01-20 ENCOUNTER — VIRTUAL VISIT (OUTPATIENT)
Dept: PRIMARY CARE CLINIC | Age: 87
End: 2022-01-20
Payer: MEDICARE

## 2022-01-20 ENCOUNTER — HOSPITAL ENCOUNTER (OUTPATIENT)
Age: 87
Discharge: HOME | End: 2022-01-22
Payer: MEDICARE

## 2022-01-20 DIAGNOSIS — M19.011 LOCALIZED OSTEOARTHRITIS OF RIGHT SHOULDER: ICD-10-CM

## 2022-01-20 DIAGNOSIS — R10.2 PELVIC PAIN: ICD-10-CM

## 2022-01-20 DIAGNOSIS — R91.8 ABNORMAL CT LUNG SCREENING: ICD-10-CM

## 2022-01-20 DIAGNOSIS — I25.10 CORONARY ARTERY DISEASE INVOLVING NATIVE HEART WITHOUT ANGINA PECTORIS, UNSPECIFIED VESSEL OR LESION TYPE: ICD-10-CM

## 2022-01-20 DIAGNOSIS — C61 PROSTATE CA (HCC): ICD-10-CM

## 2022-01-20 DIAGNOSIS — R91.1 LUNG NODULE: Primary | ICD-10-CM

## 2022-01-20 LAB — SARS-COV-2, PCR: NOT DETECTED

## 2022-01-20 PROCEDURE — 1036F TOBACCO NON-USER: CPT | Performed by: FAMILY MEDICINE

## 2022-01-20 PROCEDURE — 4040F PNEUMOC VAC/ADMIN/RCVD: CPT | Performed by: FAMILY MEDICINE

## 2022-01-20 PROCEDURE — G8427 DOCREV CUR MEDS BY ELIG CLIN: HCPCS | Performed by: FAMILY MEDICINE

## 2022-01-20 PROCEDURE — 1123F ACP DISCUSS/DSCN MKR DOCD: CPT | Performed by: FAMILY MEDICINE

## 2022-01-20 PROCEDURE — U0003 INFECTIOUS AGENT DETECTION BY NUCLEIC ACID (DNA OR RNA); SEVERE ACUTE RESPIRATORY SYNDROME CORONAVIRUS 2 (SARS-COV-2) (CORONAVIRUS DISEASE [COVID-19]), AMPLIFIED PROBE TECHNIQUE, MAKING USE OF HIGH THROUGHPUT TECHNOLOGIES AS DESCRIBED BY CMS-2020-01-R: HCPCS

## 2022-01-20 PROCEDURE — 99214 OFFICE O/P EST MOD 30 MIN: CPT | Performed by: FAMILY MEDICINE

## 2022-01-20 PROCEDURE — G8417 CALC BMI ABV UP PARAM F/U: HCPCS | Performed by: FAMILY MEDICINE

## 2022-01-20 PROCEDURE — G8484 FLU IMMUNIZE NO ADMIN: HCPCS | Performed by: FAMILY MEDICINE

## 2022-01-20 PROCEDURE — U0005 INFEC AGEN DETEC AMPLI PROBE: HCPCS

## 2022-01-20 NOTE — PROGRESS NOTES
stress test showing ejection fraction in the 40s although  Echo 5/21 showed ejection fraction between 55 and 60%. It also showed a fixed defect, she states Dr. Elham Tran staff called and marked him low risk for shoulder surgery which she is deferring now his shoulder doing much better. He had a follow-up CT scan 12/21 through Dr. Victorino Lazaro which showed interval enlargement right lower lobe. Subsequently had PET scan which showed activity as well as hypermetabolism at bilateral tsering suspicious for may metastatic disease, sclerotic skeletal lesions not markedly FDG avid suggestive of treated metastasis and bilateral adrenal activity likely physiologic less likely metastatic. CT-guided needle biopsy pending. Counseled. Again generally feeling well now. Last blood work reviewed    PET CT SKULL BASE TO MID THIGH SUBSEQ    Result Date: 12/28/2021  EXAMINATION: Skull base to midthigh PET/CT 12/28/2021 TECHNIQUE: Following IV injection of 17.7 mCi of F-18 FDG, PET  tumor imaging was acquired from the base of the skull to the mid thighs. Computed tomography was used for purposes of attenuation correction and anatomic localization. Fusion imaging was utilized for interpretation. Uptake time 60 min. Glucose level 107 mg/dl. COMPARISON: CT chest dated 01/13/2021, CT abdomen pelvis dated 03/21/2011 HISTORY: ORDERING SYSTEM PROVIDED HISTORY: Lung nodule Initial evaluation FINDINGS: HEAD/NECK: In the craniocervical soft tissues, physiologic activity is favored, including at the vocal cords. Bilateral carotid artery calcification. Postoperative changes of the globes. CHEST: Status post median sternotomy. Multiple mediastinal clips are seen. Calcification of the thoracic aorta. Coronary artery calcification. Hypermetabolism is seen of the tsering bilaterally, SUV maximum 5.4 on the right and 6.4 on the left. No axillary adenopathy. Soft tissue activity about the shoulders bilaterally, likely inflammatory.  Posterior right lower lobe pulmonary nodule demonstrates metabolic activity, SUV maximum 4.1, and currently measures 2.2 x 1.5 cm, previously 2.0 x 1.2 cm. The previously demonstrated multifocal bilateral airspace disease has improved with mild residual ground-glass opacity bilaterally. Calcified left pleural plaques are seen. ABDOMEN/PELVIS: Excretion of activity is seen from bilateral kidneys and activity is seen within the urinary bladder. Bowel activity is seen which can be physiologically variable. There is thickening of the inferior left adrenal gland inferiorly with associated metabolic activity, SUV maximum 4.2. This thickening is not significantly changed however as compared to remote CT dated 03/21/2011, favoring hyperplasia as a consequence. Activity is seen within the right adrenal gland as well though is morphologically unremarkable and favored to be physiologic as a consequence. Hepatic cysts as well as smaller low-density hepatic lesions, too small to characterize. Pancreas is fatty. Fluid density lesion at the inferior right kidney measuring 2.8 cm, typically cyst.  Calcification of the abdominal aorta and iliac arteries. Diverticulosis. Streak artifact from right hip arthroplasty. Sclerotic lesions are seen within the left iliac bone, medial right iliac bone, sacrum, not markedly FDG avid. Multiple sclerotic foci are seen within ribs, each scapula. Enlarging noncalcified right lower lobe pulmonary nodule has metabolic characteristics concerning for lung malignancy until proven otherwise. Hypermetabolism at bilateral tsering, suspicious for may metastatic disease as a consequence. Active granulomatous disease or reactive nodes could also be considered though malignancy is of primary concern. Sclerotic skeletal lesions, not markedly FDG avid, suggestive of treated metastases in this patient with history of prostate carcinoma.  Bilateral adrenal activity, likely physiologic, less likely metastatic; continued attention on follow-up suggested.      Most Recent Labs  CBC  Lab Results   Component Value Date    WBC 3.8 11/22/2021    WBC 4.0 08/25/2021    WBC 3.5 04/26/2021    RBC 3.92 11/22/2021    RBC 3.82 08/25/2021    RBC 3.90 04/26/2021    HGB 11.8 11/22/2021    HGB 11.4 08/25/2021    HGB 11.5 04/26/2021    HCT 34.6 11/22/2021    HCT 33.9 08/25/2021    HCT 34.5 04/26/2021    MCV 88.3 11/22/2021    MCV 88.7 08/25/2021    MCV 88.5 04/26/2021     11/22/2021     08/25/2021     04/26/2021      CMP  Lab Results   Component Value Date     11/22/2021     08/25/2021     04/26/2021    K 4.5 11/22/2021    K 4.4 08/25/2021    K 4.6 04/26/2021    K 4.4 01/16/2021    K 4.4 01/14/2021     11/22/2021     08/25/2021     04/26/2021    CO2 26 11/22/2021    CO2 27 08/25/2021    CO2 27 04/26/2021    ANIONGAP 12 11/22/2021    ANIONGAP 9 08/25/2021    ANIONGAP 12 04/26/2021    GLUCOSE 107 11/22/2021    GLUCOSE 110 08/25/2021    GLUCOSE 121 04/26/2021    GLUCOSE 113 03/28/2011    GLUCOSE 93 03/28/2011    GLUCOSE 101 03/26/2011    BUN 23 11/22/2021    BUN 22 08/25/2021    BUN 28 04/26/2021    CREATININE 1.3 11/22/2021    CREATININE 1.3 08/25/2021    CREATININE 1.5 04/26/2021    LABGLOM 53 11/22/2021    LABGLOM 53 08/25/2021    LABGLOM 45 04/26/2021    GFRAA >60 11/22/2021    GFRAA >60 08/25/2021    GFRAA 54 04/26/2021    CALCIUM 10.1 11/22/2021    CALCIUM 9.8 08/25/2021    CALCIUM 10.0 04/26/2021    PROT 7.0 11/22/2021    PROT 6.6 08/25/2021    PROT 7.2 04/26/2021    LABALBU 4.4 11/22/2021    LABALBU 4.0 08/25/2021    LABALBU 4.3 04/26/2021    LABALBU 2.9 03/26/2011    LABALBU 3.0 03/25/2011    LABALBU 3.4 03/24/2011    BILITOT 0.6 11/22/2021    BILITOT 0.5 08/25/2021    BILITOT 0.6 04/26/2021    ALKPHOS 56 11/22/2021    ALKPHOS 46 08/25/2021    ALKPHOS 61 04/26/2021    AST 25 11/22/2021    AST 23 08/25/2021    AST 32 04/26/2021    ALT 18 11/22/2021    ALT 17 08/25/2021    ALT 31 04/26/2021     A1C  Lab Results   Component Value Date    LABA1C 5.0 11/22/2021    LABA1C 4.7 08/25/2021    LABA1C 5.3 01/21/2021     TSH  Lab Results   Component Value Date    TSH 5.040 11/22/2021    TSH 4.550 08/25/2021    TSH 5.740 04/26/2021     FREET4  Lab Results   Component Value Date    R8WOXIP 9.5 01/31/2011     LIPID  Lab Results   Component Value Date    CHOL 125 11/22/2021    CHOL 126 08/25/2021    CHOL 125 04/26/2021    HDL 44 11/22/2021    HDL 41 08/25/2021    HDL 40 04/26/2021    LDLCALC 49 11/22/2021    LDLCALC 39 08/25/2021    LDLCALC 45 04/26/2021    TRIG 162 11/22/2021    TRIG 230 08/25/2021    TRIG 202 04/26/2021     VITAMIN D  Lab Results   Component Value Date    VITD25 50 11/22/2021    VITD25 41 08/25/2021    VITD25 47 04/26/2021     MAGNESIUM  Lab Results   Component Value Date    MG 1.8 11/22/2021    MG 1.8 08/25/2021    MG 1.9 04/26/2021      PHOS  Lab Results   Component Value Date    PHOS 3.9 08/25/2021    PHOS 4.0 01/21/2021    PHOS 3.8 01/16/2021      PAWAN   No results found for: PAWAN  RHEUMATOID FACTOR  No results found for: RF  PSA  Lab Results   Component Value Date    PSA SEE NOTE 12/06/2021    PSA <0.03 12/06/2021    PSA <0.03 09/08/2021      HEPATITIS C  No results found for: HCVABI  HIV  No results found for: QZY9BBW, HIV1QT  UA  Lab Results   Component Value Date    COLORU Yellow 11/22/2021    COLORU Yellow 08/25/2021    COLORU Yellow 04/26/2021    CLARITYU Clear 11/22/2021    CLARITYU Clear 08/25/2021    CLARITYU Clear 04/26/2021    GLUCOSEU Negative 11/22/2021    GLUCOSEU Negative 08/25/2021    GLUCOSEU Negative 04/26/2021    GLUCOSEU NEGATIVE 12/21/2010    BILIRUBINUR Negative 11/22/2021    BILIRUBINUR Negative 08/25/2021    BILIRUBINUR SMALL 04/26/2021    BILIRUBINUR NEGATIVE 12/21/2010    KETUA Negative 11/22/2021    KETUA Negative 08/25/2021    KETUA TRACE 04/26/2021    SPECGRAV >=1.030 11/22/2021    SPECGRAV 1.025 08/25/2021    SPECGRAV >=1.030 04/26/2021    BLOODU Negative 11/22/2021    BLOODU Negative 08/25/2021    BLOODU Negative 04/26/2021    PHUR 5.0 11/22/2021    PHUR 5.5 08/25/2021    PHUR 5.0 04/26/2021    PROTEINU Negative 11/22/2021    PROTEINU Negative 08/25/2021    PROTEINU TRACE 04/26/2021    UROBILINOGEN 0.2 11/22/2021    UROBILINOGEN 0.2 08/25/2021    UROBILINOGEN 0.2 04/26/2021    NITRU Negative 11/22/2021    NITRU Negative 08/25/2021    NITRU Negative 04/26/2021    LEUKOCYTESUR Negative 11/22/2021    LEUKOCYTESUR Negative 08/25/2021    LEUKOCYTESUR Negative 04/26/2021     Urine Micro/Albumin Ratio  Lab Results   Component Value Date    MALBCR 16.2 11/22/2021    MALBCR - 08/25/2021    MALBCR 9.0 04/26/2021         ROS:  Const: Denies chills, fever, malaise and sweats. Eyes: Denies discharge, pain, redness and visual disturbance. ENMT: Denies earaches, other ear symptoms. Denies nasal or sinus symptoms other than stated  above. Denies mouth and tongue lesions and sore throat. CV: Denies chest discomfort, pain; diaphoresis, dizziness, edema, lightheadedness, orthopnea,  palpitations, syncope and near syncopal episode or any exertional symptoms  Resp: Denies cough, hemoptysis, pleuritic pain, SOB, sputum production and wheezing. GI: Denies abdominal pain, change in bowel habits, hematochezia, melena, nausea and vomiting. : Denies urinary symptoms including dysuria , urgency, frequency or hematuria. Musculo: As above   skin: Denies bruising and rash.   Neuro: Denies headache, numbness, stiff neck, tingling and focal weakness slurred speech or facial  droop  Hema/Lymph: Denies bleeding/bruising tendency and enlarged lymph nodes         Current Outpatient Medications:     omega-3 acid ethyl esters (LOVAZA) 1 g capsule, Take 1 capsule by mouth 2 times daily, Disp: 180 capsule, Rfl: 3    Handicap Placard MISC, by Does not apply route Duration: 5 years, Disp: 1 each, Rfl: 0    lisinopril (PRINIVIL;ZESTRIL) 10 MG tablet, Take 1 tablet by mouth daily, Disp: 90 tablet, Rfl: 3    omeprazole (PRILOSEC) 20 MG delayed release capsule, Take 1 capsule by mouth daily, Disp: 90 capsule, Rfl: 3    atorvastatin (LIPITOR) 40 MG tablet, Take 1 tablet by mouth daily, Disp: 90 tablet, Rfl: 3    magnesium gluconate (MAGONATE) 500 MG tablet, Take 500 mg by mouth 2 times daily, Disp: , Rfl:     COMBIGAN 0.2-0.5 % ophthalmic solution, INSTILL 1 DROP INTO THE RIGHT EYE TWO TIMES A DAY., Disp: , Rfl:     latanoprost (XALATAN) 0.005 % ophthalmic solution, INSTILL 1 DROP IN RIGHT EYE EVERY DAY AT BEDTIME, Disp: , Rfl:     ERLEADA 60 MG TABS, 60 mg Every 4 days, Disp: , Rfl:     meclizine (ANTIVERT) 25 MG tablet, 1/2 - 1 by mouth every 6 hours as needed, Disp: , Rfl:     Multiple Vitamins-Minerals (THERAPEUTIC MULTIVITAMIN-MINERALS) tablet, Take 1 tablet by mouth daily , Disp: , Rfl:     metoprolol tartrate (LOPRESSOR) 25 MG tablet, Take 25 mg by mouth 2 times daily, Disp: , Rfl:     Coenzyme Q10 (COQ10) 50 MG CAPS, Take 1 capsule by mouth daily , Disp: , Rfl:     aspirin 81 MG tablet, Take 81 mg by mouth daily , Disp: , Rfl:     Cyanocobalamin (VITAMIN B 12 PO), Take  by mouth daily.  sublingual , Disp: , Rfl:   Allergies   Allergen Reactions    Iodine Hives    Shellfish-Derived Products        Past Medical History:   Diagnosis Date    CA prostate, adenoca ([facility] 4/25/2019    CAD (coronary artery disease)     [facility] Shel[facility])     prostate- PO chemotherapy     Chronic bilateral low back pain without sciatica 3/6/2020    Cobalamin deficiency     Hyperlipidemia     Hypertension     Osteoarthritis     Osteochondropathy     Osteopenia     Pain in lower limb     Peripheral venous insufficiency     PONV (postoperative nausea and vomiting)     Prolonged emergence from general anesthesia     PVD (peripheral vascular disease) with claudication ([address] 4/25/2019     Past Surgical History:   Procedure Laterality Date    ACETABULUM FRACTURE SURGERY  3/24/11    ANESTHESIA NERVE BLOCK Bilateral 2020    BILATERAL L3 L4 L5 MEDIAL NERVE BRANCH BLOCK performed by Rocky Holter, DO at [facility] COLONOSCOPY  2016    CORONARY ARTERY BYPASS GRAFT  Dec. 1996    [facility]/ Dr. Naye Moura, DIAGNOSTIC  2016    EYE SURGERY Bilateral 2018    cataract, glaucoma -left eye    FEMUR FRACTURE SURGERY  1981    left   [facility]    JOINT REPLACEMENT      bilat knee , righ thip 2011     LYMPH NODE DISSECTION  10/2007    PAIN MANAGEMENT PROCEDURE N/A 2020    CAUDAL EPIDURAL STEROID INJECTION performed by Rocky Holter, DO at [facility]  20    PAIN MANAGEMENT PROCEDURE Right 2020    RIGHT L3 4 5 MEDIAL BRANCH RADIOFREQUENCY ABLATION performed by Rocky Holter, DO at [facility] [address] Left 9/10/2020    LEFT L3 4 5 MEDIAL BRANCH RADIOFREQUENCY ABLATION     +++IODINE ALLERGY+++   CPT: 06608 [id number] performed by Rocky Holter, DO at [facility] [address] Bilateral 3/25/2021    BILATERAL L3,4,5 MEDIAL NERVE BRANCH RADIOFREQUENCY ABLATION performed by Rocky Holter, DO at [facility].  10/31/07    [address] M[address] [facility] LAPAROSCOPIC PROSTATECTOMY    TESTICLE REMOVAL Bilateral 2019    hx prostate ca-    TONSILLECTOMY      TOTAL HIP ARTHROPLASTY  11    right    TOTAL KNEE ARTHROPLASTY  2010    bilateral     Family History   Problem Relation Age of Onset    Stroke Mother     Cancer Mother         pancreatic    Cancer Father         prostate    Stroke Father      Social History     Tobacco Use    Smoking status: Former Smoker     Packs/day: 1.00     Years: 40.00     Pack years: 40.00     Types: Cigarettes     Start date: 5     Quit date: 10/1/1994     Years since quittin.3    Smokeless tobacco: Former User     Types: [address] date:    Vaping Use    Vaping Use: Never used   Substance Use Abnormal   [x] Mouth/Throat: Mucous membranes are moist.     External Ears [x] Normal  [] Abnormal-     Neck: [x] No visualized mass     Pulmonary/Chest: [x] Respiratory effort normal.  [x] No visualized signs of difficulty breathing or respiratory distress        [] Abnormal-      Musculoskeletal:   [x] Normal gait with no signs of ataxia         [x] Normal range of motion of neck        [] Abnormal-       Neurological:        [x] No Facial Asymmetry (Cranial nerve 7 motor function) (limited exam to video visit)          [x] No gaze palsy        [] Abnormal-         Skin:        [x] No significant exanthematous lesions or discoloration noted on facial skin         [] Abnormal-            Psychiatric:       [x] Normal Affect [x] No Hallucinations        [] Abnormal-     Other pertinent observable physical exam findings-     ASSESSMENT/PLAN:   Diagnosis Orders   1. Lung nodule     2. Prostate CA ([address]     3. Coronary artery disease involving native heart without angina pectoris, unspecified vessel or lesion type     4. Localized osteoarthritis of right shoulder     5. Pelvic pain         No problem-specific Assessment & Plan notes found for this encounter. Lung nodule  Serial CTs have shown increase in size. Positive PET scan. Needle biopsy pending. Counseled on this versus excisional biopsy. Counseled extensively, differential reviewed both benign and malignant. He will follow with Dr. Nahum Barba. If positive we will also refer to [facility]. Various treatments reviewed  Pelvic pain  With mets from prostate CAsymptoms resolved. Previously saw [facility]. Following with Dr. Mckinley Perez. Recent PET scan with minimal activity      Coronary artery disease involving native heart without angina pectoris  Continue per cardiology, Dr. montes. Had stress test 3/15. He had a recent stress test 1/22 showing EF in the 40s and fixed defect, echo 5/21 showing EF 55 to 60%, and at least moderate aortic stenosis.   He is asymptomatic now. He will continue per Dr. Ale Alvarado. There is a risk that the Erleada could cause cardiac arrhythmia         Prostate CA ([address]  Metastatic. Continue per Dr. Santi Cleveland less than 0.01-0.07-0.3 , he had been on Lupron, had metastasis, since orchiectomy, did not tolerate xtandi so he stopped it , overall feeling well, on Erleada, follows regularly with Dr. Yessica De Leon      Right shoulder impingement  Counseled extensively. Differential reviewed, including serious etiologies. Significant arthritis, cannot rule out tear. He saw Dr. Lior Drake. He was actually planning surgery and deemed low risk per Dr. Ale Alvarado after 1/22 stress test but symptoms actually have improved greatly and he is going to defer now       Murmur  Counseled extensively. Differential reviewed, including serious etiologies. Had echo 5 /2021. Cont per dr Edil Armendariz, sees every 6 months        Allergic rhinitis  Counseled, chronic. Counseled on use of nasal steroid, vaporizer, nasal saline as well as additional treatment options for vasomotor rhinitis     History of COVID-19  Counseled. Resolved. Counseled on vaccine        Disc disease, degenerative, lumbar or lumbosacral  Counseled extensively. Differential reviewed, including serious etiologies. Was Following with [facility], had to change because of insurance, now seeing Dr. Amanda Briscoe pain management, saw Dr. Chalino Kirby. Previously [facility]. Counseled on injections, radiofrequency ablation etc.  Failed physical therapy. . Had recent radiofrequency ablation x2 and overall doing well     Bilateral carpal tunnel syndrome  Had emg/ncs Nov 2019 [facility]. Referred to [facility] but did not go because no symptoms     Health maintenance examination  Health maintenance issues discussed at length 9/20, encouraged yearly. He will schedule this within the month for annual wellness        Pancytopenia ([address]  Counseled extensively.  Differential reviewed, including serious etiologies.  Possibly from medication.  .  Declines hematology.  Monitor.    Hemoglobin stable,  white count fluctuates, mildly low, platelets normalized. Proper hydration reviewed. Asymptomatic.                Low magnesium level  Counseled. Goals reviewed.    Currently 1.8. He would like to continue magnesium oxide 4 mg daily.                    Vitamin D deficiency  Continue current therapy. Monitor.     Vitamin B12 deficiency  Appropriate supplementation reviewed.     Vitamin A99 elevated, folic acid now normal, appropriate supplementation reviewed, monitor, risk of low and high reviewed, no longer on injection        Hyperglycemia  Counseled, not interested in meds now, monitor. A1c excellent        Essential hypertension  Counseled. Became hypotensive. Cardiology low-dose lisinopril 10 mg. Now stable. Monitor. The risks of hypertension and hypotension reviewed. Watch closely ambulatory. Hyper and hypotensive precautions and parameters reviewed and previously written as well as parameters on pulse, call if out of range, ER dangers numbers. Lifestyle modification reviewed. Tolerating therapy.            Tubular adenoma   Last colonoscopy Dr. Susie Mckeon complaining repeat 3 years from then. Overdue. Declines now, fit cards or Cologuard. . Hemoglobin consistently low but stable. Declines any intervention now, declines any tests or procedures now.     Thyroid nodule  Counseled, incidental 7 mm right thyroid nodule on carotid ultrasound 3/19. Declines workup or follow-up now     Thoracic aneurysm, not ruptured  Counseled, potential serious is reviewed, if any symptoms directly to ER. Continue per Dr. Solomon. He saw him 3/15 and had CT at least then. Dr. Solomon discharged him at this point unless symptoms and he not interested in follow-up imaging.   Of note CT was done for Covid 1/21 and they stated no acute abnormality of the thoracic aorta     PVD (peripheral vascular disease) with claudication [facility])  He saw Marlon Marrufo, who had him on pletal, had minimal results.   Now follows with Dr Nitish Everett vascular in [place] who states he did have a femoral blockage proximal that would require bypass as well as some distal blockages that would require stenting but they felt observation most appropriate. He did not feel medication was necessary or helpful at this time. They state Dr. Jennyfer Raza agrees with this plan. He is comfortable with this plan at this point. Serious signs and symptoms watch were reviewed. Admits to missing an appointment because of Covid pandemic. They were planning surgery but after discussion, and the risks of complications and the complexity of the surgery he is declining intervention now. States he watches the distance he walks but otherwise doing well. No sores etc.          Mixed hyperlipidemia  Did very well on d.a.w. Crestor unfortunately severe myalgias on generic. He could try to [address] d.a.w[address] but he chose to go on Lipitor instead, tolerating, declines change in dose, goals reviewed, risk benefits ADRs reviewed. Counseling CoQ10 and vitamin D. .  We discussed appropriate dosing given his risk factors. Also on Lovaza 1 twice a day, declines increase at this time.     Metabolic disorder  Counseled. Blood sugar had been borderline, amidst a lot of sweets, globin A1c has been excellent not interested in insulin sensitizer. Lifestyle modification reviewed. Micro-macrovascular complications monitor. Monitor   Age-related osteoporosis without current pathological fracture  Counseled extensively. He tolerated Fosamax but discontinued after he took from 7/09 through 7/16. Morbidity/mortality related to fracture reviewed.  11/13 bone density showed improvement, 12/15 -1.3 stable, 3/19 showed L2 -2.6, hip -1.0.   bone density scan 5/21 had nearly normalized, T score -1.3 L1 and -1.2 neck which is an improvement of 14.7% in the lumbar spine and   a slight worsening of 2.6% in the hip since previous study. Michael Gallegos had been on a course of alendronate 2 different times.  The rationale of the Prolia was with the expected osteoporosis as shown on DEXA scan 2 years ago but again nearly normal at this time although false negatives reviewed.  After discussion it was decided to hold off on Prolia, continue current management, falls precautions, weightbearing exercise, appropriate calcium/D and recheck bone density in 1 year sooner as needed     Gastroesophageal reflux disease without esophagitis  Asymptomatic as long as takes medication, had EGD in the past.  Risk of meds reviewed including electrolyte imbalance, renal etc.  Wants to continue.     Glaucoma of left eye  Recent. Follows with eye care. Pressures came down with drops. 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           Renal insufficiency  Counseled, differential reviewed. Emphasize proper hydration and avoidance of nephrotoxic agents. He simply wants basic monitoring. Counseled extensively and differential diagnoses of above were reviewed, including serious etiologies. Side effects and interactions of medications were reviewed. Plan as above:  Counseled on all. He is planning biopsy of lung 1/25/2022 and he will follow-up with me after sooner as needed. He will follow-up with Dr. Nahum Barba, has routine follow-up scheduled for June but would obviously move it up if positive, we would also refer to [facility]. Possible CTS    As long as symptoms steadily improve/resolve and medical conditions are following the expected course, FU as below, sooner PRN      Return for After biopsy, sooner as needed. Time spent: Greater than [address], was evaluated through a synchronous (real-time) audio-video encounter. The patient (or guardian if applicable) is aware that this is a billable service, which includes applicable co-pays.  This Virtual Visit was conducted with patient's (and/or legal guardian's) consent. The visit was conducted pursuant to the emergency declaration under the [facility] [address] authority and the [employer] General Act. Patient identification was verified, and a caregiver was present when appropriate. The patient was located in a state where the provider was licensed to provide care. The patient (and/or legal guardian) has also been advised to contact this office for worsening conditions or problems, and seek emergency medical treatment and/or call 911 if deemed necessary. As this was a virtual encounter, patient (and /or legal guardian) was instructed on various ways to be seen in person. Patients are advised to check with insurance company to ensure coverage and to fully understand benefits and cost prior to any testing to try to avoid unexpected charges. This note was created with the assistance of voice recognition software. Inadvertent errors may be present. Signs and symptoms to watch for were discussed. Serious signs and symptoms reviewed. ER if any    --Susan Clemons MD on 1/20/2022 at 6:37 PM    An electronic signature was used to authenticate this note.

## 2022-01-25 ENCOUNTER — HOSPITAL ENCOUNTER (OUTPATIENT)
Dept: GENERAL RADIOLOGY | Age: 87
Discharge: HOME | End: 2022-01-27
Payer: MEDICARE

## 2022-01-25 ENCOUNTER — HOSPITAL ENCOUNTER (OUTPATIENT)
Dept: CT IMAGING | Age: 87
Discharge: HOME | End: 2022-01-27
Payer: MEDICARE

## 2022-01-25 VITALS
RESPIRATION RATE: 18 BRPM | DIASTOLIC BLOOD PRESSURE: 60 MMHG | SYSTOLIC BLOOD PRESSURE: 139 MMHG | HEART RATE: 60 BPM | TEMPERATURE: 32.7 F | OXYGEN SATURATION: 97 %

## 2022-01-25 DIAGNOSIS — Z01.812 PRE-OPERATIVE LABORATORY EXAMINATION: ICD-10-CM

## 2022-01-25 DIAGNOSIS — R91.1 PULMONARY NODULE: ICD-10-CM

## 2022-01-25 DIAGNOSIS — R91.8 LUNG NODULES: ICD-10-CM

## 2022-01-25 LAB
ALBUMIN SERPL-MCNC: 4.3 G/DL (ref 3.5–5.2)
ALP BLD-CCNC: 46 U/L (ref 40–129)
ALT SERPL-CCNC: 19 U/L (ref 0–40)
ANION GAP SERPL CALCULATED.3IONS-SCNC: 15 MMOL/L (ref 7–16)
AST SERPL-CCNC: 25 U/L (ref 0–39)
BILIRUB SERPL-MCNC: 0.6 MG/DL (ref 0–1.2)
BUN BLDV-MCNC: 22 MG/DL (ref 6–23)
CALCIUM SERPL-MCNC: 9.6 MG/DL (ref 8.6–10.2)
CHLORIDE BLD-SCNC: 105 MMOL/L (ref 98–107)
CO2: 21 MMOL/L (ref 22–29)
CREAT SERPL-MCNC: 1.2 MG/DL (ref 0.7–1.2)
GFR AFRICAN AMERICAN: >60
GFR NON-AFRICAN AMERICAN: 58 ML/MIN/1.73
GLUCOSE BLD-MCNC: 113 MG/DL (ref 74–99)
HCT VFR BLD CALC: 33.7 % (ref 37–54)
HEMOGLOBIN: 11.2 G/DL (ref 12.5–16.5)
INR BLD: 1
MCH RBC QN AUTO: 29.8 PG (ref 26–35)
MCHC RBC AUTO-ENTMCNC: 33.2 % (ref 32–34.5)
MCV RBC AUTO: 89.6 FL (ref 80–99.9)
PDW BLD-RTO: 13.9 FL (ref 11.5–15)
PLATELET # BLD: 140 E9/L (ref 130–450)
PMV BLD AUTO: 10.4 FL (ref 7–12)
POTASSIUM SERPL-SCNC: 3.7 MMOL/L (ref 3.5–5)
PROTHROMBIN TIME: 11.1 SEC (ref 9.3–12.4)
RBC # BLD: 3.76 E12/L (ref 3.8–5.8)
SODIUM BLD-SCNC: 141 MMOL/L (ref 132–146)
TOTAL PROTEIN: 6.7 G/DL (ref 6.4–8.3)
WBC # BLD: 4.3 E9/L (ref 4.5–11.5)

## 2022-01-25 PROCEDURE — 71045 X-RAY EXAM CHEST 1 VIEW: CPT | Performed by: RADIOLOGY

## 2022-01-25 PROCEDURE — 88342 IMHCHEM/IMCYTCHM 1ST ANTB: CPT

## 2022-01-25 PROCEDURE — 71045 X-RAY EXAM CHEST 1 VIEW: CPT

## 2022-01-25 PROCEDURE — 7100000011 HC PHASE II RECOVERY - ADDTL 15 MIN

## 2022-01-25 PROCEDURE — 2709999900 CT NEEDLE BIOPSY LUNG PERCUTANEOUS W IMAGING GUIDANCE

## 2022-01-25 PROCEDURE — 85610 PROTHROMBIN TIME: CPT

## 2022-01-25 PROCEDURE — 2500000003 HC RX 250 WO HCPCS: Performed by: RADIOLOGY

## 2022-01-25 PROCEDURE — 85027 COMPLETE CBC AUTOMATED: CPT

## 2022-01-25 PROCEDURE — 88305 TISSUE EXAM BY PATHOLOGIST: CPT

## 2022-01-25 PROCEDURE — 7100000010 HC PHASE II RECOVERY - FIRST 15 MIN

## 2022-01-25 PROCEDURE — 32408 CORE NDL BX LNG/MED PERQ: CPT | Performed by: RADIOLOGY

## 2022-01-25 PROCEDURE — 6360000002 HC RX W HCPCS: Performed by: RADIOLOGY

## 2022-01-25 PROCEDURE — 80053 COMPREHEN METABOLIC PANEL: CPT

## 2022-01-25 PROCEDURE — 88341 IMHCHEM/IMCYTCHM EA ADD ANTB: CPT

## 2022-01-25 RX ORDER — LIDOCAINE HYDROCHLORIDE 20 MG/ML
INJECTION, SOLUTION INFILTRATION; PERINEURAL
Status: COMPLETED | OUTPATIENT
Start: 2022-01-25 | End: 2022-01-25

## 2022-01-25 RX ORDER — FENTANYL CITRATE 50 UG/ML
INJECTION, SOLUTION INTRAMUSCULAR; INTRAVENOUS
Status: COMPLETED | OUTPATIENT
Start: 2022-01-25 | End: 2022-01-25

## 2022-01-25 RX ORDER — MIDAZOLAM HYDROCHLORIDE 1 MG/ML
INJECTION INTRAMUSCULAR; INTRAVENOUS
Status: COMPLETED | OUTPATIENT
Start: 2022-01-25 | End: 2022-01-25

## 2022-01-25 RX ADMIN — LIDOCAINE HYDROCHLORIDE 9 ML: 20 INJECTION, SOLUTION INFILTRATION; PERINEURAL at 11:41

## 2022-01-25 RX ADMIN — FENTANYL CITRATE 50 MCG: 50 INJECTION, SOLUTION INTRAMUSCULAR; INTRAVENOUS at 11:16

## 2022-01-25 RX ADMIN — MIDAZOLAM 1 MG: 1 INJECTION INTRAMUSCULAR; INTRAVENOUS at 11:15

## 2022-01-25 ASSESSMENT — PAIN - FUNCTIONAL ASSESSMENT
PAIN_FUNCTIONAL_ASSESSMENT: 0-10

## 2022-01-25 ASSESSMENT — PAIN SCALES - GENERAL: PAINLEVEL_OUTOF10: 0

## 2022-01-25 NOTE — PROGRESS NOTES
1155 Returned via cart from procedure with JUANY. Meryle Sandman Alert. Vss, respirations nonlabored. Dressing dry and intact to right back,site swollen. [address] notified by procedure RN. Continue to monitor. Dania 9101 to "[employer].

## 2022-01-25 NOTE — BRIEF OP NOTE
Brief Postoperative Note    Aidan Rodriguez  YOB: 1935  [id number]    Pre-operative Diagnosis: mass    Right lung mass plan bx  Post-operative Diagnosis: Same    Procedure: biopsy    Estimated Blood Loss: < 10 cc    Surgeon: Manule BOWLES     Complications: none    Specimens obtained: Yes, biopsy of mass    Findings: biopsy of a mass      Ana Guerra II, MD   1/25/2022 11:50 AM

## 2022-01-25 NOTE — PROGRESS NOTES
1000 Patient arrived with spouse to Radiology department for a Right side lung biopsy. Allergies, home medications, H&P and fasting instructions reviewed with patient. Vital signs taken. 20g IV placed, blood obtained, IV flushed and prn adapter attached. 1013 Blood sample sent to lab for ordered tests. [id number] [facility] [address]. Niki Byrne at bedside. Procedural instructions given, questions answered, understanding expressed and consent signed. Patient given fluoroscopy education, no questions at this time.

## 2022-01-25 NOTE — PROGRESS NOTES
1330 Vss. Respirations nonlabored. Dressing dry and intact to right back,swelling improving. Denies pain. 1400 Chest xray done,reviewed by . 1410 Taking po well. Dressing for discharge. 1420 Given discharge instructions,verbalized understanding of. Discharged via w/c with spouse.

## 2022-01-25 NOTE — PRE SEDATION
Zenon Negro II, MD  1/25/2022  11:49 AM        PRE-SEDATION PHYSICIAN ASSESSMENT:      1. HISTORY & PHYSICAL EXAMINATION:  Comments: none    Vitals:    01/25/22 1145   BP: (!) 151/74   Pulse: 69   Resp: 19   Temp:    SpO2: 94%       Allergies: Iodine and Shellfish-derived products    2. Heart and Lungs immediately prior to procedure demonstrate no contraindications to proceed      Chief Complaint: <principal problem not specified>    Drug: unknown  Tobacco: unknown    3. PAST ANESTHESIA EXPERIENCE:  unknown. 4. AIRWAY/TEETH/HEAD & NECK(Mallampati Classification):  II (soft palate, uvula, fauces visible)    5: NORMAL RANGE OF MOTION OF NECK: No    6. PATIENT WILL LIKELY TOLERATE PLAN OF MODERATE SEDATION    7. ASA 2.     Codie Gil MD

## 2022-01-25 NOTE — POST SEDATION
POST SEDATION NOTE:  Time: 11:49 AM    Cardiopulmonary: Vitals Signs Stable: yes    Level of Consciousness: alert    Reversal Agent Used: No    Complications: none    Follow-up/Observations: none    Pain Score: 1    Blanca Matamoros MD

## 2022-01-25 NOTE — H&P
Interventional Radiology  Attending Pre-operative History and Physical    DIAGNOSIS:    Patient Active Problem List   Diagnosis    PVD (peripheral vascular disease) with claudication ([address]    Mixed hyperlipidemia    Coronary artery disease involving native heart without angina pectoris    Prostate CA ([address]    Age-related osteoporosis without current pathological fracture    Metabolic disorder    Thoracic aortic aneurysm without rupture (HCC)    Tubular adenoma    Thyroid nodule    Pancytopenia ([address]    Low magnesium level    Vitamin D deficiency    Vitamin B12 deficiency    Hyperglycemia    Disc disease, degenerative, lumbar or lumbosacral    Acute pain of right shoulder    Bilateral carpal tunnel syndrome    Essential hypertension    Chronic pain syndrome    Prostate cancer metastatic to bone ([address]    Spondylolisthesis of lumbar region    Lumbar radiculopathy    Lumbosacral spondylosis without myelopathy    Renal insufficiency    Hypothyroidism due to medication    Anemia    Gastroesophageal reflux disease without esophagitis    Glaucoma of left eye    COVID-19    Pneumonia due to COVID-19 virus    Acute respiratory failure with hypoxia (HCC)    Elevated LFTs    Lung nodule    Allergic rhinitis    Chest wall pain    Senile osteoporosis       CHIEF COMPLAINT: <principal problem not specified>          Current Outpatient Medications:     omega-3 acid ethyl esters (LOVAZA) 1 g capsule, Take 1 capsule by mouth 2 times daily, Disp: 180 capsule, Rfl: 3    lisinopril (PRINIVIL;ZESTRIL) 10 MG tablet, Take 1 tablet by mouth daily, Disp: 90 tablet, Rfl: 3    omeprazole (PRILOSEC) 20 MG delayed release capsule, Take 1 capsule by mouth daily, Disp: 90 capsule, Rfl: 3    atorvastatin (LIPITOR) 40 MG tablet, Take 1 tablet by mouth daily, Disp: 90 tablet, Rfl: 3    latanoprost (XALATAN) 0.005 % ophthalmic solution, INSTILL 1 DROP IN RIGHT EYE EVERY DAY AT BEDTIME, Disp: , Rfl:    Multiple Vitamins-Minerals (THERAPEUTIC MULTIVITAMIN-MINERALS) tablet, Take 1 tablet by mouth daily , Disp: , Rfl:     metoprolol tartrate (LOPRESSOR) 25 MG tablet, Take 25 mg by mouth 2 times daily, Disp: , Rfl:     Coenzyme Q10 (COQ10) 50 MG CAPS, Take 1 capsule by mouth daily , Disp: , Rfl:     Cyanocobalamin (VITAMIN B 12 PO), Take  by mouth daily.  sublingual , Disp: , Rfl:     Handicap Placard MISC, by Does not apply route Duration: 5 years, Disp: 1 each, Rfl: 0    magnesium gluconate (MAGONATE) 500 MG tablet, Take 500 mg by mouth 2 times daily, Disp: , Rfl:     COMBIGAN 0.2-0.5 % ophthalmic solution, INSTILL 1 DROP INTO THE RIGHT EYE TWO TIMES A DAY., Disp: , Rfl:     ERLEADA 60 MG TABS, 60 mg Every 4 days, Disp: , Rfl:     meclizine (ANTIVERT) 25 MG tablet, 1/2 - 1 by mouth every 6 hours as needed, Disp: , Rfl:     aspirin 81 MG tablet, Take 81 mg by mouth daily , Disp: , Rfl:     Allergies   Allergen Reactions    Iodine Hives    Shellfish-Derived Products        Past Medical History:   Diagnosis Date    CA prostate, adenoca ([address] 4/25/2019    CAD (coronary artery disease)     [facility]     prostate- PO chemotherapy     Chronic bilateral low back pain without sciatica 3/6/2020    Cobalamin deficiency     Hyperlipidemia     Hypertension     Osteoarthritis     Osteochondropathy     Osteopenia     Pain in lower limb     Peripheral venous insufficiency     PONV (postoperative nausea and vomiting)     Prolonged emergence from general anesthesia     PVD (peripheral vascular disease) with claudication ([address] 4/25/2019       Past Surgical History:   Procedure Laterality Date    ACETABULUM FRACTURE SURGERY  3/24/11    ANESTHESIA NERVE BLOCK Bilateral 6/16/2020    BILATERAL L3 L4 L5 MEDIAL NERVE BRANCH BLOCK performed by Nicole Singh DO at [address]  Dec. 1996    [facility]/  [place]    ENDOSCOPY, COLON, DIAGNOSTIC  2016    EYE SURGERY Bilateral 2018    cataract, glaucoma -left eye    FEMUR FRACTURE SURGERY  1981    left    HERNIA REPAIR  1993    JOINT REPLACEMENT      bilat knee 2010, padmini dunne      LYMPH NODE DISSECTION  10/2007    PAIN MANAGEMENT PROCEDURE N/A 2020    CAUDAL EPIDURAL STEROID INJECTION performed by Rocky Holter, DO at [address]  20    PAIN MANAGEMENT PROCEDURE Right 2020    RIGHT L3 4 5 MEDIAL BRANCH RADIOFREQUENCY ABLATION performed by Rocky Holter, DO at [address] [facility] Left 9/10/2020    LEFT L3 4 5 MEDIAL BRANCH RADIOFREQUENCY ABLATION     +++IODINE ALLERGY+++   CPT: 28955 [id number] performed by Rocky Holter, DO at [address] PAIN MANAGEMENT PROCEDURE Bilateral 3/25/2021    BILATERAL L3,4,5 MEDIAL NERVE BRANCH RADIOFREQUENCY ABLATION performed by Rocky Holter, DO at [address] [facility].  10/31/07    [address] M[address] DA LETICIA LAPAROSCOPIC PROSTATECTOMY    TESTICLE REMOVAL Bilateral 2019    hx prostate ca-    TONSILLECTOMY      TOTAL HIP ARTHROPLASTY  11    right    TOTAL KNEE ARTHROPLASTY  2010    bilateral       Family History   Problem Relation Age of Onset    Stroke Mother     Cancer Mother         pancreatic    Cancer Father         prostate    Stroke Father        Social History     Socioeconomic History    Marital status:      Spouse name: Not on file    Number of children: Not on file    Years of education: Not on file    Highest education level: Not on file   Occupational History    Not on file   Tobacco Use    Smoking status: Former Smoker     Packs/day: 1.00     Years: 40.00     Pack years: 40.00     Types: Cigarettes     Start date:      Quit date: 10/1/1994     Years since quittin.3    Smokeless tobacco: Former User     Types: [address] date:    Vaping Use    Vaping Use: Never used   Substance and Sexual Activity    Alcohol use: Yes     Alcohol/week: 1.0 standard drink     Types: 1 Cans of beer per week     Comment: rare    Drug use: No    Sexual activity: Not on file   Other Topics Concern    Not on file   Social History Narrative    Problem List: History of polyp of colon, Disorder of bone density and structure, unspecified,    Osteochondropathy, Carcinoma in situ of prostate, Aneurysm of thoracic aorta, Anemia, Coronary    arteriosclerosis, Cobalamin deficiency, Multiple closed fractures of pelvis with disruption of pelvic [place],    Hyperlipidemia, Essential hypertension    Health Maintenance:    Bone Density Test Screening - (3/5/2019)    Bone Density Scan - (12/18/2015)    Influenza Vaccination - (10/7/2016)    Colonoscopy - (4/3/2012)    Couseled on Home Safety - (11/23/2015)    Colonoscopy Screening - (4/3/2012)    US Liver - 8/1/08    Medical Problems:    Coronary Artery Disease (CAD), Hypertension, Hyperlipidemia, Prostate Cancer    Surgical Hx:    Prostatectomy, Coronary Artery Bypass Graft (CABG)            FH: Father:    . (Hx)    Mother:    . (Hx)        SH: Marital:  - retired . Personal Habits: Cigarette Use: former cigarette smoker, Nonsmoker. Alcohol: Occasionally    consumes alcohol. Social Determinants of Health     Financial Resource Strain: Low Risk     Difficulty of Paying Living Expenses: Not hard at all   Food Insecurity: No Food Insecurity    Worried About Running Out of Food in the Last Year: Never true    Deondre of Food in the Last Year: Never true   Transportation Needs:     Lack of Transportation (Medical): Not on file    Lack of Transportation (Non-Medical):  Not on file   Physical Activity:     Days of Exercise per Week: Not on file    Minutes of Exercise per Session: Not on file   Stress:     Feeling of Stress : Not on file   Social Connections:     Frequency of Communication with Friends and Family: Not on file    Frequency of Social Gatherings with Friends and Family: Not on file    Attends Hoahaoism Services: Not on file    Active Member of Clubs or Organizations: Not on file    Attends Club or Organization Meetings: Not on file    Marital Status: Not on file   Intimate Partner Violence:     Fear of Current or Ex-Partner: Not on file    Emotionally Abused: Not on file    Physically Abused: Not on file    Sexually Abused: Not on file   Housing Stability:     Unable to Pay for Housing in the Last Year: Not on file    Number of Places Lived in the Last Year: Not on file    Unstable Housing in the Last Year: Not on file       ROS: Non-contributory other than as noted above    PHYSICAL EXAM:      Heart and Lungs:  demonstrate no contraindications to proceed    DATA:  CBC:   Lab Results   Component Value Date    WBC 4.3 01/25/2022    RBC 3.76 01/25/2022    HGB 11.2 01/25/2022    HCT 33.7 01/25/2022    MCV 89.6 01/25/2022    MCH 29.8 01/25/2022    MCHC 33.2 01/25/2022    RDW 13.9 01/25/2022     01/25/2022    MPV 10.4 01/25/2022     CBC with Differential:    Lab Results   Component Value Date    WBC 4.3 01/25/2022    RBC 3.76 01/25/2022    HGB 11.2 01/25/2022    HCT 33.7 01/25/2022     01/25/2022    MCV 89.6 01/25/2022    MCH 29.8 01/25/2022    MCHC 33.2 01/25/2022    RDW 13.9 01/25/2022    SEGSPCT 77 03/24/2011    METASPCT 1.0 01/21/2021    LYMPHOPCT 27.2 11/22/2021    MONOPCT 7.7 11/22/2021    BASOPCT 0.8 11/22/2021    MONOSABS 0.29 11/22/2021    LYMPHSABS 1.03 11/22/2021    EOSABS 0.11 11/22/2021    BASOSABS 0.03 11/22/2021     Platelets:    Lab Results   Component Value Date     01/25/2022     BUN/Creatinine:    Lab Results   Component Value Date    BUN 22 01/25/2022    CREATININE 1.2 01/25/2022       ASSESSMENT AND PLAN:  1. Right lung mass plan bx  2. Procedure options, risks and benefits reviewed with patient. Patient expresses understanding.     Electronically signed by Seda Morrow MD on 1/25/2022 at 11:49 AM

## 2022-01-31 ENCOUNTER — PATIENT MESSAGE (OUTPATIENT)
Dept: PRIMARY CARE CLINIC | Age: 87
End: 2022-01-31

## 2022-02-01 ENCOUNTER — VIRTUAL VISIT (OUTPATIENT)
Dept: PRIMARY CARE CLINIC | Age: 87
End: 2022-02-01
Payer: MEDICARE

## 2022-02-01 DIAGNOSIS — C61 PROSTATE CA (HCC): ICD-10-CM

## 2022-02-01 DIAGNOSIS — C34.91 ADENOCARCINOMA OF RIGHT LUNG (HCC): Primary | ICD-10-CM

## 2022-02-01 PROCEDURE — G8484 FLU IMMUNIZE NO ADMIN: HCPCS | Performed by: FAMILY MEDICINE

## 2022-02-01 PROCEDURE — G8427 DOCREV CUR MEDS BY ELIG CLIN: HCPCS | Performed by: FAMILY MEDICINE

## 2022-02-01 PROCEDURE — 4040F PNEUMOC VAC/ADMIN/RCVD: CPT | Performed by: FAMILY MEDICINE

## 2022-02-01 PROCEDURE — G8417 CALC BMI ABV UP PARAM F/U: HCPCS | Performed by: FAMILY MEDICINE

## 2022-02-01 PROCEDURE — 99214 OFFICE O/P EST MOD 30 MIN: CPT | Performed by: FAMILY MEDICINE

## 2022-02-01 PROCEDURE — 1123F ACP DISCUSS/DSCN MKR DOCD: CPT | Performed by: FAMILY MEDICINE

## 2022-02-01 PROCEDURE — 1036F TOBACCO NON-USER: CPT | Performed by: FAMILY MEDICINE

## 2022-02-01 NOTE — PROGRESS NOTES
TeleMedicine Patient Consent    This visit was performed as a virtual video visit using a synchronous, two-way, audio-video telehealth technology platform. Patient identification was verified at the start of the visit, including the patient's telephone number and physical location. I discussed with the patient the nature of our telehealth visits, that:     1. Due to the nature of an audio- video modality, the only components of a physical exam that could be done are the elements supported by direct observation. 2. I would evaluate the patient and recommend diagnostics and treatments based on my assessment. 3. If it was felt that the patient should be evaluated in clinic or an emergency room setting, then they would be directed there. 4. Our sessions are not being recorded and that personal health information is protected. 5. Our team would provide follow up care in person if/when the patient needs it. Patient does agree to proceed with telemedicine consultation. Patient's location: home address in [facility]    Physician  location other address in [facility]     Other people involved in call:   Spouse    This visit was completed virtually using Doxy. 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  The cells are negative for CDX2, cytokeratin 20, and PSA.  The   pattern of staining is consistent with primary lung origin.  Molecular   studies have been ordered per institution protocol and will be reported   separately.  Intradepartmental consultation is obtained. Feels well    All questions were answered. Dr. Gris Lainez contacted them last night as well but they explained to him they were going to get referral from me for hematology/oncology.   Different options reviewed    Most Recent Labs  CBC  Lab Results   Component Value Date    WBC 4.3 01/25/2022    WBC 3.8 11/22/2021    WBC 4.0 08/25/2021    RBC 3.76 01/25/2022    RBC 3.92 11/22/2021    RBC 3.82 08/25/2021    HGB 11.2 01/25/2022    HGB 11.8 11/22/2021    HGB 11.4 08/25/2021    HCT 33.7 01/25/2022    HCT 34.6 11/22/2021    HCT 33.9 08/25/2021    MCV 89.6 01/25/2022    MCV 88.3 11/22/2021    MCV 88.7 08/25/2021     01/25/2022     11/22/2021     08/25/2021      CMP  Lab Results   Component Value Date     01/25/2022     11/22/2021     08/25/2021    K 3.7 01/25/2022    K 4.5 11/22/2021    K 4.4 08/25/2021    K 4.4 01/16/2021    K 4.4 01/14/2021     01/25/2022     11/22/2021     08/25/2021    CO2 21 01/25/2022    CO2 26 11/22/2021    CO2 27 08/25/2021    ANIONGAP 15 01/25/2022    ANIONGAP 12 11/22/2021    ANIONGAP 9 08/25/2021    GLUCOSE 113 01/25/2022    GLUCOSE 107 11/22/2021    GLUCOSE 110 08/25/2021    GLUCOSE 113 03/28/2011    GLUCOSE 93 03/28/2011    GLUCOSE 101 03/26/2011    BUN 22 01/25/2022    BUN 23 11/22/2021    BUN 22 08/25/2021    CREATININE 1.2 01/25/2022    CREATININE 1.3 11/22/2021    CREATININE 1.3 08/25/2021    LABGLOM 58 01/25/2022    LABGLOM 53 11/22/2021    LABGLOM 53 08/25/2021    GFRAA >60 01/25/2022    GFRAA >60 11/22/2021    GFRAA >60 08/25/2021    CALCIUM 9.6 01/25/2022    CALCIUM 10.1 11/22/2021    CALCIUM 9.8 08/25/2021    PROT 6.7 01/25/2022    PROT 7.0 11/22/2021    PROT 6.6 08/25/2021    LABALBU 4.3 01/25/2022    LABALBU 4.4 11/22/2021    LABALBU 4.0 08/25/2021    LABALBU 2.9 03/26/2011    LABALBU 3.0 03/25/2011    LABALBU 3.4 03/24/2011    BILITOT 0.6 01/25/2022    BILITOT 0.6 11/22/2021    BILITOT 0.5 08/25/2021    ALKPHOS 46 01/25/2022    ALKPHOS 56 11/22/2021    ALKPHOS 46 08/25/2021    AST 25 01/25/2022    AST 25 11/22/2021    AST 23 08/25/2021    ALT 19 01/25/2022    ALT 18 11/22/2021    ALT 17 08/25/2021     A1C  Lab Results   Component Value Date    LABA1C 5.0 11/22/2021    LABA1C 4.7 08/25/2021    LABA1C 5.3 01/21/2021     TSH  Lab Results   Component Value Date    TSH 5.040 11/22/2021    TSH 4.550 08/25/2021    TSH 5.740 04/26/2021     FREET4  Lab Results   Component Value Date    W7IMTDV 9.5 01/31/2011     LIPID  Lab Results   Component Value Date    CHOL 125 11/22/2021    CHOL 126 08/25/2021    CHOL 125 04/26/2021    HDL 44 11/22/2021    HDL 41 08/25/2021    HDL 40 04/26/2021    LDLCALC 49 11/22/2021    LDLCALC 39 08/25/2021    LDLCALC 45 04/26/2021    TRIG 162 11/22/2021    TRIG 230 08/25/2021    TRIG 202 04/26/2021     VITAMIN D  Lab Results   Component Value Date    VITD25 50 11/22/2021    VITD25 41 08/25/2021    VITD25 47 04/26/2021     MAGNESIUM  Lab Results   Component Value Date    MG 1.8 11/22/2021    MG 1.8 08/25/2021    MG 1.9 04/26/2021      PHOS  Lab Results   Component Value Date    PHOS 3.9 08/25/2021    PHOS 4.0 01/21/2021    PHOS 3.8 01/16/2021      PAWAN   No results found for: PAWAN  RHEUMATOID FACTOR  No results found for: RF  PSA  Lab Results   Component Value Date    PSA SEE NOTE 12/06/2021    PSA <0.03 12/06/2021    PSA <0.03 09/08/2021      HEPATITIS C  No results found for: HCVABI  HIV  No results found for: DKX1STO, HIV1QT  UA  Lab Results   Component Value Date    COLORU Yellow 11/22/2021    COLORU Yellow 08/25/2021    COLORU Yellow 04/26/2021    CLARITYU Clear 11/22/2021    CLARITYU Clear 08/25/2021    CLARITYU Clear 04/26/2021    GLUCOSEU Negative 11/22/2021    GLUCOSEU Negative 08/25/2021    GLUCOSEU Negative 04/26/2021    GLUCOSEU NEGATIVE 12/21/2010    BILIRUBINUR Negative 11/22/2021    BILIRUBINUR Negative 08/25/2021 BILIRUBINUR SMALL 04/26/2021    BILIRUBINUR NEGATIVE 12/21/2010    KETUA Negative 11/22/2021    KETUA Negative 08/25/2021    KETUA TRACE 04/26/2021    SPECGRAV >=1.030 11/22/2021    SPECGRAV 1.025 08/25/2021    SPECGRAV >=1.030 04/26/2021    BLOODU Negative 11/22/2021    BLOODU Negative 08/25/2021    BLOODU Negative 04/26/2021    PHUR 5.0 11/22/2021    PHUR 5.5 08/25/2021    PHUR 5.0 04/26/2021    PROTEINU Negative 11/22/2021    PROTEINU Negative 08/25/2021    PROTEINU TRACE 04/26/2021    UROBILINOGEN 0.2 11/22/2021    UROBILINOGEN 0.2 08/25/2021    UROBILINOGEN 0.2 04/26/2021    NITRU Negative 11/22/2021    NITRU Negative 08/25/2021    NITRU Negative 04/26/2021    LEUKOCYTESUR Negative 11/22/2021    LEUKOCYTESUR Negative 08/25/2021    LEUKOCYTESUR Negative 04/26/2021     Urine Micro/Albumin Ratio  Lab Results   Component Value Date    MALBCR 16.2 11/22/2021    MALBCR - 08/25/2021    MALBCR 9.0 04/26/2021           ROS:  Const: Denies chills, fever, malaise and sweats. Eyes: Denies discharge, pain, redness and visual disturbance. ENMT: Denies earaches, other ear symptoms. Denies nasal or sinus symptoms other than stated  above. Denies mouth and tongue lesions and sore throat. CV: Denies chest discomfort, pain; diaphoresis, dizziness, edema, lightheadedness, orthopnea,  palpitations, syncope and near syncopal episode or any exertional symptoms  Resp: Denies cough, hemoptysis, pleuritic pain, SOB, sputum production and wheezing. GI: Denies abdominal pain, change in bowel habits, hematochezia, melena, nausea and vomiting. : Denies urinary symptoms including dysuria , urgency, frequency or hematuria. Musculo: Denies musculoskeletal symptoms. Skin: Denies bruising and rash.   Neuro: Denies headache, numbness, stiff neck, tingling and focal weakness slurred speech or facial  droop  Hema/Lymph: Denies bleeding/bruising tendency and enlarged lymph nodes         Current Outpatient Medications:     omega-3 acid ethyl esters (LOVAZA) 1 g capsule, Take 1 capsule by mouth 2 times daily, Disp: 180 capsule, Rfl: 3    Handicap Placard MISC, by Does not apply route Duration: 5 years, Disp: 1 each, Rfl: 0    lisinopril (PRINIVIL;ZESTRIL) 10 MG tablet, Take 1 tablet by mouth daily, Disp: 90 tablet, Rfl: 3    omeprazole (PRILOSEC) 20 MG delayed release capsule, Take 1 capsule by mouth daily, Disp: 90 capsule, Rfl: 3    atorvastatin (LIPITOR) 40 MG tablet, Take 1 tablet by mouth daily, Disp: 90 tablet, Rfl: 3    magnesium gluconate (MAGONATE) 500 MG tablet, Take 500 mg by mouth 2 times daily, Disp: , Rfl:     COMBIGAN 0.2-0.5 % ophthalmic solution, INSTILL 1 DROP INTO THE RIGHT EYE TWO TIMES A DAY., Disp: , Rfl:     latanoprost (XALATAN) 0.005 % ophthalmic solution, INSTILL 1 DROP IN RIGHT EYE EVERY DAY AT BEDTIME, Disp: , Rfl:     ERLEADA 60 MG TABS, 60 mg Every 4 days, Disp: , Rfl:     meclizine (ANTIVERT) 25 MG tablet, 1/2 - 1 by mouth every 6 hours as needed, Disp: , Rfl:     Multiple Vitamins-Minerals (THERAPEUTIC MULTIVITAMIN-MINERALS) tablet, Take 1 tablet by mouth daily , Disp: , Rfl:     metoprolol tartrate (LOPRESSOR) 25 MG tablet, Take 25 mg by mouth 2 times daily, Disp: , Rfl:     Coenzyme Q10 (COQ10) 50 MG CAPS, Take 1 capsule by mouth daily , Disp: , Rfl:     aspirin 81 MG tablet, Take 81 mg by mouth daily , Disp: , Rfl:     Cyanocobalamin (VITAMIN B 12 PO), Take  by mouth daily.  sublingual , Disp: , Rfl:   Allergies   Allergen Reactions    Iodine Hives    Shellfish-Derived Products        Past Medical History:   Diagnosis Date    CA prostate, adenoca ([address] 4/25/2019    CAD (coronary artery disease)     Dr. Bhakti Emmanuel - [facility])     prostate- PO chemotherapy     Chronic bilateral low back pain without sciatica 3/6/2020    Cobalamin deficiency     Hyperlipidemia     Hypertension     Osteoarthritis     Osteochondropathy     Osteopenia     Pain in lower limb     Peripheral venous insufficiency     PONV (postoperative nausea and vomiting)     Prolonged emergence from general anesthesia     PVD (peripheral vascular disease) with claudication ([facility] [address] 4/25/2019     Past Surgical History:   Procedure Laterality Date    ACETABULUM FRACTURE SURGERY  3/24/11    ANESTHESIA NERVE BLOCK Bilateral 6/16/2020    BILATERAL L3 L4 L5 MEDIAL NERVE BRANCH BLOCK performed by Irma Davis DO at [facility] COLONOSCOPY  2016    CORONARY ARTERY BYPASS GRAFT  Dec. 1996    [facility]/ Dr. Elisa Candelario  1/25/2022    CT NEEDLE BIOPSY LUNG PERCUTANEOUS 1/25/2022 Hever Reid MD [facility]    ENDOSCOPY, COLON, DIAGNOSTIC  2016    EYE SURGERY Bilateral 01/2018    cataract, glaucoma 2020-left eye    FEMUR FRACTURE SURGERY  1981    left   [address]    JOINT REPLACEMENT      bilat knee 2010, padmini dunne 2011     LYMPH NODE DISSECTION  10/2007    PAIN MANAGEMENT PROCEDURE N/A 5/19/2020    CAUDAL EPIDURAL STEROID INJECTION performed by Irma Davis DO at [place] [address]  8/4/20    PAIN MANAGEMENT PROCEDURE Right 8/4/2020    RIGHT L3 4 5 MEDIAL BRANCH RADIOFREQUENCY ABLATION performed by Irma Davis DO at [place] [address] Left 9/10/2020    LEFT L3 4 5 MEDIAL BRANCH RADIOFREQUENCY ABLATION     +++IODINE ALLERGY+++   CPT: 34696 [id number] performed by Irma Davis DO at [place] [address] Bilateral 3/25/2021    BILATERAL L3,4,5 MEDIAL NERVE BRANCH RADIOFREQUENCY ABLATION performed by Irma Davis DO at [facility].  10/31/07    [address] M[address] [facility] LAPAROSCOPIC PROSTATECTOMY    TESTICLE REMOVAL Bilateral 02/2019    hx prostate ca-    TONSILLECTOMY      TOTAL HIP ARTHROPLASTY  11/30/11    right    TOTAL KNEE ARTHROPLASTY  2010    bilateral     Family History   Problem Relation Age of Onset    Stroke Mother     Cancer Mother         pancreatic  Cancer Father         prostate    Stroke Father      Social History     Tobacco Use    Smoking status: Former Smoker     Packs/day: 1.00     Years: 40.00     Pack years: 40.00     Types: Cigarettes     Start date: 5     Quit date: 10/1/1994     Years since quittin.3    Smokeless tobacco: Former User     Types: Chew     Quit date:    Vaping Use    Vaping Use: Never used   Substance Use Topics    Alcohol use: Yes     Alcohol/week: 1.0 standard drink     Types: 1 Cans of beer per week     Comment: rare    Drug use: No     Social History     Social History Narrative    Problem List: History of polyp of colon, Disorder of bone density and structure, unspecified,    Osteochondropathy, Carcinoma in situ of prostate, Aneurysm of thoracic aorta, Anemia, Coronary    arteriosclerosis, Cobalamin deficiency, Multiple closed fractures of pelvis with disruption of pelvic [place],    Hyperlipidemia, Essential hypertension    Health Maintenance:    Bone Density Test Screening - (3/5/2019)    Bone Density Scan - (2015)    Influenza Vaccination - (10/7/2016)    Colonoscopy - (4/3/2012)    Couseled on Home Safety - (2015)    Colonoscopy Screening - (4/3/2012)    US Liver - 08    Medical Problems:    Coronary Artery Disease (CAD), Hypertension, Hyperlipidemia, Prostate Cancer    Surgical Hx:    Prostatectomy, Coronary Artery Bypass Graft (CABG)            FH: Father:    . (Hx)    Mother:    . (Hx)        SH: Marital:  - retired . Personal Habits: Cigarette Use: former cigarette smoker, Nonsmoker. Alcohol: Occasionally    consumes alcohol. PHYSICAL EXAMINATION:  [ INSTRUCTIONS:  \"[x]\" Indicates a positive item  \"[]\" Indicates a negative item  -- DELETE ALL ITEMS NOT EXAMINED]  Vital Signs: (As obtained by patient/caregiver or practitioner observation)    There were no vitals filed for this visit.       Blood pressure-  Heart rate-    Respiratory rate- Temperature-  Pulse oximetry-     Constitutional: [x] Appears well-developed and well-nourished [x] No apparent distress      [] Abnormal-   Mental status  [x] Alert and awake  [x] Oriented to person/place/time [x]Able to follow commands      Eyes:  EOM    [x]  Normal  [] Abnormal-  Sclera  [x]  Normal  [] Abnormal -         Discharge [x]  None visible  [] Abnormal -    HENT:   [x] Normocephalic, atraumatic. [] Abnormal   [x] Mouth/Throat: Mucous membranes are moist.     External Ears [x] Normal  [] Abnormal-     Neck: [x] No visualized mass     Pulmonary/Chest: [x] Respiratory effort normal.  [x] No visualized signs of difficulty breathing or respiratory distress        [] Abnormal-      Musculoskeletal:   [x] Normal gait with no signs of ataxia         [x] Normal range of motion of neck        [] Abnormal-       Neurological:        [x] No Facial Asymmetry (Cranial nerve 7 motor function) (limited exam to video visit)          [x] No gaze palsy        [] Abnormal-         Skin:        [x] No significant exanthematous lesions or discoloration noted on facial skin         [] Abnormal-            Psychiatric:       [x] Normal Affect [x] No Hallucinations        [] Abnormal-     Other pertinent observable physical exam findings-     ASSESSMENT/PLAN:   Diagnosis Orders   1. Adenocarcinoma of right lung [facility])  Amb External Referral To Hematology Oncology   2. Prostate CA [facility])         Adenocarcinoma of right lung ([address]    counseled extensively, last measurement of approximately 2.4 x 1.8 cm. Right posterior lung.   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  Continue per Dr. Stevenson Monie less than 0.01-0.07-0.3 , he had been on Lupron, had metastasis, since orchiectomy, did not tolerate xtandi so he stopped it , overall feeling well, on Erleada, follows regularly with Dr. Shirley Chase extensively and differential diagnoses of above were reviewed, including serious etiologies. Side effects and interactions of medications were reviewed. Plan as above:  Counseled, referred to the [facility]. Discussed various stages and prognosis from my understanding, hopefully amenable to surgerywill need referred to CTS if so, await recommendations from the [facility]. Refer to them ASAP. They will stay in touch with any questions or concerns. Otherwise they are going to get blood work and keep their appointment 3/1 with me sooner as needed    As long as symptoms steadily improve/resolve and medical conditions are following the expected course, FU as below, sooner PRN      Return for Keep Scheduled FU, SOONER PRN. Time spent: Greater than [address], was evaluated through a synchronous (real-time) audio-video encounter. The patient (or guardian if applicable) is aware that this is a billable service, which includes applicable co-pays. This Virtual Visit was conducted with patient's (and/or legal guardian's) consent. The visit was conducted pursuant to the emergency declaration under the [facility], [address] [place] [address] waiver authority and the [employer] and [employer] General Act. Patient identification was verified, and a caregiver was present when appropriate. The patient was located in a state where the provider was licensed to provide care. The patient (and/or legal guardian) has also been advised to contact this office for worsening conditions or problems, and seek emergency medical treatment and/or call 911 if deemed necessary. As this was a virtual encounter, patient (and /or legal guardian) was instructed on various ways to be seen in person.         Patients are advised to check with insurance company to ensure coverage and to fully understand benefits and cost prior to any testing to try to avoid unexpected charges. This note was created with the assistance of voice recognition software. Inadvertent errors may be present. Signs and symptoms to watch for were discussed. Serious signs and symptoms reviewed. ER if any    --Jovi Finch MD on 2/1/2022 at 12:20 PM    An electronic signature was used to authenticate this note.

## 2022-02-01 NOTE — ASSESSMENT & PLAN NOTE
counseled extensively, last measurement of approximately 2.4 x 1.8 cm. Right posterior lung.   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

## 2022-02-17 ENCOUNTER — HOSPITAL ENCOUNTER (OUTPATIENT)
Dept: RADIATION ONCOLOGY | Age: 87
Discharge: HOME | End: 2022-02-17
Payer: MEDICARE

## 2022-02-17 VITALS
BODY MASS INDEX: 32.19 KG/M2 | TEMPERATURE: 97.2 F | DIASTOLIC BLOOD PRESSURE: 72 MMHG | HEART RATE: 78 BPM | SYSTOLIC BLOOD PRESSURE: 118 MMHG | WEIGHT: 224.38 LBS | OXYGEN SATURATION: 96 % | RESPIRATION RATE: 20 BRPM

## 2022-02-17 DIAGNOSIS — C61 PROSTATE CANCER METASTATIC TO BONE (HCC): Primary | ICD-10-CM

## 2022-02-17 DIAGNOSIS — C79.51 PROSTATE CANCER METASTATIC TO BONE (HCC): Primary | ICD-10-CM

## 2022-02-17 DIAGNOSIS — C34.91 ADENOCARCINOMA OF RIGHT LUNG (HCC): ICD-10-CM

## 2022-02-17 PROCEDURE — 99205 OFFICE O/P NEW HI 60 MIN: CPT | Performed by: RADIOLOGY

## 2022-02-17 PROCEDURE — 99205 OFFICE O/P NEW HI 60 MIN: CPT

## 2022-02-17 ASSESSMENT — PAIN DESCRIPTION - ORIENTATION: ORIENTATION: LEFT

## 2022-02-17 ASSESSMENT — PAIN DESCRIPTION - LOCATION: LOCATION: GROIN

## 2022-02-17 ASSESSMENT — PAIN DESCRIPTION - DESCRIPTORS: DESCRIPTORS: DULL

## 2022-02-17 ASSESSMENT — PAIN SCALES - GENERAL: PAINLEVEL_OUTOF10: 6

## 2022-02-17 ASSESSMENT — PAIN DESCRIPTION - FREQUENCY: FREQUENCY: INTERMITTENT

## 2022-02-17 NOTE — PROGRESS NOTES
Radiation Oncology Consult Note         2/17/2022    Yanely Esposito  82 y.o.   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 HISTORY OF PRESENT ILLNESS: Mr. Yanely Esposito  is a 82y.o. year old male who suffered from a Covid pneumonia back in January 2021 for which he was admitted to the hospital.  During the admission CT scan done at that time found a pulmonary nodule, 2.1 cm in maximum diameter, at the level of the right lower lobe. A follow-up CT scan done in May 2021 showed an increment of the lesion to maximum dimension of 2.4 cm. He had a PET scan done on December 28, 2021 that showed hypermetabolism in the level of the hilar area bilaterally with SUV of 5.4 on the right and 6.4 on the left. The posterior right lower lobe nodule had an SUV of 4.1 and measured on PET/CT 2.2 x 1.5 centimeters. It also showed a thickening of the left adrenal gland with an SUV of 4.1. The thickening Did not significantly change compared to a CT scan dating March 21, 2011  therefore favoring  hyperplasia. A CT scan of the pelvis done in December 2021 showed extensive osteoblastic metastases involving the sacrum, left iliac and right iliac bones with no pathologic fractures and no soft tissue mass.   The patient underwent a CT-guided lung biopsy that was consistent with invasive moderately differentiated adenocarcinoma of the lung, PD-L1 1% positive, EGFR negative, BRAF negative, KRAS negative, NTRK negative and MET negative. Of note the patient had a prostate cancer diagnosed back in 2007, he had a radical prostatectomy and was found to have a Shelby 10 adenocarcinoma of the prostate, with extracapsular extension, involvement of the vas deferens and lymph node positive disease. He received external beam radiation therapy and Lupron here at [facility] in 2008. He refers of acute side effects in terms of  proctitis at that time. He developed bone metastases around 2018, had a trial of Xtandi and then 6 doses of Xofigo in 2019. He is currently taking apalutamide and his last PSA is 0.03. He also had bilateral orchiectomy for hormone suppression. The patient has been complaining of pain at the level of the left groin for about 4 months. He states that at the beginning he also had functional impotence correlated to pain, so that he could not bear weight on the left leg and was used to use a walker. He is now feeling better from that point of view, he can walk without a walker even though he guards his left leg, but the pain is still there. The patient was evaluated by Dr. Otto So who requested pulmonologist consult for a possible EBUS to rule out metastatic lesions at the level of the hilar lymph nodes bilaterally. Dr. Otto So also requested an MRI of the brain and an MRI of the lumbar spine and pelvis for better diagnosis of the pain. Bone scan done on 12/29/2021 shows no areas of abnormal radionuclide accumulations or regions of photopenia to suggest underlying metastatic disease. A CT of the chest done on 12/13/2021 demonstrates changes of COPD with bullous changes primarily at the level of the right lung base. Confirms the somewhat spiculated soft tissue mass posterior right lower lobe measuring 2.4 x 1.8 cm, no other nodule, mass or consolidative infiltrative lesions identified.     The patient was referred to me by Dr. Otto So for an initial  discussion regarding the role of radiation therapy for his lung cancer  Today the patient is still feeling tenderness and pain at the level of the left groin, he walks guarding the left leg, and does not use a cane for support. He  is here with his wife.     PAST MEDICAL HISTORY:      Diagnosis Date    CA prostate, adenoca ([facility] 4/25/2019    CAD (coronary artery disease)     Dr. Patel Faith  [facility])     prostate- PO chemotherapy     Chronic bilateral low back pain without sciatica 3/6/2020    Cobalamin deficiency     Hyperlipidemia     Hypertension     Osteoarthritis     Osteochondropathy     Osteopenia     Pain in lower limb     Peripheral venous insufficiency     PONV (postoperative nausea and vomiting)     Prolonged emergence from general anesthesia     PVD (peripheral vascular disease) with claudication ([facility] 4/25/2019       PAST SURGICAL HISTORY:      Procedure Laterality Date    ACETABULUM FRACTURE SURGERY  3/24/11    ANESTHESIA NERVE BLOCK Bilateral 6/16/2020    BILATERAL L3 L4 L5 MEDIAL NERVE BRANCH BLOCK performed by Jh Sims DO at [address] [place] [address]  2016   [facility] 18 GRAFT  Dec. 1996    [facility]/ Dr. Jh Gonzales  1/25/2022    CT NEEDLE BIOPSY LUNG PERCUTANEOUS 1/25/2022 Maykel Pascual MD [facility] CT    ENDOSCOPY, COLON, DIAGNOSTIC  2016    EYE SURGERY Bilateral 01/2018    cataract, glaucoma 2020-left eye    FEMUR FRACTURE SURGERY  1981    left   [facility]    JOINT REPLACEMENT      bilat knee 2010, righ thi 2011     LYMPH NODE DISSECTION  10/2007    PAIN MANAGEMENT PROCEDURE N/A 5/19/2020    CAUDAL EPIDURAL STEROID INJECTION performed by Jh Sims DO at [place] [id number]  8/4/20    PAIN MANAGEMENT PROCEDURE Right 8/4/2020    RIGHT L3 4 5 MEDIAL BRANCH RADIOFREQUENCY ABLATION performed by Jh Sims DO at [place] [id number] Left 9/10/2020    LEFT L3 4 5 MEDIAL BRANCH RADIOFREQUENCY ABLATION     +++IODINE ALLERGY+++   CPT: [id number] performed by Coreen Jose DO at [address] Bilateral 3/25/2021    BILATERAL L3,4,5 MEDIAL NERVE BRANCH RADIOFREQUENCY ABLATION [facility] [address]  10/31/07    AMIRAH STREETER M.D./ [place] LAPAROSCOPIC PROSTATECTOMY    TESTICLE REMOVAL Bilateral 02/2019    hx prostate ca-    TONSILLECTOMY      TOTAL HIP ARTHROPLASTY  11/30/11    right    TOTAL KNEE ARTHROPLASTY  2010    bilateral       Allergies   Allergen Reactions    Iodine Hives    Shellfish-Derived Products        MEDICATIONS:  Medications reviewed and reconciled. Current Outpatient Medications   Medication Sig Dispense Refill    Multiple Vitamins-Minerals (VITAMIN D3 COMPLETE PO) Take by mouth      omega-3 acid ethyl esters (LOVAZA) 1 g capsule Take 1 capsule by mouth 2 times daily 180 capsule 3    Handicap Placard MISC by Does not apply route Duration: 5 years 1 each 0    lisinopril (PRINIVIL;ZESTRIL) 10 MG tablet Take 1 tablet by mouth daily 90 tablet 3    omeprazole (PRILOSEC) 20 MG delayed release capsule Take 1 capsule by mouth daily 90 capsule 3    atorvastatin (LIPITOR) 40 MG tablet Take 1 tablet by mouth daily 90 tablet 3    magnesium gluconate (MAGONATE) 500 MG tablet Take 500 mg by mouth 2 times daily      COMBIGAN 0.2-0.5 % ophthalmic solution INSTILL 1 DROP INTO THE RIGHT EYE TWO TIMES A DAY.       latanoprost (XALATAN) 0.005 % ophthalmic solution INSTILL 1 DROP IN RIGHT EYE EVERY DAY AT BEDTIME      ERLEADA 60 MG TABS 60 mg Every 4 days      meclizine (ANTIVERT) 25 MG tablet 1/2 - 1 by mouth every 6 hours as needed      Multiple Vitamins-Minerals (THERAPEUTIC MULTIVITAMIN-MINERALS) tablet Take 1 tablet by mouth daily       metoprolol tartrate (LOPRESSOR) 25 MG tablet Take 25 mg by mouth 2 times daily      Coenzyme Q10 (COQ10) 50 MG CAPS Take 1 capsule by mouth daily  aspirin 81 MG tablet Take 81 mg by mouth daily       Cyanocobalamin (VITAMIN B 12 PO) Take  by mouth daily. sublingual        No current facility-administered medications for this encounter. FAMILY HISTORY:  Family History   Problem Relation Age of Onset    Stroke Mother     Cancer Mother         pancreatic    Cancer Father         prostate    Stroke Father        SOCIAL HISTORY:       reports that he quit smoking about 27 years ago. His smoking use included cigarettes. He started smoking about 69 years ago. He has a 40.00 pack-year smoking history. He quit smokeless tobacco use about 42 years ago. His smokeless tobacco use included chew. .   reports current alcohol use of about 1.0 standard drink of alcohol per week. .   reports no history of drug use. REVIEW OF SYSTEMS:  Obtained from the patient, chart review and nursing assessment. Please refer to the HPI, the review of systems is otherwise negative    PHYSICAL EXAMINATION:      Vitals:    02/17/22 1329   BP: 118/72   Pulse: 78   Resp: 20   Temp: 97.2 °F (36.2 °C)   TempSrc: Temporal   SpO2: 96%   Weight: 224 lb 6 oz (101.8 kg)       Wt Readings from Last 3 Encounters:   02/17/22 224 lb 6 oz (101.8 kg)   12/15/21 218 lb (98.9 kg)   11/30/21 218 lb 3.2 oz (99 kg)       KARNOSKY PERFORMANCE STATUS: 90    Constitutional: A well developed, well nourished 82 y.o. male who is alert, oriented, cooperative and in no apparent distress. EENT: EOMI AURELIA. No icterus. No conjunctival injections. External canals are patent and no discharge was appreciated. .    Neck: Supple without thyromegaly  Respiratory: Chest was symmetrical without dullness to percussion. Breath sounds bilaterally were clear to auscultation. No wheezes, rhonchi or rales. Breasts: Deferred    Cardiovascular: Regular without murmur. Abdomen: Obese, rounded and soft without organomegaly. No rebound, guarding or  rigidity. Lymphatic: No lymphadenopathy.   Musculoskeletal: Ambulates without assistance. Normal curvature of the spine. No gross muscle weakness. Muscle size, tone and strength are normal. No involuntary movements. Pain upon deep palpation of the left groin  Extremities:  No lower extremity edema, ulcerations, tenderness, varicosities or erythema. Coordination appears adequate. Sensory function appears intact. Skin:  Warm and dry. Examination of color, turgor and pigmentation was relatively normal. No bruises or skin rashes. Neurological/Psychiatric: General behavior, level of consciousness, thought content is normal. The patient's emotional status is normal.  Cranial nerves II-XII are grossly intact. RADIATION SAFETY AND ONCOLOGIC TREATMENT SUPPORT:    - Previous radiation history: Yes  - History of autoimmune or connective tissue disease:  No  - Nutritional support/ PEG:  Not applicable  - Dental evaluation:  Not applicable  -  requested:  Not asked for.  - Oncology Nurse navigator requested:  - Transportation for daily treatment:  Self    TUMOR MARKERS:   Lab Results   Component Value Date    PSA SEE NOTE 12/06/2021    PSA <0.03 12/06/2021       IMAGING REVIEWED:  No results found. PATHOLOGY REVIEWED:      IMPRESSION: The patient presents with non-small cell lung cancer of the right lower lobe that is in course of staging. He also presents with metastatic disease from a high risk, grade group 5, prostate cancer, currently on treatment with apalutamide. The patient also presents with pain at the level of the left groin lasting over the last 4 months, that is also in course of evaluation to rule out metastatic painful bone lesions at that level. DISCUSSION/PLAN: I agree with Dr. Eloy Soto about the need of finding out the cause of the FDG avidity at the level of the tsering bilaterally since this will change the course of treatment for his adenocarcinoma of the lung.   I have discussed with the patient and his wife 2 different radiation therapy scenarios, the first one consisting of SBRT of the right lower lobe lesion, should the imaging and tests rule out the presence of bilateral hilar metastatic disease. For the treatment with SBRT the patient will need to be referred to our center at [facility]. Conversely, should the EBUS confirm the presence of metastatic disease at the level of the tsering, then the cancer would be staged as a stage IIIb, it will be considered not operable and it will need to undergo, in theory, a course of concurrent chemoradiation. I have discussed with the  patient and his wife the possible side effects of the combination of chemo and radiation therapy especially in a patient that is 82years old and has different cardiovascular comorbidities. Following that we have discussed the role of radiation therapy in terms of pain palliation should the lesion at the level of the left growing be deemed to be metastatic from either the lung cancer or the prostatic cancer. I explained to them that we need to keep in mind the remote history of radiation therapy at the level of the pelvis, and the possibility for the pain to be due to degenerative disease rather than metastatic involvement. Hopefully the MRI will clarify this differential diagnosis. We have agreed about reconvening to review the results of the MRIs. I am also going to call his pulmonologist Dr. Summer Jauregui at [facility] to move up the appointment with him and to discuss the need to rule out metastatic disease at the level of the tsering. I have spent more than 1 hour for my physical examination and in a face-to-face discussion with the patient and his wife. NCCN guidelines, version 2020 is used to help review treatment recommendations. Procedures and process involved with CT simulation and daily radiation treatments were explained. Toxicities, both expected and less common, were reviewed in detail.   Questions were answered to their apparent satisfaction. Thank you for the opportunity to participate in multidisciplinary management of this remarkable and pleasant patient.       Stephanie Navarrete MD    Department of Radiation Oncology  [facility]) [facility]: [phone] (CBK: [phone])  [facility]) [facility]: [phone] (XIZ: [phone])  [facility]:  [phone] (ZIE:  [phone])

## 2022-02-17 NOTE — PROGRESS NOTES
Chino Llamas  1935 82 y.o. Referring Physician: Veronica Pedraza    PCP: Isaias Jacobs MD     Vitals:    02/17/22 1329   Pulse: 78   Resp: 20   Temp: 97.2 °F (36.2 °C)   SpO2: 96%        Wt Readings from Last 3 Encounters:   02/17/22 224 lb 6 oz (101.8 kg)   12/15/21 218 lb (98.9 kg)   11/30/21 218 lb 3.2 oz (99 kg)        Body mass index is 32.19 kg/m². Chief Complaint: No chief complaint on file. Cancer Staging  No matching staging information was found for the patient. Prior Radiation Therapy? YES: Site Treated: Prostate          Facility: [facility]          Date: 3/27/2008 to 6/19/2008 35 fractions/6300 cGy    Concurrent Chemo/radiation? Waiting for MRI results    Prior Chemotherapy? NO    Prior Hormonal Therapy? YES: Site Treated: Prostates          Facility: [facility]          Date: 2008 to 2015    Head and Neck Cancer? No, patient does NOT have HN cancer. Current Outpatient Medications   Medication Sig Dispense Refill    Multiple Vitamins-Minerals (VITAMIN D3 COMPLETE PO) Take by mouth      omega-3 acid ethyl esters (LOVAZA) 1 g capsule Take 1 capsule by mouth 2 times daily 180 capsule 3    Handicap Placard MISC by Does not apply route Duration: 5 years 1 each 0    lisinopril (PRINIVIL;ZESTRIL) 10 MG tablet Take 1 tablet by mouth daily 90 tablet 3    omeprazole (PRILOSEC) 20 MG delayed release capsule Take 1 capsule by mouth daily 90 capsule 3    atorvastatin (LIPITOR) 40 MG tablet Take 1 tablet by mouth daily 90 tablet 3    magnesium gluconate (MAGONATE) 500 MG tablet Take 500 mg by mouth 2 times daily      COMBIGAN 0.2-0.5 % ophthalmic solution INSTILL 1 DROP INTO THE RIGHT EYE TWO TIMES A DAY.       latanoprost (XALATAN) 0.005 % ophthalmic solution INSTILL 1 DROP IN RIGHT EYE EVERY DAY AT BEDTIME      ERLEADA 60 MG TABS 60 mg Every 4 days      meclizine (ANTIVERT) 25 MG tablet 1/2 - 1 by mouth every 6 hours as needed      Multiple Vitamins-Minerals (THERAPEUTIC MULTIVITAMIN-MINERALS) tablet Take 1 tablet by mouth daily       metoprolol tartrate (LOPRESSOR) 25 MG tablet Take 25 mg by mouth 2 times daily      Coenzyme Q10 (COQ10) 50 MG CAPS Take 1 capsule by mouth daily       aspirin 81 MG tablet Take 81 mg by mouth daily       Cyanocobalamin (VITAMIN B 12 PO) Take  by mouth daily. sublingual        No current facility-administered medications for this encounter.        Past Medical History:   Diagnosis Date    CA prostate, adenoca ([facility] [address] 4/25/2019    CAD (coronary artery disease)     Dr. Emy Tsang [facility])     prostate- PO chemotherapy     Chronic bilateral low back pain without sciatica 3/6/2020    Cobalamin deficiency     Hyperlipidemia     Hypertension     Osteoarthritis     Osteochondropathy     Osteopenia     Pain in lower limb     Peripheral venous insufficiency     PONV (postoperative nausea and vomiting)     Prolonged emergence from general anesthesia     PVD (peripheral vascular disease) with claudication ([facility] [address] 4/25/2019       Past Surgical History:   Procedure Laterality Date    ACETABULUM FRACTURE SURGERY  3/24/11    ANESTHESIA NERVE BLOCK Bilateral 6/16/2020    BILATERAL L3 L4 L5 MEDIAL NERVE BRANCH BLOCK performed by Isabel Roberts DO at [address] [place] [address]  2016   [facility] 18 GRAFT  Dec. 1996    [facility]/ Dr. Fang Brito  1/25/2022    CT NEEDLE BIOPSY LUNG PERCUTANEOUS 1/25/2022 Berta Daniels MD [facility]    ENDOSCOPY, COLON, DIAGNOSTIC  2016    EYE SURGERY Bilateral 01/2018    cataract, glaucoma 2020-left eye    FEMUR FRACTURE SURGERY  1981    left   [address]    JOINT REPLACEMENT      bilat knee 2010, rig thi 2011     LYMPH NODE DISSECTION  10/2007    PAIN MANAGEMENT PROCEDURE N/A 5/19/2020    CAUDAL EPIDURAL STEROID INJECTION performed by Isabel Roberts DO at [address] [place] [address]  8/4/20  PAIN MANAGEMENT PROCEDURE Right 2020    RIGHT L3 4 5 MEDIAL BRANCH RADIOFREQUENCY ABLATION performed by Edu Oropeza DO at [facility] [address] Left 9/10/2020    LEFT L3 4 5 MEDIAL BRANCH RADIOFREQUENCY ABLATION     +++IODINE ALLERGY+++   CPT: 94415 [id number] performed by Edu Oropeza DO at [facility] [address] Bilateral 3/25/2021    BILATERAL L3,4,5 MEDIAL NERVE BRANCH RADIOFREQUENCY ABLATION [facility]  10/31/07    [address] M[address] [facility] LETIC[place] LAPAROSCOPIC PROSTATECTOMY    TESTICLE REMOVAL Bilateral 2019    hx prostate ca-    TONSILLECTOMY      TOTAL HIP ARTHROPLASTY  11    right    TOTAL KNEE ARTHROPLASTY  2010    bilateral       Family History   Problem Relation Age of Onset    Stroke Mother     Cancer Mother         pancreatic    Cancer Father         prostate    Stroke Father        Social History     Socioeconomic History    Marital status:      Spouse name: Not on file    Number of children: Not on file    Years of education: Not on file    Highest education level: Not on file   Occupational History    Not on file   Tobacco Use    Smoking status: Former Smoker     Packs/day: 1.00     Years: 40.00     Pack years: 40.00     Types: Cigarettes     Start date: 5     Quit date: 10/1/1994     Years since quittin.4    Smokeless tobacco: Former User     Types: [address] date:    Vaping Use    Vaping Use: Never used   Substance and Sexual Activity    Alcohol use:  Yes     Alcohol/week: 1.0 standard drink     Types: 1 Cans of beer per week     Comment: rare    Drug use: No    Sexual activity: Not on file   Other Topics Concern    Not on file   Social History Narrative    Problem List: History of polyp of colon, Disorder of bone density and structure, unspecified,    Osteochondropathy, Carcinoma in situ of prostate, Aneurysm of thoracic aorta, Anemia, Coronary arteriosclerosis, Cobalamin deficiency, Multiple closed fractures of pelvis with disruption of pelvic [place],    Hyperlipidemia, Essential hypertension    Health Maintenance:    Bone Density Test Screening - (3/5/2019)    Bone Density Scan - (12/18/2015)    Influenza Vaccination - (10/7/2016)    Colonoscopy - (4/3/2012)    Couseled on Home Safety - (11/23/2015)    Colonoscopy Screening - (4/3/2012)    US Liver - 8/1/08    Medical Problems:    Coronary Artery Disease (CAD), Hypertension, Hyperlipidemia, Prostate Cancer    Surgical Hx:    Prostatectomy, Coronary Artery Bypass Graft (CABG)            FH: Father:    . (Hx)    Mother:    . (Hx)        SH: Marital:  - retired . Personal Habits: Cigarette Use: former cigarette smoker, Nonsmoker. Alcohol: Occasionally    consumes alcohol. Social Determinants of Health     Financial Resource Strain: Low Risk     Difficulty of Paying Living Expenses: Not hard at all   Food Insecurity: No Food Insecurity    Worried About Running Out of Food in the Last Year: Never true    Deondre of Food in the Last Year: Never true   Transportation Needs:     Lack of Transportation (Medical): Not on file    Lack of Transportation (Non-Medical):  Not on file   Physical Activity:     Days of Exercise per Week: Not on file    Minutes of Exercise per Session: Not on file   Stress:     Feeling of Stress : Not on file   Social Connections:     Frequency of Communication with Friends and Family: Not on file    Frequency of Social Gatherings with Friends and Family: Not on file    Attends Jewish Services: Not on file    Active Member of Clubs or Organizations: Not on file    Attends Club or Organization Meetings: Not on file    Marital Status: Not on file   Intimate Partner Violence:     Fear of Current or Ex-Partner: Not on file    Emotionally Abused: Not on file    Physically Abused: Not on file    Sexually Abused: Not on file   Housing Stability:     Unable to Pay for Housing in the Last Year: Not on file    Number of Places Lived in the Last Year: Not on file    Unstable Housing in the Last Year: Not on file           Occupation:   Retired:  YES: Patient is retired from [employer] and [employer]  [place]. REVIEW OF SYSTEMS: <<For Level 5, 10 or more systems>> Igor Ochoa is a very pleasant 82years old male, he is here today with his wife Maria Guadalupe to discuss receiving radiation therapy R/T PET positive right lower lobe pulmonary nodule has metabolic characteristics concerning for malignancy. He is alert and oriented x 4, c/o pain to left groin 6/10, ambulatory without any assistance. Igor Ochoa had a history of prostates cancer in the past.  He had a CT of he chest 12/13/21 which showed soft tissue mass right lower lobe showing interval slight increase in size. 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 He is following Dr Ruiz Coad oncology at [facility] his last office visit was 02/10/22. Per Her notes  Discussed pt if Stage 111b concurrent weekly Carbo/Taxol with radiation therapy  #2 or Palliate radiaiiton # 3 If stag 1 Adenocarcinoma SBRT to lesion. Patient is scheduled for his lumbar MRI 3/02/22, MRI to pelvis and brain 3/08/22. No teaching given today re: radiation, patient will get his imaging then he will be back re: his options. Pacemaker/Defibulator/ICD:  No    Mediport: No        FALLS RISK SCREENING ASSESSMENT    Instructions:  Assess the patient and enter the appropriate indicators that are present for fall risk identification. Total the numbers entered and assign a fall risk score from Table 2.  Reassess patient at a minimum every 12 weeks or with status change. Assessment   Date  2/17/2022     1.   Mental Ability: confusion/cognitively impaired No - 0 2.  Elimination Issues: incontinence, frequency Yes - 3/both       3. Ambulatory: use of assistive devices (walker, cane, off-loading devices), attached to equipment (IV pole, oxygen) No - 0     4. Sensory Limitations: dizziness, vertigo, impaired vision Yes - 3/vertigo comes and goes       5. Age 72 years or greater - 1       10. Medication: diuretics, strong analgesics, hypnotics, sedatives, antihypertensive agents   Yes - 3   7. Falls:  recent history of falls within the last 3 months (not to include slipping or tripping)   No - 0   TOTAL 10    If score of 4 or greater was education given? Yes       TABLE 2   Risk Score Risk Level Plan of Care   0-3 Little or  No Risk 1. Provide assistance as indicated for ambulation activities  2. Reorient confused/cognitively impaired patient  3. Call-light/bell within patient's reach  4. Chair/bed in low position, stretcher/bed with siderails up except when performing patient care activities  5. Educate patient/family/caregiver on falls prevention  6.  Reassess in 12 weeks or with any noted change in patient condition which places them at a risk for a fall   4-6 Moderate Risk 1. Provide assistance as indicated for ambulation activities  2. Reorient confused/cognitively impaired patient  3. Call-light/bell within patient's reach  4. Chair/bed in low position, stretcher/bed with siderails up except when performing patient care activities  5. Educate patient/family/caregiver on falls prevention  6. Falls risk precaution (Yellow sticker Level II) placed on patient chart   7 or   Higher High Risk 1. Place patient in easily observable treatment room  2. Patient attended at all times by family member or staff  3. Provide assistance as indicated for ambulation activities  4. Reorient confused/cognitively impaired patient  5. Call-light/bell within patient's reach  6.   Chair/bed in low position, stretcher/bed with siderails up except when performing patient care activities  7. Educate patient/family/caregiver on falls prevention  8. Falls risk precaution (Yellow sticker Level III) placed on patient chart           MALNUTRITION RISK SCREENING ASSESSMENT    Instructions:  Assess the patient and enter the appropriate indicators that are present for nutrition risk identification. Total the numbers entered and assign a risk score. Follow the appropriate action for total score listed below. Assessment   Date  2/17/2022     1. Have you lost weight without trying? 0- No     2. Have you been eating poorly because of a decreased appetite? 0- No   3. Do you have a diagnosis of head and neck cancer? 0- No                                                                                    TOTAL 0          Score of 0-1: No action  Score 2 or greater:  · For Non-Diabetic Patient: Recommend adding Ensure Complete 2 x daily and provide patient with Ensure wellness bag with coupons  · For Diabetic Patient: Recommend adding Glucerna Shake 2 x daily and provide patient with Glucerna Wellness bag with coupons  · Route to the dietitian via [employer]    · Are you having difficulty performing daily routine tasks due to fatigue or weakness (ie: bathing/showering, dressing, housework, meal prep, work, , etc):  No     · Do you have any arm flexibility/ROM restrictions, swelling or pain that limit activity: Yes / arthritis to right shoulder     · Any changes in memory, attention/focus that impact daily activities: No     · Do you avoid participation in leisure/social activity due to weakness, fatigue or pain: No     ARE ANY OF THE ABOVE ARE ANSWERED YES: No          PT ASSESSMENT FOR REFERRAL    · Have you had any recent falls in the past 2 months: No     · Do you have difficulty going up/down stairs: No     · Are you having difficulty walking: Yes and No     · Do you often hold onto furniture/environmental supports or feel off balance when you are walking: No     · Do you need to take rest breaks when you are walking: No     · Any pain on a scale of 1-10 that limits your mobility: Yes 6/10    ARE ANY OF THE ABOVE ARE ANSWERED YES: Yes - but NO PT referral request sent due to patient refusal.             Kerri Hicksirn    - Is patient planned to receive Cisplatin? No. This patient is not planned to start Cisplatin. - Is patient planned to receive radiation therapy that may be directed toward auditory canals or nerves? No. Patient is not planned to start radiation therapy to auditory canals or nerves. - Is patient complaining of new onset hearing loss? No. Patient is not complaining of new onset hearing loss. Patient education given on fall prevention safety, pt and wife verbalizes undersanding. The patient expresses understanding and acceptance of instructions.  Sanaz Crowe RN 2/17/2022 1:39 PM           Sanaz Crowe RN

## 2022-02-21 ENCOUNTER — HOSPITAL ENCOUNTER (OUTPATIENT)
Age: 87
Discharge: HOME | End: 2022-02-21
Payer: MEDICARE

## 2022-02-21 DIAGNOSIS — E78.2 MIXED HYPERLIPIDEMIA: ICD-10-CM

## 2022-02-21 DIAGNOSIS — I10 ESSENTIAL HYPERTENSION: ICD-10-CM

## 2022-02-21 DIAGNOSIS — E55.9 VITAMIN D DEFICIENCY: ICD-10-CM

## 2022-02-21 DIAGNOSIS — E03.2 HYPOTHYROIDISM DUE TO MEDICATION: ICD-10-CM

## 2022-02-21 DIAGNOSIS — E53.8 VITAMIN B12 DEFICIENCY: ICD-10-CM

## 2022-02-21 DIAGNOSIS — Z86.16 HISTORY OF COVID-19: ICD-10-CM

## 2022-02-21 DIAGNOSIS — R79.0 LOW MAGNESIUM LEVEL: ICD-10-CM

## 2022-02-21 LAB
ALBUMIN SERPL-MCNC: 4.5 G/DL (ref 3.5–5.2)
ALP BLD-CCNC: 50 U/L (ref 40–129)
ALT SERPL-CCNC: 20 U/L (ref 0–40)
ANION GAP SERPL CALCULATED.3IONS-SCNC: 14 MMOL/L (ref 7–16)
AST SERPL-CCNC: 26 U/L (ref 0–39)
BASOPHILS ABSOLUTE: 0.02 E9/L (ref 0–0.2)
BASOPHILS RELATIVE PERCENT: 0.4 % (ref 0–2)
BILIRUB SERPL-MCNC: 0.8 MG/DL (ref 0–1.2)
BILIRUBIN URINE: NEGATIVE
BLOOD, URINE: NEGATIVE
BUN BLDV-MCNC: 20 MG/DL (ref 6–23)
CALCIUM SERPL-MCNC: 10.1 MG/DL (ref 8.6–10.2)
CHLORIDE BLD-SCNC: 106 MMOL/L (ref 98–107)
CHOLESTEROL, TOTAL: 141 MG/DL (ref 0–199)
CLARITY: CLEAR
CO2: 23 MMOL/L (ref 22–29)
COLOR: YELLOW
CREAT SERPL-MCNC: 1.4 MG/DL (ref 0.7–1.2)
CREATININE URINE: 258 MG/DL (ref 40–278)
EOSINOPHILS ABSOLUTE: 0.09 E9/L (ref 0.05–0.5)
EOSINOPHILS RELATIVE PERCENT: 2 % (ref 0–6)
FOLATE: 16 NG/ML (ref 4.8–24.2)
GFR AFRICAN AMERICAN: 58
GFR NON-AFRICAN AMERICAN: 48 ML/MIN/1.73
GLUCOSE BLD-MCNC: 117 MG/DL (ref 74–99)
GLUCOSE URINE: NEGATIVE MG/DL
HCT VFR BLD CALC: 35.5 % (ref 37–54)
HDLC SERPL-MCNC: 43 MG/DL
HEMOGLOBIN: 12.1 G/DL (ref 12.5–16.5)
IMMATURE GRANULOCYTES #: 0.04 E9/L
IMMATURE GRANULOCYTES %: 0.9 % (ref 0–5)
KETONES, URINE: NEGATIVE MG/DL
LDL CHOLESTEROL CALCULATED: 49 MG/DL (ref 0–99)
LEUKOCYTE ESTERASE, URINE: NEGATIVE
LYMPHOCYTES ABSOLUTE: 1.17 E9/L (ref 1.5–4)
LYMPHOCYTES RELATIVE PERCENT: 26.1 % (ref 20–42)
MAGNESIUM: 1.9 MG/DL (ref 1.6–2.6)
MCH RBC QN AUTO: 30 PG (ref 26–35)
MCHC RBC AUTO-ENTMCNC: 34.1 % (ref 32–34.5)
MCV RBC AUTO: 88.1 FL (ref 80–99.9)
MICROALBUMIN UR-MCNC: 34.6 MG/L
MICROALBUMIN/CREAT UR-RTO: 13.4 (ref 0–30)
MONOCYTES ABSOLUTE: 0.31 E9/L (ref 0.1–0.95)
MONOCYTES RELATIVE PERCENT: 6.9 % (ref 2–12)
NEUTROPHILS ABSOLUTE: 2.85 E9/L (ref 1.8–7.3)
NEUTROPHILS RELATIVE PERCENT: 63.7 % (ref 43–80)
NITRITE, URINE: NEGATIVE
PDW BLD-RTO: 13.8 FL (ref 11.5–15)
PH UA: 5 (ref 5–9)
PLATELET # BLD: 147 E9/L (ref 130–450)
PMV BLD AUTO: 10 FL (ref 7–12)
POTASSIUM SERPL-SCNC: 4.5 MMOL/L (ref 3.5–5)
PROTEIN UA: NEGATIVE MG/DL
RBC # BLD: 4.03 E12/L (ref 3.8–5.8)
SODIUM BLD-SCNC: 143 MMOL/L (ref 132–146)
SPECIFIC GRAVITY UA: >=1.03 (ref 1–1.03)
T4 FREE: 1.28 NG/DL (ref 0.93–1.7)
TOTAL CK: 67 U/L (ref 20–200)
TOTAL PROTEIN: 7.2 G/DL (ref 6.4–8.3)
TRIGL SERPL-MCNC: 245 MG/DL (ref 0–149)
TSH SERPL DL<=0.05 MIU/L-ACNC: 5.3 UIU/ML (ref 0.27–4.2)
UROBILINOGEN, URINE: 0.2 E.U./DL
VITAMIN B-12: 1838 PG/ML (ref 211–946)
VITAMIN D 25-HYDROXY: 41 NG/ML (ref 30–100)
VLDLC SERPL CALC-MCNC: 49 MG/DL
WBC # BLD: 4.5 E9/L (ref 4.5–11.5)

## 2022-02-21 PROCEDURE — 86769 SARS-COV-2 COVID-19 ANTIBODY: CPT

## 2022-02-21 PROCEDURE — 84443 ASSAY THYROID STIM HORMONE: CPT

## 2022-02-21 PROCEDURE — 80061 LIPID PANEL: CPT

## 2022-02-21 PROCEDURE — 82570 ASSAY OF URINE CREATININE: CPT

## 2022-02-21 PROCEDURE — 82550 ASSAY OF CK (CPK): CPT

## 2022-02-21 PROCEDURE — 82044 UR ALBUMIN SEMIQUANTITATIVE: CPT

## 2022-02-21 PROCEDURE — 82607 VITAMIN B-12: CPT

## 2022-02-21 PROCEDURE — 36415 COLL VENOUS BLD VENIPUNCTURE: CPT

## 2022-02-21 PROCEDURE — 85025 COMPLETE CBC W/AUTO DIFF WBC: CPT

## 2022-02-21 PROCEDURE — 82306 VITAMIN D 25 HYDROXY: CPT

## 2022-02-21 PROCEDURE — 83735 ASSAY OF MAGNESIUM: CPT

## 2022-02-21 PROCEDURE — 84439 ASSAY OF FREE THYROXINE: CPT

## 2022-02-21 PROCEDURE — 82746 ASSAY OF FOLIC ACID SERUM: CPT

## 2022-02-21 PROCEDURE — 80053 COMPREHEN METABOLIC PANEL: CPT

## 2022-02-21 PROCEDURE — 81003 URINALYSIS AUTO W/O SCOPE: CPT

## 2022-02-22 ENCOUNTER — TELEPHONE (OUTPATIENT)
Dept: PULMONOLOGY | Age: 87
End: 2022-02-22

## 2022-02-22 LAB — SARS-COV-2 ANTIBODY, TOTAL: REACTIVE

## 2022-02-23 ENCOUNTER — OFFICE VISIT (OUTPATIENT)
Dept: PULMONOLOGY | Age: 87
End: 2022-02-23
Payer: MEDICARE

## 2022-02-23 VITALS
BODY MASS INDEX: 32.07 KG/M2 | WEIGHT: 224 LBS | OXYGEN SATURATION: 95 % | SYSTOLIC BLOOD PRESSURE: 149 MMHG | DIASTOLIC BLOOD PRESSURE: 66 MMHG | HEART RATE: 89 BPM | RESPIRATION RATE: 18 BRPM | TEMPERATURE: 95.7 F | HEIGHT: 70 IN

## 2022-02-23 DIAGNOSIS — R91.1 LUNG NODULE: ICD-10-CM

## 2022-02-23 DIAGNOSIS — C34.91 ADENOCARCINOMA OF RIGHT LUNG (HCC): ICD-10-CM

## 2022-02-23 PROCEDURE — G8428 CUR MEDS NOT DOCUMENT: HCPCS | Performed by: INTERNAL MEDICINE

## 2022-02-23 PROCEDURE — 1036F TOBACCO NON-USER: CPT | Performed by: INTERNAL MEDICINE

## 2022-02-23 PROCEDURE — 99204 OFFICE O/P NEW MOD 45 MIN: CPT | Performed by: INTERNAL MEDICINE

## 2022-02-23 PROCEDURE — 1123F ACP DISCUSS/DSCN MKR DOCD: CPT | Performed by: INTERNAL MEDICINE

## 2022-02-23 PROCEDURE — 4040F PNEUMOC VAC/ADMIN/RCVD: CPT | Performed by: INTERNAL MEDICINE

## 2022-02-23 PROCEDURE — 99203 OFFICE O/P NEW LOW 30 MIN: CPT | Performed by: INTERNAL MEDICINE

## 2022-02-23 PROCEDURE — G8417 CALC BMI ABV UP PARAM F/U: HCPCS | Performed by: INTERNAL MEDICINE

## 2022-02-23 PROCEDURE — G8484 FLU IMMUNIZE NO ADMIN: HCPCS | Performed by: INTERNAL MEDICINE

## 2022-02-23 RX ORDER — MULTIVIT-MIN/IRON/FOLIC ACID/K 18-600-40
2000 CAPSULE ORAL DAILY
COMMUNITY
Start: 2018-01-01 | End: 2022-10-13

## 2022-02-23 NOTE — PROGRESS NOTES
[address] PRE-ADMISSION TESTING GENERAL INSTRUCTIONS- [facility]-phone number:[phone]    GENERAL INSTRUCTIONS  [x] Antibacterial Soap shower Night before and/or AM of Surgery  [x] Nothing by mouth after midnight, including gum, candy, mints, or water. [x] You may brush your teeth, gargle, but do NOT swallow water. [x]No smoking, chewing tobacco, illegal drugs, marijuana or alcohol within 24 hours of your surgery. [x] Jewelry, valuables or body piercing's should not be brought to the hospital. All body and/or tongue piercing's must be removed prior to arriving to hospital.  ALL hair pins must be removed. [x] Do not wear makeup, lotions, powders, deodorant. Nail polish as directed by the nurse. [x] Arrange transportation with a responsible adult  to and from the hospital. If you do not have a responsible adult  to transport you, you will need to make arrangements with a medical transportation company (i.e. [employer]. A Uber/taxi/bus is not appropriate unless you are accompanied by a responsible adult ). Arrange for someone to be with you for the remainder of the day and for 24 hours after your procedure due to having had anesthesia. Who will be your  for transportation? Maria Guadalupe, spouse  Who will be staying with you for 24 hrs after your procedure? Maria Guadalupe, spouse  [x] [facility] card and photo ID. PARKING INSTRUCTIONS:   [x] Arrival Time: 11:30 am,    Two people may accompany you. Everyone will need to wear a mask. · [x] Parking lot '\"I\"  is located on [facility] (the corner of [facility]). To enter, press the button and the gate will lift. A free token will be provided to exit the lot. One car per patient is allowed to park in this lot. All other cars are to park on [address] [place] [address] either in the parking garage or the handicap lot.       Walk up the front walk to the [facility], the door will be locked an employee will greet you and let you in. EDUCATION INSTRUCTIONS:          [x]Pain: Post-op pain is normal and to be expected. You will be asked to rate your pain from 0-10 (a zero is not acceptable-education is needed). Your post-op pain goal is:   [x] Ask your nurse for your pain medication. MEDICATION INSTRUCTIONS:  [x]Bring a complete list of your medications, please write the last time you took the medicine, give this list to the nurse. [x] Take the following medications the morning of surgery with 1-2 ounces of water:  Metoprolol tartrate    [x] Stop herbal supplements, ibuprofen (Nsaids)  and vitamins 5 days before your surgery. [x] Follow physician instructions regarding any blood thinners you may be taking. WHAT TO EXPECT:  [x] The day of surgery you will be greeted and checked in by the [place]. Please bring your photo ID and insurance card. A nurse will greet you in accordance to the time you are needed in the pre-op area to prepare you for surgery. Please do not be discouraged if you are not greeted in the order you arrive as there are many variables that are involved in patient preparation. Your patience is greatly appreciated as you wait for your nurse. Please bring in items such as: books, magazines, newspapers, electronics, or any other items  to occupy your time in the waiting area. [x]  Delays may occur with surgery and staff will make a sincere effort to keep you informed of delays. If any delays occur with your procedure, we apologize ahead of time for your inconvenience as we recognize the value of your time.

## 2022-02-23 NOTE — PROGRESS NOTES
Department of Internal Medicine  Division of Pulmonary, Critical Care & Sleep Medicine  Pulmonary [address] [facility]                                             Pulmonary Clinic Consult     I had the pleasure of seeing  Juliet Chakraborty in the [facility] regarding their lung cancer       HISTORY OF PRESENT ILLNESS:    Juliet Chakraborty is a 82y.o. year old  Who start smoking at age 23 and at age 63 give him 36   He work as price for 54 years   Has SOB   Had PET scan 2020 shows lung mass and was followed by Dr Brianna Raman     He was referred for EBUS   Had clearances from cardiology for eye surgery    ALLERGIES:    Allergies   Allergen Reactions    Iodine Hives    Shellfish-Derived Products        PAST MEDICAL HISTORY:       Diagnosis Date    CA prostate, adenoca ([facility] 4/25/2019    CAD (coronary artery disease)     Dr. Jagdish [facility])     prostate- PO chemotherapy     Chronic bilateral low back pain without sciatica 3/6/2020    Cobalamin deficiency     Hyperlipidemia     Hypertension     Osteoarthritis     Osteochondropathy     Osteopenia     Pain in lower limb     Peripheral venous insufficiency     PONV (postoperative nausea and vomiting)     Prolonged emergence from general anesthesia     PVD (peripheral vascular disease) with claudication ([facility] 4/25/2019       MEDICATIONS:   Current Outpatient Medications   Medication Sig Dispense Refill    Multiple Vitamins-Minerals (VITAMIN D3 COMPLETE PO) Take by mouth      omega-3 acid ethyl esters (LOVAZA) 1 g capsule Take 1 capsule by mouth 2 times daily 180 capsule 3    Handicap Placard MISC by Does not apply route Duration: 5 years 1 each 0    lisinopril (PRINIVIL;ZESTRIL) 10 MG tablet Take 1 tablet by mouth daily 90 tablet 3    omeprazole (PRILOSEC) 20 MG delayed release capsule Take 1 capsule by mouth daily 90 capsule 3    atorvastatin (LIPITOR) 40 MG tablet Take 1 tablet by mouth daily 90 tablet 3    magnesium gluconate (MAGONATE) 500 MG tablet Take 500 mg by mouth 2 times daily      COMBIGAN 0.2-0.5 % ophthalmic solution INSTILL 1 DROP INTO THE RIGHT EYE TWO TIMES A DAY.  latanoprost (XALATAN) 0.005 % ophthalmic solution INSTILL 1 DROP IN RIGHT EYE EVERY DAY AT BEDTIME      ERLEADA 60 MG TABS 60 mg Every 4 days      meclizine (ANTIVERT) 25 MG tablet 1/2 - 1 by mouth every 6 hours as needed      Multiple Vitamins-Minerals (THERAPEUTIC MULTIVITAMIN-MINERALS) tablet Take 1 tablet by mouth daily       metoprolol tartrate (LOPRESSOR) 25 MG tablet Take 25 mg by mouth 2 times daily      Coenzyme Q10 (COQ10) 50 MG CAPS Take 1 capsule by mouth daily       aspirin 81 MG tablet Take 81 mg by mouth daily       Cyanocobalamin (VITAMIN B 12 PO) Take  by mouth daily. sublingual        No current facility-administered medications for this visit.        SOCIAL AND OCCUPATIONAL HEALTH:  Social History     Tobacco Use   Smoking Status Former Smoker    Packs/day: 1.00    Years: 40.00    Pack years: 40.00    Types: Cigarettes    Start date:     Quit date: 10/1/1994    Years since quittin.4   Smokeless Tobacco Former User    Types: Chew    Quit date:      TB :  Pets   Industrial exposure:  Birds :    SURGICAL HISTORY:   Past Surgical History:   Procedure Laterality Date    ACETABULUM FRACTURE SURGERY  3/24/11    ANESTHESIA NERVE BLOCK Bilateral 2020    BILATERAL L3 L4 L5 MEDIAL NERVE BRANCH BLOCK performed by Henriette Bence, DO at [facility]      CORONARY ARTERY BYPASS GRAFT  Dec. 1996    [facility]/ Dr. Liz 2022    CT NEEDLE BIOPSY LUNG PERCUTANEOUS 2022 Shelby Goodrich MD [facility]    ENDOSCOPY, COLON, DIAGNOSTIC  2016    EYE SURGERY Bilateral 2018    cataract, glaucoma -left eye    FEMUR FRACTURE SURGERY  1981    left   [facility]    JOINT REPLACEMENT bilat knee 2010, righ thip 2011     LYMPH NODE DISSECTION  10/2007    PAIN MANAGEMENT PROCEDURE N/A 5/19/2020    CAUDAL EPIDURAL STEROID INJECTION performed by Heidy Martinez DO at [address]  8/4/20    PAIN MANAGEMENT PROCEDURE Right 8/4/2020    RIGHT L3 4 5 MEDIAL BRANCH RADIOFREQUENCY ABLATION performed by Heidy Martinez DO at [address] Left 9/10/2020    LEFT L3 4 5 MEDIAL BRANCH RADIOFREQUENCY ABLATION     +++IODINE ALLERGY+++   CPT: 78819 [id number] performed by Heidy Martinez DO at [address] Bilateral 3/25/2021    BILATERAL L3,4,5 MEDIAL NERVE BRANCH RADIOFREQUENCY ABLATION performed by Heidy Martinez DO at [address]  10/31/07    [address] M[address] [place] [facility] LAPAROSCOPIC PROSTATECTOMY    TESTICLE REMOVAL Bilateral 02/2019    hx prostate ca-    TONSILLECTOMY      TOTAL HIP ARTHROPLASTY  11/30/11    right    TOTAL KNEE ARTHROPLASTY  2010    bilateral       FAMILY HISTORY:   Lung cancer:no  DVT or PE     REVIEW OF SYSTEMS:  Constitutional: General health is good . There has been no weight changes. No fevers, fatigue or weakness. Head: Patient denies any history of trauma, convulsive disorder or syncope. Skin:  Patient denies history of changes in pigmentation, eruptions or pruritus. No easy bruising or bleeding. EENT: no nasal congestion   Cardiovascular ,No chest pain ,No edema ,  Respiration:SOB: +,CHRISTIE :+  Gastrointestinal:No GI bleed ,no melena  ,no hematemesis    Musculoskeletal: no joint pain ,no swelling  Neurological:no , syncope. Denies twitching, convulsions, loss of consciousness or memory. Endocrine:  . No history of goiter, exophthalmos or dryness of skin. The patient has no history of diabetes. Hematopoietic:  No history of bleeding disorders or easy bruising. Rheumatic:  No connective tissue disease history or polyarthritis/inflammatory joint disease.       PHYSICAL EXAMINATION:  Vitals:    02/23/22 1034   BP: (!) 149/66   Pulse: 89   Resp: 18   Temp: 95.7 °F (35.4 °C)   SpO2: 95%     Constitutional: This is a well developed, well nourished 82y.o. year old male who is alert, oriented, cooperative and in no apparent distress. Head was normocephalic and atraumatic. EENT: Mallampati class :  Extraocular muscles intact. External canals are patent and no discharge was appreciated. Septum was midline,   mucosa was without erythema, exudates or cobblestoning. No thrush was noted. Neck: Supple without thyromegaly. No elevated JVP. Trachea was midline. No carotid bruits were auscultated. rhonch    Cardiovascular: Regular without murmur, clicks, gallops or rubs. There is no left or right ventricular heave. Pulses:  Carotid, radial and femoral pulses were equally bilaterally. Abdomen: Slightly rounded and soft without organomegaly. No rebound, rigidity or guarding was appreciated. Lymphatic: No lymphadenopathy. Musculoskeletal: no edema     Extremities: no edema   Skin:  Warm and dry. Good color, turgor and pigmentation. No lesions or scars. Neurological/Psychiatric: The patient's general behavior, level of consciousness, thought content and emotional status is normal.  Cranial nerves II-XII are intact. DATA:             IMPRESSION:    1-Lung cancer  2-Prostate  cancer   3-mediastinal  and hilar adenopathy   4-Possible COPD               PLAN:      -he was referred EBUS and sample Lymph nodes   Most can be reached excelt L5-6   Will do staging by EBUS     I personally supervised the performance of the documented procedure. I was present for the critical elements of this procedure and was immediately available for the entire procedure. Flu and Pneumovax     Thank you for allowing me to participate in [facility].  I will keep following with you ,should you have any concerns ,please contact me at [phone]     Sincerely,        Fernanda Gray Mishel PRATHER  Pulmonary & Critical Care Medicine     NOTE: This report was transcribed using voice recognition software. Every effort was made to ensure accuracy; however, inadvertent computerized transcription errors may be present.

## 2022-02-23 NOTE — PROGRESS NOTES
Pre Bronch visit today in office with wife. Pt not vaccinated however has had COVID in Jan 2021; antibodies reactive ad of 2/21/22. Plan for EBUS Bronch tomorrow. No ASA has been taken for a while due to upcoming eye surgery per wife. Discussed at today's visit procedure and biopsy process with Dr. Oliver Herron and all questions answered. Plan for wife to call PAT staff when they get home for further instructions for Procedure scheduled at 230pm tomorrow. Plan for follow up with results to be sent to Dr. Ovalle(oncology).

## 2022-02-24 ENCOUNTER — APPOINTMENT (OUTPATIENT)
Dept: GENERAL RADIOLOGY | Age: 87
End: 2022-02-24
Attending: INTERNAL MEDICINE
Payer: MEDICARE

## 2022-02-24 ENCOUNTER — ANESTHESIA (OUTPATIENT)
Dept: ENDOSCOPY | Age: 87
End: 2022-02-24
Payer: MEDICARE

## 2022-02-24 ENCOUNTER — ANESTHESIA EVENT (OUTPATIENT)
Dept: ENDOSCOPY | Age: 87
End: 2022-02-24
Payer: MEDICARE

## 2022-02-24 ENCOUNTER — HOSPITAL ENCOUNTER (OUTPATIENT)
Age: 87
Setting detail: OUTPATIENT SURGERY
Discharge: HOME | End: 2022-02-24
Attending: INTERNAL MEDICINE | Admitting: INTERNAL MEDICINE
Payer: MEDICARE

## 2022-02-24 VITALS
BODY MASS INDEX: 32.07 KG/M2 | DIASTOLIC BLOOD PRESSURE: 75 MMHG | HEART RATE: 81 BPM | HEIGHT: 70 IN | WEIGHT: 224 LBS | RESPIRATION RATE: 20 BRPM | TEMPERATURE: 97 F | OXYGEN SATURATION: 95 % | SYSTOLIC BLOOD PRESSURE: 125 MMHG

## 2022-02-24 VITALS — SYSTOLIC BLOOD PRESSURE: 107 MMHG | OXYGEN SATURATION: 97 % | DIASTOLIC BLOOD PRESSURE: 62 MMHG | TEMPERATURE: 97.7 F

## 2022-02-24 DIAGNOSIS — Z01.818 PRE-OP TESTING: Primary | ICD-10-CM

## 2022-02-24 LAB — SARS-COV-2, NAAT: NOT DETECTED

## 2022-02-24 PROCEDURE — 71045 X-RAY EXAM CHEST 1 VIEW: CPT

## 2022-02-24 PROCEDURE — 2709999900 HC NON-CHARGEABLE SUPPLY: Performed by: INTERNAL MEDICINE

## 2022-02-24 PROCEDURE — 93005 ELECTROCARDIOGRAM TRACING: CPT | Performed by: ANESTHESIOLOGY

## 2022-02-24 PROCEDURE — 31653 BRONCH EBUS SAMPLNG 3/> NODE: CPT | Performed by: INTERNAL MEDICINE

## 2022-02-24 PROCEDURE — 3700000001 HC ADD 15 MINUTES (ANESTHESIA): Performed by: INTERNAL MEDICINE

## 2022-02-24 PROCEDURE — 7100000001 HC PACU RECOVERY - ADDTL 15 MIN: Performed by: INTERNAL MEDICINE

## 2022-02-24 PROCEDURE — 6360000002 HC RX W HCPCS

## 2022-02-24 PROCEDURE — 2500000003 HC RX 250 WO HCPCS: Performed by: INTERNAL MEDICINE

## 2022-02-24 PROCEDURE — 2500000003 HC RX 250 WO HCPCS

## 2022-02-24 PROCEDURE — C1725 CATH, TRANSLUMIN NON-LASER: HCPCS | Performed by: INTERNAL MEDICINE

## 2022-02-24 PROCEDURE — 3609020000 HC BRONCHOSCOPY W/EBUS FNA: Performed by: INTERNAL MEDICINE

## 2022-02-24 PROCEDURE — 2580000003 HC RX 258

## 2022-02-24 PROCEDURE — 87635 SARS-COV-2 COVID-19 AMP PRB: CPT

## 2022-02-24 PROCEDURE — 88305 TISSUE EXAM BY PATHOLOGIST: CPT

## 2022-02-24 PROCEDURE — 88173 CYTOPATH EVAL FNA REPORT: CPT

## 2022-02-24 PROCEDURE — 7100000011 HC PHASE II RECOVERY - ADDTL 15 MIN: Performed by: INTERNAL MEDICINE

## 2022-02-24 PROCEDURE — 7100000000 HC PACU RECOVERY - FIRST 15 MIN: Performed by: INTERNAL MEDICINE

## 2022-02-24 PROCEDURE — 3609027000 HC BRONCHOSCOPY: Performed by: INTERNAL MEDICINE

## 2022-02-24 PROCEDURE — 3700000000 HC ANESTHESIA ATTENDED CARE: Performed by: INTERNAL MEDICINE

## 2022-02-24 PROCEDURE — 7100000010 HC PHASE II RECOVERY - FIRST 15 MIN: Performed by: INTERNAL MEDICINE

## 2022-02-24 RX ORDER — SODIUM CHLORIDE 0.9 % (FLUSH) 0.9 %
5-40 SYRINGE (ML) INJECTION PRN
Status: DISCONTINUED | OUTPATIENT
Start: 2022-02-24 | End: 2022-02-24 | Stop reason: HOSPADM

## 2022-02-24 RX ORDER — DEXAMETHASONE SODIUM PHOSPHATE 10 MG/ML
INJECTION INTRAMUSCULAR; INTRAVENOUS PRN
Status: DISCONTINUED | OUTPATIENT
Start: 2022-02-24 | End: 2022-02-24 | Stop reason: SDUPTHER

## 2022-02-24 RX ORDER — SUCCINYLCHOLINE/SOD CL,ISO/PF 200MG/10ML
SYRINGE (ML) INTRAVENOUS PRN
Status: DISCONTINUED | OUTPATIENT
Start: 2022-02-24 | End: 2022-02-24 | Stop reason: SDUPTHER

## 2022-02-24 RX ORDER — SODIUM CHLORIDE 9 MG/ML
INJECTION, SOLUTION INTRAVENOUS CONTINUOUS PRN
Status: DISCONTINUED | OUTPATIENT
Start: 2022-02-24 | End: 2022-02-24 | Stop reason: SDUPTHER

## 2022-02-24 RX ORDER — LIDOCAINE HYDROCHLORIDE 20 MG/ML
INJECTION, SOLUTION INTRAVENOUS PRN
Status: DISCONTINUED | OUTPATIENT
Start: 2022-02-24 | End: 2022-02-24 | Stop reason: SDUPTHER

## 2022-02-24 RX ORDER — PHENYLEPHRINE HCL IN 0.9% NACL 1 MG/10 ML
SYRINGE (ML) INTRAVENOUS PRN
Status: DISCONTINUED | OUTPATIENT
Start: 2022-02-24 | End: 2022-02-24 | Stop reason: SDUPTHER

## 2022-02-24 RX ORDER — PROPOFOL 10 MG/ML
INJECTION, EMULSION INTRAVENOUS CONTINUOUS PRN
Status: DISCONTINUED | OUTPATIENT
Start: 2022-02-24 | End: 2022-02-24 | Stop reason: SDUPTHER

## 2022-02-24 RX ORDER — MORPHINE SULFATE 2 MG/ML
2 INJECTION, SOLUTION INTRAMUSCULAR; INTRAVENOUS EVERY 5 MIN PRN
Status: DISCONTINUED | OUTPATIENT
Start: 2022-02-24 | End: 2022-02-24 | Stop reason: HOSPADM

## 2022-02-24 RX ORDER — MEPERIDINE HYDROCHLORIDE 25 MG/ML
12.5 INJECTION INTRAMUSCULAR; INTRAVENOUS; SUBCUTANEOUS EVERY 5 MIN PRN
Status: DISCONTINUED | OUTPATIENT
Start: 2022-02-24 | End: 2022-02-24 | Stop reason: HOSPADM

## 2022-02-24 RX ORDER — SODIUM CHLORIDE 0.9 % (FLUSH) 0.9 %
5-40 SYRINGE (ML) INJECTION EVERY 12 HOURS SCHEDULED
Status: DISCONTINUED | OUTPATIENT
Start: 2022-02-24 | End: 2022-02-24 | Stop reason: HOSPADM

## 2022-02-24 RX ORDER — SODIUM CHLORIDE 9 MG/ML
25 INJECTION, SOLUTION INTRAVENOUS PRN
Status: DISCONTINUED | OUTPATIENT
Start: 2022-02-24 | End: 2022-02-24 | Stop reason: HOSPADM

## 2022-02-24 RX ORDER — ONDANSETRON 2 MG/ML
INJECTION INTRAMUSCULAR; INTRAVENOUS PRN
Status: DISCONTINUED | OUTPATIENT
Start: 2022-02-24 | End: 2022-02-24 | Stop reason: SDUPTHER

## 2022-02-24 RX ORDER — FENTANYL CITRATE 50 UG/ML
INJECTION, SOLUTION INTRAMUSCULAR; INTRAVENOUS PRN
Status: DISCONTINUED | OUTPATIENT
Start: 2022-02-24 | End: 2022-02-24 | Stop reason: SDUPTHER

## 2022-02-24 RX ORDER — MORPHINE SULFATE 2 MG/ML
1 INJECTION, SOLUTION INTRAMUSCULAR; INTRAVENOUS EVERY 5 MIN PRN
Status: DISCONTINUED | OUTPATIENT
Start: 2022-02-24 | End: 2022-02-24 | Stop reason: HOSPADM

## 2022-02-24 RX ADMIN — ONDANSETRON 4 MG: 2 INJECTION INTRAMUSCULAR; INTRAVENOUS at 14:40

## 2022-02-24 RX ADMIN — Medication 100 MCG: at 15:13

## 2022-02-24 RX ADMIN — Medication 100 MCG: at 15:23

## 2022-02-24 RX ADMIN — SODIUM CHLORIDE: 9 INJECTION, SOLUTION INTRAVENOUS at 14:35

## 2022-02-24 RX ADMIN — LIDOCAINE HYDROCHLORIDE 100 MG: 20 INJECTION, SOLUTION INTRAVENOUS at 14:40

## 2022-02-24 RX ADMIN — PROPOFOL 200 MCG/KG/MIN: 10 INJECTION, EMULSION INTRAVENOUS at 14:40

## 2022-02-24 RX ADMIN — Medication 200 MCG: at 15:16

## 2022-02-24 RX ADMIN — FENTANYL CITRATE 100 MCG: 50 INJECTION, SOLUTION INTRAMUSCULAR; INTRAVENOUS at 14:40

## 2022-02-24 RX ADMIN — DEXAMETHASONE SODIUM PHOSPHATE 10 MG: 10 INJECTION INTRAMUSCULAR; INTRAVENOUS at 14:40

## 2022-02-24 RX ADMIN — Medication 100 MCG: at 15:01

## 2022-02-24 RX ADMIN — Medication 140 MG: at 14:40

## 2022-02-24 ASSESSMENT — PULMONARY FUNCTION TESTS
PIF_VALUE: 18
PIF_VALUE: 15
PIF_VALUE: 19
PIF_VALUE: 18
PIF_VALUE: 15
PIF_VALUE: 15
PIF_VALUE: 12
PIF_VALUE: 15
PIF_VALUE: 19
PIF_VALUE: 13
PIF_VALUE: 9
PIF_VALUE: 11
PIF_VALUE: 8
PIF_VALUE: 8
PIF_VALUE: 11
PIF_VALUE: 12
PIF_VALUE: 15
PIF_VALUE: 15
PIF_VALUE: 16
PIF_VALUE: 15
PIF_VALUE: 15
PIF_VALUE: 8
PIF_VALUE: 9
PIF_VALUE: 12
PIF_VALUE: 11
PIF_VALUE: 0
PIF_VALUE: 4
PIF_VALUE: 19
PIF_VALUE: 15
PIF_VALUE: 19
PIF_VALUE: 6
PIF_VALUE: 12
PIF_VALUE: 41
PIF_VALUE: 0
PIF_VALUE: 15
PIF_VALUE: 8
PIF_VALUE: 0
PIF_VALUE: 15
PIF_VALUE: 11
PIF_VALUE: 6
PIF_VALUE: 19
PIF_VALUE: 8
PIF_VALUE: 15
PIF_VALUE: 19

## 2022-02-24 ASSESSMENT — PAIN DESCRIPTION - LOCATION: LOCATION: CHEST

## 2022-02-24 ASSESSMENT — PAIN SCALES - GENERAL
PAINLEVEL_OUTOF10: 0

## 2022-02-24 ASSESSMENT — PAIN DESCRIPTION - PAIN TYPE: TYPE: SURGICAL PAIN

## 2022-02-24 ASSESSMENT — PAIN DESCRIPTION - DESCRIPTORS: DESCRIPTORS: DISCOMFORT;PINS AND NEEDLES

## 2022-02-24 ASSESSMENT — PAIN - FUNCTIONAL ASSESSMENT: PAIN_FUNCTIONAL_ASSESSMENT: 0-10

## 2022-02-24 NOTE — ANESTHESIA POSTPROCEDURE EVALUATION
Department of Anesthesiology  Postprocedure Note    Patient: Denise Nugent  MRN: [id number]  YOB: 1935  Date of evaluation: 2/25/2022  Time:  7:37 AM     Procedure Summary     Date: 02/24/22 Room / Location: [facility] / [facility]    Anesthesia Start:  Anesthesia Stop:     Procedure: BRONCHOSCOPY ENDOBRONCHIAL ULTRASOUND (N/A ) Diagnosis: (LUNG NODULE)    Surgeons: Lenor Simmonds, MD Responsible Provider:     Anesthesia Type: general ASA Status: 4          Anesthesia Type: general    Lucius Phase I: Lucius Score: 10    Lucius Phase II: Lucius Score: 10    Last vitals: Reviewed and per EMR flowsheets.        Anesthesia Post Evaluation    Patient location during evaluation: PACU  Patient participation: complete - patient participated  Level of consciousness: awake  Pain score: 3  Airway patency: patent  Nausea & Vomiting: no nausea and no vomiting  Complications: no  Cardiovascular status: blood pressure returned to baseline  Respiratory status: acceptable  Hydration status: euvolemic

## 2022-02-24 NOTE — OP NOTE
Operative Note  [address]  PROCEDURE NOTE            Patient: Michael Nunez  YOB: 1935  MRN: [id number]    Date of Procedure: 2/24/2022    Pre-Op Diagnosis: LUNG NODULE    Post-Op Diagnosis: Same       Procedure(s):  BRONCHOSCOPY W/EBUS FNA  BRONCHOSCOPY DIAGNOSTIC OR CELL [address] ONLY    Surgeon(s):  Josi Abdalla MD    Assistant:   * No surgical staff found *    Anesthesia: General    Estimated Blood Loss (mL): Minimal    Complications: None    Specimens:   ID Type Source Tests Collected by Time Destination   A : EBUS  FNA   R 4 Tissue Fine Needle Aspirate CYTOLOGY, NON-GYN Josi Abdalla MD 2/24/2022 1454    B : EBUS R 11 Tissue Fine Needle Aspirate CYTOLOGY, NON-GYN Josi Abdalla MD 2/24/2022 1503        Description of Procedure: The patient was taken to the endoscopy suite, Nemesio Armenta  and the procedure verified as Flexible Fiberoptic Bronchoscopy with EBUS -FNA         After the patient was controlled with sedation bronchoscope was introduced through ET without difficulty. The scope was then passed into the trachea. Additional 2% lidocaine was used topically within the airway. Careful inspection of the tracheal lumen was accomplished. The scope was sequentially passed into all bronchial airways.            Endobronchial findings:     Trachea:  Normal mucosa, he the part seen outside the ET tube  Lucita  Normal mucosa     Right Main Stem Bronchus  Normal mucosa  Right Upper Lobe Bronchi Normal mucosa  Right Middle Lobe Bronchi  Normal mucosa  Right Lower Lobe Bronchi (including the Superior segment)  Normal mucosa     Left Main Stem Bronchus Normal mucosa  Left Upper Lobe Bronchus, Upper Division Normal mucosa  Left Upper Lobe Bronchus, Lingula  Normal mucosa  Left Lower Lobe Bronchus (including the Superior segment)             EBUS scope was inserted  station 7 was less than 5 mm no biopsy  Station 4 R was 8 X 7 mm and 3 passes taken   Station R 11 was 6 X 6 mm and 3 passes were taken     COMPLICATIONS:  Estimated blood loss less than 3 CC    IMPRESSIONS:   Mild enlarged hilar and mediastinal adenopathy ,no malignancy       RECOMMENDATIONS:   The Patient was taken to the recovery area in satisfactory condition. Await final test/sample results.         Leonard Randolph MD        Electronically signed by Mine Stevens MD on 2/24/2022 at 3:30 PM

## 2022-02-24 NOTE — ANESTHESIA POSTPROCEDURE EVALUATION
Department of Anesthesiology  Postprocedure Note    Patient: Eliana Carballo  MRN: [id number]  YOB: 1935  Date of evaluation: 2/25/2022  Time:  8:09 AM     Procedure Summary     Date: 02/24/22 Room / Location: [facility]    Anesthesia Start: 4402 Anesthesia Stop: 2534    Procedures:       BRONCHOSCOPY W/EBUS FNA (N/A )      BRONCHOSCOPY DIAGNOSTIC OR CELL [facility] ONLY Diagnosis: (LUNG NODULE)    Surgeons: Nurys Yost MD Responsible Provider: Jaycee Guy MD    Anesthesia Type: general ASA Status: 4          Anesthesia Type: general    Lucius Phase I: Lucius Score: 10    Lucius Phase II: Lucius Score: 10    Last vitals: Reviewed and per EMR flowsheets.        Anesthesia Post Evaluation    Patient location during evaluation: PACU  Patient participation: complete - patient participated  Level of consciousness: awake  Pain score: 0  Airway patency: patent  Nausea & Vomiting: no nausea and no vomiting  Complications: no  Cardiovascular status: blood pressure returned to baseline and hemodynamically stable  Respiratory status: acceptable  Hydration status: euvolemic

## 2022-02-24 NOTE — ANESTHESIA PRE PROCEDURE
Department of Anesthesiology  Preprocedure Note       Name:  Vazquez [facility]   Age:  82 y.o.  :  1935                                          MRN:  [id number]         Date:  2022      Surgeon: Davey Matt):  Rain Reynolds MD    Procedure: Procedure(s):  BRONCHOSCOPY ENDOBRONCHIAL ULTRASOUND    Medications prior to admission:   Prior to Admission medications    Medication Sig Start Date End Date Taking?  Authorizing Provider   omega-3 acid ethyl esters (LOVAZA) 1 g capsule Take 1 capsule by mouth 2 times daily 21  Yes Natalia Simms MD   lisinopril (PRINIVIL;ZESTRIL) 10 MG tablet Take 1 tablet by mouth daily 21  Yes Natalia Simms MD   omeprazole (PRILOSEC) 20 MG delayed release capsule Take 1 capsule by mouth daily  Patient taking differently: Take 20 mg by mouth at bedtime  21  Yes Natalia Simms MD   atorvastatin (LIPITOR) 40 MG tablet Take 1 tablet by mouth daily 21  Yes Natalia Simms MD   COMBIGAN 0.2-0.5 % ophthalmic solution INSTILL 1 DROP INTO THE RIGHT EYE TWO TIMES A DAY. 21  Yes Historical Provider, MD   latanoprost (XALATAN) 0.005 % ophthalmic solution INSTILL 1 DROP IN RIGHT EYE EVERY DAY AT BEDTIME 21  Yes Historical Provider, MD   ERLEADA 60 MG TABS 60 mg Every 4 days 3/2/20  Yes Historical Provider, MD   metoprolol tartrate (LOPRESSOR) 25 MG tablet Take 25 mg by mouth 2 times daily   Yes Historical Provider, MD   Vitamin D, Cholecalciferol, 50 MCG ( UT) CAPS Take 2,000 Units by mouth daily 18   Historical Provider, MD   Handicap Placard [facility] by Does not apply route Duration: 5 years 21   Natalia Simms MD   magnesium gluconate (MAGONATE) 500 MG tablet Take 500 mg by mouth at bedtime     Historical Provider, MD   meclizine (ANTIVERT) 25 MG tablet 1/2 - 1 by mouth every 6 hours as needed    Historical Provider, MD   Multiple Vitamins-Minerals (THERAPEUTIC MULTIVITAMIN-MINERALS) tablet Take 1 tablet by mouth daily     Historical Provider, MD Coenzyme Q10 (COQ10) 50 MG CAPS Take 1 capsule by mouth daily     Historical Provider, MD   aspirin 81 MG tablet Take 81 mg by mouth daily     Historical Provider, MD   Cyanocobalamin (VITAMIN B 12 PO) Take  by mouth daily. sublingual  2/8/11   Historical Provider, MD       Current medications:    No current facility-administered medications for this encounter. Allergies:     Allergies   Allergen Reactions    Iodine Hives    Shellfish-Derived Products        Problem List:    Patient Active Problem List   Diagnosis Code    PVD (peripheral vascular disease) with claudication ([facility]) I73.9    Mixed hyperlipidemia E78.2    Coronary artery disease involving native heart without angina pectoris I25.10    Prostate CA ([facility] C61    Age-related osteoporosis without current pathological fracture X52.4    Metabolic disorder B79.8    Thoracic aortic aneurysm without rupture ([facility] I71.2    Tubular adenoma D36.9    Thyroid nodule E04.1    Pancytopenia ([facility]) D61.818    Low magnesium level R79.0    Vitamin D deficiency E55.9    Vitamin B12 deficiency E53.8    Hyperglycemia R73.9    Disc disease, degenerative, lumbar or lumbosacral M51.37    Acute pain of right shoulder M25.511    Bilateral carpal tunnel syndrome G56.03    Essential hypertension I10    Chronic pain syndrome G89.4    Prostate cancer metastatic to bone ([facility]) C61, C79.51    Spondylolisthesis of lumbar region M43.16    Lumbar radiculopathy M54.16    Lumbosacral spondylosis without myelopathy M47.817    Renal insufficiency N28.9    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 4/25/2019    CAD (coronary artery disease)     Dr[facility] Melod[facility])     prostate- PO chemotherapy     Chronic bilateral low back pain without sciatica 3/6/2020    Cobalamin deficiency     Glaucoma     Right eye    Hyperlipidemia     Hypertension     Osteoarthritis     Osteochondropathy     Osteopenia     Pain in lower limb     Peripheral venous insufficiency     PONV (postoperative nausea and vomiting)     Prolonged emergence from general anesthesia     PVD (peripheral vascular disease) with claudication ([facility] 4/25/2019       Past Surgical History:        Procedure Laterality Date    ACETABULUM FRACTURE SURGERY  3/24/11    ANESTHESIA NERVE BLOCK Bilateral 6/16/2020    BILATERAL L3 L4 L5 MEDIAL NERVE BRANCH BLOCK [facility] Souleymane Terrazas DO [facility]  2016   [facility]    [facility]/ Dr. Dominic Lema  1/25/2022    CT NEEDLE BIOPSY LUNG PERCUTANEOUS 1/25/2022 Darline June MD [facility] CT    ENDOSCOPY, COLON, DIAGNOSTIC  2016    EYE SURGERY Bilateral 01/2018    cataract, glaucoma 2020-left eye    FEMUR FRACTURE SURGERY  1981    left   [address]    JOINT REPLACEMENT      bilat knee 2010, righ thip 2011     LYMPH NODE DISSECTION  10/2007    PAIN MANAGEMENT PROCEDURE N/A 5/19/2020    CAUDAL EPIDURAL STEROID INJECTION performed by Souleymane Terrazas DO at [address] [place] [address]  8/4/20    PAIN MANAGEMENT PROCEDURE Right 8/4/2020    RIGHT L3 4 5 MEDIAL BRANCH RADIOFREQUENCY ABLATION performed by Souleymane Terrazas DO at [address] [place] [address] Left 9/10/2020    LEFT L3 4 5 MEDIAL BRANCH RADIOFREQUENCY ABLATION     +++IODINE ALLERGY+++   CPT: 45745 [id number] performed by Souleymane Terrazas DO at [address] [place] [address] PROCEDURE Bilateral 3/25/2021    BILATERAL L3,4,5 MEDIAL NERVE BRANCH RADIOFREQUENCY ABLATION performed by Rocky Holter, DO at [address]  10/31/07    [address] [place] [facility] LAPAROSCOPIC PROSTATECTOMY    TESTICLE REMOVAL Bilateral 2019    hx prostate ca-    TONSILLECTOMY      TOTAL HIP ARTHROPLASTY  11    right    TOTAL KNEE ARTHROPLASTY  2010    bilateral       Social History:    Social History     Tobacco Use    Smoking status: Former Smoker     Packs/day: 1.00     Years: 40.00     Pack years: 40.00     Types: Cigarettes     Start date: 5     Quit date: 10/1/1994     Years since quittin.4    Smokeless tobacco: Former User     Types: [address] date:    Substance Use Topics    Alcohol use: Yes     Alcohol/week: 1.0 standard drink     Types: 1 Cans of beer per week     Comment: rare                                Counseling given: Not Answered      Vital Signs (Current):   Vitals:    22 1253 22 1145   BP:  (!) 134/92   Pulse:  111   Resp:  16   Temp:  97.4 °F (36.3 °C)   TempSrc:  Temporal   SpO2:  97%   Weight: 224 lb (101.6 kg) 224 lb (101.6 kg)   Height: 5' 10\" (1.778 m) 5' 10\" (1.778 m)                                              BP Readings from Last 3 Encounters:   22 (!) 134/92   22 (!) 149/66   22 118/72       NPO Status: Time of last liquid consumption: 1000                        Time of last solid consumption:                         Date of last liquid consumption: 22                        Date of last solid food consumption: 22    BMI:   Wt Readings from Last 3 Encounters:   22 224 lb (101.6 kg)   22 224 lb (101.6 kg)   22 224 lb 6 oz (101.8 kg)     Body mass index is 32.14 kg/m².     CBC:   Lab Results   Component Value Date    WBC 4.5 2022    RBC 4.03 2022    HGB 12.1 2022    HCT 35.5 2022    MCV 88.1 2022    RDW 13.8 2022     02/21/2022       CMP:   Lab Results   Component Value Date     02/21/2022    K 4.5 02/21/2022    K 4.4 01/16/2021     02/21/2022    CO2 23 02/21/2022    BUN 20 02/21/2022    CREATININE 1.4 02/21/2022    GFRAA 58 02/21/2022    LABGLOM 48 02/21/2022    GLUCOSE 117 02/21/2022    GLUCOSE 113 03/28/2011    PROT 7.2 02/21/2022    CALCIUM 10.1 02/21/2022    BILITOT 0.8 02/21/2022    ALKPHOS 50 02/21/2022    AST 26 02/21/2022    ALT 20 02/21/2022       POC Tests: No results for input(s): POCGLU, POCNA, POCK, POCCL, POCBUN, POCHEMO, POCHCT in the last 72 hours. Coags:   Lab Results   Component Value Date    PROTIME 11.1 01/25/2022    PROTIME 12.3 03/24/2011    INR 1.0 01/25/2022    APTT 27.0 03/22/2011       HCG (If Applicable): No results found for: PREGTESTUR, PREGSERUM, HCG, HCGQUANT     ABGs:   Lab Results   Component Value Date    X7UUQUMG 98.3 03/22/2011        Type & Screen (If Applicable):  No results found for: LABABO, LABRH    Drug/Infectious Status (If Applicable):  No results found for: HIV, HEPCAB    COVID-19 Screening (If Applicable):   Lab Results   Component Value Date    COVID19 Not Detected 02/24/2022    COVID19 Not Detected 01/19/2022           Anesthesia Evaluation  Patient summary reviewed   history of anesthetic complications: PONV. Airway: Mallampati: II  TM distance: >3 FB   Neck ROM: limited  Mouth opening: > = 3 FB Dental:    (+) edentulous      Pulmonary:   (+) pneumonia:  decreased breath sounds,                             Cardiovascular:    (+) hypertension:, CAD:,         Rhythm: regular  Rate: normal           Beta Blocker:  Not on Beta Blocker         Neuro/Psych:   (+) neuromuscular disease:,             GI/Hepatic/Renal:   (+) GERD:,           Endo/Other:    (+) hypothyroidism: arthritis:., malignancy/cancer. Pt had no PAT visit       Abdominal:   (+) obese,           Vascular: negative vascular ROS.  + PVD, aortic or cerebral, .        Other Findings: Anesthesia Plan      general     ASA 4       Induction: intravenous. MIPS: Postoperative opioids intended, Prophylactic antiemetics administered and Postoperative trial extubation. Anesthetic plan and risks discussed with patient and spouse. Plan discussed with CRNA.                   Sushant Rodríguez MD   2/24/2022

## 2022-02-25 LAB
EKG ATRIAL RATE: 89 BPM
EKG P AXIS: 33 DEGREES
EKG P-R INTERVAL: 170 MS
EKG Q-T INTERVAL: 386 MS
EKG QRS DURATION: 146 MS
EKG QTC CALCULATION (BAZETT): 469 MS
EKG R AXIS: -36 DEGREES
EKG T AXIS: -2 DEGREES
EKG VENTRICULAR RATE: 89 BPM

## 2022-02-25 PROCEDURE — 93010 ELECTROCARDIOGRAM REPORT: CPT | Performed by: INTERNAL MEDICINE

## 2022-02-25 NOTE — H&P
Department of Internal Medicine  Division of Pulmonary, Critical Care & Sleep Medicine  Pulmonary [address] [facility]                                             Pulmonary Clinic Consult     I had the pleasure of seeing  Dennise Casiano in the [facility] regarding their lung cancer       HISTORY OF PRESENT ILLNESS:    Dennise Casiano is a 82y.o. year old  Who start smoking at age 23 and at age 63 give him 36   He work as price for 54 years   Has SOB   Had PET scan 2020 shows lung mass and was followed by Dr Wilfred Mayer     He was referred for EBUS   Had clearances from cardiology for eye surgery    ALLERGIES:    Allergies   Allergen Reactions    Iodine Hives    Shellfish-Derived Products        PAST MEDICAL HISTORY:       Diagnosis Date    CA prostate, adenoca ([facility] [address] 4/25/2019    CAD (coronary artery disease)     Dr. Kyrstal Carranza [facility])     prostate- PO chemotherapy     Chronic bilateral low back pain without sciatica 3/6/2020    Cobalamin deficiency     Glaucoma     Right eye    Hyperlipidemia     Hypertension     Osteoarthritis     Osteochondropathy     Osteopenia     Pain in lower limb     Peripheral venous insufficiency     PONV (postoperative nausea and vomiting)     Prolonged emergence from general anesthesia     PVD (peripheral vascular disease) with claudication ([facility] [address] 4/25/2019       MEDICATIONS:   Current Facility-Administered Medications   Medication Dose Route Frequency Provider Last Rate Last Admin    sodium chloride flush 0.9 % injection 5-40 mL  5-40 mL IntraVENous 2 times per day Kelvin Conway MD        sodium chloride flush 0.9 % injection 5-40 mL  5-40 mL IntraVENous PRN Kelvin Conway MD        0.9 % sodium chloride infusion  25 mL IntraVENous PRN Kelvin Conway MD        meperidine (DEMEROL) injection 12.5 mg  12.5 mg IntraVENous Q5 Min PRN Kelvin Conway MD        morphine (PF) injection 1 mg  1 mg IntraVENous Q5 Min PRN Kelvin Conway MD        morphine (PF) injection 2 mg  2 mg IntraVENous Q5 Min PRN Kelvin Conway MD         Current Outpatient Medications   Medication Sig Dispense Refill    omega-3 acid ethyl esters (LOVAZA) 1 g capsule Take 1 capsule by mouth 2 times daily 180 capsule 3    lisinopril (PRINIVIL;ZESTRIL) 10 MG tablet Take 1 tablet by mouth daily 90 tablet 3    omeprazole (PRILOSEC) 20 MG delayed release capsule Take 1 capsule by mouth daily (Patient taking differently: Take 20 mg by mouth at bedtime ) 90 capsule 3    atorvastatin (LIPITOR) 40 MG tablet Take 1 tablet by mouth daily 90 tablet 3    COMBIGAN 0.2-0.5 % ophthalmic solution INSTILL 1 DROP INTO THE RIGHT EYE TWO TIMES A DAY.  latanoprost (XALATAN) 0.005 % ophthalmic solution INSTILL 1 DROP IN RIGHT EYE EVERY DAY AT BEDTIME      ERLEADA 60 MG TABS 60 mg Every 4 days      metoprolol tartrate (LOPRESSOR) 25 MG tablet Take 25 mg by mouth 2 times daily      Vitamin D, Cholecalciferol, 50 MCG (2000 UT) CAPS Take 2,000 Units by mouth daily      Handicap Placard MISC by Does not apply route Duration: 5 years 1 each 0    magnesium gluconate (MAGONATE) 500 MG tablet Take 500 mg by mouth at bedtime       meclizine (ANTIVERT) 25 MG tablet 1/2 - 1 by mouth every 6 hours as needed      Multiple Vitamins-Minerals (THERAPEUTIC MULTIVITAMIN-MINERALS) tablet Take 1 tablet by mouth daily       Coenzyme Q10 (COQ10) 50 MG CAPS Take 1 capsule by mouth daily       aspirin 81 MG tablet Take 81 mg by mouth daily       Cyanocobalamin (VITAMIN B 12 PO) Take  by mouth daily.  sublingual          SOCIAL AND OCCUPATIONAL HEALTH:  Social History     Tobacco Use   Smoking Status Former Smoker    Packs/day: 1.00    Years: 40.00    Pack years: 40.00    Types: Cigarettes    Start date: 5    Quit date: 10/1/1994    Years since quittin.4   Smokeless Tobacco Former User    Types: Adeline Craft Quit date:      TB :  Pets Industrial exposure:  Birds :    SURGICAL HISTORY:   Past Surgical History:   Procedure Laterality Date    ACETABULUM FRACTURE SURGERY  3/24/11    ANESTHESIA NERVE BLOCK Bilateral 6/16/2020    BILATERAL L3 L4 L5 MEDIAL NERVE BRANCH BLOCK performed by Jair Kemp DO at [facility] COLONOSCOPY  2016    CORONARY ARTERY BYPASS GRAFT  Dec. 1996    [facility]/ Dr. Cantu Sensor  1/25/2022    CT NEEDLE BIOPSY LUNG PERCUTANEOUS 1/25/2022 Natalie Mohan MD [facility]    ENDOSCOPY, COLON, DIAGNOSTIC  2016    EYE SURGERY Bilateral 01/2018    cataract, glaucoma 2020-left eye    FEMUR FRACTURE [facility]  1981    [facility]    JOINT REPLACEMENT      bilat knee 2010, righ thip 2011     LYMPH NODE DISSECTION  10/2007    PAIN MANAGEMENT PROCEDURE N/A 5/19/2020    CAUDAL EPIDURAL STEROID INJECTION performed by Jair Kemp DO at [place] [address]  8/4/20    PAIN MANAGEMENT PROCEDURE Right 8/4/2020    RIGHT L3 4 5 MEDIAL BRANCH RADIOFREQUENCY ABLATION performed by Jair Kemp DO at [place] [address] Left 9/10/2020    LEFT L3 4 5 MEDIAL BRANCH RADIOFREQUENCY ABLATION     +++IODINE ALLERGY+++   CPT: 09199 [id number] performed by Jair Kemp DO at [place] [address] Bilateral 3/25/2021    BILATERAL L3,4,5 MEDIAL NERVE BRANCH RADIOFREQUENCY ABLATION performed by Jair Kemp DO at [facility].  10/31/07    [address] M[address] [facility] [place] LAPAROSCOPIC PROSTATECTOMY    TESTICLE REMOVAL Bilateral 02/2019    hx prostate ca-    TONSILLECTOMY      TOTAL HIP ARTHROPLASTY  11/30/11    right    TOTAL KNEE ARTHROPLASTY  2010    bilateral       FAMILY HISTORY:   Lung cancer:no  DVT or PE     REVIEW OF SYSTEMS:  Constitutional: General health is good . There has been no weight changes. No fevers, fatigue or weakness.    Head: Patient denies any history of trauma, convulsive Cranial nerves II-XII are intact. DATA:             IMPRESSION:    1-Lung cancer  2-Prostate  cancer   3-mediastinal  and hilar adenopathy   4-Possible COPD               PLAN:      -he was referred EBUS and sample Lymph nodes   Most can be reached excelt L5-6   Will do staging by EBUS     I personally supervised the performance of the documented procedure. I was present for the critical elements of this procedure and was immediately available for the entire procedure. Flu and Pneumovax     Thank you for allowing me to participate in Sidra Klinefelter care. I will keep following with you ,should you have any concerns ,please contact me at [phone]     Sincerely,        Sotero Moore MD  Pulmonary & Critical Care Medicine     NOTE: This report was transcribed using voice recognition software. Every effort was made to ensure accuracy; however, inadvertent computerized transcription errors may be present.

## 2022-03-01 ENCOUNTER — OFFICE VISIT (OUTPATIENT)
Dept: PRIMARY CARE CLINIC | Age: 87
End: 2022-03-01
Payer: MEDICARE

## 2022-03-01 VITALS
OXYGEN SATURATION: 97 % | DIASTOLIC BLOOD PRESSURE: 72 MMHG | SYSTOLIC BLOOD PRESSURE: 124 MMHG | WEIGHT: 223.4 LBS | HEART RATE: 61 BPM | BODY MASS INDEX: 32.05 KG/M2 | TEMPERATURE: 97.1 F

## 2022-03-01 DIAGNOSIS — K21.9 GASTROESOPHAGEAL REFLUX DISEASE WITHOUT ESOPHAGITIS: ICD-10-CM

## 2022-03-01 DIAGNOSIS — J30.9 ALLERGIC RHINITIS, UNSPECIFIED SEASONALITY, UNSPECIFIED TRIGGER: ICD-10-CM

## 2022-03-01 DIAGNOSIS — D64.9 ANEMIA, UNSPECIFIED TYPE: ICD-10-CM

## 2022-03-01 DIAGNOSIS — M81.0 AGE-RELATED OSTEOPOROSIS WITHOUT CURRENT PATHOLOGICAL FRACTURE: ICD-10-CM

## 2022-03-01 DIAGNOSIS — E55.9 VITAMIN D DEFICIENCY: ICD-10-CM

## 2022-03-01 DIAGNOSIS — M25.552 LEFT HIP PAIN: ICD-10-CM

## 2022-03-01 DIAGNOSIS — C34.91 ADENOCARCINOMA OF RIGHT LUNG (HCC): ICD-10-CM

## 2022-03-01 DIAGNOSIS — I25.10 CORONARY ARTERY DISEASE INVOLVING NATIVE HEART WITHOUT ANGINA PECTORIS, UNSPECIFIED VESSEL OR LESION TYPE: ICD-10-CM

## 2022-03-01 DIAGNOSIS — N28.9 RENAL INSUFFICIENCY: ICD-10-CM

## 2022-03-01 DIAGNOSIS — C61 PROSTATE CA (HCC): ICD-10-CM

## 2022-03-01 DIAGNOSIS — I10 ESSENTIAL HYPERTENSION: ICD-10-CM

## 2022-03-01 DIAGNOSIS — E53.8 VITAMIN B12 DEFICIENCY: ICD-10-CM

## 2022-03-01 DIAGNOSIS — R79.0 LOW MAGNESIUM LEVEL: ICD-10-CM

## 2022-03-01 DIAGNOSIS — R73.9 HYPERGLYCEMIA: Primary | ICD-10-CM

## 2022-03-01 DIAGNOSIS — E78.2 MIXED HYPERLIPIDEMIA: ICD-10-CM

## 2022-03-01 DIAGNOSIS — E03.2 HYPOTHYROIDISM DUE TO MEDICATION: ICD-10-CM

## 2022-03-01 LAB — HBA1C MFR BLD: 5.2 %

## 2022-03-01 PROCEDURE — G8417 CALC BMI ABV UP PARAM F/U: HCPCS | Performed by: FAMILY MEDICINE

## 2022-03-01 PROCEDURE — 1123F ACP DISCUSS/DSCN MKR DOCD: CPT | Performed by: FAMILY MEDICINE

## 2022-03-01 PROCEDURE — 1036F TOBACCO NON-USER: CPT | Performed by: FAMILY MEDICINE

## 2022-03-01 PROCEDURE — 4040F PNEUMOC VAC/ADMIN/RCVD: CPT | Performed by: FAMILY MEDICINE

## 2022-03-01 PROCEDURE — G8484 FLU IMMUNIZE NO ADMIN: HCPCS | Performed by: FAMILY MEDICINE

## 2022-03-01 PROCEDURE — 99215 OFFICE O/P EST HI 40 MIN: CPT | Performed by: FAMILY MEDICINE

## 2022-03-01 PROCEDURE — G8427 DOCREV CUR MEDS BY ELIG CLIN: HCPCS | Performed by: FAMILY MEDICINE

## 2022-03-01 PROCEDURE — 83036 HEMOGLOBIN GLYCOSYLATED A1C: CPT | Performed by: FAMILY MEDICINE

## 2022-03-01 NOTE — PROGRESS NOTES
Addison Aj : 1935 Sex: male  Age: 82 y.o. Chief Complaint   Patient presents with    Hyperlipidemia    Hypertension       HPI:       Patient presents today for follow-up. He is here with his wife. Overall he feels well. Cardiologist dec 2021, stress test 2022. 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 Further recommendations to follow then.    Planning rt eye surgery Thursday for glaucoma    CMP shows glucose 117 creatinine elevated stable 1.4 CK 67 triglyceride 245 LDL 49 HDL 43 TSH elevated 5.3 Free T4 1.28 vitamin D 41 hemoglobin up to 12.1 CBC otherwise grossly normal differential reviewed vitamin U91 [id number] folic acid 16 urinalysis negative microalbumin creatinine ratio 13    Blood work reviewed,  Most Recent Labs  CBC  Lab Results   Component Value Date    WBC 4.5 2022    WBC 4.3 2022    WBC 3.8 2021    RBC 4.03 2022    RBC 3.76 2022    RBC 3.92 2021    HGB 12.1 2022    HGB 11.2 2022    HGB 11.8 2021    HCT 35.5 2022    HCT 33.7 2022    HCT 34.6 2021    MCV 88.1 2022    MCV 89.6 2022    MCV 88.3 2021     2022     2022     2021      CMP  Lab Results   Component Value Date     2022     2022     2021    K 4.5 2022    K 3.7 2022    K 4.5 2021    K 4.4 2021    K 4.4 2021     2022     2022     2021    CO2 23 2022    CO2 21 2022    CO2 26 2021    ANIONGAP 14 2022    ANIONGAP 15 2022    ANIONGAP 12 2021    GLUCOSE 117 2022    GLUCOSE 113 2022    GLUCOSE 107 2021    GLUCOSE 113 2011    GLUCOSE 93 2011    GLUCOSE 101 2011    BUN 20 2022    BUN 22 2022    BUN 23 2021    CREATININE 1.4 2022 CREATININE 1.2 01/25/2022    CREATININE 1.3 11/22/2021    LABGLOM 48 02/21/2022    LABGLOM 58 01/25/2022    LABGLOM 53 11/22/2021    GFRAA 58 02/21/2022    GFRAA >60 01/25/2022    GFRAA >60 11/22/2021    CALCIUM 10.1 02/21/2022    CALCIUM 9.6 01/25/2022    CALCIUM 10.1 11/22/2021    PROT 7.2 02/21/2022    PROT 6.7 01/25/2022    PROT 7.0 11/22/2021    LABALBU 4.5 02/21/2022    LABALBU 4.3 01/25/2022    LABALBU 4.4 11/22/2021    LABALBU 2.9 03/26/2011    LABALBU 3.0 03/25/2011    LABALBU 3.4 03/24/2011    BILITOT 0.8 02/21/2022    BILITOT 0.6 01/25/2022    BILITOT 0.6 11/22/2021    ALKPHOS 50 02/21/2022    ALKPHOS 46 01/25/2022    ALKPHOS 56 11/22/2021    AST 26 02/21/2022    AST 25 01/25/2022    AST 25 11/22/2021    ALT 20 02/21/2022    ALT 19 01/25/2022    ALT 18 11/22/2021     A1C  Lab Results   Component Value Date    LABA1C 5.2 03/01/2022    LABA1C 5.0 11/22/2021    LABA1C 4.7 08/25/2021     TSH  Lab Results   Component Value Date    TSH 5.300 02/21/2022    TSH 5.040 11/22/2021    TSH 4.550 08/25/2021     FREET4  Lab Results   Component Value Date    C8PURZK 9.5 01/31/2011     LIPID  Lab Results   Component Value Date    CHOL 141 02/21/2022    CHOL 125 11/22/2021    CHOL 126 08/25/2021    HDL 43 02/21/2022    HDL 44 11/22/2021    HDL 41 08/25/2021    LDLCALC 49 02/21/2022    LDLCALC 49 11/22/2021    LDLCALC 39 08/25/2021    TRIG 245 02/21/2022    TRIG 162 11/22/2021    TRIG 230 08/25/2021     VITAMIN D  Lab Results   Component Value Date    VITD25 41 02/21/2022    VITD25 50 11/22/2021    VITD25 41 08/25/2021     MAGNESIUM  Lab Results   Component Value Date    MG 1.9 02/21/2022    MG 1.8 11/22/2021    MG 1.8 08/25/2021      PHOS  Lab Results   Component Value Date    PHOS 3.9 08/25/2021    PHOS 4.0 01/21/2021    PHOS 3.8 01/16/2021      PAWAN   No results found for: PAWAN  RHEUMATOID FACTOR  No results found for: RF  PSA  Lab Results   Component Value Date    PSA SEE NOTE 12/06/2021    PSA <0.03 12/06/2021    PSA <0.03 09/08/2021      HEPATITIS C  No results found for: HCVABI  HIV  No results found for: DRQ5KBV, HIV1QT  UA  Lab Results   Component Value Date    COLORU Yellow 02/21/2022    COLORU Yellow 11/22/2021    COLORU Yellow 08/25/2021    CLARITYU Clear 02/21/2022    CLARITYU Clear 11/22/2021    CLARITYU Clear 08/25/2021    GLUCOSEU Negative 02/21/2022    GLUCOSEU Negative 11/22/2021    GLUCOSEU Negative 08/25/2021    GLUCOSEU NEGATIVE 12/21/2010    BILIRUBINUR Negative 02/21/2022    BILIRUBINUR Negative 11/22/2021    BILIRUBINUR Negative 08/25/2021    BILIRUBINUR NEGATIVE 12/21/2010    KETUA Negative 02/21/2022    KETUA Negative 11/22/2021    KETUA Negative 08/25/2021    SPECGRAV >=1.030 02/21/2022    SPECGRAV >=1.030 11/22/2021    SPECGRAV 1.025 08/25/2021    BLOODU Negative 02/21/2022    BLOODU Negative 11/22/2021    BLOODU Negative 08/25/2021    PHUR 5.0 02/21/2022    PHUR 5.0 11/22/2021    PHUR 5.5 08/25/2021    PROTEINU Negative 02/21/2022    PROTEINU Negative 11/22/2021    PROTEINU Negative 08/25/2021    UROBILINOGEN 0.2 02/21/2022    UROBILINOGEN 0.2 11/22/2021    UROBILINOGEN 0.2 08/25/2021    NITRU Negative 02/21/2022    NITRU Negative 11/22/2021    NITRU Negative 08/25/2021    LEUKOCYTESUR Negative 02/21/2022    LEUKOCYTESUR Negative 11/22/2021    LEUKOCYTESUR Negative 08/25/2021     Urine Micro/Albumin Ratio  Lab Results   Component Value Date    MALBCR 13.4 02/21/2022    MALBCR 16.2 11/22/2021    MALBCR - 08/25/2021               Review of Systems  ROS:    Const: Denies chills, fever, malaise and sweats. Eyes: Denies discharge, pain, redness and visual disturbance. ENMT: Denies earaches, other ear symptoms. Denies nasal or sinus symptoms other than if stated  above. Denies mouth and tongue lesions and sore throat.   CV: Denies chest discomfort, pain; diaphoresis, dizziness, edema, lightheadedness, orthopnea,  palpitations, syncope and near syncopal episode or any exertional symptoms  Resp: Denies cough, hemoptysis, pleuritic pain, SOB, sputum production and wheezing. GI: Denies abdominal pain, change in bowel habits, hematochezia, melena, nausea and vomiting. : Denies urinary problems including dysuria. Musculo: Intermittent left hip pain, see below  Skin: Denies bruising and rash or changing skin lesions. Neuro: Denies headache, numbness, stiff neck, tingling and focal weakness slurred speech or facial  Droop  Extremities: Denies calf pain, redness or swelling. Hema/Lymph: Denies bleeding/bruising tendency and enlarged lymph nodes.         Current Outpatient Medications:     Vitamin D, Cholecalciferol, 50 MCG (2000 UT) CAPS, Take 2,000 Units by mouth daily, Disp: , Rfl:     omega-3 acid ethyl esters (LOVAZA) 1 g capsule, Take 1 capsule by mouth 2 times daily, Disp: 180 capsule, Rfl: 3    Handicap Placard MISC, by Does not apply route Duration: 5 years, Disp: 1 each, Rfl: 0    lisinopril (PRINIVIL;ZESTRIL) 10 MG tablet, Take 1 tablet by mouth daily, Disp: 90 tablet, Rfl: 3    omeprazole (PRILOSEC) 20 MG delayed release capsule, Take 1 capsule by mouth daily (Patient taking differently: Take 20 mg by mouth at bedtime ), Disp: 90 capsule, Rfl: 3    atorvastatin (LIPITOR) 40 MG tablet, Take 1 tablet by mouth daily, Disp: 90 tablet, Rfl: 3    magnesium gluconate (MAGONATE) 500 MG tablet, Take 500 mg by mouth at bedtime , Disp: , Rfl:     COMBIGAN 0.2-0.5 % ophthalmic solution, INSTILL 1 DROP INTO THE RIGHT EYE TWO TIMES A DAY., Disp: , Rfl:     latanoprost (XALATAN) 0.005 % ophthalmic solution, INSTILL 1 DROP IN RIGHT EYE EVERY DAY AT BEDTIME, Disp: , Rfl:     ERLEADA 60 MG TABS, 60 mg Every 4 days, Disp: , Rfl:     meclizine (ANTIVERT) 25 MG tablet, 1/2 - 1 by mouth every 6 hours as needed, Disp: , Rfl:     Multiple Vitamins-Minerals (THERAPEUTIC MULTIVITAMIN-MINERALS) tablet, Take 1 tablet by mouth daily , Disp: , Rfl:     metoprolol tartrate (LOPRESSOR) 25 MG tablet, Take 25 mg by mouth 2 times daily, Disp: , Rfl:     Coenzyme Q10 (COQ10) 50 MG CAPS, Take 1 capsule by mouth daily , Disp: , Rfl:     aspirin 81 MG tablet, Take 81 mg by mouth daily , Disp: , Rfl:     Cyanocobalamin (VITAMIN B 12 PO), Take  by mouth daily.  sublingual , Disp: , Rfl:   Allergies   Allergen Reactions    Iodine Hives    Shellfish-Derived Products        Past Medical History:   Diagnosis Date    CA prostate, adenoca ([address] 4/25/2019    CAD (coronary artery disease)     Dr. Dejuan France [facility])     prostate- PO chemotherapy     Chronic bilateral low back pain without sciatica 3/6/2020    Cobalamin deficiency     Glaucoma     Right eye    Hyperlipidemia     Hypertension     Osteoarthritis     Osteochondropathy     Osteopenia     Pain in lower limb     Peripheral venous insufficiency     PONV (postoperative nausea and vomiting)     Prolonged emergence from general anesthesia     PVD (peripheral vascular disease) with claudication ([address] 4/25/2019     Past Surgical History:   Procedure Laterality Date    ACETABULUM FRACTURE SURGERY  3/24/11    ANESTHESIA NERVE BLOCK Bilateral 6/16/2020    BILATERAL L3 L4 L5 MEDIAL NERVE BRANCH BLOCK performed by Lilli Pathak DO at [facility] [address] N/A 2/24/2022    BRONCHOSCOPY W/EBUS FNA performed by Moses Galeano MD at [address] [place] [address]  2/24/2022    BRONCHOSCOPY DIAGNOSTIC OR CELL [address] ONLY performed by Moses Galeano MD at [address] [place] [address]  2016   [facility] 18 GRAFT  Dec. 1996    [facility]/ Dr. Dania Valdez  1/25/2022    CT NEEDLE BIOPSY LUNG PERCUTANEOUS 1/25/2022 Ryan Schumacher MD [facility] CT    ENDOSCOPY, COLON, DIAGNOSTIC  2016    EYE SURGERY Bilateral 01/2018    cataract, glaucoma 2020-left eye    FEMUR FRACTURE SURGERY  1981    left   [address]    JOINT REPLACEMENT      bilat knee 2010, righ thip 2011     LYMPH NODE DISSECTION 10/2007    PAIN MANAGEMENT PROCEDURE N/A 2020    CAUDAL EPIDURAL STEROID INJECTION performed by Danuta Ferraro DO at [facility] 177  20    PAIN MANAGEMENT PROCEDURE Right 2020    RIGHT L3 4 5 MEDIAL BRANCH RADIOFREQUENCY ABLATION performed by Danuta Ferraro DO at [facility] [address] Left 9/10/2020    LEFT L3 4 5 MEDIAL BRANCH RADIOFREQUENCY ABLATION     +++IODINE ALLERGY+++   CPT: 36225 31320 performed by Danuta Ferraro DO at [facility] 177 Bilateral 3/25/2021    BILATERAL L3,4,5 MEDIAL NERVE BRANCH RADIOFREQUENCY ABLATION performed by Danuta Ferraro DO at [address] [facility].  10/31/07    [address] M[address] [place] LAPAROSCOPIC PROSTATECTOMY    TESTICLE REMOVAL Bilateral 2019    hx prostate ca-    TONSILLECTOMY      TOTAL HIP ARTHROPLASTY  11    right    TOTAL KNEE ARTHROPLASTY  2010    bilateral     Family History   Problem Relation Age of Onset    Stroke Mother     Cancer Mother         pancreatic    Cancer Father         prostate    Stroke Father      Social History     Socioeconomic History    Marital status:      Spouse name: Not on file    Number of children: Not on file    Years of education: Not on file    Highest education level: Not on file   Occupational History    Not on file   Tobacco Use    Smoking status: Former Smoker     Packs/day: 1.00     Years: 40.00     Pack years: 40.00     Types: Cigarettes     Start date:      Quit date: 10/1/1994     Years since quittin.4    Smokeless tobacco: Former User     Types: [address] date:    Vaping Use    Vaping Use: Never used   Substance and Sexual Activity    Alcohol use:  Yes     Alcohol/week: 1.0 standard drink     Types: 1 Cans of beer per week     Comment: rare    Drug use: No    Sexual activity: Not on file   Other Topics Concern    Not on file   Social History Narrative    Problem List: History of polyp of colon, Disorder of bone density and structure, unspecified,    Osteochondropathy, Carcinoma in situ of prostate, Aneurysm of thoracic aorta, Anemia, Coronary    arteriosclerosis, Cobalamin deficiency, Multiple closed fractures of pelvis with disruption of pelvic [place],    Hyperlipidemia, Essential hypertension    Health Maintenance:    Bone Density Test Screening - (3/5/2019)    Bone Density Scan - (12/18/2015)    Influenza Vaccination - (10/7/2016)    Colonoscopy - (4/3/2012)    Couseled on Home Safety - (11/23/2015)    Colonoscopy Screening - (4/3/2012)    US Liver - 8/1/08    Medical Problems:    Coronary Artery Disease (CAD), Hypertension, Hyperlipidemia, Prostate Cancer    Surgical Hx:    Prostatectomy, Coronary Artery Bypass Graft (CABG)            FH: Father:    . (Hx)    Mother:    . (Hx)        SH: Marital:  - retired . Personal Habits: Cigarette Use: former cigarette smoker, Nonsmoker. Alcohol: Occasionally    consumes alcohol. Social Determinants of Health     Financial Resource Strain: Low Risk     Difficulty of Paying Living Expenses: Not hard at all   Food Insecurity: No Food Insecurity    Worried About Running Out of Food in the Last Year: Never true    Deondre of Food in the Last Year: Never true   Transportation Needs:     Lack of Transportation (Medical): Not on file    Lack of Transportation (Non-Medical):  Not on file   Physical Activity:     Days of Exercise per Week: Not on file    Minutes of Exercise per Session: Not on file   Stress:     Feeling of Stress : Not on file   Social Connections:     Frequency of Communication with Friends and Family: Not on file    Frequency of Social Gatherings with Friends and Family: Not on file    Attends Hoahaoism Services: Not on file    Active Member of Clubs or Organizations: Not on file    Attends Club or Organization Meetings: Not on file    Marital Status: Not on file   Intimate Partner Violence:     Fear of skeletal-chronic limited mobility, no acute inflammation    Lab Results   Component Value Date    PSA SEE NOTE (AA) 12/06/2021    PSA <0.03 12/06/2021    PSA <0.03 09/08/2021    PSADIA 0.02 12/09/2014    PSADIA <0.01 09/05/2014    PSADIA 0.32 06/03/2014          Assessment and Plan:   Diagnosis Orders   1. Hyperglycemia  POCT glycosylated hemoglobin (Hb A1C)    Hemoglobin A1C   2. Adenocarcinoma of right lung ([facility]     3. Prostate CA ([facility]     4. Coronary artery disease involving native heart without angina pectoris, unspecified vessel or lesion type     5. Mixed hyperlipidemia  CK    Comprehensive Metabolic Panel    Lipid Panel   6. Essential hypertension     7. Age-related osteoporosis without current pathological fracture     8. Renal insufficiency  Urinalysis    Microalbumin / Creatinine Urine Ratio   9. Gastroesophageal reflux disease without esophagitis     10. Vitamin D deficiency  Vitamin D 25 Hydroxy   11. Low magnesium level  Magnesium   12. Vitamin B12 deficiency  Vitamin B12 & Folate   13. Allergic rhinitis, unspecified seasonality, unspecified trigger     14. Anemia, unspecified type  CBC with Auto Differential   15. Hypothyroidism due to medication  TSH    T4, Free   16. Left hip pain          Adenocarcinoma of right lung ([facility]    counseled extensively, last measurement of approximately 2.4 x 1.8 cm. Right posterior lung. Biopsy 1/22 reveals    Diagnosis:   Right lung, core needle biopsy: Invasive, moderately differentiated   adenocarcinoma (grade 2) consistent with primary lung origin    Recent bronchoscopy as of 3/1/2022 revealed negative Biopsy of reachable lymph nodes. Has MRI of the brain back and pelvis pending.   Has follow-up with Dr. Stephanie Mccoy March 17, 2022 with further recommendations to follow      Prostate CA [facility])  Metastatic.  Continue per Dr. Isidro Plaza less than 0.01-0.07-0.3 , he had been on Lupron, had metastasis, since orchiectomy, did not tolerate xtandi so he stopped it , overall feeling well, on Erleada, follows regularly with Dr. Ramya Ortega extensively. Differential reviewed, including serious etiologies. Had echo 2/2021. Cont per dr Trino Winchester, saw 7/1/21, no change, sees 6mos        Allergic rhinitis  Counseled, chronic. Counseled on use of nasal steroid, vaporizer, nasal saline as well as additional treatment options for vasomotor rhinitis    History of COVID-19  Counseled. Resolved. Counseled on vaccine      Disc disease, degenerative, lumbar or lumbosacral  Counseled extensively. Differential reviewed, including serious etiologies. Was Following with [facility], had to change because of insurance, now seeing Dr. Maurine Goldmann pain management, saw Dr. Bunny Runner. Previously saw [facility]. Counseled on injections, radiofrequency ablation etc.  Failed physical therapy. . Had recent radiofrequency ablation x2 and overall doing well, MRI pending    Bilateral carpal tunnel syndrome  Had emg/ncs Nov 2019 [facility]. Referred to [facility] but did not go because no symptoms    Health maintenance examination  Health maintenance issues discussed at length 12/8/2021, encouraged yearly. Pancytopenia ([address]  Counseled extensively. Differential reviewed, including serious etiologies. Possibly from medication. .  Declines hematology. Monitor. Hemoglobin stable, climbing, white count fluctuates, normal, platelets normalized. Continue per the [facility]        Low magnesium level  Counseled. Goals reviewed. Currently 1.9. We will continue magnesium oxide 400 mg daily             Vitamin D deficiency  Continue current therapy. Monitor. Vitamin B12 deficiency  Appropriate supplementation reviewed.     Vitamin J36 elevated, folic acid now normal, appropriate supplementation reviewed, monitor, risk of low and high reviewed, no longer on injection       Hyperglycemia  Counseled, not interested in meds now, monitor. A1c excellent. Monitor      Essential hypertension  Counseled. Became hypotensive. Cardiology low-dose lisinopril 10 mg. Now stable. Monitor. The risks of hypertension and hypotension reviewed. Watch closely ambulatory. Hyper and hypotensive precautions and parameters reviewed and previously written as well as parameters on pulse, call if out of range, ER dangers numbers. Lifestyle modification reviewed. Tolerating therapy. Tubular adenoma   Last colonoscopy Dr. Rachel Moore, 2/15 complaining repeat 3 years from then. Overdue. Declines now, fit cards or Cologuard. . Hemoglobin consistently low but stable. Declines any intervention now, declines any tests or procedures now.     Thyroid nodule  Counseled, incidental 7 mm right thyroid nodule on carotid ultrasound 3/19. Declines workup or follow-up now     Thoracic aneurysm, not ruptured  Counseled, potential serious is reviewed, if any symptoms directly to ER. Continue per Dr. Lynn Combs. He saw him 3/15 and had CT at least then. Dr. Lynn Combs discharged him at this point unless symptoms and he not interested in follow-up imaging. Of note CT was done for Covid 1/21 and they stated no acute abnormality of the thoracic aorta     PVD (peripheral vascular disease) with claudication [facility])  He saw Dr. Gomez Members, who had him on pletal, had minimal results. Now follows with Dr Kendal Guerra vascular in [place] who states he did have a femoral blockage proximal that would require bypass as well as some distal blockages that would require stenting but they felt observation most appropriate. He did not feel medication was necessary or helpful at this time. They state Dr. Gomez Members agrees with this plan. He is comfortable with this plan at this point. Serious signs and symptoms watch were reviewed. Admits to missing an appointment because of Covid pandemic.   They were planning surgery but after discussion, and the risks of complications and the complexity of the surgery he is declining intervention now. States he watches the distance he walks but otherwise doing well. No sores etc.      Mixed hyperlipidemia  Did very well on d.a.w. Crestor unfortunately severe myalgias on generic. He could try to [address] d.a.w., but he chose to go on Lipitor instead, tolerating, declines change in dose, goals reviewed, risk benefits ADRs reviewed. Counseling CoQ10 and vitamin D. .  We discussed appropriate dosing given his risk factors. Also on Lovaza 1 twice a day, declines increase at this time. Metabolic disorder  Counseled. Blood sugar had been borderline, amidst a lot of sweets, globin A1c has been excellent not interested in insulin sensitizer. Lifestyle modification reviewed. Micro-macrovascular complications monitor. Monitor     Coronary artery disease involving native heart without angina pectoris  Continue per cardiology. Saw Dr. Elise Forrester  December 2021 and had a stress test January 2022 showing fixed defect. Continue per cardiology. ,  There is a risk that the Erleada could cause cardiac arrhythmia     Age-related osteoporosis without current pathological fracture  Counseled extensively. He tolerated Fosamax but discontinued after he took from 7/09 through 7/16. Morbidity/mortality related to fracture reviewed.  11/13 bone density showed improvement, 12/15 -1.3 stable, 3/19 showed L2 -2.6, hip -1.0.   bone density scan 5/21 had nearly normalized, T score -1.3 L1 and -1.2 neck which is an improvement of 14.7% in the lumbar spine and   a slight worsening of 2.6% in the hip since previous study. [facility] had been on a course of alendronate 2 different times.  The rationale of the Prolia was with the expected osteoporosis as shown on DEXA scan 2 years ago but again nearly normal at this time although false negatives reviewed.  After discussion it was decided to hold off on Prolia, continue current management, falls precautions, weightbearing exercise, appropriate calcium/D and recheck bone density in 1 year sooner as needed    Gastroesophageal reflux disease without esophagitis  Asymptomatic as long as takes medication, had EGD in the past.  Risk of meds reviewed including electrolyte imbalance, renal etc.  Wants to continue. Glaucoma of left eye   Follows with eye care. Planning surgery        Renal insufficiency  Counseled, differential reviewed. Emphasize proper hydration and avoidance of nephrotoxic agents. He simply wants basic monitoring. Left HIP PAIN  X 4mos. Intermittent. Saw santhosh Dec 2021, had xray, ct . .. lesions noted, then saw pinky who did bone scan showiing these to be old. Following w/ dr Shilo Waterman pending for soon. ? Back related as well. Again, mri pending of hip/pelvis, back and brain      PLAN AS ABOVE:      Counseled extensively and differential diagnoses relevant to above were reviewed, including serious etiologies. Side effects and interactions of medications were reviewed. Counseled extensively. Continue per multiple specialists, chart reviewed, they will call for med refills, call with any issues otherwise they are planning blood work and follow-up in 3 months sooner as  Return in about 3 months (around 6/1/2022), or if symptoms worsen or fail to improve. Signs and symptoms to watch for discussed, serious signs and symptoms reviewed. ER if any. Over 40 minutes  spent with the patient in reviewing records, reviewing with patient/family, counseling, ordering,  prescribing, completing h&p, etc., with over 50% of the time spent face to face counseling. Cameron Alex MD    Patients are advised to check with insurance company to ensure coverage and to fully understand benefits and cost prior to any testing. This note was created with the assistance of voice recognition software. Document was reviewed however may contain grammatical errors.

## 2022-03-02 ENCOUNTER — TELEPHONE (OUTPATIENT)
Dept: PRIMARY CARE CLINIC | Age: 87
End: 2022-03-02

## 2022-03-02 ENCOUNTER — TELEPHONE (OUTPATIENT)
Dept: PULMONOLOGY | Age: 87
End: 2022-03-02

## 2022-03-02 DIAGNOSIS — F41.8 SITUATIONAL ANXIETY: Primary | ICD-10-CM

## 2022-03-02 RX ORDER — ALPRAZOLAM 0.25 MG/1
TABLET ORAL
Qty: 6 TABLET | Refills: 0 | Status: SHIPPED | OUTPATIENT
Start: 2022-03-02 | End: 2022-03-02

## 2022-03-02 NOTE — TELEPHONE ENCOUNTER
Call to pt and wife to advise that Dr. Yusra Cho has reviewed biopsy results and that they were all negative for malignancy. Pt advised to follow up with Dr. Reyes Achilles at the [facility] and office will fax over notes to her office from procedure and test results. Pt and wife appreciative of today's call.

## 2022-03-03 NOTE — TELEPHONE ENCOUNTER
Yes we can do Xanax prior to procedure with standard precautions, Not to drive with or take with other CNS depressants.-Sent to pharmacy

## 2022-03-08 ENCOUNTER — HOSPITAL ENCOUNTER (OUTPATIENT)
Dept: MRI IMAGING | Age: 87
Discharge: HOME | End: 2022-03-10
Payer: MEDICARE

## 2022-03-08 DIAGNOSIS — C34.31 SQUAMOUS CELL CARCINOMA OF BRONCHUS IN RIGHT LOWER LOBE (HCC): ICD-10-CM

## 2022-03-08 DIAGNOSIS — C61 MALIGNANT NEOPLASM OF PROSTATE (HCC): ICD-10-CM

## 2022-03-08 DIAGNOSIS — C79.51 BONE METASTASIS (HCC): ICD-10-CM

## 2022-03-08 PROCEDURE — 70553 MRI BRAIN STEM W/O & W/DYE: CPT

## 2022-03-08 PROCEDURE — 72158 MRI LUMBAR SPINE W/O & W/DYE: CPT

## 2022-03-08 PROCEDURE — 72197 MRI PELVIS W/O & W/DYE: CPT

## 2022-03-08 PROCEDURE — 6360000004 HC RX CONTRAST MEDICATION: Performed by: RADIOLOGY

## 2022-03-08 PROCEDURE — A9579 GAD-BASE MR CONTRAST NOS,1ML: HCPCS | Performed by: RADIOLOGY

## 2022-03-08 RX ADMIN — GADOTERIDOL 20 ML: 279.3 INJECTION, SOLUTION INTRAVENOUS at 14:07

## 2022-03-22 ENCOUNTER — HOSPITAL ENCOUNTER (OUTPATIENT)
Age: 87
Discharge: HOME | End: 2022-03-22
Payer: MEDICARE

## 2022-03-22 LAB — PROSTATE SPECIFIC ANTIGEN: <0.01 NG/ML (ref 0–4)

## 2022-03-22 PROCEDURE — 36415 COLL VENOUS BLD VENIPUNCTURE: CPT

## 2022-03-22 PROCEDURE — 84153 ASSAY OF PSA TOTAL: CPT

## 2022-04-01 NOTE — PROGRESS NOTES
[facility]  [address]  [facility]    Follow up Note      Kalen Mcknight     Date of Visit:  4/5/2022    CC:  Patient presents for follow up   Chief Complaint   Patient presents with    Follow-up    Lower Back Pain     raidiating to the groin     HPI:    Pain is worse      Change in quality of symptoms:yes - having left groin pain. Medication side effects:none. Recent diagnostic testing:Lumbar MRI, pelvis MRI. Recent interventional procedures:none. He has been on anticoagulation medications to include ASA and has not been on herbal supplements.  He is not diabetic.     Imaging:   3/2022 lumbar MRI -     FINDINGS:   BONES/ALIGNMENT: There is normal alignment of the spine.  The vertebral body   heights are maintained.  T1 and T2 hypointense signal in the anterior S1   vertebral body does not demonstrate enhancement and likely represents a   treated metastasis.  Additional metastases are seen the right lateral sacral   ala, at the level of S2, as well as in the right posterior iliac.  None   demonstrate any enhancement.       SPINAL CORD:  The conus terminates normally.       SOFT TISSUES: No paraspinal mass identified.       L1-L2: There is no significant disc protrusion, spinal canal stenosis or   neural foraminal narrowing.       L2-L3: Moderate loss of disc height with a small disc bulge.  Mild facet and   ligamentous hypertrophy.  No significant central canal or lateral recess   stenosis.  Mild-to-moderate right and mild left neural foraminal stenoses.       L3-L4: Mild loss of disc height with a small disc bulge.  Mild facet and   ligamentous hypertrophy.  Fluid in the bilateral facet joints.  Mild central   canal and lateral recess stenoses.  Moderate neural foraminal stenoses.       L4-L5: Severe loss of disc height.  No significant disc herniation Mild facet   and ligamentous hypertrophy.  No significant central canal or lateral recess   stenosis.  Moderate neural foraminal stenoses.       L5-S1: Grade 1 anterolisthesis of L5 over S1 by approximately 7 mm. Bilateral L5 spondylolysis.  No central canal stenosis.  Mild lateral recess   stenoses.  Severe neural foraminal stenoses.           Impression   1. Stable, treated, nonenhancing metastases S1 and S2 vertebra, as well as   the right iliac bone. 2. No fracture or marrow edema. 3. Bilateral L5 spondylolysis with grade 1 spondylolisthesis of L5 over S1 by   approximately 7 mm. 4. Mild central canal stenosis at L3-4. 5.  Multilevel neural foraminal stenoses, worst (severe) at L5-S1.     3/2022 MRI pelvis -  Appearance of the left proximal femur is suspicious for nondisplaced   pathologic fracture.       Focal lesion within the right sacral ala may demonstrate minimal enhancement   and could represent a healing treated metastatic lesion.  Evaluation is   limited secondary to metallic artifact.  Attention on follow-up recommended.       Scattered, treated metastasis throughout the bony pelvis and left proximal   femur without discrete enhancement.         Lumbar MRI 4/2020 -  1. Metastatic lesions are noted in the right iliac bone and first   sacral body, but are not associated with neural encroachment. Other   metastases previously documented in the pelvis are not included on   this exam. Metastatic foci do enhance. 2. There is grade 1 anterolisthesis of L5 on S1 and loss of the L4-5   disc space. 3. Lateral portions of the circumferential disc bulge at the L5-S1   level could encroach on the L5 nerve roots lateral to the foramina on   either side.    4. Telescoping of facets and disc bulging results in severe central   stenosis of the right-sided L4 and L5 nerve roots and moderately   severe stenosis of the left-sided L2-L5 root levels, more severe   inferiorly.      Lumbar MRI 7/2019 -   IMPRESSION:    Bone lesions at S1, S2 and in the right iliac wing worrisome for a marrow   infiltrative process.  Recommend clinical correlation and correlation   with nuclear medicine bone scanning. Dextroconvex scoliosis and multilevel malalignment associated with   multilevel spondylosis as detailed. L2-3 level, disc bulging with mild spinal canal stenosis and mild to   moderate right foraminal stenosis. L3-4 level, disc bulging with mild spinal canal stenosis and mild to   moderate left foraminal stenosis. L4-5 level, disc degeneration and disc osteophyte complex with mild to   moderate foraminal stenosis. L5-S1 level, grade 1 anterolisthesis from bilateral L5 pars defects and   degenerative disc disease with moderate to severe foraminal stenosis and   impingement of the L5 nerve roots. Anatomic Thoracic/Lumbar Variant: None.  L4-5 is considered the level of   the iliac crest and assume there are 5 lumbar-type vertebrae. There are hepatic and renal cysts.  Recommend correlation with ultrasound.     Cervical MRI 2019 -   IMPRESSION:    10 mm low signal intensity lesion at the right side of the C6 vertebral   body and 7 mm signal intensity lesion at the base of C2 are worrisome for   a marrow infiltrative process such as osteoblastic metastasis.  Clinical   correlation and correlation with nuclear medicine bone scanning   recommended for further evaluation. Levoconvex scoliosis and multilevel malalignment associated multilevel   spondylosis most notably moderate to advanced degenerative disc disease   at C6-7 and mild to moderate degenerative disc disease at C5-6 and C4-5. There is no substantial spinal canal stenosis. There is multilevel acquired foraminal stenosis most notably moderate to   severe left and moderate right foraminal stenosis at C5-6. Anatomic Variant:  None.  Assume 7 cervical vertebrae with counting from   the craniocervical junction.                                         Potential Aberrant Drug-Related Behavior:  no     Urine Drug Screenin2020 consistent     OARRS report:  7/2020 consistent  8/2020 consistent  12/2020 consistent   3/2022 consistent    Opioid Agreement:  Date enacted:  03/06/2020  Renewal date:    Past Medical History:   Diagnosis Date    CA prostate, adenoca ([facility] [address] 4/25/2019    CAD (coronary artery disease)     [facility])     prostate- PO chemotherapy     Chronic bilateral low back pain without sciatica 3/6/2020    Cobalamin deficiency     Glaucoma     Right eye    Hyperlipidemia     Hypertension     Osteoarthritis     Osteochondropathy     Osteopenia     Pain in lower limb     Peripheral venous insufficiency     PONV (postoperative nausea and vomiting)     Prolonged emergence from general anesthesia     PVD (peripheral vascular disease) with claudication ([facility] [address] 4/25/2019       Past Surgical History:   Procedure Laterality Date    ACETABULUM FRACTURE SURGERY  3/24/11    ANESTHESIA NERVE BLOCK Bilateral 6/16/2020    BILATERAL L3 L4 L5 MEDIAL NERVE BRANCH BLOCK performed by Jana Spears DO at [address] [place] [address] N/A 2/24/2022    BRONCHOSCOPY W/EBUS FNA performed by Heather May MD at [address] [place] [address]  2/24/2022    BRONCHOSCOPY DIAGNOSTIC OR CELL [address] ONLY performed by Heather May MD at [address] [address]  2016   [address] GRAFT  Dec. 1996    [facility]/ Dr. Pradip Hebert  1/25/2022    CT NEEDLE BIOPSY LUNG PERCUTANEOUS 1/25/2022 Inocencio Bazan MD [facility]    ENDOSCOPY, COLON, DIAGNOSTIC  2016    EYE SURGERY Bilateral 01/2018    cataract, glaucoma 2020-left eye    FEMUR FRACTURE SURGERY  1981    left   [address]    JOINT REPLACEMENT      bilat knee 2010, padmini dunne 2011     LYMPH NODE DISSECTION  10/2007    PAIN MANAGEMENT PROCEDURE N/A 5/19/2020    CAUDAL EPIDURAL STEROID INJECTION performed by Jana Spears DO at [address] [facility]  8/4/20    PAIN MANAGEMENT PROCEDURE Right 8/4/2020    RIGHT L3 4 5 MEDIAL BRANCH RADIOFREQUENCY ABLATION performed by Alberto Cook DO at [facility] [address] Left 9/10/2020    LEFT L3 4 5 MEDIAL BRANCH RADIOFREQUENCY ABLATION     +++IODINE ALLERGY+++   CPT: 42840 [id number] performed by Alberto Cook DO at [facility] [address] Bilateral 3/25/2021    BILATERAL L3,4,5 MEDIAL NERVE BRANCH RADIOFREQUENCY ABLATION performed by Alberto Cook DO at [address] [facility].  10/31/07    [address] M[address] [place] LAPAROSCOPIC PROSTATECTOMY    TESTICLE REMOVAL Bilateral 02/2019    hx prostate ca-    TONSILLECTOMY      TOTAL HIP ARTHROPLASTY  11/30/11    right    TOTAL KNEE ARTHROPLASTY  2010    bilateral       Prior to Admission medications    Medication Sig Start Date End Date Taking?  Authorizing Provider   Vitamin D, Cholecalciferol, 50 MCG (2000 UT) CAPS Take 2,000 Units by mouth daily 1/1/18  Yes Historical Provider, MD   omega-3 acid ethyl esters (LOVAZA) 1 g capsule Take 1 capsule by mouth 2 times daily 11/30/21  Yes Aiden Zamora MD   Handicap Placard MISC by Does not apply route Duration: 5 years 11/4/21  Yes Aiden Zamora MD   lisinopril (PRINIVIL;ZESTRIL) 10 MG tablet Take 1 tablet by mouth daily 8/31/21  Yes Aiden Zamora MD   omeprazole (PRILOSEC) 20 MG delayed release capsule Take 1 capsule by mouth daily  Patient taking differently: Take 20 mg by mouth at bedtime  7/16/21  Yes Aiden Zamora MD   atorvastatin (LIPITOR) 40 MG tablet Take 1 tablet by mouth daily 7/16/21  Yes Aiden Zaomra MD   magnesium gluconate (MAGONATE) 500 MG tablet Take 500 mg by mouth at bedtime    Yes Historical Provider, MD   COMBIGAN 0.2-0.5 % ophthalmic solution INSTILL 1 DROP INTO THE RIGHT EYE TWO TIMES A DAY. 2/8/21  Yes Historical Provider, MD   latanoprost (XALATAN) 0.005 % ophthalmic solution INSTILL 1 DROP IN RIGHT EYE EVERY DAY AT BEDTIME 2/16/21  Yes Historical Provider, MD   ERLEADA 60 MG TABS 60 mg Every 4 days 3/2/20  Yes Historical Provider, MD   meclizine (ANTIVERT) 25 MG tablet 1/2 - 1 by mouth every 6 hours as needed   Yes Historical Provider, MD   Multiple Vitamins-Minerals (THERAPEUTIC MULTIVITAMIN-MINERALS) tablet Take 1 tablet by mouth daily    Yes Historical Provider, MD   metoprolol tartrate (LOPRESSOR) 25 MG tablet Take 25 mg by mouth 2 times daily   Yes Historical Provider, MD   Coenzyme Q10 (COQ10) 50 MG CAPS Take 1 capsule by mouth daily    Yes Historical Provider, MD   aspirin 81 MG tablet Take 81 mg by mouth daily    Yes Historical Provider, MD   Cyanocobalamin (VITAMIN B 12 PO) Take  by mouth daily. sublingual  11  Yes Historical Provider, MD       Allergies   Allergen Reactions    Iodine Hives    Shellfish-Derived Products        Social History     Socioeconomic History    Marital status:      Spouse name: Not on file    Number of children: 3    Years of education: Not on file    Highest education level: Not on file   Occupational History    Not on file   Tobacco Use    Smoking status: Former Smoker     Packs/day: 1.00     Years: 40.00     Pack years: 40.00     Types: Cigarettes     Start date: 5     Quit date: 10/1/1994     Years since quittin.5    Smokeless tobacco: Former User     Types: [address] date:    Vaping Use    Vaping Use: Never used   Substance and Sexual Activity    Alcohol use:  Yes     Alcohol/week: 1.0 standard drink     Types: 1 Cans of beer per week     Comment: rare    Drug use: No    Sexual activity: Not on file   Other Topics Concern    Not on file   Social History Narrative    Problem List: History of polyp of colon, Disorder of bone density and structure, unspecified,    Osteochondropathy, Carcinoma in situ of prostate, Aneurysm of thoracic aorta, Anemia, Coronary    arteriosclerosis, Cobalamin deficiency, Multiple closed fractures of pelvis with disruption of pelvic [place],    Hyperlipidemia, Essential hypertension on file       Family History   Problem Relation Age of Onset    Stroke Mother     Cancer Mother         pancreatic    Cancer Father         prostate    Stroke Father        REVIEW OF SYSTEMS:     Sandip Vallecillo denies fever/chills, chest pain, shortness of breath, new bowel or bladder complaints. All other review of systems was negative. PHYSICAL EXAMINATION:      /80   Pulse 82   Temp 97.3 °F (36.3 °C) (Infrared)   Resp 16   Ht 5' 8\" (1.727 m)   Wt 222 lb (100.7 kg)   SpO2 95%   BMI 33.75 kg/m²     General:      General appearance:pleasant and well-hydrated, in mild discomfort and A & O x3  Build:Normal Weight    HEENT:    Head:normocephalic and atraumatic  Sclera: icterus absent    Lungs:    Breathing:Normal expansion. No accessory muscle use. Abdomen:    Shape:non-distended and normal    Lumbar spine:    Tenderness:- bilateral paraspinals  Range of motion:normal in lateral bending, flexion, extension rotation bilateral and is not painful. Extremities:    Tremors:None bilaterally upper and lower  Range of motion:pain with internal rotation of left hips negative.   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 Estefania Villafuerte  Referred by Dr. Debby Oakley for consideration for further injections as Dr. Estefania Villafuerte no longer takes his insurance  Pt's wife states she has discussed his LE symptoms and they have determined they feel his LE weakness is a vascular issue, pt states he can stand still     He developed L hip/groin pain 10/2021 when getting out of the car after having pulled into the garage too close to the wall  3/2022 MRI pelvis w/ and w/o shows concern for pathologic fracture, moderate L hip OA  Saw Dr. Angela Bishop for eval of L hip pain  His pain has been gradually improving over time, at one time was on a walker but is not any longer requiring the gait assistance  Pain is worse upon first standing and with walking, no pain with sitting  Lumbar symptoms controlled at this time                 Plan:     Reviewed oncology, PCP, rad/onc notes  Reviewed Lumbar and Pelvis MRI  OARRS reviewed  Start [place] 5/325 #30  Pt is not a candidate for L hip injection due to pathologic fracture  Caudal EVGENY done with 60% relief of lumbar symptoms x 2-3 days until he did yardwork              b/l L3,4,5 RFA with near 100% relief                 We discussed with the patient that combining opioids, benzodiazepines, alcohol, illicit drugs or sleep aids increases the risk of respiratory depression including death. We discussed that these medications may cause drowsiness, sedation or dizziness and have counseled the patient not to drive or operate machinery. We have discussed that these medications will be prescribed only by one provider. We have discussed with the patient about age related risk factors and have thoroughly discussed the importance of taking these medications as prescribed. The patient verbalizes understanding.     Cc: Referring physician

## 2022-04-04 ENCOUNTER — TELEPHONE (OUTPATIENT)
Dept: CASE MANAGEMENT | Age: 87
End: 2022-04-04

## 2022-04-04 ENCOUNTER — HOSPITAL ENCOUNTER (OUTPATIENT)
Dept: RADIATION ONCOLOGY | Age: 87
Discharge: HOME | End: 2022-04-04
Payer: MEDICARE

## 2022-04-04 VITALS
OXYGEN SATURATION: 96 % | HEART RATE: 100 BPM | WEIGHT: 222.6 LBS | DIASTOLIC BLOOD PRESSURE: 78 MMHG | TEMPERATURE: 96.8 F | SYSTOLIC BLOOD PRESSURE: 138 MMHG | BODY MASS INDEX: 31.94 KG/M2 | RESPIRATION RATE: 18 BRPM

## 2022-04-04 DIAGNOSIS — C34.90 MALIGNANT NEOPLASM OF LUNG, UNSPECIFIED LATERALITY, UNSPECIFIED PART OF LUNG (HCC): Primary | ICD-10-CM

## 2022-04-04 PROCEDURE — 99215 OFFICE O/P EST HI 40 MIN: CPT | Performed by: RADIOLOGY

## 2022-04-04 PROCEDURE — 99205 OFFICE O/P NEW HI 60 MIN: CPT

## 2022-04-04 ASSESSMENT — PAIN DESCRIPTION - LOCATION: LOCATION: GROIN

## 2022-04-04 ASSESSMENT — PAIN SCALES - GENERAL: PAINLEVEL_OUTOF10: 3

## 2022-04-04 NOTE — TELEPHONE ENCOUNTER
Met with patient and wife during his initial consultation with Dr. Alfredo Randolph for his Lung cancer diagnosis. Introduced myself and explained my role with patients receiving treatment at our center. Patient was friendly and receptive. They deny any questions or concerns at this time. Next steps include potential Radiation treatments per Dr. Faye Sweeney recommendations and follow up care. Provided patient with  literature on ACS Radiation Therapy, Cancer Care Online Support handout and Community Transportation Resource List. Reviewed resources available including Social Work, Dietician, and Financial Navigator. Provided with my contact information and instructed patient to call me with questions or concerns. Verbalizes understanding. Patient appreciative of visit. Will continue to follow.

## 2022-04-04 NOTE — PROGRESS NOTES
Radiation Oncology      Tammy Banda. Whitney Hernandez  [facility]      Referring Physician: Dr. Kimberly Balbuena MD   Primary Oncologist: Dr. Roberto Carlos Wray      Diagnosis: AJCC SG IV prostate cancer s/p definative intent RT (GS 10)   -bone mets (stable) - PSA < 0.03 on apalutamide   -lung mass - RLL adenocarcinoma      Service:  Radiation Oncology consultation performed on 4/4/22              HPI:        Harsha Simmons is a pleasant 82year old with a single progressive RLL lung mass - primary NSCLC. Patient has an extensive history of prostate cancer and bone mets that has been treated from 2008-to 2019 with all the different chemo/rt plans. He did receive RT 2008 in [facility] [id number] for prostate ca. He was in follow up care and had a PET scan on 12/28/21 that presented enlarging noncalcified right lower lobe pulmonary nodule with hypermetabolism at the bilateral tsering. He had a bx done by Dr. Estefani Dietrich that presented invasive moderately differentiated adenocarcinoma of lung-- EGFR- negative. EBUS negative. The patient presents today to discuss fractionated external beam radiation therapy as a component of multidisciplinary, definative management. We reviewed the available medical records including the complete medical history of this pt today prior to consultation. Epic -CE and available scanned documents per the Epic Media tab were reviewed PRN. A complete ROS was also performed today and is noted below. During consultation today I personally discussed the pts workup to date; including but not limited to applicable imaging studies, Pathology reports, and interventions. The NCCN guidelines, as pertaining to the above diagnosis were also recapped for the pt today in brief. Today, Manuela Fee  notes Sx that include fatigue, cough.    KPS 70.        -----    Per [address]:      [facility] records reviewed. -----    PATH:      Primary:   Diagnosis:   Right lung, core needle biopsy: Invasive, moderately differentiated   adenocarcinoma (grade 2) consistent with primary lung origin     Comment:    The tumor cells are immunoreactive with cytokeratin 7 and   TTF-1.  The cells are negative for CDX2, cytokeratin 20, and PSA.  The   pattern of staining is consistent with primary lung origin.  Molecular   studies have been ordered per institution protocol and will be reported   separately.  Intradepartmental consultation is obtained. 1) Specimen Source:  EBUS-FNA, R4     Clinical Data:  Former smoker, lung cancer 2022, prostate cancer 2010,   mediastinal and hilar adenopathy     ON-SITE RAPID STAIN ASSESSMENT:   Evaluation episode 1 (3 passes): Adequate (RSG 2/23/22)     ADEQUACY STATEMENT:   Specimen is satisfactory for interpretation     DIAGNOSIS   NEGATIVE FOR MALIGNANT CELLS    2) Specimen Source:  EBUS-FNA, R11     Clinical Data:  Former smoker, lung cancer 2022, prostate cancer 2010,   mediastinal and hilar adenopathy     ON-SITE RAPID STAIN ASSESSMENT:   Evaluation episode 1 (pass 1): Insufficient   Evaluation episode 2 (pass 2): Adequate   (RSG 2/23/22)     ADEQUACY STATEMENT:   Specimen is satisfactory for interpretation     DIAGNOSIS   NEGATIVE FOR MALIGNANT CELLS     Mixed lymphoid population present. Benign-appearing bronchial cells and blood also present. Cellblock is non-contributory.        Past Medical History:   Diagnosis Date    CA prostate, adenoca ([address] 4/25/2019    CAD (coronary artery disease)     [facility]     prostate- PO chemotherapy     Chronic bilateral low back pain without sciatica 3/6/2020    Cobalamin deficiency     Glaucoma     Right eye    Hyperlipidemia     Hypertension     Osteoarthritis     Osteochondropathy     Osteopenia     Pain in lower limb     Peripheral venous insufficiency     PONV (postoperative nausea and vomiting)     Prolonged emergence from general anesthesia     PVD (peripheral vascular disease) with claudication ([address] 4/25/2019       Past Surgical History:   Procedure Laterality Date    ACETABULUM FRACTURE SURGERY  3/24/11    ANESTHESIA NERVE BLOCK Bilateral 6/16/2020    BILATERAL L3 L4 L5 MEDIAL NERVE BRANCH BLOCK performed by Hosea Ruiz DO at [address] [place] [address] N/A 2/24/2022    BRONCHOSCOPY W/EBUS FNA performed by Lorena Hernandez MD at [address] [facility]  2/24/2022    BRONCHOSCOPY DIAGNOSTIC OR CELL [address] ONLY performed by Lorena Hernandez MD at [address] [place] [address]  2016   [address] GRAFT  Dec. 1996    [facility]/ Dr. Michael Cool  1/25/2022    CT NEEDLE BIOPSY LUNG PERCUTANEOUS 1/25/2022 Linda Ojeda MD [facility]    ENDOSCOPY, COLON, DIAGNOSTIC  2016    EYE SURGERY Bilateral 01/2018    cataract, glaucoma 2020-left eye    FEMUR FRACTURE SURGERY  1981    left   [address]    JOINT REPLACEMENT      bilat knee 2010, rig thip 2011     LYMPH NODE DISSECTION  10/2007    PAIN MANAGEMENT PROCEDURE N/A 5/19/2020    CAUDAL EPIDURAL STEROID INJECTION performed by Hosea Ruiz DO at [place] [address]  8/4/20    PAIN MANAGEMENT PROCEDURE Right 8/4/2020    RIGHT L3 4 5 MEDIAL BRANCH RADIOFREQUENCY ABLATION performed by Hosea Ruiz DO at [facility] [address] Left 9/10/2020    LEFT L3 4 5 MEDIAL BRANCH RADIOFREQUENCY ABLATION     +++IODINE ALLERGY+++   CPT: 58804 [id number] performed by Hosea Ruiz DO at [place] [id number] Bilateral 3/25/2021    BILATERAL L3,4,5 MEDIAL NERVE BRANCH RADIOFREQUENCY ABLATION performed by Hosea Ruiz DO at [address] [facility].  10/31/07    AMIRAH STREETER M.D./ [place] LAPAROSCOPIC PROSTATECTOMY    TESTICLE REMOVAL Bilateral 02/2019    hx prostate ca-    TONSILLECTOMY      TOTAL HIP ARTHROPLASTY  11/30/11    right    TOTAL KNEE ARTHROPLASTY  2010    bilateral       Family History   Problem Relation Age of Onset    Stroke Mother     Cancer Mother         pancreatic    Cancer Father         prostate    Stroke Father        Current Outpatient Medications   Medication Sig Dispense Refill    Vitamin D, Cholecalciferol, 50 MCG (2000 UT) CAPS Take 2,000 Units by mouth daily      omega-3 acid ethyl esters (LOVAZA) 1 g capsule Take 1 capsule by mouth 2 times daily 180 capsule 3    Handicap Placard MISC by Does not apply route Duration: 5 years 1 each 0    lisinopril (PRINIVIL;ZESTRIL) 10 MG tablet Take 1 tablet by mouth daily 90 tablet 3    omeprazole (PRILOSEC) 20 MG delayed release capsule Take 1 capsule by mouth daily (Patient taking differently: Take 20 mg by mouth at bedtime ) 90 capsule 3    atorvastatin (LIPITOR) 40 MG tablet Take 1 tablet by mouth daily 90 tablet 3    magnesium gluconate (MAGONATE) 500 MG tablet Take 500 mg by mouth at bedtime       COMBIGAN 0.2-0.5 % ophthalmic solution INSTILL 1 DROP INTO THE RIGHT EYE TWO TIMES A DAY.  latanoprost (XALATAN) 0.005 % ophthalmic solution INSTILL 1 DROP IN RIGHT EYE EVERY DAY AT BEDTIME      ERLEADA 60 MG TABS 60 mg Every 4 days      meclizine (ANTIVERT) 25 MG tablet 1/2 - 1 by mouth every 6 hours as needed      Multiple Vitamins-Minerals (THERAPEUTIC MULTIVITAMIN-MINERALS) tablet Take 1 tablet by mouth daily       metoprolol tartrate (LOPRESSOR) 25 MG tablet Take 25 mg by mouth 2 times daily      Coenzyme Q10 (COQ10) 50 MG CAPS Take 1 capsule by mouth daily       aspirin 81 MG tablet Take 81 mg by mouth daily       Cyanocobalamin (VITAMIN B 12 PO) Take  by mouth daily. sublingual        No current facility-administered medications for this encounter.        Allergies   Allergen Reactions    Iodine Hives    Shellfish-Derived Products        Social History     Socioeconomic History    Marital status:      Spouse name: None    Number of children: 3    Years of education: None    Highest education level: None   Occupational History    None   Tobacco Use    Smoking status: Former Smoker     Packs/day: 1.00     Years: 40.00     Pack years: 40.00     Types: Cigarettes     Start date: 5     Quit date: 10/1/1994     Years since quittin.5    Smokeless tobacco: Former User     Types: [address] date:    Vaping Use    Vaping Use: Never used   Substance and Sexual Activity    Alcohol use: Yes     Alcohol/week: 1.0 standard drink     Types: 1 Cans of beer per week     Comment: rare    Drug use: No    Sexual activity: None   Other Topics Concern    None   Social History Narrative    Problem List: History of polyp of colon, Disorder of bone density and structure, unspecified,    Osteochondropathy, Carcinoma in situ of prostate, Aneurysm of thoracic aorta, Anemia, Coronary    arteriosclerosis, Cobalamin deficiency, Multiple closed fractures of pelvis with disruption of pelvic Creek,    Hyperlipidemia, Essential hypertension    Health Maintenance:    Bone Density Test Screening - (3/5/2019)    Bone Density Scan - (2015)    Influenza Vaccination - (10/7/2016)    Colonoscopy - (4/3/2012)    Couseled on Home Safety - (2015)    Colonoscopy Screening - (4/3/2012)    US Liver - 08    Medical Problems:    Coronary Artery Disease (CAD), Hypertension, Hyperlipidemia, Prostate Cancer    Surgical Hx:    Prostatectomy, Coronary Artery Bypass Graft (CABG)            FH: Father:    . (Hx)    Mother:    . (Hx)        SH: Marital:  - retired . Personal Habits: Cigarette Use: former cigarette smoker, Nonsmoker. Alcohol: Occasionally    consumes alcohol.      Social Determinants of Health     Financial Resource Strain: Low Risk     Difficulty of Paying Living Expenses: Not hard at all   Food Insecurity: No Food Insecurity    Worried About Running Out of Food in the Last Year: Never true   [employer] Ran Out of Food in the Last Year: Never true   Transportation Needs:     Lack of Transportation (Medical): Not on file    Lack of Transportation (Non-Medical): Not on file   Physical Activity:     Days of Exercise per Week: Not on file    Minutes of Exercise per Session: Not on file   Stress:     Feeling of Stress : Not on file   Social Connections:     Frequency of Communication with Friends and Family: Not on file    Frequency of Social Gatherings with Friends and Family: Not on file    Attends Hindu Services: Not on file    Active Member of Clubs or Organizations: Not on file    Attends Club or Organization Meetings: Not on file    Marital Status: Not on file   Intimate Partner Violence:     Fear of Current or Ex-Partner: Not on file    Emotionally Abused: Not on file    Physically Abused: Not on file    Sexually Abused: Not on file   Housing Stability:     Unable to Pay for Housing in the Last Year: Not on file    Number of [place] in the Last Year: Not on file    Unstable Housing in the Last Year: Not on file           Review of Systems - History obtained from chart review and the patient  General ROS: positive for  - fatigue  Psychological ROS: negative  Ophthalmic ROS: negative  ENT ROS: negative  Allergy and Immunology ROS: negative  Hematological and Lymphatic ROS: negative  Endocrine ROS: negative  Breast ROS: negative for breast lumps  Respiratory ROS: positive for - cough  Cardiovascular ROS: no chest pain or dyspnea on exertion  Gastrointestinal ROS: no abdominal pain, change in bowel habits, or black or bloody stools  Genito-Urinary ROS: no dysuria, trouble voiding, or hematuria  Musculoskeletal ROS: negative  Neurological ROS: no TIA or stroke symptoms  Dermatological ROS: negative        Physical Exam  HENT:      Head: Normocephalic and atraumatic.       Right Ear: External ear normal.      Left Ear: External ear normal.      Nose: Nose normal.      Mouth/Throat:      Mouth: Mucous membranes are moist.   Eyes:      Extraocular Movements: Extraocular movements intact. Pupils: Pupils are equal, round, and reactive to light. Cardiovascular:      Rate and Rhythm: Normal rate and regular rhythm. Pulses: Normal pulses. Heart sounds: Normal heart sounds. Pulmonary:      Effort: Pulmonary effort is normal.      Breath sounds: Normal breath sounds. Abdominal:      General: Abdomen is flat. Palpations: Abdomen is soft. Musculoskeletal:         General: Normal range of motion. Cervical back: Normal range of motion. Skin:     General: Skin is warm. Neurological:      General: No focal deficit present. Mental Status: He is alert and oriented to person, place, and time. Psychiatric:         Mood and Affect: Mood normal.         Behavior: Behavior normal.         Thought Content: Thought content normal.         Judgment: Judgment normal.             Imaging reviewed:        PET 12/28/22:  Impression   Enlarging noncalcified right lower lobe pulmonary nodule has metabolic   characteristics concerning for lung malignancy until proven otherwise.       Hypermetabolism at bilateral tsering, suspicious for may metastatic disease as   a consequence.  Active granulomatous disease or reactive nodes could also be   considered though malignancy is of primary concern.       Sclerotic skeletal lesions, not markedly FDG avid, suggestive of treated   metastases in this patient with history of prostate carcinoma.       Bilateral adrenal activity, likely physiologic, less likely metastatic;   continued attention on follow-up suggested.            Radiation Safety and Treatment Support:  -previous Radiation history: Yes - prostate (2008)  -history of connective tissue disease: No  -history of autoimmune disease: No  -pregnant: no  -fertility conservation and /or contraception discussed: no  -nutrition consult prior to [place]: Yes  -PEG: No  -Dental evaluation prior to treatment:No  -Social Work requested: Yes  -Oncology Nurse Navigator requested: Yes  -pre + post treatment PT / Rehab / PM+R evaluation considered: Yes  -ICD: No   -ICD brand: -  -[facility] patient navigator: Yvan Menchaca  -Nurse Practitioners for Radiation Oncology:    ---Marietta Hunter, MSN, RN, FNP-C   ---Hoda Cortez, MSN, RN, FNP-BC        Assessment and Plan: Maciej Thompson is a pleasant and cooperative 82year old with a recent diagnosis of AJCC stage group IA3 RLL lung cancer in the setting of stable metastatic prostate cancer and intact KPS. We recommend definitive intent fractionated external beam radiation therapy with a 3-5 fraction SBRT approach [NCCN NSCLC 4.2019 NSCLC-C 7/10, 3/10 ]. The risks, benefits, alternatives, process and logistics of stereotactic body radiation therapy (SBRT / SABR) were reviewed and specifically discussed in great detail - (risks discussed today include but are not limited to radiation pneumonitis, worsening PULM function, hemoptysis, lack of response, rib fracture, chest wall pain, oxygen dependence, esophagitis, esophageal structure, perforation, radiation induced neuropathies, vascular injury, bleeding and death, paralysis, second malignancy). SBRT is typically considered safe in terms of acute and chronic pulmonary toxicity, even for patients with severe PULM comobidities [Lily et al. Balwinder Wall. 2012 Mar; 7(3): 663-374 / Int J Radiat Oncol Biol Phys. 2018 Feb 1;100(2):462-469 ]. A second opinion was offered to this pt and was declined today at consult. Guidelines applicable to this pt reviewed and applied to SILVA and medical decision making as available and applicable [Pract Radiat Oncol. 2017 Sep - Oct;7(5):295-301]. We answered all of the patient's questions to the best of our ability. The patient verbalized understanding and seemed satisfied, desiring definitive intent SBRT.   Radiation planning will commence within 7-14 days; the next step in management being the simulation scan, with external beam radiation to commence in a timely fashion thereafter. It was a pleasure meeting Kayla Birmingham today and we appreciate the referral and opportunity to be involved in His care. We had an extensive discussion today regarding the course to date (including a focused review of theapplicable radiographic and laboratory information), multidisciplinary approach to cancer care, and indications for external beam radiation therapy as a component therein. A literature review and multidisciplinary discussion was performed after seeing this patient due to the complexity of the medical decision making in this case. I personally spent greater than 80 minutes on this case and with this patient. I performed the complete history and physical as above at today's visit, at least 45 minutes was in direct discussion and  regarding disease management.          -sim for SBRT - RLL  -N/C        Gaurav James. Zachary Jacob MD [place] [address] Oncology  Cell: [phone]    [facility]:  [facility]: [phone]  [facility]:  [phone]   FAX:    [phone]  [address] [place] [address] Road:  [phone]   FAX:  [phone]        NOTE: This report was transcribed using voice recognition software. Every effort was made to ensure accuracy; however, inadvertent computerized transcription errors may be present.

## 2022-04-04 NOTE — PROGRESS NOTES
[facility]  4/4/2022  Wt Readings from Last 10 Encounters:   04/04/22 222 lb 9.6 oz (101 kg)   03/01/22 223 lb 6.4 oz (101.3 kg)   02/24/22 224 lb (101.6 kg)   02/23/22 224 lb (101.6 kg)   02/17/22 224 lb 6 oz (101.8 kg)   12/15/21 218 lb (98.9 kg)   11/30/21 218 lb 3.2 oz (99 kg)   08/31/21 214 lb 12.8 oz (97.4 kg)   06/03/21 210 lb (95.3 kg)   04/28/21 212 lb (96.2 kg)     Ht Readings from Last 1 Encounters:   02/24/22 5' 10\" (1.778 m)     Body mass index is 31.94 kg/m². Met w/ pt and wife, Hubert Gimenez, during initial consult for lung CA. Pt w/ relatively stable wt. He reports good appetite, consuming 2-3 meals daily w/ grazing between. He denies any N/V/D/C. He is not utilizing any ONS products at this time. Provided pt w/ resources for managing possible effects as well as recipes for increased kcal/pro content if needed for future. Contact info provided. Will follow as treatment initiated.      Weight change: stable  Appetite: good  N/V/D/C: none noted  Calculated Needs: 20-22 kcal/kg CBW =  kcal, 1.3-1.5 gm/kg IBW = 100-115 gm pro, 1 ml/kcal =  ml fluids    Pre-Hab Eligible?: No    Recommendations:Pt to continue w/ 3 meals/day as able    ASPEN GUIDELINES FOR CLINICAL CHARACTERISTICS OF MALNUTRITION IN CHRONIC ILLNESS     Moderate Malnutrition  Severe Malnutrition    Energy intake  <75% energy intake compared to estimated needs for >1month <75% energy intake compared to estimated needs for >1month   Weight changes  5% x 1 month  7.5% x 3 months   10% x 6 months   20% x 1 year  >5% x 1 month  >7.5% x 3 months   >10% x 6 months   >20% x 1 year    Physical findings  Mild   Decrease subcutaneous fat    Decrease muscle mass     Increase fluid/edema   Severe  Decrease subcutaneous fat    Decrease muscle mass     Increase fluid/edema    No malnutrition    Luwana Dakin, MS, RD, LD

## 2022-04-05 ENCOUNTER — OFFICE VISIT (OUTPATIENT)
Dept: PAIN MANAGEMENT | Age: 87
End: 2022-04-05
Payer: MEDICARE

## 2022-04-05 VITALS
DIASTOLIC BLOOD PRESSURE: 80 MMHG | SYSTOLIC BLOOD PRESSURE: 127 MMHG | RESPIRATION RATE: 16 BRPM | HEIGHT: 68 IN | TEMPERATURE: 97.3 F | WEIGHT: 222 LBS | HEART RATE: 82 BPM | BODY MASS INDEX: 33.65 KG/M2 | OXYGEN SATURATION: 95 %

## 2022-04-05 DIAGNOSIS — C79.51 PROSTATE CANCER METASTATIC TO BONE (HCC): ICD-10-CM

## 2022-04-05 DIAGNOSIS — C61 PROSTATE CANCER METASTATIC TO BONE (HCC): ICD-10-CM

## 2022-04-05 DIAGNOSIS — M84.452A: ICD-10-CM

## 2022-04-05 DIAGNOSIS — G89.4 CHRONIC PAIN SYNDROME: Primary | ICD-10-CM

## 2022-04-05 DIAGNOSIS — M25.552 LEFT HIP PAIN: ICD-10-CM

## 2022-04-05 PROCEDURE — 1123F ACP DISCUSS/DSCN MKR DOCD: CPT | Performed by: PAIN MEDICINE

## 2022-04-05 PROCEDURE — G8417 CALC BMI ABV UP PARAM F/U: HCPCS | Performed by: PAIN MEDICINE

## 2022-04-05 PROCEDURE — 99203 OFFICE O/P NEW LOW 30 MIN: CPT | Performed by: PAIN MEDICINE

## 2022-04-05 PROCEDURE — 1036F TOBACCO NON-USER: CPT | Performed by: PAIN MEDICINE

## 2022-04-05 PROCEDURE — 4040F PNEUMOC VAC/ADMIN/RCVD: CPT | Performed by: PAIN MEDICINE

## 2022-04-05 PROCEDURE — G8427 DOCREV CUR MEDS BY ELIG CLIN: HCPCS | Performed by: PAIN MEDICINE

## 2022-04-05 PROCEDURE — 99214 OFFICE O/P EST MOD 30 MIN: CPT | Performed by: PAIN MEDICINE

## 2022-04-05 RX ORDER — HYDROCODONE BITARTRATE AND ACETAMINOPHEN 5; 325 MG/1; MG/1
1 TABLET ORAL DAILY PRN
Qty: 30 TABLET | Refills: 0 | Status: SHIPPED
Start: 2022-04-05 | End: 2022-05-05 | Stop reason: SDUPTHER

## 2022-04-05 NOTE — PROGRESS NOTES
Do you currently have any of the following:    Fever: No  Headache:  No  Cough: No  Shortness of breath: No  Exposed to anyone with these symptoms: No         Kalen Mcknight [facility] to the [facility] on 4/5/2022. Doroteo Rondon is complaining of pain lower back pain that radiates to the groin. The pain is intermittent. The pain is described as dull. Pain is rated on his best day at a 0, on his worst day at a 8, and on average at a 5 on the VAS scale. He took his last dose of Motrin and Tylenol PRn. Any procedures since your last visit: No, with  % relief. Pacemaker or defibrillator: No managed by . He is  on NSAIDS and is  on anticoagulation medications to include ASA and is managed by Naomy Youngblood MD  .     Medication Contract and Consent for Opioid Use Documents Filed     Patient Documents     Type of Document Status Date Received Received By Description    Medication Contract Received 3/6/2020 10:13 AM Reuben TINAJERO KIMMY med contract                /80   Pulse 82   Temp 97.3 °F (36.3 °C) (Infrared)   Resp 16   Ht 5' 8\" (1.727 m)   Wt 222 lb (100.7 kg)   SpO2 95%   BMI 33.75 kg/m²      No LMP for male patient.

## 2022-04-07 ENCOUNTER — FOLLOWUP TELEPHONE ENCOUNTER (OUTPATIENT)
Dept: INFUSION THERAPY | Age: 87
End: 2022-04-07

## 2022-04-07 NOTE — TELEPHONE ENCOUNTER
Benefits Investigation    Insurance: [employer]   Phone#:   ID#: [id number]   Group #:[id number]  Spoke with: Epic Registration  Reference #: 4/7/22 @ 10:27 am    Calendar Year : yes    Annual Deductible Amount:  $100  Amount met to date: $100  Out-of-Pocket Max Amount: $ 1800  Amount met to date: $100  Lifetime Maximum Amount: $ unlimited  Amount met to date: $n/a    Patient was seen for Rad Onc Consult. If a need for financial assistance arises I will meet with patient at that time.  Electronically signed by Betty Condon on 4/7/2022 at 10:28 AM

## 2022-04-13 ENCOUNTER — PATIENT MESSAGE (OUTPATIENT)
Dept: PRIMARY CARE CLINIC | Age: 87
End: 2022-04-13

## 2022-04-13 ENCOUNTER — HOSPITAL ENCOUNTER (OUTPATIENT)
Dept: RADIATION ONCOLOGY | Age: 87
Discharge: HOME | End: 2022-04-13
Attending: RADIOLOGY
Payer: MEDICARE

## 2022-04-13 PROCEDURE — 77334 RADIATION TREATMENT AID(S): CPT | Performed by: RADIOLOGY

## 2022-04-13 PROCEDURE — 77263 THER RADIOLOGY TX PLNG CPLX: CPT | Performed by: RADIOLOGY

## 2022-04-13 NOTE — TELEPHONE ENCOUNTER
From: Xiomy Washington  To: Dr. Vishal Bailey: 4/13/2022 3:02 PM EDT  Subject: ALLERGIES? Hello Dr Nadia Bukc and staff. We are writing to confirm to you that we have determined that Medford Habermann does not appear to have any allergies to iodine, shrimp or shellfish any longer. It has been well over 20 years since he reacted to shrimp. We do not think that he has ever had a reaction to contrast used in any procedures. Please call if you need further information.  Thank you very much, [place] Habermann and [place]

## 2022-04-14 ENCOUNTER — HOSPITAL ENCOUNTER (OUTPATIENT)
Dept: CT IMAGING | Age: 87
Discharge: HOME | End: 2022-04-16
Payer: MEDICARE

## 2022-04-14 DIAGNOSIS — C34.31 SQUAMOUS CELL CARCINOMA OF BRONCHUS IN RIGHT LOWER LOBE (HCC): ICD-10-CM

## 2022-04-14 PROCEDURE — 6360000004 HC RX CONTRAST MEDICATION: Performed by: RADIOLOGY

## 2022-04-14 PROCEDURE — 70496 CT ANGIOGRAPHY HEAD: CPT

## 2022-04-14 RX ADMIN — IOPAMIDOL 75 ML: 755 INJECTION, SOLUTION INTRAVENOUS at 15:17

## 2022-04-29 ENCOUNTER — OFFICE VISIT (OUTPATIENT)
Dept: NEUROLOGY | Age: 87
End: 2022-04-29
Payer: MEDICARE

## 2022-04-29 VITALS
OXYGEN SATURATION: 96 % | DIASTOLIC BLOOD PRESSURE: 71 MMHG | HEIGHT: 68 IN | WEIGHT: 224 LBS | HEART RATE: 65 BPM | BODY MASS INDEX: 33.95 KG/M2 | SYSTOLIC BLOOD PRESSURE: 140 MMHG | TEMPERATURE: 97.2 F

## 2022-04-29 DIAGNOSIS — I67.1 CEREBRAL ANEURYSM: Primary | ICD-10-CM

## 2022-04-29 PROCEDURE — G8427 DOCREV CUR MEDS BY ELIG CLIN: HCPCS | Performed by: PSYCHIATRY & NEUROLOGY

## 2022-04-29 PROCEDURE — G8417 CALC BMI ABV UP PARAM F/U: HCPCS | Performed by: PSYCHIATRY & NEUROLOGY

## 2022-04-29 PROCEDURE — 1036F TOBACCO NON-USER: CPT | Performed by: PSYCHIATRY & NEUROLOGY

## 2022-04-29 PROCEDURE — 4040F PNEUMOC VAC/ADMIN/RCVD: CPT | Performed by: PSYCHIATRY & NEUROLOGY

## 2022-04-29 PROCEDURE — 99204 OFFICE O/P NEW MOD 45 MIN: CPT | Performed by: PSYCHIATRY & NEUROLOGY

## 2022-04-29 PROCEDURE — 1123F ACP DISCUSS/DSCN MKR DOCD: CPT | Performed by: PSYCHIATRY & NEUROLOGY

## 2022-04-29 NOTE — PROGRESS NOTES
Neuro endovascular Surgery Consultation          Reddy Kaufman is a 82 y.o. 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   She is referred by Kolton Mayorga to me to provide services as a Neuro endovascular specialist      Past Medical History:     Past Medical History:   Diagnosis Date    CA prostate, adenoca ([facility] [address] 4/25/2019    CAD (coronary artery disease)     [facility]     prostate- PO chemotherapy     Chronic bilateral low back pain without sciatica 3/6/2020    Cobalamin deficiency     Glaucoma     Right eye    Hyperlipidemia     Hypertension     Osteoarthritis     Osteochondropathy     Osteopenia     Pain in lower limb     Peripheral venous insufficiency     PONV (postoperative nausea and vomiting)     Prolonged emergence from general anesthesia     PVD (peripheral vascular disease) with claudication ([facility] [address] 4/25/2019       Past Surgical History:     Past Surgical History:   Procedure Laterality Date    ACETABULUM FRACTURE SURGERY  3/24/11    ANESTHESIA NERVE BLOCK Bilateral 6/16/2020    BILATERAL L3 L4 L5 MEDIAL NERVE BRANCH BLOCK performed by Davy Jimenez DO at [address] [place] [address] N/A 2/24/2022    BRONCHOSCOPY W/EBUS FNA performed by Sylvester Florez MD at [address] [place] [address]  2/24/2022    BRONCHOSCOPY DIAGNOSTIC OR CELL [address] ONLY performed by Sylvester Florez MD at [address] [place] [address]  2016   [facility] 18 GRAFT  Dec. 1996    [facility]/ Dr. Jain Brochure  1/25/2022    CT NEEDLE BIOPSY LUNG PERCUTANEOUS 1/25/2022 Patti Newell MD [facility] CT    ENDOSCOPY, COLON, DIAGNOSTIC  2016    EYE SURGERY Bilateral 01/2018    cataract, glaucoma 2020-left eye   [address] [facility]    HERNIA REPAIR  1993    JOINT REPLACEMENT      bilat knee 2010, righ thip 2011     LYMPH NODE DISSECTION  10/2007    PAIN MANAGEMENT PROCEDURE N/A 5/19/2020    CAUDAL EPIDURAL STEROID INJECTION performed by Jana Spears DO at [facility]  8/4/20    PAIN MANAGEMENT PROCEDURE Right 8/4/2020    RIGHT L3 4 5 MEDIAL BRANCH RADIOFREQUENCY ABLATION performed by Jaan Spears DO at [facility] [address] Left 9/10/2020    LEFT L3 4 5 MEDIAL BRANCH RADIOFREQUENCY ABLATION     +++IODINE ALLERGY+++   CPT: 42321 [id number] performed by Jana Spears DO at [facility] PAIN MANAGEMENT PROCEDURE Bilateral 3/25/2021    BILATERAL L3,4,5 MEDIAL NERVE BRANCH RADIOFREQUENCY ABLATION performed by Jana Spears DO at [facility].  10/31/07    [address] M.D./ [facility] [place] LAPAROSCOPIC PROSTATECTOMY    TESTICLE REMOVAL Bilateral 02/2019    hx prostate ca-    TONSILLECTOMY      TOTAL HIP ARTHROPLASTY  11/30/11    right    TOTAL KNEE ARTHROPLASTY  2010    bilateral       Allergies:     Patient has no known allergies. Medications:     Prior to Admission medications    Medication Sig Start Date End Date Taking? Authorizing Provider   HYDROcodone-acetaminophen (NORCO) 5-325 MG per tablet Take 1 tablet by mouth daily as needed for Pain for up to 30 days. Intended supply: 3 days.  Take lowest dose possible to manage pain 4/5/22 5/5/22 Yes Jana Spears DO   Vitamin D, Cholecalciferol, 50 MCG (2000 UT) CAPS Take 2,000 Units by mouth daily 1/1/18  Yes Historical Provider, MD   omega-3 acid ethyl esters (LOVAZA) 1 g capsule Take 1 capsule by mouth 2 times daily 11/30/21  Yes Wil Russell MD   Handicap Phoebe MISC by Does not apply route Duration: 5 years 11/4/21  Yes Wil Russell MD   lisinopril (PRINIVIL;ZESTRIL) 10 MG tablet Take 1 tablet by mouth daily 8/31/21  Yes Wil Russell MD   omeprazole (PRILOSEC) 20 MG delayed release capsule Take 1 capsule by mouth daily  Patient taking differently: Take 20 mg by mouth at bedtime  21  Yes Mlacom López MD   atorvastatin (LIPITOR) 40 MG tablet Take 1 tablet by mouth daily 21  Yes Malcom López MD   magnesium gluconate (MAGONATE) 500 MG tablet Take 500 mg by mouth at bedtime    Yes Historical Provider, MD   COMBIGAN 0.2-0.5 % ophthalmic solution INSTILL 1 DROP INTO THE RIGHT EYE TWO TIMES A DAY. 21  Yes Historical Provider, MD   latanoprost (XALATAN) 0.005 % ophthalmic solution INSTILL 1 DROP IN RIGHT EYE EVERY DAY AT BEDTIME 21  Yes Historical Provider, MD   ERLEADA 60 MG TABS 60 mg Every 4 days 3/2/20  Yes Historical Provider, MD   meclizine (ANTIVERT) 25 MG tablet  - 1 by mouth every 6 hours as needed   Yes Historical Provider, MD   Multiple Vitamins-Minerals (THERAPEUTIC MULTIVITAMIN-MINERALS) tablet Take 1 tablet by mouth daily    Yes Historical Provider, MD   metoprolol tartrate (LOPRESSOR) 25 MG tablet Take 25 mg by mouth 2 times daily   Yes Historical Provider, MD   Coenzyme Q10 (COQ10) 50 MG CAPS Take 1 capsule by mouth daily    Yes Historical Provider, MD   aspirin 81 MG tablet Take 81 mg by mouth daily    Yes Historical Provider, MD   Cyanocobalamin (VITAMIN B 12 PO) Take  by mouth daily. sublingual  11  Yes Historical Provider, MD       Social History:     Social History     Tobacco Use    Smoking status: Former Smoker     Packs/day: 1.00     Years: 40.00     Pack years: 40.00     Types: Cigarettes     Start date: 5     Quit date: 10/1/1994     Years since quittin.5    Smokeless tobacco: Former User     Types: [facility] date:    Vaping Use    Vaping Use: Never used   Substance Use Topics    Alcohol use:  Yes     Alcohol/week: 1.0 standard drink     Types: 1 Cans of beer per week     Comment: rare    Drug use: No       Review of Systems:     No chest pain or palpitations  No SOB  No vertigo, lightheadedness or loss of consciousness  No falls, tripping or stumbling  No incontinence of bowels or bladder  No itching or bruising appreciated  No numbness, tingling or focal arm/leg weakness    ROS otherwise negative     Family History:     Family History   Problem Relation Age of Onset    Stroke Mother     Cancer Mother         pancreatic    Cancer Father         prostate    Stroke Father         History of Present Illness: This is a 82-year-old very healthy male gentleman who with a price in his early career and retire more than 25 years ago. Today he is here in the office accompanied by his wife. Reportedly patient had a lung cancer that is currently at stage I and planned to be treated with radiation therapy alone. He had a MRI brain to screen for any brain mets and an aneurysm was identified. This was subsequently followed up with a CTA. The CTA showed a significantly larger size of the aneurysm and hence he was referred here for a neuro endovascular consultation. Patient denies any recent increase in headaches however he reports that he had a history of a headache about 25 years ago and presented to [facility] reportedly the neurosurgeon evaluated him and transferred him to [facility] at that time the neurosurgeon did not say that he had an aneurysm however the fellow said that he suspected that he had an aneurysm in the middle of his head. When he went to [facility] they had diagnosed him with viral meningitis and treated him and his headache resolved. I do not have any of these records at this time and this is just a history from verbal history taking from the patient and wife.         Objective:   BP (!) 140/71   Pulse 65   Temp 97.2 °F (36.2 °C) (Temporal)   Ht 5' 8\" (1.727 m)   Wt 224 lb (101.6 kg)   SpO2 96%   BMI 34.06 kg/m²      General appearance: alert, appears stated age and cooperative  Head: Normocephalic, without obvious abnormality, atraumatic  Neck: no adenopathy, no carotid bruit and limited ROM  Lungs: clear to auscultation bilaterally  Heart: regular rate and rhythm  Extremities: no cyanosis or edema  Pulses: 2+ and symmetric  Skin: no rashes or lesions     Constitutional: Well developed, well nourished and in no acute distress. Respiratory: Clear to auscultation bilaterally with no use of accessory muscles during respiration. Cardiovascular:  No murmurs auscultated. Carotid arteries without bruits. Pedal pulses and radial pulses 2+ bilaterally. No edema in all four extremities. Mental Status:    Alert and oriented to person, place, and time. Recent and remote memory: Intact  Attention and concentration: Intact  Speech and language : Intact  Fund of knowledge: Intact  Judgement and Insight: Intact    Cranial Nerves:     II: Visual Fields: Full to confrontation bilaterally   III, IV, VI: Pupils: equally round and reactive to light, 3  to 2  mm bilaterally. EOMs: full with no nystagmus. V: Sensation intact to light touch and pin prick sensation bilaterally  VII: symmetric and strong in the upper and lower face bilaterally  VIII: Hearing intact to finger rub bilaterally   IX,X: Palate elevates symmetrically. XI: head turn (sternocleidomastoid) and shoulder shrug (trapezius) 5/5 bilaterally   XII: Tongue is midline upon protrusion    Motor:   Normal tone and bulk. Normal fine finger movements. No pronator drift. No resting tremors, postural tremors or myoclonus.     Upper Extremity Right Left  Lower Extremity Right Left  Deltoid              5 5      Hip Flexion             5 5  Biceps              5 5   Hip Extension             5 5  Triceps                        5 5   Hip Adduction             5 5  Wrist Extension 5 5   Knee Extension 5 5  Wrist Flexion             5 5                Knee Flexion 5 5  Index Finger Flexion 5 5  Ankle Dorsiflexion 5 5  Finger Extension 5 5  Ankle Plantarflexion 5 5  APB                        5 5   Extensor Hallucis Longus 5 5       Deep Tendon Reflexes: Right Left  triceps              2 2  biceps              2 2  brachioradialis  2 2  knee jerk  2 2  ankle jerk  2  2  ankle clonus none none  toes      down down    Sensory:  Intact light touch and pinprick in upper and lower extremities bilaterally. Intact proprioception and vibration sense in upper and lower extremities bilaterally. Coordination:   Alternating hand and foot movements intact in the UE and LE bilaterally  Finger-to-nose and Heel-to-shin with no ataxia or intention tremor bilaterally    Gait/Station:   Patient rises from a seated position without assistance. Romberg's test negative. Gait pattern is without hesitation, a wide-base, and of normal stride length. Heel, toe and tandem walking are intact.     Laboratory/Radiology:     CBC:   Lab Results   Component Value Date    WBC 4.5 02/21/2022    RBC 4.03 02/21/2022    HGB 12.1 02/21/2022    HCT 35.5 02/21/2022    MCV 88.1 02/21/2022    MCH 30.0 02/21/2022    MCHC 34.1 02/21/2022    RDW 13.8 02/21/2022     02/21/2022    MPV 10.0 02/21/2022     CBC with Differential:    Lab Results   Component Value Date    WBC 4.5 02/21/2022    RBC 4.03 02/21/2022    HGB 12.1 02/21/2022    HCT 35.5 02/21/2022     02/21/2022    MCV 88.1 02/21/2022    MCH 30.0 02/21/2022    MCHC 34.1 02/21/2022    RDW 13.8 02/21/2022    SEGSPCT 77 03/24/2011    METASPCT 1.0 01/21/2021    LYMPHOPCT 26.1 02/21/2022    MONOPCT 6.9 02/21/2022    BASOPCT 0.4 02/21/2022    MONOSABS 0.31 02/21/2022    LYMPHSABS 1.17 02/21/2022    EOSABS 0.09 02/21/2022    BASOSABS 0.02 02/21/2022     Platelets:    Lab Results   Component Value Date     02/21/2022     Hemoglobin/Hematocrit:    Lab Results   Component Value Date    HGB 12.1 02/21/2022    HCT 35.5 02/21/2022     CMP:    Lab Results   Component Value Date     02/21/2022    K 4.5 02/21/2022    K 4.4 01/16/2021     02/21/2022    CO2 23 02/21/2022    BUN 20 02/21/2022    CREATININE 1.4 02/21/2022 GFRAA 58 02/21/2022    LABGLOM 48 02/21/2022    GLUCOSE 117 02/21/2022    GLUCOSE 113 03/28/2011    PROT 7.2 02/21/2022    LABALBU 4.5 02/21/2022    LABALBU 2.9 03/26/2011    CALCIUM 10.1 02/21/2022    BILITOT 0.8 02/21/2022    ALKPHOS 50 02/21/2022    AST 26 02/21/2022    ALT 20 02/21/2022       I independently reviewed the labs and imaging studies today. Assessment:     Encounter Diagnosis   Name Primary?  Cerebral aneurysm Yes           Plan:     1. I suggest a catheter angiogram to evaluate the suspected intracranial aneursym Angiogram will be useful to discuss treatment options and risk of bleeding. We covered the risks of careful observation vs. Endovascular treatment. The patient was given a concise synopsis of the relevant studies and literature supporting both medical and surgical approaches. I then deedee illustrations to further explain the various therapy options and potential risks of each of these. All questions were answered to the patients satisfaction. Finally, I recommended that the patient should contemplate what we had discussed, talk the situation over with others (physicians, family, friends). Patient at this time reports-he is very healthy and he wants to live to 10 years old and spend time with his grandkids and hence does not want to risk a subarachnoid hemorrhage he has already looked this up along with his wife and they know the outcomes if an aneurysm ruptures. They wish to get this treated. I have told him that this would be a two-step process 1 would be a diagnostic angiogram for treatment planning and to ensure that the aneurysm can be safely treated. The second would be the treatment itself. Patient and wife verbalized understanding and wished to proceed with treatment.     2. Pt was educated on the warning signs of stroke and voiced understanding that if these symptoms were to occur the correct action would be to seek immediate medical attention at the nearest Emergency room.           Raymond Jin MD  11:47 AM  4/29/2022    I spent 45 minutes with the patient, with 50% or more counseling them on their diagnosis, diagnostic workup and treatment options.

## 2022-05-03 ENCOUNTER — TELEPHONE (OUTPATIENT)
Dept: NEUROLOGY | Age: 87
End: 2022-05-03

## 2022-05-03 NOTE — TELEPHONE ENCOUNTER
Called [facility] and spoke to [employer]. No prior auth required for procedure code 47607. Call reference number is [id number] [id number].

## 2022-05-04 DIAGNOSIS — K21.9 GASTROESOPHAGEAL REFLUX DISEASE WITHOUT ESOPHAGITIS: ICD-10-CM

## 2022-05-04 RX ORDER — OMEPRAZOLE 20 MG/1
20 CAPSULE, DELAYED RELEASE ORAL DAILY
Qty: 90 CAPSULE | Refills: 3 | Status: SHIPPED | OUTPATIENT
Start: 2022-05-04

## 2022-05-04 NOTE — PROGRESS NOTES
[facility]  [address]  [facility]    Follow up Note      Ann Moreno     Date of Visit:  5/5/2022    CC:  Patient presents for follow up   Chief Complaint   Patient presents with    Follow-up     GROIN      HPI:    Pain is worse, having left foot neuropathy, intermittent, longstanding in nature but currently bothersome mostly at night     Change in quality of symptoms:yes - as above    Medication side effects:none. Recent diagnostic testing:none. Recent interventional procedures:none. He has been on anticoagulation medications to include ASA and has not been on herbal supplements.  He is not diabetic.     Imaging:   3/2022 lumbar MRI -     FINDINGS:   BONES/ALIGNMENT: There is normal alignment of the spine.  The vertebral body   heights are maintained.  T1 and T2 hypointense signal in the anterior S1   vertebral body does not demonstrate enhancement and likely represents a   treated metastasis.  Additional metastases are seen the right lateral sacral   ala, at the level of S2, as well as in the right posterior iliac.  None   demonstrate any enhancement.       SPINAL CORD:  The conus terminates normally.       SOFT TISSUES: No paraspinal mass identified.       L1-L2: There is no significant disc protrusion, spinal canal stenosis or   neural foraminal narrowing.       L2-L3: Moderate loss of disc height with a small disc bulge.  Mild facet and   ligamentous hypertrophy.  No significant central canal or lateral recess   stenosis.  Mild-to-moderate right and mild left neural foraminal stenoses.       L3-L4: Mild loss of disc height with a small disc bulge.  Mild facet and   ligamentous hypertrophy.  Fluid in the bilateral facet joints.  Mild central   canal and lateral recess stenoses.  Moderate neural foraminal stenoses.       L4-L5: Severe loss of disc height.  No significant disc herniation Mild facet   and ligamentous hypertrophy.  No significant central canal or lateral recess   stenosis.  Moderate neural foraminal stenoses.       L5-S1: Grade 1 anterolisthesis of L5 over S1 by approximately 7 mm. Bilateral L5 spondylolysis.  No central canal stenosis.  Mild lateral recess   stenoses.  Severe neural foraminal stenoses.           Impression   1. Stable, treated, nonenhancing metastases S1 and S2 vertebra, as well as   the right iliac bone. 2. No fracture or marrow edema. 3. Bilateral L5 spondylolysis with grade 1 spondylolisthesis of L5 over S1 by   approximately 7 mm. 4. Mild central canal stenosis at L3-4. 5.  Multilevel neural foraminal stenoses, worst (severe) at L5-S1.     3/2022 MRI pelvis -  Appearance of the left proximal femur is suspicious for nondisplaced   pathologic fracture.       Focal lesion within the right sacral ala may demonstrate minimal enhancement   and could represent a healing treated metastatic lesion.  Evaluation is   limited secondary to metallic artifact.  Attention on follow-up recommended.       Scattered, treated metastasis throughout the bony pelvis and left proximal   femur without discrete enhancement.         Lumbar MRI 4/2020 -  1. Metastatic lesions are noted in the right iliac bone and first   sacral body, but are not associated with neural encroachment. Other   metastases previously documented in the pelvis are not included on   this exam. Metastatic foci do enhance. 2. There is grade 1 anterolisthesis of L5 on S1 and loss of the L4-5   disc space. 3. Lateral portions of the circumferential disc bulge at the L5-S1   level could encroach on the L5 nerve roots lateral to the foramina on   either side.    4. Telescoping of facets and disc bulging results in severe central   stenosis of the right-sided L4 and L5 nerve roots and moderately   severe stenosis of the left-sided L2-L5 root levels, more severe   inferiorly.      Lumbar MRI 7/2019 -   IMPRESSION:    Bone lesions at S1, S2 and in the right iliac wing worrisome for a marrow infiltrative process.  Recommend clinical correlation and correlation   with nuclear medicine bone scanning. Dextroconvex scoliosis and multilevel malalignment associated with   multilevel spondylosis as detailed. L2-3 level, disc bulging with mild spinal canal stenosis and mild to   moderate right foraminal stenosis. L3-4 level, disc bulging with mild spinal canal stenosis and mild to   moderate left foraminal stenosis. L4-5 level, disc degeneration and disc osteophyte complex with mild to   moderate foraminal stenosis. L5-S1 level, grade 1 anterolisthesis from bilateral L5 pars defects and   degenerative disc disease with moderate to severe foraminal stenosis and   impingement of the L5 nerve roots. Anatomic Thoracic/Lumbar Variant: None.  L4-5 is considered the level of   the iliac crest and assume there are 5 lumbar-type vertebrae. There are hepatic and renal cysts.  Recommend correlation with ultrasound.     Cervical MRI 7/2019 -   IMPRESSION:    10 mm low signal intensity lesion at the right side of the C6 vertebral   body and 7 mm signal intensity lesion at the base of C2 are worrisome for   a marrow infiltrative process such as osteoblastic metastasis.  Clinical   correlation and correlation with nuclear medicine bone scanning   recommended for further evaluation. Levoconvex scoliosis and multilevel malalignment associated multilevel   spondylosis most notably moderate to advanced degenerative disc disease   at C6-7 and mild to moderate degenerative disc disease at C5-6 and C4-5. There is no substantial spinal canal stenosis. There is multilevel acquired foraminal stenosis most notably moderate to   severe left and moderate right foraminal stenosis at C5-6. Anatomic Variant:  None.  Assume 7 cervical vertebrae with counting from   the craniocervical junction.                                         Potential Aberrant Drug-Related Behavior:  no     Urine Drug Screenin2020 consistent     OARRS report:  2020-2022 consistent    Opioid Agreement:  Date enacted:  2020  Renewal date:    Past Medical History:   Diagnosis Date    CA prostate, adenoca ([facility] [address] 2019    CAD (coronary artery disease)     Dr. He[facility] Dukes [facility])     prostate- PO chemotherapy     Chronic bilateral low back pain without sciatica 3/6/2020    Cobalamin deficiency     Glaucoma     Right eye    Hyperlipidemia     Hypertension     Osteoarthritis     Osteochondropathy     Osteopenia     Pain in lower limb     Peripheral venous insufficiency     PONV (postoperative nausea and vomiting)     Prolonged emergence from general anesthesia     PVD (peripheral vascular disease) with claudication ([facility] [address] 2019       Past Surgical History:   Procedure Laterality Date    ACETABULUM FRACTURE SURGERY  3/24/11    ANESTHESIA NERVE BLOCK Bilateral 2020    BILATERAL L3 L4 L5 MEDIAL NERVE BRANCH BLOCK performed by Krysta Nash DO at [address] N/A 2022    BRONCHOSCOPY W/EBUS FNA performed by Shannan Sullivan MD at [address] [facility]  2022    BRONCHOSCOPY DIAGNOSTIC OR CELL [address] ONLY performed by Shannan Sullivan MD at [address] [place] [address]     [address] GRAFT  Dec. 1996    [facility]/ Dr. Yared Romero  2022    CT NEEDLE BIOPSY LUNG PERCUTANEOUS 2022 Camden Dee MD [facility] CT    ENDOSCOPY, COLON, DIAGNOSTIC  2016    EYE SURGERY Bilateral 2018    cataract, glaucoma -left eye    FEMUR FRACTURE SURGERY  1981    left   [address]    JOINT REPLACEMENT      bilat knee , righ thip 2011     LYMPH NODE DISSECTION  10/2007    PAIN MANAGEMENT PROCEDURE N/A 2020    CAUDAL EPIDURAL STEROID INJECTION performed by Krysta Nash DO at [address] [facility]  20    PAIN MANAGEMENT PROCEDURE Right 8/4/2020    RIGHT L3 4 5 MEDIAL BRANCH RADIOFREQUENCY ABLATION performed by Porfirio Hughes DO at [address] [facility] [address] Left 9/10/2020    LEFT L3 4 5 MEDIAL BRANCH RADIOFREQUENCY ABLATION     +++IODINE ALLERGY+++   CPT: [id number] performed by Porfirio Hughes DO at [address] [facility] Bilateral 3/25/2021    BILATERAL L3,4,5 MEDIAL NERVE BRANCH RADIOFREQUENCY ABLATION performed by Porfirio Hughes DO at [address] [facility]  10/31/07    [address] M[address]D./ DA LETICIA LAPAROSCOPIC PROSTATECTOMY    TESTICLE REMOVAL Bilateral 02/2019    hx prostate ca-    TONSILLECTOMY      TOTAL HIP ARTHROPLASTY  11/30/11    right    TOTAL KNEE ARTHROPLASTY  2010    bilateral       Prior to Admission medications    Medication Sig Start Date End Date Taking? Authorizing Provider   calcium carbonate (OSCAL) 500 MG TABS tablet Take 500 mg by mouth daily   Yes Historical Provider, MD   denosumab (XGEVA) 120 MG/1.7ML SOLN SC injection Inject 120 mg into the skin once   Yes Historical Provider, MD   omeprazole (PRILOSEC) 20 MG delayed release capsule Take 1 capsule by mouth daily 5/4/22  Yes Chula Zarate MD   HYDROcodone-acetaminophen (NORCO) 5-325 MG per tablet Take 1 tablet by mouth daily as needed for Pain for up to 30 days. Intended supply: 3 days.  Take lowest dose possible to manage pain 4/5/22 5/5/22 Yes Porfirio Hughes DO   Vitamin D, Cholecalciferol, 50 MCG (2000 UT) CAPS Take 2,000 Units by mouth daily 1/1/18  Yes Historical Provider, MD   omega-3 acid ethyl esters (LOVAZA) 1 g capsule Take 1 capsule by mouth 2 times daily 11/30/21  Yes Chula Zarate MD   Handicap Placard MISC by Does not apply route Duration: 5 years 11/4/21  Yes Chula Zarate MD   lisinopril (PRINIVIL;ZESTRIL) 10 MG tablet Take 1 tablet by mouth daily 8/31/21  Yes Chula Zarate MD   atorvastatin (LIPITOR) 40 MG tablet Take 1 tablet by mouth daily 7/16/21  Yes Chula Zarate MD   magnesium gluconate (MAGONATE) 500 MG tablet Take 500 mg by mouth at bedtime    Yes Historical Provider, MD   COMBIGAN 0.2-0.5 % ophthalmic solution INSTILL 1 DROP INTO THE RIGHT EYE TWO TIMES A DAY. 21  Yes Historical Provider, MD   latanoprost (XALATAN) 0.005 % ophthalmic solution INSTILL 1 DROP IN RIGHT EYE EVERY DAY AT BEDTIME 21  Yes Historical Provider, MD   ERLEADA 60 MG TABS 60 mg Every 4 days 3/2/20  Yes Historical Provider, MD   meclizine (ANTIVERT) 25 MG tablet 1 - 1 by mouth every 6 hours as needed   Yes Historical Provider, MD   Multiple Vitamins-Minerals (THERAPEUTIC MULTIVITAMIN-MINERALS) tablet Take 1 tablet by mouth daily    Yes Historical Provider, MD   metoprolol tartrate (LOPRESSOR) 25 MG tablet Take 25 mg by mouth 2 times daily   Yes Historical Provider, MD   Coenzyme Q10 (COQ10) 50 MG CAPS Take 1 capsule by mouth daily    Yes Historical Provider, MD   aspirin 81 MG tablet Take 81 mg by mouth daily    Yes Historical Provider, MD   Cyanocobalamin (VITAMIN B 12 PO) Take  by mouth daily. sublingual  11  Yes Historical Provider, MD       No Known Allergies    Social History     Socioeconomic History    Marital status:      Spouse name: Not on file    Number of children: 3    Years of education: Not on file    Highest education level: Not on file   Occupational History    Not on file   Tobacco Use    Smoking status: Former Smoker     Packs/day: 1.00     Years: 40.00     Pack years: 40.00     Types: Cigarettes     Start date: 5     Quit date: 10/1/1994     Years since quittin.6    Smokeless tobacco: Former User     Types: [address] date:    Vaping Use    Vaping Use: Never used   Substance and Sexual Activity    Alcohol use:  Yes     Alcohol/week: 1.0 standard drink     Types: 1 Cans of beer per week     Comment: rare    Drug use: No    Sexual activity: Not on file   Other Topics Concern    Not on file   Social History Narrative    Problem List: History of polyp of colon, Disorder of bone density and structure, unspecified,    Osteochondropathy, Carcinoma in situ of prostate, Aneurysm of thoracic aorta, Anemia, Coronary    arteriosclerosis, Cobalamin deficiency, Multiple closed fractures of pelvis with disruption of pelvic [place],    Hyperlipidemia, Essential hypertension    Health Maintenance:    Bone Density Test Screening - (3/5/2019)    Bone Density Scan - (12/18/2015)    Influenza Vaccination - (10/7/2016)    Colonoscopy - (4/3/2012)    Couseled on Home Safety - (11/23/2015)    Colonoscopy Screening - (4/3/2012)    US Liver - 8/1/08    Medical Problems:    Coronary Artery Disease (CAD), Hypertension, Hyperlipidemia, Prostate Cancer    Surgical Hx:    Prostatectomy, Coronary Artery Bypass Graft (CABG)            FH: Father:    . (Hx)    Mother:    . (Hx)        SH: Marital:  - retired . Personal Habits: Cigarette Use: former cigarette smoker, Nonsmoker. Alcohol: Occasionally    consumes alcohol. Social Determinants of Health     Financial Resource Strain: Low Risk     Difficulty of Paying Living Expenses: Not hard at all   Food Insecurity: No Food Insecurity    Worried About Running Out of Food in the Last Year: Never true    Deondre of Food in the Last Year: Never true   Transportation Needs:     Lack of Transportation (Medical): Not on file    Lack of Transportation (Non-Medical):  Not on file   Physical Activity:     Days of Exercise per Week: Not on file    Minutes of Exercise per Session: Not on file   Stress:     Feeling of Stress : Not on file   Social Connections:     Frequency of Communication with Friends and Family: Not on file    Frequency of Social Gatherings with Friends and Family: Not on file    Attends Gnosticism Services: Not on file    Active Member of Clubs or Organizations: Not on file    Attends Club or Organization Meetings: Not on file    Marital Status: Not on file   Intimate Partner Violence:     Fear of Current or Ex-Partner: Not on file    Emotionally Abused: Not on file    Physically Abused: Not on file    Sexually Abused: Not on file   Housing Stability:     Unable to Pay for Housing in the Last Year: Not on file    Number of Places Lived in the Last Year: Not on file    Unstable Housing in the Last Year: Not on file       Family History   Problem Relation Age of Onset    Stroke Mother     Cancer Mother         pancreatic    Cancer Father         prostate    Stroke Father        REVIEW OF SYSTEMS:     Marely Harvey denies fever/chills, chest pain, shortness of breath, new bowel or bladder complaints. All other review of systems was negative. PHYSICAL EXAMINATION:      /68   Pulse 64   Temp 97.6 °F (36.4 °C) (Infrared)   Resp 16   Ht 5' 8\" (1.727 m)   Wt 224 lb (101.6 kg)   SpO2 93%   BMI 34.06 kg/m²     General:      General appearance:pleasant and well-hydrated, in no discomfort and A & O x3  Build:Normal Weight    HEENT:    Head:normocephalic and atraumatic  Sclera: icterus absent    Lungs:    Breathing:Normal expansion. No accessory muscle use. Abdomen:    Shape:non-distended and normal    Lumbar spine:    Tenderness:- bilateral paraspinals  Range of motion:normal in lateral bending, flexion, extension rotation bilateral and is not painful. Extremities:    Tremors:None bilaterally upper and lower  Range of motion:pain with internal rotation of left hips negative.   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 Roe Morales  Referred by Dr. Gemma Topete for consideration for further injections as  [facility] no longer takes his insurance  Pt's wife states she has discussed his LE symptoms and they have determined they feel his LE weakness is a vascular issue, pt states he can stand still     He developed L hip/groin pain 10/2021 when getting out of the car after having pulled into the garage too close to the wall  3/2022 MRI pelvis w/ and w/o shows concern for pathologic fracture, moderate L hip OA  Saw Dr. Feliberto Harris for eval of L hip pain  His pain has been gradually improving over time, at one time was on a walker but is not any longer requiring the gait assistance  Pain is worse upon first standing and with walking, no pain with sitting  Lumbar symptoms controlled at this time although having increased L foot neuropathic symptoms  Recently started Xgeva for bone strength  Dx with cerebral aneurysm, pending CTA and coiling                 Plan:    UDS next visit   OARRS reviewed  RF [place] 5/325 #30  Start pregabalin 25 mg titration  Pt is not a candidate for L hip injection due to pathologic fracture  Caudal EVGENY done with 60% relief of lumbar symptoms x 2-3 days until he did yardwork              b/l L3,4,5 RFA with near 100% relief                 We discussed with the patient that combining opioids, benzodiazepines, alcohol, illicit drugs or sleep aids increases the risk of respiratory depression including death. We discussed that these medications may cause drowsiness, sedation or dizziness and have counseled the patient not to drive or operate machinery. We have discussed that these medications will be prescribed only by one provider. We have discussed with the patient about age related risk factors and have thoroughly discussed the importance of taking these medications as prescribed. The patient verbalizes understanding.     Cc: Referring physician

## 2022-05-05 ENCOUNTER — OFFICE VISIT (OUTPATIENT)
Dept: PAIN MANAGEMENT | Age: 87
End: 2022-05-05
Payer: MEDICARE

## 2022-05-05 VITALS
TEMPERATURE: 97.6 F | RESPIRATION RATE: 16 BRPM | HEIGHT: 68 IN | BODY MASS INDEX: 33.95 KG/M2 | HEART RATE: 64 BPM | WEIGHT: 224 LBS | OXYGEN SATURATION: 93 % | DIASTOLIC BLOOD PRESSURE: 68 MMHG | SYSTOLIC BLOOD PRESSURE: 126 MMHG

## 2022-05-05 DIAGNOSIS — M25.552 LEFT HIP PAIN: ICD-10-CM

## 2022-05-05 DIAGNOSIS — C61 PROSTATE CANCER METASTATIC TO BONE (HCC): ICD-10-CM

## 2022-05-05 DIAGNOSIS — M84.452A: ICD-10-CM

## 2022-05-05 DIAGNOSIS — M54.16 LUMBAR RADICULOPATHY: ICD-10-CM

## 2022-05-05 DIAGNOSIS — C79.51 PROSTATE CANCER METASTATIC TO BONE (HCC): ICD-10-CM

## 2022-05-05 DIAGNOSIS — G89.4 CHRONIC PAIN SYNDROME: Primary | ICD-10-CM

## 2022-05-05 PROCEDURE — 1036F TOBACCO NON-USER: CPT | Performed by: PAIN MEDICINE

## 2022-05-05 PROCEDURE — 99213 OFFICE O/P EST LOW 20 MIN: CPT | Performed by: PAIN MEDICINE

## 2022-05-05 PROCEDURE — 1123F ACP DISCUSS/DSCN MKR DOCD: CPT | Performed by: PAIN MEDICINE

## 2022-05-05 PROCEDURE — 99203 OFFICE O/P NEW LOW 30 MIN: CPT | Performed by: PAIN MEDICINE

## 2022-05-05 PROCEDURE — G8417 CALC BMI ABV UP PARAM F/U: HCPCS | Performed by: PAIN MEDICINE

## 2022-05-05 PROCEDURE — 4040F PNEUMOC VAC/ADMIN/RCVD: CPT | Performed by: PAIN MEDICINE

## 2022-05-05 PROCEDURE — G8427 DOCREV CUR MEDS BY ELIG CLIN: HCPCS | Performed by: PAIN MEDICINE

## 2022-05-05 RX ORDER — PREGABALIN 25 MG/1
CAPSULE ORAL
Qty: 69 CAPSULE | Refills: 0 | Status: SHIPPED
Start: 2022-05-05 | End: 2022-06-07 | Stop reason: SDUPTHER

## 2022-05-05 RX ORDER — CALCIUM CARBONATE 500(1250)
500 TABLET ORAL DAILY
COMMUNITY

## 2022-05-05 RX ORDER — HYDROCODONE BITARTRATE AND ACETAMINOPHEN 5; 325 MG/1; MG/1
1 TABLET ORAL DAILY PRN
Qty: 30 TABLET | Refills: 0 | Status: SHIPPED | OUTPATIENT
Start: 2022-05-05 | End: 2022-06-04

## 2022-05-05 NOTE — PROGRESS NOTES
Do you currently have any of the following:    Fever: No  Headache:  No  Cough: No  Shortness of breath: No  Exposed to anyone with these symptoms: No         Mechelle Bender presents to the [facility] on 5/5/2022. Juan Roberts is complaining of pain IN THE GROIN. The pain is intermittent. The pain is described as dull. Pain is rated on his best day at a 2, on his worst day at a 10, and on average at a 5 on the VAS scale. He took his last dose of Norco last night. Any procedures since your last visit: No, with  % relief. Pacemaker or defibrillator: No managed by . He is not on NSAIDS and is  on anticoagulation medications to include ASA and is managed by Asmita Brnach MD  .     Medication Contract and Consent for Opioid Use Documents Filed     Patient Documents     Type of Document Status Date Received Received By Description    Medication Contract Received 3/6/2020 10:13 AM Laney TINAJERO KIMMY med contract                /68   Pulse 64   Temp 97.6 °F (36.4 °C) (Infrared)   Resp 16   Ht 5' 8\" (1.727 m)   Wt 224 lb (101.6 kg)   SpO2 93%   BMI 34.06 kg/m²      No LMP for male patient.

## 2022-05-17 ENCOUNTER — HOSPITAL ENCOUNTER (OUTPATIENT)
Dept: RADIATION ONCOLOGY | Age: 87
Discharge: HOME | End: 2022-05-17
Attending: RADIOLOGY
Payer: MEDICARE

## 2022-05-17 PROCEDURE — 77301 RADIOTHERAPY DOSE PLAN IMRT: CPT | Performed by: RADIOLOGY

## 2022-05-17 PROCEDURE — 77300 RADIATION THERAPY DOSE PLAN: CPT | Performed by: RADIOLOGY

## 2022-05-17 PROCEDURE — 77338 DESIGN MLC DEVICE FOR IMRT: CPT | Performed by: RADIOLOGY

## 2022-05-23 ENCOUNTER — HOSPITAL ENCOUNTER (OUTPATIENT)
Dept: RADIATION ONCOLOGY | Age: 87
Discharge: HOME | End: 2022-05-23
Payer: MEDICARE

## 2022-05-23 DIAGNOSIS — C34.90 MALIGNANT NEOPLASM OF LUNG, UNSPECIFIED LATERALITY, UNSPECIFIED PART OF LUNG (HCC): Primary | ICD-10-CM

## 2022-05-23 PROCEDURE — 77373 STRTCTC BDY RAD THER TX DLVR: CPT | Performed by: RADIOLOGY

## 2022-05-23 PROCEDURE — 99999 PR OFFICE/OUTPT VISIT,PROCEDURE ONLY: CPT | Performed by: RADIOLOGY

## 2022-05-23 NOTE — PATIENT INSTRUCTIONS
Cont SBRT      Huan Ramos. Lenka Hou MD [facility]  Oncology  Cell: [phone]    [facility]:  [phone]   FAX: [phone]  [facility]:  [address] [place] [address] Avenue:    [phone]  [address] [place] [address] Road:  [phone]   FAX:  [phone]  Email: [email or web address]

## 2022-05-23 NOTE — PROGRESS NOTES
Radiation Oncology          Mr.James JUAN Armenta underwent fractionated external beam radiation therapy using a SBRT / SABR technique. I was personally present for the treatment planning (+/- 4D CT, W/WO Ab compression) imaging and pt setup to ensure appropriate immobilization to meet current JOSE standards. After a detailed review of the treatment plan and appropriate physics QA / oversight this pt was scheduled and underwent hypo fractionated treatment as noted per the D/I in [facility]. Typical fractionation schemes include but are not limited to 5500 Gy in 3-5 fractions. The NCCN guidelines and pt workup including a detailed discussion of the risks, benefits and alternative were discussed previously [RTOG dose constraints met per plan as applicable- see Mosaiq]. The pt verbalized understanding for the risks of this procedure today prior to Tx. Today, after a dual identification time out (including a brief plan overview )- I personally reviewed the complex multifaceted immobilization apparatus W/WO compression +/- Synergy (PRN), patient position, and 4D imaging (if applicable) prior to the treatment delivery to ensure accuracy. The SBRT team, including myself, were all present for the set up CT scan and delivery of the fraction (the latter on a PRN basis). The patient successfully completed the procedure today in stable condition; this procedure was well tolerated today. Letty Bates has now received 1100 cGy in 1/5 fractions directed to the right lung cancer. Bonifacio Grullon.  Avis Bullard MD [facility]  On[facility]  Cell: [phone]    [facility]:  [phone]   FAX: [phone] [address] [facility] [address]:  [address] [place] [address] Avenue:    [phone]  [address] [place] [address] Road:  [address] [place] [address] Road:  [phone]

## 2022-05-24 ENCOUNTER — HOSPITAL ENCOUNTER (OUTPATIENT)
Age: 87
Discharge: HOME | End: 2022-05-24
Payer: MEDICARE

## 2022-05-24 DIAGNOSIS — R79.0 LOW MAGNESIUM LEVEL: ICD-10-CM

## 2022-05-24 DIAGNOSIS — N28.9 RENAL INSUFFICIENCY: ICD-10-CM

## 2022-05-24 DIAGNOSIS — R73.9 HYPERGLYCEMIA: ICD-10-CM

## 2022-05-24 DIAGNOSIS — E55.9 VITAMIN D DEFICIENCY: ICD-10-CM

## 2022-05-24 DIAGNOSIS — E53.8 VITAMIN B12 DEFICIENCY: ICD-10-CM

## 2022-05-24 DIAGNOSIS — D64.9 ANEMIA, UNSPECIFIED TYPE: ICD-10-CM

## 2022-05-24 DIAGNOSIS — E78.2 MIXED HYPERLIPIDEMIA: ICD-10-CM

## 2022-05-24 DIAGNOSIS — E03.2 HYPOTHYROIDISM DUE TO MEDICATION: ICD-10-CM

## 2022-05-24 LAB
ALBUMIN SERPL-MCNC: 4.8 G/DL (ref 3.5–5.2)
ALP BLD-CCNC: 51 U/L (ref 40–129)
ALT SERPL-CCNC: 31 U/L (ref 0–40)
ANION GAP SERPL CALCULATED.3IONS-SCNC: 11 MMOL/L (ref 7–16)
AST SERPL-CCNC: 30 U/L (ref 0–39)
BASOPHILS ABSOLUTE: 0.02 E9/L (ref 0–0.2)
BASOPHILS RELATIVE PERCENT: 0.5 % (ref 0–2)
BILIRUB SERPL-MCNC: 0.5 MG/DL (ref 0–1.2)
BILIRUBIN URINE: NEGATIVE
BLOOD, URINE: NEGATIVE
BUN BLDV-MCNC: 25 MG/DL (ref 6–23)
CALCIUM SERPL-MCNC: 10 MG/DL (ref 8.6–10.2)
CHLORIDE BLD-SCNC: 105 MMOL/L (ref 98–107)
CHOLESTEROL, TOTAL: 150 MG/DL (ref 0–199)
CLARITY: CLEAR
CO2: 27 MMOL/L (ref 22–29)
COLOR: YELLOW
CREAT SERPL-MCNC: 1.4 MG/DL (ref 0.7–1.2)
CREATININE URINE: 171 MG/DL (ref 40–278)
EOSINOPHILS ABSOLUTE: 0.09 E9/L (ref 0.05–0.5)
EOSINOPHILS RELATIVE PERCENT: 2.1 % (ref 0–6)
FOLATE: 14.4 NG/ML (ref 4.8–24.2)
GFR AFRICAN AMERICAN: 58
GFR NON-AFRICAN AMERICAN: 48 ML/MIN/1.73
GLUCOSE BLD-MCNC: 111 MG/DL (ref 74–99)
GLUCOSE URINE: NEGATIVE MG/DL
HCT VFR BLD CALC: 34.8 % (ref 37–54)
HDLC SERPL-MCNC: 46 MG/DL
HEMOGLOBIN: 12.2 G/DL (ref 12.5–16.5)
IMMATURE GRANULOCYTES #: 0.05 E9/L
IMMATURE GRANULOCYTES %: 1.2 % (ref 0–5)
KETONES, URINE: NEGATIVE MG/DL
LDL CHOLESTEROL CALCULATED: 46 MG/DL (ref 0–99)
LEUKOCYTE ESTERASE, URINE: NEGATIVE
LYMPHOCYTES ABSOLUTE: 0.94 E9/L (ref 1.5–4)
LYMPHOCYTES RELATIVE PERCENT: 21.8 % (ref 20–42)
MAGNESIUM: 1.9 MG/DL (ref 1.6–2.6)
MCH RBC QN AUTO: 30.7 PG (ref 26–35)
MCHC RBC AUTO-ENTMCNC: 35.1 % (ref 32–34.5)
MCV RBC AUTO: 87.7 FL (ref 80–99.9)
MICROALBUMIN UR-MCNC: 13.7 MG/L
MICROALBUMIN/CREAT UR-RTO: 8 (ref 0–30)
MONOCYTES ABSOLUTE: 0.32 E9/L (ref 0.1–0.95)
MONOCYTES RELATIVE PERCENT: 7.4 % (ref 2–12)
NEUTROPHILS ABSOLUTE: 2.9 E9/L (ref 1.8–7.3)
NEUTROPHILS RELATIVE PERCENT: 67 % (ref 43–80)
NITRITE, URINE: NEGATIVE
PDW BLD-RTO: 13.8 FL (ref 11.5–15)
PH UA: 5.5 (ref 5–9)
PLATELET # BLD: 138 E9/L (ref 130–450)
PMV BLD AUTO: 9.9 FL (ref 7–12)
POTASSIUM SERPL-SCNC: 4.7 MMOL/L (ref 3.5–5)
PROTEIN UA: NEGATIVE MG/DL
RBC # BLD: 3.97 E12/L (ref 3.8–5.8)
SODIUM BLD-SCNC: 143 MMOL/L (ref 132–146)
SPECIFIC GRAVITY UA: 1.02 (ref 1–1.03)
T4 FREE: 1.09 NG/DL (ref 0.93–1.7)
TOTAL CK: 104 U/L (ref 20–200)
TOTAL PROTEIN: 7.2 G/DL (ref 6.4–8.3)
TRIGL SERPL-MCNC: 292 MG/DL (ref 0–149)
TSH SERPL DL<=0.05 MIU/L-ACNC: 3.41 UIU/ML (ref 0.27–4.2)
UROBILINOGEN, URINE: 0.2 E.U./DL
VITAMIN B-12: 1217 PG/ML (ref 211–946)
VITAMIN D 25-HYDROXY: 47 NG/ML (ref 30–100)
VLDLC SERPL CALC-MCNC: 58 MG/DL
WBC # BLD: 4.3 E9/L (ref 4.5–11.5)

## 2022-05-24 PROCEDURE — 81003 URINALYSIS AUTO W/O SCOPE: CPT

## 2022-05-24 PROCEDURE — 80053 COMPREHEN METABOLIC PANEL: CPT

## 2022-05-24 PROCEDURE — 82550 ASSAY OF CK (CPK): CPT

## 2022-05-24 PROCEDURE — 85025 COMPLETE CBC W/AUTO DIFF WBC: CPT

## 2022-05-24 PROCEDURE — 84443 ASSAY THYROID STIM HORMONE: CPT

## 2022-05-24 PROCEDURE — 84439 ASSAY OF FREE THYROXINE: CPT

## 2022-05-24 PROCEDURE — 80061 LIPID PANEL: CPT

## 2022-05-24 PROCEDURE — 83735 ASSAY OF MAGNESIUM: CPT

## 2022-05-24 PROCEDURE — 82746 ASSAY OF FOLIC ACID SERUM: CPT

## 2022-05-24 PROCEDURE — 36415 COLL VENOUS BLD VENIPUNCTURE: CPT

## 2022-05-24 PROCEDURE — 82570 ASSAY OF URINE CREATININE: CPT

## 2022-05-24 PROCEDURE — 82044 UR ALBUMIN SEMIQUANTITATIVE: CPT

## 2022-05-24 PROCEDURE — 82607 VITAMIN B-12: CPT

## 2022-05-24 PROCEDURE — 82306 VITAMIN D 25 HYDROXY: CPT

## 2022-05-25 ENCOUNTER — HOSPITAL ENCOUNTER (OUTPATIENT)
Dept: INTERVENTIONAL RADIOLOGY/VASCULAR | Age: 87
Discharge: HOME | End: 2022-05-27
Payer: MEDICARE

## 2022-05-25 ENCOUNTER — APPOINTMENT (OUTPATIENT)
Dept: RADIATION ONCOLOGY | Age: 87
End: 2022-05-25
Attending: RADIOLOGY
Payer: MEDICARE

## 2022-05-25 VITALS
HEART RATE: 68 BPM | DIASTOLIC BLOOD PRESSURE: 59 MMHG | SYSTOLIC BLOOD PRESSURE: 126 MMHG | WEIGHT: 224 LBS | OXYGEN SATURATION: 95 % | BODY MASS INDEX: 34.06 KG/M2 | RESPIRATION RATE: 20 BRPM | TEMPERATURE: 98 F

## 2022-05-25 DIAGNOSIS — I67.1 CEREBRAL ANEURYSM, NONRUPTURED: ICD-10-CM

## 2022-05-25 DIAGNOSIS — I66.9 CEREBRAL ARTERY OCCLUSION: ICD-10-CM

## 2022-05-25 LAB
INR BLD: 0.9
PROTHROMBIN TIME: 10.3 SEC (ref 9.3–12.4)

## 2022-05-25 PROCEDURE — 6360000002 HC RX W HCPCS: Performed by: PSYCHIATRY & NEUROLOGY

## 2022-05-25 PROCEDURE — 6360000004 HC RX CONTRAST MEDICATION: Performed by: PSYCHIATRY & NEUROLOGY

## 2022-05-25 PROCEDURE — C1769 GUIDE WIRE: HCPCS

## 2022-05-25 PROCEDURE — 36225 PLACE CATH SUBCLAVIAN ART: CPT | Performed by: PSYCHIATRY & NEUROLOGY

## 2022-05-25 PROCEDURE — 36415 COLL VENOUS BLD VENIPUNCTURE: CPT

## 2022-05-25 PROCEDURE — 2500000003 HC RX 250 WO HCPCS: Performed by: PSYCHIATRY & NEUROLOGY

## 2022-05-25 PROCEDURE — 76377 3D RENDER W/INTRP POSTPROCES: CPT | Performed by: PSYCHIATRY & NEUROLOGY

## 2022-05-25 PROCEDURE — 36225 PLACE CATH SUBCLAVIAN ART: CPT

## 2022-05-25 PROCEDURE — 7100000011 HC PHASE II RECOVERY - ADDTL 15 MIN

## 2022-05-25 PROCEDURE — 76377 3D RENDER W/INTRP POSTPROCES: CPT

## 2022-05-25 PROCEDURE — 7100000010 HC PHASE II RECOVERY - FIRST 15 MIN

## 2022-05-25 PROCEDURE — 36223 PLACE CATH CAROTID/INOM ART: CPT

## 2022-05-25 PROCEDURE — 36223 PLACE CATH CAROTID/INOM ART: CPT | Performed by: PSYCHIATRY & NEUROLOGY

## 2022-05-25 PROCEDURE — 85610 PROTHROMBIN TIME: CPT

## 2022-05-25 RX ORDER — FENTANYL CITRATE 50 UG/ML
INJECTION, SOLUTION INTRAMUSCULAR; INTRAVENOUS
Status: COMPLETED | OUTPATIENT
Start: 2022-05-25 | End: 2022-05-25

## 2022-05-25 RX ORDER — LIDOCAINE HYDROCHLORIDE 20 MG/ML
INJECTION, SOLUTION INFILTRATION; PERINEURAL
Status: COMPLETED | OUTPATIENT
Start: 2022-05-25 | End: 2022-05-25

## 2022-05-25 RX ORDER — MIDAZOLAM HYDROCHLORIDE 2 MG/2ML
INJECTION, SOLUTION INTRAMUSCULAR; INTRAVENOUS
Status: COMPLETED | OUTPATIENT
Start: 2022-05-25 | End: 2022-05-25

## 2022-05-25 RX ADMIN — MIDAZOLAM HYDROCHLORIDE 1 MG: 1 INJECTION, SOLUTION INTRAMUSCULAR; INTRAVENOUS at 09:58

## 2022-05-25 RX ADMIN — VERAPAMIL HYDROCHLORIDE: 2.5 INJECTION INTRAVENOUS at 10:10

## 2022-05-25 RX ADMIN — Medication 1000 ML: at 10:07

## 2022-05-25 RX ADMIN — IOPAMIDOL 70 ML: 612 INJECTION, SOLUTION INTRAVENOUS at 10:15

## 2022-05-25 RX ADMIN — FENTANYL CITRATE 50 MCG: 50 INJECTION, SOLUTION INTRAMUSCULAR; INTRAVENOUS at 09:59

## 2022-05-25 RX ADMIN — LIDOCAINE HYDROCHLORIDE 2 ML: 20 INJECTION, SOLUTION INFILTRATION; PERINEURAL at 10:06

## 2022-05-25 NOTE — PROGRESS NOTES
1100 3 cc air removed from tr band. 1115 3 cc air removed. 1130 3 cc air removed balloon empty. 1200 DSD applied to right radial cath site. No bleeding or hematoma pulse palpable. 1240 received and verbalized understanding of discharge instructions. Home with personal belongings. To private car in wheelchair. Home with wife.

## 2022-05-25 NOTE — H&P
Neurointervention Pre procedure Note      Medical record number:  [id number]      PLEASE SEE MY CONSULT NOTE FROM 4/29 for Hand P fields      Procedure: ACOM aneurysm  Indications:  Treatment planning  Labs: The patient's record including history and physical, available labs and procedures, medications and allergies has been reviewed. PRE-PROCEDURE ATTESTATION STATEMENT  I have explained the potential risks, benefits, and side effects of the proposed procedure/treatment, including the risk of death to the patient and/or surrogate. Also, the possibility for the transfusion of blood or blood components (only if potential for transfusion is applicable) was discussed with risks, benefits, and alternatives. This explanation included discussion of the likelihood of the patient achieving his or her goals and any potential problems that might occur during recuperation. Reasonable alternatives to the proposed procedure/treatment including risks, benefits, and side effects were also discussed, as were the risks related to not receiving the proposed procedure/treatment. The patient and/or surrogate have elected to proceed with the proposed procedure/treatment. .      Sedation and/or anesthesia risk, benefits, and alternatives discussed and questions answered. Vital Signs: There were no vitals filed for this visit. GENERAL: NPO: YES  ASA: ASA 2 - Patient with mild systemic disease with no functional limitations  MALLAMPATI: II (soft palate, uvula, fauces visible)    Anesthesia Plan:  Plan for conscious sedation. Impression & Plan:   1.   ACOM aneurysm

## 2022-05-26 ENCOUNTER — HOSPITAL ENCOUNTER (OUTPATIENT)
Dept: RADIATION ONCOLOGY | Age: 87
Discharge: HOME | End: 2022-05-26
Payer: MEDICARE

## 2022-05-26 PROCEDURE — 77373 STRTCTC BDY RAD THER TX DLVR: CPT | Performed by: SPECIALIST

## 2022-05-26 NOTE — PROGRESS NOTES
RADIATION ONCOLOGY PROCEDURE NOTE          Mr.James DONALD Armenta underwent fractionated external beam radiation therapy using a SBRT / SABR technique. I was personally present for the treatment planning (+/- 4D CT, W/WO Ab compression) imaging and pt setup to ensure appropriate immobilization to meet current JSOE standards. After a detailed review of the treatment plan and appropriate physics QA / oversight this pt was scheduled and underwent hypo fractionated treatment as noted per the D/I in [facility]. Typical fractionation schemes include but are not limited to 5500 cGy in 3-5 fractions. The NCCN guidelines and pt workup including a detailed discussion of the risks, benefits and alternative were discussed previously [RTOG dose constraints met per plan as applicable- see Mosaiq]. The pt verbalized understanding for the risks of this procedure today prior to Tx. Today, after a dual identification time out (including a brief plan overview )- I personally reviewed the complex multifaceted immobilization apparatus W/WO compression +/- Synergy (PRN), patient position, and 4D imaging (if applicable) prior to the treatment delivery to ensure accuracy. The SBRT team, including myself, were all present for the set up CT scan and delivery of the fraction (the latter on a PRN basis). The patient successfully completed the procedure today in stable condition; this procedure was well-tolerated today. Cherie Saavedra has now received 2200 cGy in 2/5 fractions directed to the Right Lower Lobe mass. Mel Benitez MD, Meredith Santana [address], Venkata Urrutia, [address] [place] [address]    Department of Radiation Oncology  PHYSICIANS [facility]) [facility]: [phone] (QED: [phone])  [facility]: [phone] (LGL: [phone])  [facility]:  [phone] (FFT:  [phone])    NOTE: This report was transcribed using voice recognition software.  Every effort was made to ensure accuracy; however, inadvertent computerized transcription errors may be present.

## 2022-05-27 ENCOUNTER — HOSPITAL ENCOUNTER (OUTPATIENT)
Dept: RADIATION ONCOLOGY | Age: 87
Discharge: HOME | End: 2022-05-27
Payer: MEDICARE

## 2022-05-27 DIAGNOSIS — C34.90 MALIGNANT NEOPLASM OF LUNG, UNSPECIFIED LATERALITY, UNSPECIFIED PART OF LUNG (HCC): Primary | ICD-10-CM

## 2022-05-27 PROCEDURE — 77373 STRTCTC BDY RAD THER TX DLVR: CPT | Performed by: RADIOLOGY

## 2022-05-27 PROCEDURE — 99999 PR OFFICE/OUTPT VISIT,PROCEDURE ONLY: CPT | Performed by: RADIOLOGY

## 2022-05-27 NOTE — PATIENT INSTRUCTIONS
Cont SBRT      Live Pesa. Myranda Cha MD [place] [address] Oncology  Cell: [phone]    [facility]:  [phone]   FAX: [phone] [address] e [facility]:  [address] [place] [address] Avenue:    [phone]  [address] [place] [address] Road:  [phone]   FAX:  [phone]  Email: [email or web address]

## 2022-05-27 NOTE — PROGRESS NOTES
Radiation Oncology          Mr.James DONALD Armenta underwent fractionated external beam radiation therapy using a SBRT / SABR technique. I was personally present for the treatment planning (+/- 4D CT, W/WO Ab compression) imaging and pt setup to ensure appropriate immobilization to meet current JOSE standards. After a detailed review of the treatment plan and appropriate physics QA / oversight this pt was scheduled and underwent hypo fractionated treatment as noted per the D/I in [facility]. Typical fractionation schemes include but are not limited to 5500 Gy in 3-5 fractions. The NCCN guidelines and pt workup including a detailed discussion of the risks, benefits and alternative were discussed previously [RTOG dose constraints met per plan as applicable- see Mosaiq]. The pt verbalized understanding for the risks of this procedure today prior to Tx. Today, after a dual identification time out (including a brief plan overview )- I personally reviewed the complex multifaceted immobilization apparatus W/WO compression +/- Synergy (PRN), patient position, and 4D imaging (if applicable) prior to the treatment delivery to ensure accuracy. The SBRT team, including myself, were all present for the set up CT scan and delivery of the fraction (the latter on a PRN basis). The patient successfully completed the procedure today in stable condition; this procedure was well tolerated today. Mechelle Bender has now received 3300 cGy in 3/5 fractions directed to the RLL mass. Erica Cogan.  Urvashi Ashby MD [place] [address] Oncology  Cell: [phone]    [facility]:  [phone]   FAX: [phone]  [facility]:  [address] [place] [address] Avenue:    [phone]  [address] [place] [address] Road:  [address] [place] [address] Road:  [phone]

## 2022-05-31 ENCOUNTER — HOSPITAL ENCOUNTER (OUTPATIENT)
Dept: RADIATION ONCOLOGY | Age: 87
Discharge: HOME | End: 2022-05-31
Payer: MEDICARE

## 2022-05-31 PROCEDURE — 77373 STRTCTC BDY RAD THER TX DLVR: CPT | Performed by: SPECIALIST

## 2022-05-31 NOTE — PROGRESS NOTES
Radiation Oncology          Mr.James DONALD Armenta underwent fractionated external beam radiation therapy using a SBRT / SABR technique. I was personally present for the treatment planning (+/- 4D CT, W/WO Ab compression) imaging and pt setup to ensure appropriate immobilization to meet current JOSE standards. After a detailed review of the treatment plan and appropriate physics QA / oversight this pt was scheduled and underwent hypo fractionated treatment as noted per the D/I in [facility]. Typical fractionation schemes include but are not limited to 5500 Gy in 3-5 fractions. The NCCN guidelines and pt workup including a detailed discussion of the risks, benefits and alternative were discussed previously [RTOG dose constraints met per plan as applicable- see Mosaiq]. The pt verbalized understanding for the risks of this procedure today prior to Tx. Today, after a dual identification time out (including a brief plan overview )- I personally reviewed the complex multifaceted immobilization apparatus W/WO compression +/- Synergy (PRN), patient position, and 4D imaging (if applicable) prior to the treatment delivery to ensure accuracy. The SBRT team, including myself, were all present for the set up CT scan and delivery of the fraction (the latter on a PRN basis). The patient successfully completed the procedure today in stable condition; this procedure was well tolerated today. Jose Diop has now received 4400 cGy in 4/5 fractions directed to the RLL mass.       Otoniel Cowan MD    [facility] 15[address] Oncology      PHYSICIANS [facility]:  [phone]   [address] [facility] Street: [phone] 101 e [facility] Street:  [address] [place] [address] Avenue:    [phone]  [facility]:  [address] [place] [address] Road:  [phone]

## 2022-06-01 NOTE — PROGRESS NOTES
[facility]  [address]  EUGENE WALDROP [facility], [facility]    Follow up Note      Jose Oddi     Date of Visit:  6/7/2022    CC:  Patient presents for follow up   Chief Complaint   Patient presents with    1 Month Follow-Up    Lower Back Pain     HPI:    Pain is better  Change in quality of symptoms:yes - as above    Medication side effects:none. Recent diagnostic testing:none. Recent interventional procedures:none. He has been on anticoagulation medications to include ASA and has not been on herbal supplements.  He is not diabetic.     Imaging:   3/2022 lumbar MRI -     FINDINGS:   BONES/ALIGNMENT: There is normal alignment of the spine.  The vertebral body   heights are maintained.  T1 and T2 hypointense signal in the anterior S1   vertebral body does not demonstrate enhancement and likely represents a   treated metastasis.  Additional metastases are seen the right lateral sacral   ala, at the level of S2, as well as in the right posterior iliac.  None   demonstrate any enhancement.       SPINAL CORD:  The conus terminates normally.       SOFT TISSUES: No paraspinal mass identified.       L1-L2: There is no significant disc protrusion, spinal canal stenosis or   neural foraminal narrowing.       L2-L3: Moderate loss of disc height with a small disc bulge.  Mild facet and   ligamentous hypertrophy.  No significant central canal or lateral recess   stenosis.  Mild-to-moderate right and mild left neural foraminal stenoses.       L3-L4: Mild loss of disc height with a small disc bulge.  Mild facet and   ligamentous hypertrophy.  Fluid in the bilateral facet joints.  Mild central   canal and lateral recess stenoses.  Moderate neural foraminal stenoses.       L4-L5: Severe loss of disc height.  No significant disc herniation Mild facet   and ligamentous hypertrophy.  No significant central canal or lateral recess   stenosis.  Moderate neural foraminal stenoses.       L5-S1: Grade 1 anterolisthesis of L5 over S1 by approximately 7 mm. Bilateral L5 spondylolysis.  No central canal stenosis.  Mild lateral recess   stenoses.  Severe neural foraminal stenoses.           Impression   1. Stable, treated, nonenhancing metastases S1 and S2 vertebra, as well as   the right iliac bone. 2. No fracture or marrow edema. 3. Bilateral L5 spondylolysis with grade 1 spondylolisthesis of L5 over S1 by   approximately 7 mm. 4. Mild central canal stenosis at L3-4. 5.  Multilevel neural foraminal stenoses, worst (severe) at L5-S1.     3/2022 MRI pelvis -  Appearance of the left proximal femur is suspicious for nondisplaced   pathologic fracture.       Focal lesion within the right sacral ala may demonstrate minimal enhancement   and could represent a healing treated metastatic lesion.  Evaluation is   limited secondary to metallic artifact.  Attention on follow-up recommended.       Scattered, treated metastasis throughout the bony pelvis and left proximal   femur without discrete enhancement.         Lumbar MRI 4/2020 -  1. Metastatic lesions are noted in the right iliac bone and first   sacral body, but are not associated with neural encroachment. Other   metastases previously documented in the pelvis are not included on   this exam. Metastatic foci do enhance. 2. There is grade 1 anterolisthesis of L5 on S1 and loss of the L4-5   disc space. 3. Lateral portions of the circumferential disc bulge at the L5-S1   level could encroach on the L5 nerve roots lateral to the foramina on   either side.    4. Telescoping of facets and disc bulging results in severe central   stenosis of the right-sided L4 and L5 nerve roots and moderately   severe stenosis of the left-sided L2-L5 root levels, more severe   inferiorly.      Lumbar MRI 7/2019 -   IMPRESSION:    Bone lesions at S1, S2 and in the right iliac wing worrisome for a marrow   infiltrative process.  Recommend clinical correlation and correlation   with nuclear medicine bone scanning. Dextroconvex scoliosis and multilevel malalignment associated with   multilevel spondylosis as detailed. L2-3 level, disc bulging with mild spinal canal stenosis and mild to   moderate right foraminal stenosis. L3-4 level, disc bulging with mild spinal canal stenosis and mild to   moderate left foraminal stenosis. L4-5 level, disc degeneration and disc osteophyte complex with mild to   moderate foraminal stenosis. L5-S1 level, grade 1 anterolisthesis from bilateral L5 pars defects and   degenerative disc disease with moderate to severe foraminal stenosis and   impingement of the L5 nerve roots. Anatomic Thoracic/Lumbar Variant: None.  L4-5 is considered the level of   the iliac crest and assume there are 5 lumbar-type vertebrae. There are hepatic and renal cysts.  Recommend correlation with ultrasound.     Cervical MRI 2019 -   IMPRESSION:    10 mm low signal intensity lesion at the right side of the C6 vertebral   body and 7 mm signal intensity lesion at the base of C2 are worrisome for   a marrow infiltrative process such as osteoblastic metastasis.  Clinical   correlation and correlation with nuclear medicine bone scanning   recommended for further evaluation. Levoconvex scoliosis and multilevel malalignment associated multilevel   spondylosis most notably moderate to advanced degenerative disc disease   at C6-7 and mild to moderate degenerative disc disease at C5-6 and C4-5. There is no substantial spinal canal stenosis. There is multilevel acquired foraminal stenosis most notably moderate to   severe left and moderate right foraminal stenosis at C5-6. Anatomic Variant:  None.  Assume 7 cervical vertebrae with counting from   the craniocervical junction.                                         Potential Aberrant Drug-Related Behavior:  no     Urine Drug Screenin2020 consistent     OARRS report:  2020-2022 consistent    Opioid Agreement:  Date enacted: 03/06/2020 - updated 6/7/2022  Renewal date:    Past Medical History:   Diagnosis Date    CA prostate, adenoca ([facility] [address] 4/25/2019    CAD (coronary artery disease)     Dr. Geovanny Harrell [facility])     prostate- PO chemotherapy     Chronic bilateral low back pain without sciatica 3/6/2020    Cobalamin deficiency     Glaucoma     Right eye    Hyperlipidemia     Hypertension     Osteoarthritis     Osteochondropathy     Osteopenia     Pain in lower limb     Peripheral venous insufficiency     PONV (postoperative nausea and vomiting)     Prolonged emergence from general anesthesia     PVD (peripheral vascular disease) with claudication ([facility] [address] 4/25/2019       Past Surgical History:   Procedure Laterality Date    ACETABULUM FRACTURE SURGERY  3/24/11    ANESTHESIA NERVE BLOCK Bilateral 6/16/2020    BILATERAL L3 L4 L5 MEDIAL NERVE BRANCH BLOCK performed by Callie Bennett DO at [address] [place] [address] N/A 2/24/2022    BRONCHOSCOPY W/EBUS FNA performed by Kelsey Rodriguez MD at [id number] [place] [address]  2/24/2022    BRONCHOSCOPY DIAGNOSTIC OR CELL [address] ONLY performed by Kelsey Rodriguez MD at [address] [place] [address]  2016   [address] GRAFT  Dec. 1996    [facility]/ Dr. Guzmán Levo  1/25/2022    CT NEEDLE BIOPSY LUNG PERCUTANEOUS 1/25/2022 Sherren Haff, MD [facility] CT    ENDOSCOPY, COLON, DIAGNOSTIC  2016    EYE SURGERY Bilateral 01/2018    cataract, glaucoma 2020-left eye    FEMUR FRACTURE SURGERY  1981    left   [address]    JOINT REPLACEMENT      bilat knee 2010, rig thi 2011     LYMPH NODE DISSECTION  10/2007    PAIN MANAGEMENT PROCEDURE N/A 5/19/2020    CAUDAL EPIDURAL STEROID INJECTION performed by Callie Bennett DO at [facility]  8/4/20    PAIN MANAGEMENT PROCEDURE Right 8/4/2020    RIGHT L3 4 5 MEDIAL BRANCH RADIOFREQUENCY ABLATION performed by Callie Bennett - 1 by mouth every 6 hours as needed   Yes Historical Provider, MD   Multiple Vitamins-Minerals (THERAPEUTIC MULTIVITAMIN-MINERALS) tablet Take 1 tablet by mouth daily    Yes Historical Provider, MD   metoprolol tartrate (LOPRESSOR) 25 MG tablet Take 25 mg by mouth 2 times daily   Yes Historical Provider, MD   Coenzyme Q10 (COQ10) 50 MG CAPS Take 1 capsule by mouth daily    Yes Historical Provider, MD   aspirin 81 MG tablet Take 81 mg by mouth daily    Yes Historical Provider, MD   Cyanocobalamin (VITAMIN B 12 PO) Take  by mouth daily. sublingual  11  Yes Historical Provider, MD       No Known Allergies    Social History     Socioeconomic History    Marital status:      Spouse name: Not on file    Number of children: 3    Years of education: Not on file    Highest education level: Not on file   Occupational History    Not on file   Tobacco Use    Smoking status: Former Smoker     Packs/day: 1.00     Years: 40.00     Pack years: 40.00     Types: Cigarettes     Start date: 5     Quit date: 10/1/1994     Years since quittin.7    Smokeless tobacco: Former User     Types: [address] date:    Vaping Use    Vaping Use: Never used   Substance and Sexual Activity    Alcohol use:  Yes     Alcohol/week: 1.0 standard drink     Types: 1 Cans of beer per week     Comment: rare    Drug use: No    Sexual activity: Not on file   Other Topics Concern    Not on file   Social History Narrative    Problem List: History of polyp of colon, Disorder of bone density and structure, unspecified,    Osteochondropathy, Carcinoma in situ of prostate, Aneurysm of thoracic aorta, Anemia, Coronary    arteriosclerosis, Cobalamin deficiency, Multiple closed fractures of pelvis with disruption of pelvic [place],    Hyperlipidemia, Essential hypertension    Health Maintenance:    Bone Density Test Screening - (3/5/2019)    Bone Density Scan - (2015)    Influenza Vaccination - (10/7/2016)    Colonoscopy - (4/3/2012)    Couseled on Home Safety - (11/23/2015)    Colonoscopy Screening - (4/3/2012)    US Liver - 8/1/08    Medical Problems:    Coronary Artery Disease (CAD), Hypertension, Hyperlipidemia, Prostate Cancer    Surgical Hx:    Prostatectomy, Coronary Artery Bypass Graft (CABG)            FH: Father:    . (Hx)    Mother:    . (Hx)        SH: Marital:  - retired . Personal Habits: Cigarette Use: former cigarette smoker, Nonsmoker. Alcohol: Occasionally    consumes alcohol. Social Determinants of Health     Financial Resource Strain: Low Risk     Difficulty of Paying Living Expenses: Not hard at all   Food Insecurity: No Food Insecurity    Worried About Running Out of Food in the Last Year: Never true    Deondre of Food in the Last Year: Never true   Transportation Needs:     Lack of Transportation (Medical): Not on file    Lack of Transportation (Non-Medical):  Not on file   Physical Activity:     Days of Exercise per Week: Not on file    Minutes of Exercise per Session: Not on file   Stress:     Feeling of Stress : Not on file   Social Connections:     Frequency of Communication with Friends and Family: Not on file    Frequency of Social Gatherings with Friends and Family: Not on file    Attends Restoration Services: Not on file    Active Member of [address] [place] [address] [employer] or Organizations: Not on file    Attends Club or Organization Meetings: Not on file    Marital Status: Not on file   Intimate Partner Violence:     Fear of Current or Ex-Partner: Not on file    Emotionally Abused: Not on file    Physically Abused: Not on file    Sexually Abused: Not on file   Housing Stability:     Unable to Pay for Housing in the Last Year: Not on file    Number of [place] in the Last Year: Not on file    Unstable Housing in the Last Year: Not on file       Family History   Problem Relation Age of Onset    Stroke Mother     Cancer Mother         pancreatic    Cancer Father         prostate    Stroke Father        REVIEW OF SYSTEMS:     Nikolay Mckinney denies fever/chills, chest pain, shortness of breath, new bowel or bladder complaints. All other review of systems was negative. PHYSICAL EXAMINATION:      /74   Pulse (!) 101   Temp 97.9 °F (36.6 °C) (Infrared)   Resp 16   Ht 5' 10\" (1.778 m)   Wt 226 lb (102.5 kg)   SpO2 95%   BMI 32.43 kg/m²     General:      General appearance:pleasant and well-hydrated, in no discomfort and A & O x3  Build:Normal Weight    HEENT:    Head:normocephalic and atraumatic  Sclera: icterus absent    Lungs:    Breathing:Normal expansion. No accessory muscle use. Abdomen:    Shape:non-distended and normal    Lumbar spine:    Tenderness:- bilateral paraspinals  Range of motion:normal in lateral bending, flexion, extension rotation bilateral and is not painful. Extremities:    Tremors:None bilaterally upper and lower  Range of motion:pain with internal rotation of left hips negative.   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 Sanjeev Andrews  Referred by Dr. Eva Urbano for consideration for further injections as Dr. Sanjeev Andrews no longer takes his insurance  Pt's wife states she has discussed his LE symptoms and they have determined they feel his LE weakness is a vascular issue, pt states he can stand still     He developed L hip/groin pain 10/2021 when getting out of the car after having pulled into the garage too close to the wall  3/2022 MRI pelvis w/ and w/o shows concern for pathologic fracture, moderate L hip OA  Saw Dr. Wayne Ross for eval of L hip pain  His pain continues to be gradually improving over time (approx 70% improved), at one time was on a walker but is not any longer requiring the gait assistance  L foot neuropathic symptoms resolved with once daily pregabalin  Recently started Xgeva for bone strength  Dx with cerebral aneurysm, pending CTA and coiling  Just finished radiation for lung CA, diagnosed when having CT scan for covid pneumonia, pending f/u with Dr. Ivan Suggs [facility]  He feels his back pain since RFA is returning and he'd like to repeat the RFA                 Plan:    UDS today - last dose of Norco not recent so expect to be negative  OARRS reviewed  continue Norco 5/325 #30 - no RF needed, he has only used 3-4 of last script  RF pregabalin 25 mg at HS, 2 RF  Pt is not a candidate for L hip injection due to pathologic fracture  Caudal EVGENY done with 60% relief of lumbar symptoms x 2-3 days until he did yardwork              b/l L3,4,5 RFA with near 100% relief, pain now returning and pt would like to repeat                We discussed with the patient that combining opioids, benzodiazepines, alcohol, illicit drugs or sleep aids increases the risk of respiratory depression including death. We discussed that these medications may cause drowsiness, sedation or dizziness and have counseled the patient not to drive or operate machinery. We have discussed that these medications will be prescribed only by one provider. We have discussed with the patient about age related risk factors and have thoroughly discussed the importance of taking these medications as prescribed. The patient verbalizes understanding.     Cc: Referring physician

## 2022-06-02 ENCOUNTER — HOSPITAL ENCOUNTER (OUTPATIENT)
Dept: RADIATION ONCOLOGY | Age: 87
Discharge: HOME | End: 2022-06-02
Payer: MEDICARE

## 2022-06-02 PROCEDURE — 77336 RADIATION PHYSICS CONSULT: CPT | Performed by: RADIOLOGY

## 2022-06-02 PROCEDURE — 77373 STRTCTC BDY RAD THER TX DLVR: CPT | Performed by: RADIOLOGY

## 2022-06-02 NOTE — PROGRESS NOTES
RADIATION ONCOLOGY PROCEDURE NOTE          Mr.James DONALD Armenta underwent fractionated external beam radiation therapy using a SBRT / SABR technique. I was personally present for the treatment planning (+/- 4D CT, W/WO Ab compression) imaging and pt setup to ensure appropriate immobilization to meet current JOSE standards. After a detailed review of the treatment plan and appropriate physics QA / oversight this pt was scheduled and underwent hypo fractionated treatment as noted per the D/I in [facility]. Typical fractionation schemes include but are not limited to 5500 cGy in 3-5 fractions. The NCCN guidelines and pt workup including a detailed discussion of the risks, benefits and alternative were discussed previously [RTOG dose constraints met per plan as applicable- see Mosaiq]. The pt verbalized understanding for the risks of this procedure today prior to Tx. Today, after a dual identification time out (including a brief plan overview )- I personally reviewed the complex multifaceted immobilization apparatus W/WO compression +/- Synergy (PRN), patient position, and 4D imaging (if applicable) prior to the treatment delivery to ensure accuracy. The SBRT team, including myself, were all present for the set up CT scan and delivery of the fraction (the latter on a PRN basis). The patient successfully completed the procedure today in stable condition; this procedure was well-tolerated today. Denia Walton has now received 5500 cGy in 5/5 fractions directed to the Lung. Lisseth Ba M.D.     Department of Radiation Oncology  [address]: [phone] (OGO: [phone])  [address] [place] [address]: [phone] (FCD: [phone])  [facility]:  [phone] (MUR:  [phone])

## 2022-06-02 NOTE — PROGRESS NOTES
Pt and wife here for discharge instructions. Verbalized understanding. All questions answered from a nursing perspective. Pt has follow up appt. With Dr Mey Hawkins in 3 weeks.

## 2022-06-02 NOTE — PROGRESS NOTES
Kalen Zengwood  6/2/2022  7:56 AM          Current Outpatient Medications   Medication Sig Dispense Refill    calcium carbonate (OSCAL) 500 MG TABS tablet Take 500 mg by mouth daily      denosumab (XGEVA) 120 MG/1.7ML SOLN SC injection Inject 120 mg into the skin once      HYDROcodone-acetaminophen (NORCO) 5-325 MG per tablet Take 1 tablet by mouth daily as needed for Pain for up to 30 days. Intended supply: 3 days. Take lowest dose possible to manage pain 30 tablet 0    pregabalin (LYRICA) 25 MG capsule qHS x 7 days then BID x7 days then TID thereafter 69 capsule 0    omeprazole (PRILOSEC) 20 MG delayed release capsule Take 1 capsule by mouth daily 90 capsule 3    Vitamin D, Cholecalciferol, 50 MCG (2000 UT) CAPS Take 2,000 Units by mouth daily      omega-3 acid ethyl esters (LOVAZA) 1 g capsule Take 1 capsule by mouth 2 times daily 180 capsule 3    Handicap Placard MISC by Does not apply route Duration: 5 years 1 each 0    lisinopril (PRINIVIL;ZESTRIL) 10 MG tablet Take 1 tablet by mouth daily 90 tablet 3    atorvastatin (LIPITOR) 40 MG tablet Take 1 tablet by mouth daily 90 tablet 3    magnesium gluconate (MAGONATE) 500 MG tablet Take 500 mg by mouth at bedtime       COMBIGAN 0.2-0.5 % ophthalmic solution INSTILL 1 DROP INTO THE RIGHT EYE TWO TIMES A DAY.  latanoprost (XALATAN) 0.005 % ophthalmic solution INSTILL 1 DROP IN RIGHT EYE EVERY DAY AT BEDTIME      ERLEADA 60 MG TABS 60 mg Every 4 days      meclizine (ANTIVERT) 25 MG tablet 1/2 - 1 by mouth every 6 hours as needed      Multiple Vitamins-Minerals (THERAPEUTIC MULTIVITAMIN-MINERALS) tablet Take 1 tablet by mouth daily       metoprolol tartrate (LOPRESSOR) 25 MG tablet Take 25 mg by mouth 2 times daily      Coenzyme Q10 (COQ10) 50 MG CAPS Take 1 capsule by mouth daily       aspirin 81 MG tablet Take 81 mg by mouth daily       Cyanocobalamin (VITAMIN B 12 PO) Take  by mouth daily.  sublingual        No current facility-administered medications for this encounter. This is an up-to-date medication list.    Please take this list to your next care provider, and discard any previous medication lists.

## 2022-06-07 ENCOUNTER — OFFICE VISIT (OUTPATIENT)
Dept: PRIMARY CARE CLINIC | Age: 87
End: 2022-06-07
Payer: MEDICARE

## 2022-06-07 ENCOUNTER — OFFICE VISIT (OUTPATIENT)
Dept: PAIN MANAGEMENT | Age: 87
End: 2022-06-07
Payer: MEDICARE

## 2022-06-07 ENCOUNTER — PREP FOR PROCEDURE (OUTPATIENT)
Dept: PAIN MANAGEMENT | Age: 87
End: 2022-06-07

## 2022-06-07 VITALS
DIASTOLIC BLOOD PRESSURE: 74 MMHG | OXYGEN SATURATION: 95 % | SYSTOLIC BLOOD PRESSURE: 112 MMHG | HEIGHT: 70 IN | WEIGHT: 226 LBS | BODY MASS INDEX: 32.35 KG/M2 | HEART RATE: 101 BPM | RESPIRATION RATE: 16 BRPM | TEMPERATURE: 97.9 F

## 2022-06-07 VITALS
SYSTOLIC BLOOD PRESSURE: 128 MMHG | OXYGEN SATURATION: 96 % | DIASTOLIC BLOOD PRESSURE: 64 MMHG | BODY MASS INDEX: 34.36 KG/M2 | TEMPERATURE: 97.8 F | WEIGHT: 226 LBS | HEART RATE: 78 BPM

## 2022-06-07 DIAGNOSIS — M54.16 LUMBAR RADICULOPATHY: ICD-10-CM

## 2022-06-07 DIAGNOSIS — K21.9 GASTROESOPHAGEAL REFLUX DISEASE WITHOUT ESOPHAGITIS: ICD-10-CM

## 2022-06-07 DIAGNOSIS — M84.452A: ICD-10-CM

## 2022-06-07 DIAGNOSIS — R79.0 LOW MAGNESIUM LEVEL: ICD-10-CM

## 2022-06-07 DIAGNOSIS — M54.50 CHRONIC BILATERAL LOW BACK PAIN WITHOUT SCIATICA: ICD-10-CM

## 2022-06-07 DIAGNOSIS — M25.552 LEFT HIP PAIN: ICD-10-CM

## 2022-06-07 DIAGNOSIS — E53.8 VITAMIN B12 DEFICIENCY: ICD-10-CM

## 2022-06-07 DIAGNOSIS — I67.1 CEREBRAL ANEURYSM: Primary | ICD-10-CM

## 2022-06-07 DIAGNOSIS — Z86.16 HISTORY OF COVID-19: ICD-10-CM

## 2022-06-07 DIAGNOSIS — G89.4 CHRONIC PAIN SYNDROME: Primary | ICD-10-CM

## 2022-06-07 DIAGNOSIS — D61.818 PANCYTOPENIA (HCC): ICD-10-CM

## 2022-06-07 DIAGNOSIS — C34.91 ADENOCARCINOMA OF RIGHT LUNG (HCC): ICD-10-CM

## 2022-06-07 DIAGNOSIS — I25.10 CORONARY ARTERY DISEASE INVOLVING NATIVE HEART WITHOUT ANGINA PECTORIS, UNSPECIFIED VESSEL OR LESION TYPE: ICD-10-CM

## 2022-06-07 DIAGNOSIS — C61 PROSTATE CANCER METASTATIC TO BONE (HCC): ICD-10-CM

## 2022-06-07 DIAGNOSIS — I10 ESSENTIAL HYPERTENSION: ICD-10-CM

## 2022-06-07 DIAGNOSIS — E55.9 VITAMIN D DEFICIENCY: ICD-10-CM

## 2022-06-07 DIAGNOSIS — M47.817 LUMBOSACRAL SPONDYLOSIS WITHOUT MYELOPATHY: Primary | ICD-10-CM

## 2022-06-07 DIAGNOSIS — M81.0 AGE-RELATED OSTEOPOROSIS WITHOUT CURRENT PATHOLOGICAL FRACTURE: ICD-10-CM

## 2022-06-07 DIAGNOSIS — N28.9 RENAL INSUFFICIENCY: ICD-10-CM

## 2022-06-07 DIAGNOSIS — C61 PROSTATE CA (HCC): ICD-10-CM

## 2022-06-07 DIAGNOSIS — R73.9 HYPERGLYCEMIA: ICD-10-CM

## 2022-06-07 DIAGNOSIS — M47.817 LUMBOSACRAL SPONDYLOSIS WITHOUT MYELOPATHY: ICD-10-CM

## 2022-06-07 DIAGNOSIS — G89.29 CHRONIC BILATERAL LOW BACK PAIN WITHOUT SCIATICA: ICD-10-CM

## 2022-06-07 DIAGNOSIS — C79.51 PROSTATE CANCER METASTATIC TO BONE (HCC): ICD-10-CM

## 2022-06-07 DIAGNOSIS — E78.2 MIXED HYPERLIPIDEMIA: ICD-10-CM

## 2022-06-07 DIAGNOSIS — M43.16 SPONDYLOLISTHESIS OF LUMBAR REGION: ICD-10-CM

## 2022-06-07 PROCEDURE — 99215 OFFICE O/P EST HI 40 MIN: CPT | Performed by: FAMILY MEDICINE

## 2022-06-07 PROCEDURE — 1036F TOBACCO NON-USER: CPT | Performed by: FAMILY MEDICINE

## 2022-06-07 PROCEDURE — 99213 OFFICE O/P EST LOW 20 MIN: CPT

## 2022-06-07 PROCEDURE — 1123F ACP DISCUSS/DSCN MKR DOCD: CPT | Performed by: PAIN MEDICINE

## 2022-06-07 PROCEDURE — G8417 CALC BMI ABV UP PARAM F/U: HCPCS | Performed by: PAIN MEDICINE

## 2022-06-07 PROCEDURE — 1123F ACP DISCUSS/DSCN MKR DOCD: CPT | Performed by: FAMILY MEDICINE

## 2022-06-07 PROCEDURE — 1036F TOBACCO NON-USER: CPT | Performed by: PAIN MEDICINE

## 2022-06-07 PROCEDURE — 99213 OFFICE O/P EST LOW 20 MIN: CPT | Performed by: PAIN MEDICINE

## 2022-06-07 PROCEDURE — G8417 CALC BMI ABV UP PARAM F/U: HCPCS | Performed by: FAMILY MEDICINE

## 2022-06-07 PROCEDURE — G8427 DOCREV CUR MEDS BY ELIG CLIN: HCPCS | Performed by: FAMILY MEDICINE

## 2022-06-07 PROCEDURE — 99214 OFFICE O/P EST MOD 30 MIN: CPT | Performed by: PAIN MEDICINE

## 2022-06-07 PROCEDURE — G8427 DOCREV CUR MEDS BY ELIG CLIN: HCPCS | Performed by: PAIN MEDICINE

## 2022-06-07 RX ORDER — PREGABALIN 25 MG/1
25 CAPSULE ORAL NIGHTLY PRN
Qty: 30 CAPSULE | Refills: 2 | Status: SHIPPED
Start: 2022-06-07 | End: 2022-09-01

## 2022-06-07 ASSESSMENT — PATIENT HEALTH QUESTIONNAIRE - PHQ9
2. FEELING DOWN, DEPRESSED OR HOPELESS: 0
SUM OF ALL RESPONSES TO PHQ QUESTIONS 1-9: 0
1. LITTLE INTEREST OR PLEASURE IN DOING THINGS: 0
SUM OF ALL RESPONSES TO PHQ QUESTIONS 1-9: 0
SUM OF ALL RESPONSES TO PHQ9 QUESTIONS 1 & 2: 0
SUM OF ALL RESPONSES TO PHQ QUESTIONS 1-9: 0
SUM OF ALL RESPONSES TO PHQ QUESTIONS 1-9: 0

## 2022-06-07 NOTE — PROGRESS NOTES
Do you currently have any of the following:    Fever: No  Headache:  No  Cough: No  Shortness of breath: No  Exposed to anyone with these symptoms: No         Elisha Kapoor presents to the [facility] on 6/7/2022. Jennifer West is complaining of pain in his low back. The pain is intermittent. The pain is described as sharp. Pain is rated on his best day at a 2, on his worst day at a 8, and on average at a 6 on the VAS scale. He took his last dose of Lyrica yesterday. Any procedures since your last visit: No    Pacemaker or defibrillator: No     He is not on NSAIDS and is  on anticoagulation medications to include ASA and is managed by Mary Forman MD  .     Medication Contract and Consent for Opioid Use Documents Filed     Patient Documents     Type of Document Status Date Received Received By Description    Medication Contract Received 3/6/2020 10:13 AM Anju TINAJERO KIMMY med contract                /74   Pulse (!) 101   Temp 97.9 °F (36.6 °C) (Infrared)   Resp 16   Ht 5' 10\" (1.778 m)   Wt 226 lb (102.5 kg)   SpO2 95%   BMI 32.43 kg/m²      No LMP for male patient.

## 2022-06-07 NOTE — ASSESSMENT & PLAN NOTE
counseled, risk of rupture reviewed. Signs and symptoms watch were reviewed. Avoid Valsalva. Blood pressure control. Following with .  Mining coiling procedure.

## 2022-06-07 NOTE — PROGRESS NOTES
Chavo Mera : 1935 Sex: male  Age: 82 y.o. Chief Complaint   Patient presents with    3 Month Follow-Up    Discuss Labs       HPI:       Patient presents today for follow-up. He is here with his wife. Overall he feels well. Had cerebral angiogram, planning coil; waiting with recent radiation. Finished radiation. Belgicaravinder Dacosta ; Yamile July. Harsha 6/15    Cardiologist dec 2021, stress test 2022. 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          Blood work reviewed, creatinine elevated but stable at 1.4 BUN 25 GFR 48, not interested in nephrologist, glucose 111 HDL 46 LDL 46 triglyceride 292 LFTs normal TFTs normal vitamin D 47 White count slightly low but stable 4.3 hemoglobin low but stable at 12.2 platelet count 340 vitamin D83 5981 folic acid 83.5 urinalysis negative microalbumin creatinine ratio 8      Most Recent Labs  CBC  Lab Results   Component Value Date    WBC 4.3 2022    WBC 4.5 2022    WBC 4.3 2022    RBC 3.97 2022    RBC 4.03 2022    RBC 3.76 2022    HGB 12.2 2022    HGB 12.1 2022    HGB 11.2 2022    HCT 34.8 2022    HCT 35.5 2022    HCT 33.7 2022    MCV 87.7 2022    MCV 88.1 2022    MCV 89.6 2022     2022     2022     2022      CMP  Lab Results   Component Value Date     2022     2022     2022    K 4.7 2022    K 4.5 2022    K 3.7 2022    K 4.4 2021    K 4.4 2021     2022     2022     2022    CO2 27 2022    CO2 23 2022    CO2 21 2022    ANIONGAP 11 2022    ANIONGAP 14 2022    ANIONGAP 15 2022    GLUCOSE 111 2022    GLUCOSE 117 2022    GLUCOSE 113 2022    GLUCOSE 113 2011    GLUCOSE 93 2011    GLUCOSE 101 2011    BUN 25 2022    BUN 20 2022    BUN 22 2022 CREATININE 1.4 05/24/2022    CREATININE 1.4 02/21/2022    CREATININE 1.2 01/25/2022    LABGLOM 48 05/24/2022    LABGLOM 48 02/21/2022    LABGLOM 58 01/25/2022    GFRAA 58 05/24/2022    GFRAA 58 02/21/2022    GFRAA >60 01/25/2022    CALCIUM 10.0 05/24/2022    CALCIUM 10.1 02/21/2022    CALCIUM 9.6 01/25/2022    PROT 7.2 05/24/2022    PROT 7.2 02/21/2022    PROT 6.7 01/25/2022    LABALBU 4.8 05/24/2022    LABALBU 4.5 02/21/2022    LABALBU 4.3 01/25/2022    LABALBU 2.9 03/26/2011    LABALBU 3.0 03/25/2011    LABALBU 3.4 03/24/2011    BILITOT 0.5 05/24/2022    BILITOT 0.8 02/21/2022    BILITOT 0.6 01/25/2022    ALKPHOS 51 05/24/2022    ALKPHOS 50 02/21/2022    ALKPHOS 46 01/25/2022    AST 30 05/24/2022    AST 26 02/21/2022    AST 25 01/25/2022    ALT 31 05/24/2022    ALT 20 02/21/2022    ALT 19 01/25/2022     A1C  Lab Results   Component Value Date    LABA1C 5.2 03/01/2022    LABA1C 5.0 11/22/2021    LABA1C 4.7 08/25/2021     TSH  Lab Results   Component Value Date    TSH 3.410 05/24/2022    TSH 5.300 02/21/2022    TSH 5.040 11/22/2021     FREET4  Lab Results   Component Value Date    J0BHYFF 9.5 01/31/2011     LIPID  Lab Results   Component Value Date    CHOL 150 05/24/2022    CHOL 141 02/21/2022    CHOL 125 11/22/2021    HDL 46 05/24/2022    HDL 43 02/21/2022    HDL 44 11/22/2021    LDLCALC 46 05/24/2022    LDLCALC 49 02/21/2022    LDLCALC 49 11/22/2021    TRIG 292 05/24/2022    TRIG 245 02/21/2022    TRIG 162 11/22/2021     VITAMIN D  Lab Results   Component Value Date    VITD25 47 05/24/2022    VITD25 41 02/21/2022    VITD25 50 11/22/2021     MAGNESIUM  Lab Results   Component Value Date    MG 1.9 05/24/2022    MG 1.9 02/21/2022    MG 1.8 11/22/2021      PHOS  Lab Results   Component Value Date    PHOS 3.9 08/25/2021    PHOS 4.0 01/21/2021    PHOS 3.8 01/16/2021      PAWAN   No results found for: PAWAN  RHEUMATOID FACTOR  No results found for: RF  PSA  Lab Results   Component Value Date    PSA <0.01 03/22/2022    PSA SEE NOTE 12/06/2021    PSA <0.03 12/06/2021      HEPATITIS C  No results found for: HCVABI  HIV  No results found for: ARE5LFC, HIV1QT  UA  Lab Results   Component Value Date    COLORU Yellow 05/24/2022    COLORU Yellow 02/21/2022    COLORU Yellow 11/22/2021    CLARITYU Clear 05/24/2022    CLARITYU Clear 02/21/2022    CLARITYU Clear 11/22/2021    GLUCOSEU Negative 05/24/2022    GLUCOSEU Negative 02/21/2022    GLUCOSEU Negative 11/22/2021    GLUCOSEU NEGATIVE 12/21/2010    BILIRUBINUR Negative 05/24/2022    BILIRUBINUR Negative 02/21/2022    BILIRUBINUR Negative 11/22/2021    BILIRUBINUR NEGATIVE 12/21/2010    KETUA Negative 05/24/2022    KETUA Negative 02/21/2022    KETUA Negative 11/22/2021    SPECGRAV 1.025 05/24/2022    SPECGRAV >=1.030 02/21/2022    SPECGRAV >=1.030 11/22/2021    BLOODU Negative 05/24/2022    BLOODU Negative 02/21/2022    BLOODU Negative 11/22/2021    PHUR 5.5 05/24/2022    PHUR 5.0 02/21/2022    PHUR 5.0 11/22/2021    PROTEINU Negative 05/24/2022    PROTEINU Negative 02/21/2022    PROTEINU Negative 11/22/2021    UROBILINOGEN 0.2 05/24/2022    UROBILINOGEN 0.2 02/21/2022    UROBILINOGEN 0.2 11/22/2021    NITRU Negative 05/24/2022    NITRU Negative 02/21/2022    NITRU Negative 11/22/2021    LEUKOCYTESUR Negative 05/24/2022    LEUKOCYTESUR Negative 02/21/2022    LEUKOCYTESUR Negative 11/22/2021     Urine Micro/Albumin Ratio  Lab Results   Component Value Date    MALBCR 8.0 05/24/2022    MALBCR 13.4 02/21/2022    MALBCR 16.2 11/22/2021               Review of Systems  ROS:    Const: Denies chills, fever, malaise and sweats. Eyes: Denies discharge, pain, redness and visual disturbance. ENMT: Denies earaches, other ear symptoms. Denies nasal or sinus symptoms other than if stated  above. Denies mouth and tongue lesions and sore throat.   CV: Denies chest discomfort, pain; diaphoresis, dizziness, edema, lightheadedness, orthopnea,  palpitations, syncope and near syncopal episode or any exertional symptoms  Resp: Denies cough, hemoptysis, pleuritic pain, SOB, sputum production and wheezing. GI: Denies abdominal pain, change in bowel habits, hematochezia, melena, nausea and vomiting. : Denies urinary problems including dysuria. Musculo: Chronic pain stable  Skin: Denies bruising and rash or changing skin lesions. Neuro: Denies headache, numbness, stiff neck, tingling and focal weakness slurred speech or facial  Droop  Extremities: Denies calf pain, redness or swelling. Hema/Lymph: Denies bleeding/bruising tendency and enlarged lymph nodes.         Current Outpatient Medications:     calcium carbonate (OSCAL) 500 MG TABS tablet, Take 500 mg by mouth daily, Disp: , Rfl:     denosumab (XGEVA) 120 MG/1.7ML SOLN SC injection, Inject 120 mg into the skin once, Disp: , Rfl:     omeprazole (PRILOSEC) 20 MG delayed release capsule, Take 1 capsule by mouth daily, Disp: 90 capsule, Rfl: 3    Vitamin D, Cholecalciferol, 50 MCG (2000 UT) CAPS, Take 2,000 Units by mouth daily, Disp: , Rfl:     omega-3 acid ethyl esters (LOVAZA) 1 g capsule, Take 1 capsule by mouth 2 times daily, Disp: 180 capsule, Rfl: 3    Handicap Placard MISC, by Does not apply route Duration: 5 years, Disp: 1 each, Rfl: 0    lisinopril (PRINIVIL;ZESTRIL) 10 MG tablet, Take 1 tablet by mouth daily, Disp: 90 tablet, Rfl: 3    atorvastatin (LIPITOR) 40 MG tablet, Take 1 tablet by mouth daily, Disp: 90 tablet, Rfl: 3    magnesium gluconate (MAGONATE) 500 MG tablet, Take 500 mg by mouth at bedtime , Disp: , Rfl:     ERLEADA 60 MG TABS, 60 mg Every 4 days, Disp: , Rfl:     meclizine (ANTIVERT) 25 MG tablet, 1/2 - 1 by mouth every 6 hours as needed, Disp: , Rfl:     Multiple Vitamins-Minerals (THERAPEUTIC MULTIVITAMIN-MINERALS) tablet, Take 1 tablet by mouth daily , Disp: , Rfl:     metoprolol tartrate (LOPRESSOR) 25 MG tablet, Take 25 mg by mouth 2 times daily, Disp: , Rfl:     Coenzyme Q10 (COQ10) 50 MG CAPS, Take 1 capsule by mouth daily , Disp: , Rfl:     aspirin 81 MG tablet, Take 81 mg by mouth daily , Disp: , Rfl:     Cyanocobalamin (VITAMIN B 12 PO), Take  by mouth daily. sublingual , Disp: , Rfl:     pregabalin (LYRICA) 25 MG capsule, Take 1 capsule by mouth nightly as needed (pain) for up to 30 days. , Disp: 30 capsule, Rfl: 2  No Known Allergies    Past Medical History:   Diagnosis Date    CA prostate, adenoca ([facility] 4/25/2019    CAD (coronary artery disease)     Dr. Lolly Eaton [facility])     prostate- PO chemotherapy     Chronic bilateral low back pain without sciatica 3/6/2020    Cobalamin deficiency     Glaucoma     Right eye    Hyperlipidemia     Hypertension     Osteoarthritis     Osteochondropathy     Osteopenia     Pain in lower limb     Peripheral venous insufficiency     PONV (postoperative nausea and vomiting)     Prolonged emergence from general anesthesia     PVD (peripheral vascular disease) with claudication ([facility] 4/25/2019     Past Surgical History:   Procedure Laterality Date    ACETABULUM FRACTURE SURGERY  3/24/11    ANESTHESIA NERVE BLOCK Bilateral 6/16/2020    BILATERAL L3 L4 L5 MEDIAL NERVE BRANCH BLOCK performed by Cachorro Stanton DO at [address] [place] [address] N/A 2/24/2022    BRONCHOSCOPY W/EBUS FNA performed by Raghavendra Steven MD at [address] [place] [address]  2/24/2022    BRONCHOSCOPY DIAGNOSTIC OR CELL [address] ONLY performed by Raghavendra Steven MD at [address] [place] [address]  2016   [facility]  Dec. [facility]    [facility]/ Dr. Roberto Fallon  1/25/2022    CT NEEDLE BIOPSY LUNG PERCUTANEOUS 1/25/2022 Harshad Peterson MD [facility] CT    ENDOSCOPY, COLON, DIAGNOSTIC  2016    EYE SURGERY Bilateral 01/2018    cataract, glaucoma 2020-left eye    FEMUR FRACTURE SURGERY  1981    left   [address]    JOINT REPLACEMENT      bilat knee 2010, padmini dunne 2011     LYMPH NODE DISSECTION  10/2007    PAIN MANAGEMENT PROCEDURE N/A 2020    CAUDAL EPIDURAL STEROID INJECTION performed by Marine Peterson DO at [address] [facility]  20    PAIN MANAGEMENT PROCEDURE Right 2020    RIGHT L3 4 5 MEDIAL BRANCH RADIOFREQUENCY ABLATION performed by Marine Peterson DO at [address] [facility] Left 9/10/2020    LEFT L3 4 5 MEDIAL BRANCH RADIOFREQUENCY ABLATION     +++IODINE ALLERGY+++   CPT: 45797 [id number] performed by Marine Peterson DO at [facility] PAIN MANAGEMENT PROCEDURE Bilateral 3/25/2021    BILATERAL L3,4,5 MEDIAL NERVE BRANCH RADIOFREQUENCY ABLATION performed by Marine Peterson DO at [address] [facility]  10/31/07    [address] M[address] [place] LAPAROSCOPIC PROSTATECTOMY    TESTICLE REMOVAL Bilateral 2019    hx prostate ca-    TONSILLECTOMY      TOTAL HIP ARTHROPLASTY  11    right    TOTAL KNEE ARTHROPLASTY  2010    bilateral     Family History   Problem Relation Age of Onset    Stroke Mother     Cancer Mother         pancreatic    Cancer Father         prostate    Stroke Father      Social History     Socioeconomic History    Marital status:      Spouse name: Not on file    Number of children: 3    Years of education: Not on file    Highest education level: Not on file   Occupational History    Not on file   Tobacco Use    Smoking status: Former Smoker     Packs/day: 1.00     Years: 40.00     Pack years: 40.00     Types: Cigarettes     Start date: 5     Quit date: 10/1/1994     Years since quittin.7    Smokeless tobacco: Former User     Types: [facility] date:    Vaping Use    Vaping Use: Never used   Substance and Sexual Activity    Alcohol use:  Yes     Alcohol/week: 1.0 standard drink     Types: 1 Cans of beer per week     Comment: rare    Drug use: No    Sexual activity: Not on file   Other Topics Concern    Not on file   Social History Narrative    Problem List: History of polyp of colon, Disorder of bone density and structure, unspecified,    Osteochondropathy, Carcinoma in situ of prostate, Aneurysm of thoracic aorta, Anemia, Coronary    arteriosclerosis, Cobalamin deficiency, Multiple closed fractures of pelvis with disruption of pelvic [place],    Hyperlipidemia, Essential hypertension    Health Maintenance:    Bone Density Test Screening - (3/5/2019)    Bone Density Scan - (12/18/2015)    Influenza Vaccination - (10/7/2016)    Colonoscopy - (4/3/2012)    Couseled on Home Safety - (11/23/2015)    Colonoscopy Screening - (4/3/2012)    US Liver - 8/1/08    Medical Problems:    Coronary Artery Disease (CAD), Hypertension, Hyperlipidemia, Prostate Cancer    Surgical Hx:    Prostatectomy, Coronary Artery Bypass Graft (CABG)            FH: Father:    . (Hx)    Mother:    . (Hx)        SH: Marital:  - retired . Personal Habits: Cigarette Use: former cigarette smoker, Nonsmoker. Alcohol: Occasionally    consumes alcohol. Social Determinants of Health     Financial Resource Strain: Low Risk     Difficulty of Paying Living Expenses: Not hard at all   Food Insecurity: No Food Insecurity    Worried About Running Out of Food in the Last Year: Never true    Deondre of Food in the Last Year: Never true   Transportation Needs:     Lack of Transportation (Medical): Not on file    Lack of Transportation (Non-Medical):  Not on file   Physical Activity:     Days of Exercise per Week: Not on file    Minutes of Exercise per Session: Not on file   Stress:     Feeling of Stress : Not on file   Social Connections:     Frequency of Communication with Friends and Family: Not on file    Frequency of Social Gatherings with Friends and Family: Not on file    Attends Rastafari Services: Not on file    Active Member of Clubs or Organizations: Not on file    Attends Club or Organization Meetings: Not on file    Marital Status: Not on file   Intimate Partner Violence:     Fear of Current or Ex-Partner: Not on file    Emotionally Abused: Not on file    Physically Abused: Not on file    Sexually Abused: Not on file   Housing Stability:     Unable to Pay for Housing in the Last Year: Not on file    Number of Places Lived in the Last Year: Not on file    Unstable Housing in the Last Year: Not on file       Vitals:    06/07/22 1328   BP: 128/64   Pulse: 78   Temp: 97.8 °F (36.6 °C)   SpO2: 96%   Weight: 226 lb (102.5 kg)      Wt Readings from Last 3 Encounters:   06/07/22 226 lb (102.5 kg)   06/07/22 226 lb (102.5 kg)   05/25/22 224 lb (101.6 kg)        Physical Exam  Exam:  Const: Appears comfortable. No signs of acute distress present. Head/Face: Atraumatic on inspection. Eyes: EOMI in both eyes. No discharge from the eyes. PERRL. Sclerae clear. ENMT: Auditory canals normal. Tympanic membranes: intact and translucent. External nose WNL. Nasal mucosa is clear. Oropharynx: No erythema or exudate. Posterior pharynx is normal.  Neck: Supple. Palpation reveals no adenopathy. No masses appreciated. No JVD. Carotids: no  bruits. Resp: Respirations are unlabored. Clear to auscultation. No rales, rhonchi or wheezes appreciated  over the lungs bilaterally. CV: Rate is regular. Rhythm regular today. 3/6 TRIPP  Abdomen: Bowel sounds are normoactive. Abdomen is soft, nontender, and nondistended. No  abdominal masses. No palpable hepatosplenomegaly. Lymph: No palpable or visible regional lymphadenopathy. Skin: Dry and warm with no rash or concerning lesions. Extremities: No clubbing cyanosis or edema. No calf inflammation or tenderness. Normal pulses. Neuro: Alert and oriented. Affect: appropriate. Upper Extremities: 5/5 bilaterally. Lower Extremities:  5/5 bilaterally. Sensation intact to light touch. Reflexes: DTR's are symmetric and 2+ bilaterally. .  Cranial Nerves: Cranial nerves grossly intact.   Muscular skeletal-chronic limited mobility, no acute inflammation    Lab Results   Component Value Date    PSA <0.01 03/22/2022    PSA SEE NOTE (AA) 12/06/2021    PSA <0.03 12/06/2021    PSADIA 0.02 12/09/2014    PSADIA <0.01 09/05/2014    PSADIA 0.32 06/03/2014          Assessment and Plan:   Diagnosis Orders   1. Cerebral aneurysm     2. Coronary artery disease involving native heart without angina pectoris, unspecified vessel or lesion type     3. Adenocarcinoma of right lung ([facility]     4. Prostate CA ([facility]     5. Hyperglycemia  Hemoglobin A1C   6. Gastroesophageal reflux disease without esophagitis     7. Mixed hyperlipidemia  Lipid Panel    TSH    Comprehensive Metabolic Panel    CK   8. Essential hypertension  TSH   9. Age-related osteoporosis without current pathological fracture     10. Renal insufficiency  Urinalysis    Microalbumin / Creatinine Urine Ratio   11. Vitamin D deficiency  Vitamin D 25 Hydroxy   12. Low magnesium level  Magnesium   13. Vitamin B12 deficiency  Vitamin B12 & Folate   14. Pancytopenia (HCC)  CBC with Auto Differential   15. History of COVID-19  Covid-19, Antibody      Cerebral aneurysm    counseled, risk of rupture reviewed. Signs and symptoms watch were reviewed. Avoid Valsalva. Blood pressure control. Following with .  Mining coiling procedure. Reviewed pictures from angiogram with him      Adenocarcinoma of right lung [facility])    counseled extensively, last measurement of approximately 2.4 x 1.8 cm. Right posterior lung. Biopsy 1/22 reveals    Diagnosis:   Right lung, core needle biopsy: Invasive, moderately differentiated   adenocarcinoma (grade 2) consistent with primary lung origin     bronchoscopy as of 3/1/2022 revealed negative Biopsy of reachable lymph nodes.        Finished radiation, follows with Dr. Sue Gomez and Dr. Kelsey Walsh [facility])  Metastatic.  Continue per Dr. Sanders Aase less than 0.01-0.07-0.3 , he had been on Lupron, had metastasis, since orchiectomy, did not tolerate xtandi so he stopped it , overall feeling well, on Erleada, follows regularly with Dr. Trice Arreola extensively. Differential reviewed, including serious etiologies. Gets echoes through cardiology,. Cont per dr David Bartholomew, saw May 2022. No change, sees 6mos        Allergic rhinitis  Counseled, chronic. Counseled on use of nasal steroid, vaporizer, nasal saline as well as additional treatment options for vasomotor rhinitis    History of COVID-19  Counseled. Resolved. Counseled on vaccine, declines, would like antibodies monitored, ordered for 3 months      Disc disease, degenerative, lumbar or lumbosacral  Counseled extensively. Differential reviewed, including serious etiologies. Was Following with [facility], had to change because of insurance, now seeing Dr. Chante Rodriguez pain management, saw Dr. Alek Bedoya. Previously [facility]. Counseled on injections, radiofrequency ablation etc.  Failed physical therapy. . Had  radiofrequency ablation x2, overall doing well, last MRI lumbar spine ordered by Dr. Meron Ariza 3/2022  Bilateral carpal tunnel syndrome  Had emg/ncs Nov 2019 [facility]. Referred to [facility] but did not go because no symptoms    Health maintenance examination  Health maintenance issues discussed at length 12/8/2021, encouraged yearly. Pancytopenia ([facility]  Counseled extensively. Differential reviewed, including serious etiologies. Possibly from medication. .  Continue per Dr. Meron Ariza. White count and hemoglobin mildly low but stable, platelets normalized. Monitor. Asymptomatic            Low magnesium level  Counseled. Goals reviewed. Currently 1.9. We will continue magnesium oxide 400 mg daily             Vitamin D deficiency  Continue current therapy. Monitor.     Vitamin B12 deficiency  Appropriate supplementation reviewed.     Vitamin I94 elevated, folic acid now normal, appropriate supplementation reviewed, monitor, risk of low and high reviewed, no longer on injection       Hyperglycemia  Counseled, not interested in meds now, monitor. A1c excellent in the past,. Monitor      Essential hypertension  Counseled. Became hypotensive. Cardiology low-dose lisinopril 10 mg. Now stable. Monitor. The risks of hypertension and hypotension reviewed. Watch closely ambulatory. Hyper and hypotensive precautions and parameters reviewed and previously written as well as parameters on pulse, call if out of range, ER dangers numbers. Lifestyle modification reviewed. Tolerating therapy. Tubular adenoma   Last colonoscopy Dr. Melissa Reid, 2/15 complaining repeat 3 years from then. Overdue. Declines now, fit cards or Cologuard. . Hemoglobin consistently low but stable. Declines any intervention now, declines any tests or procedures now.     Thyroid nodule  Counseled, incidental 7 mm right thyroid nodule on carotid ultrasound 3/19. Declines workup or follow-up now     Thoracic aneurysm, not ruptured  Counseled, potential serious is reviewed, if any symptoms directly to ER. Continue per Dr. Michelle Wagoner. He saw him 3/15 and had CT at least then. Dr. Michelle Wagoner discharged him at this point unless symptoms and he not interested in follow-up imaging. Of note CT was done for Covid 1/21 and they stated no acute abnormality of the thoracic aorta     PVD (peripheral vascular disease) with claudication [facility])  He saw Dr. Kyle Vela, who had him on pletal, had minimal results. Now follows with Dr Imelda Babin vascular in [place] who states he did have a femoral blockage proximal that would require bypass as well as some distal blockages that would require stenting but they felt observation most appropriate. He did not feel medication was necessary or helpful at this time. They state Dr. Kyle Vela agrees with this plan. He is comfortable with this plan at this point. Serious signs and symptoms watch were reviewed. Admits to missing an appointment because of Covid pandemic.   They were planning surgery but after discussion, and the risks of weightbearing exercise, appropriate calcium/D and recheck bone density in 1 year sooner as needed    Gastroesophageal reflux disease without esophagitis  Asymptomatic as long as takes medication, had EGD in the past.  Risk of meds reviewed including electrolyte imbalance, renal etc.  Wants to continue. Glaucoma of left eye   Follows with eye care. Planning surgery        Renal insufficiency  Counseled, differential reviewed. Emphasize proper hydration and avoidance of nephrotoxic agents. He simply wants basic monitoring. Not interested in referral.           Plan as above. Counseled extensively and differential diagnoses relevant to above were reviewed, including serious etiologies, risks and complications, especially of left uncontrolled. If relevant, instructions and  alternatives to meds/treatment reviewed, as well as interactions, and  SE's/ADRs reviewed, notify immediately if any, discontinuing new meds if any. Plan made after discussion and shared decision making. Counseled. Continue per multiple specialist.  Otherwise planning blood work and follow-up in 3 months sooner as needed  Return in about 3 months (around 9/7/2022). Educational materials and/or home exercises printed for patient's review and were included in patient instructions on his/her After Visit Summary and given to patient at the end of visit. After discussion, patient and/or guardian verbalizes understanding, agrees, feels comfortable with and wishes to proceed with above treatment plan. Call for any pending results, FU sooner if abnormal, as needed or if any current symptoms persist/worsen. Advised patient to call with any new medication issues, and read all Rx info from pharmacy to assure aware of all possible risks and side effects of medication before taking. Reviewed age and gender appropriate health screening exams and vaccinations.   Advised patient regarding importance of keeping up with recommended health maintenance and to schedule as soon as possible if overdue, as this is important in assessing for undiagnosed pathology, especially cancer, as well as protecting against potentially harmful/life threatening disease. Patient and/or guardian verbalizes understanding and agrees with above counseling, assessment and plan. All questions answered. Signs and symptoms to watch for discussed, serious signs and symptoms reviewed. ER if any. Over 40 minutes  spent with the patient in reviewing records, reviewing with patient/family, counseling, ordering,  prescribing, completing h&p, etc., with over 50% of the time spent face to face counseling. Carlene Puente MD    Patients are advised to check with insurance company to ensure coverage and to fully understand benefits and cost prior to any testing. This note was created with the assistance of voice recognition software. Document was reviewed however may contain grammatical errors.

## 2022-06-15 ENCOUNTER — TELEPHONE (OUTPATIENT)
Dept: PAIN MANAGEMENT | Age: 87
End: 2022-06-15

## 2022-06-15 NOTE — TELEPHONE ENCOUNTER
Call to Dary Mejia that procedure was approved for 6/23/2022 and that the surgery center should call him a few days before for the pre op call and after 3:00 PM the business day before with the arrival time. Instructed Alejandro to hold ibuprofen for 24 hours, naprosyn for 4 days and any aspirin containing products or fish oil for 7 days, last dose 6/15/2022. Instructed to call office back if any questions. Marely Harvey verbalized understanding.     Maxim Mcdowell RN  Pain Management

## 2022-06-16 ENCOUNTER — PREP FOR PROCEDURE (OUTPATIENT)
Dept: PAIN MANAGEMENT | Age: 87
End: 2022-06-16

## 2022-06-20 ENCOUNTER — TELEPHONE (OUTPATIENT)
Dept: PAIN MANAGEMENT | Age: 87
End: 2022-06-20

## 2022-06-20 NOTE — TELEPHONE ENCOUNTER
Called patient and spoke with his wife. Okay to discontinue the pregabalin due to side effects. Instructed to call with any other issues. This was also relayed to the patient via [address]

## 2022-06-21 NOTE — PROGRESS NOTES
[facility] MANAG[facility]  INSTRUCTIONS  . .......................................................................................................................................... [] Parking the day of Surgery is located in the [facility].   Upon entering the door, make immediate right into the surgery reception room    []  Bring photo ID and insurance card     [] You may have a light breakfast day of procedure    []  Wear loose comfortable clothing    []  Please follow instructions for medications as given per Dr's office    [] You can expect a call the business day prior to procedure to notify you of your arrival time     [] Please arrange for     []  Other instructions

## 2022-06-23 ENCOUNTER — HOSPITAL ENCOUNTER (OUTPATIENT)
Age: 87
Setting detail: OUTPATIENT SURGERY
Discharge: HOME | End: 2022-06-23
Attending: PAIN MEDICINE | Admitting: PAIN MEDICINE
Payer: MEDICARE

## 2022-06-23 ENCOUNTER — HOSPITAL ENCOUNTER (OUTPATIENT)
Dept: GENERAL RADIOLOGY | Age: 87
Discharge: HOME | End: 2022-06-25
Attending: PAIN MEDICINE
Payer: MEDICARE

## 2022-06-23 VITALS
HEIGHT: 68 IN | OXYGEN SATURATION: 98 % | BODY MASS INDEX: 34.25 KG/M2 | WEIGHT: 226 LBS | DIASTOLIC BLOOD PRESSURE: 66 MMHG | HEART RATE: 70 BPM | TEMPERATURE: 97 F | RESPIRATION RATE: 18 BRPM | SYSTOLIC BLOOD PRESSURE: 121 MMHG

## 2022-06-23 DIAGNOSIS — R52 PAIN MANAGEMENT: ICD-10-CM

## 2022-06-23 PROCEDURE — 64636 DESTROY L/S FACET JNT ADDL: CPT | Performed by: PAIN MEDICINE

## 2022-06-23 PROCEDURE — 7100000011 HC PHASE II RECOVERY - ADDTL 15 MIN: Performed by: PAIN MEDICINE

## 2022-06-23 PROCEDURE — 3209999900 FLUORO FOR SURGICAL PROCEDURES

## 2022-06-23 PROCEDURE — 3600000002 HC SURGERY LEVEL 2 BASE: Performed by: PAIN MEDICINE

## 2022-06-23 PROCEDURE — 2500000003 HC RX 250 WO HCPCS: Performed by: PAIN MEDICINE

## 2022-06-23 PROCEDURE — 3600000012 HC SURGERY LEVEL 2 ADDTL 15MIN: Performed by: PAIN MEDICINE

## 2022-06-23 PROCEDURE — 64635 DESTROY LUMB/SAC FACET JNT: CPT | Performed by: PAIN MEDICINE

## 2022-06-23 PROCEDURE — 7100000010 HC PHASE II RECOVERY - FIRST 15 MIN: Performed by: PAIN MEDICINE

## 2022-06-23 PROCEDURE — 6360000002 HC RX W HCPCS: Performed by: PAIN MEDICINE

## 2022-06-23 PROCEDURE — 2709999900 HC NON-CHARGEABLE SUPPLY: Performed by: PAIN MEDICINE

## 2022-06-23 RX ORDER — BUPIVACAINE HYDROCHLORIDE 2.5 MG/ML
INJECTION, SOLUTION EPIDURAL; INFILTRATION; INTRACAUDAL PRN
Status: DISCONTINUED | OUTPATIENT
Start: 2022-06-23 | End: 2022-06-23 | Stop reason: ALTCHOICE

## 2022-06-23 RX ORDER — METHYLPREDNISOLONE ACETATE 40 MG/ML
INJECTION, SUSPENSION INTRA-ARTICULAR; INTRALESIONAL; INTRAMUSCULAR; SOFT TISSUE PRN
Status: DISCONTINUED | OUTPATIENT
Start: 2022-06-23 | End: 2022-06-23 | Stop reason: ALTCHOICE

## 2022-06-23 RX ORDER — LIDOCAINE HYDROCHLORIDE 20 MG/ML
INJECTION, SOLUTION EPIDURAL; INFILTRATION; INTRACAUDAL; PERINEURAL PRN
Status: DISCONTINUED | OUTPATIENT
Start: 2022-06-23 | End: 2022-06-23 | Stop reason: ALTCHOICE

## 2022-06-23 ASSESSMENT — PAIN DESCRIPTION - DESCRIPTORS: DESCRIPTORS: ACHING

## 2022-06-23 ASSESSMENT — PAIN DESCRIPTION - ONSET: ONSET: ON-GOING

## 2022-06-23 ASSESSMENT — PAIN DESCRIPTION - LOCATION: LOCATION: BACK

## 2022-06-23 ASSESSMENT — PAIN DESCRIPTION - PAIN TYPE: TYPE: CHRONIC PAIN

## 2022-06-23 ASSESSMENT — PAIN DESCRIPTION - ORIENTATION: ORIENTATION: LOWER

## 2022-06-23 ASSESSMENT — PAIN SCALES - GENERAL: PAINLEVEL_OUTOF10: 6

## 2022-06-23 NOTE — OP NOTE
2022    Patient: Demarco Cai  :  1935  Age:  82 y.o. Sex:  male     PRE-OPERATIVE DIAGNOSIS: Bilateral Lumbar spondylosis, lumbar facet arthropathy. POST-OPERATIVE DIAGNOSIS: Same. PROCEDURE:  Fluoroscopic-guided Bilateral  Lumbar facet medial branch radiofrequency ablation at levels L3,4,5 (anesthetizing the L4-5 and L5-S1 facet joints). SURGEON:  NAVARRO Ruiz. ANESTHESIA: local    ESTIMATED BLOOD LOSS: None.  ______________________________________________________________________  HISTORY & PHYSICAL: Demarco Cai presents today for fluoroscopic-guided Bilateral lumbar facet medial branch radiofrequency ablation at levels L3,4,5. The potential complications of the procedure were explained to Ivanna Lima again today and he has elected to undergo the aforementioned procedure. Antoni complete History & Physical examination were reviewed in depth, a copy of which is in the chart. DESCRIPTION OF PROCEDURE:    After confirming written and informed consent, a time-out was performed and Antoni name and date of birth, the procedure to be performed as well as the plan for the location of the needle insertion were confirmed. The patient was brought into the procedure room and placed in the prone position on the fluoroscopy table. Standard monitors were placed and vital signs were observed throughout the procedure. The area of the lumbar spine and upper buttocks was sterilely prepped with chloraprep and draped in a sterile manner. AP fluoroscopy was used to identify and christian bartons point at the targeted area. A 22 gauge 10 mm radiofrequency probe was advanced toward each of these points. Once bone was contacted, negative aspiration for blood and CSF was confirmed, sensory stimulation was performed at 50 Hz and at 0.4 volts generating a pressure sensation. Motor stimulation < 2.0 volts elicited multifidus twitching without any radicular symptoms.  1 ml of 2% lidocaine was injected prior

## 2022-06-23 NOTE — H&P
EUGENE RODRIGUEZ [facility] Pain Management        [address] [facility], [address]  Dept: [phone]    Procedure History & Physical      Daryed Martino     HPI:    Patient  is here for LBP for b/l L3,4,5 RFA  Labs/imaging studies reviewed   All question and concerns addressed including R/B/A associated with the procedure    Past Medical History:   Diagnosis Date    CA prostate, adenoca ([facility] [address] [address] 4/25/2019    CAD (coronary artery disease)     Dr. Junaid Oakes [facility])     prostate- PO chemotherapy     Chronic bilateral low back pain without sciatica 3/6/2020    Cobalamin deficiency     Glaucoma     Right eye    History of blood transfusion     Hyperlipidemia     Hypertension     Osteoarthritis     Osteochondropathy     Osteopenia     Pain in lower limb     Peripheral venous insufficiency     PONV (postoperative nausea and vomiting)     Prolonged emergence from general anesthesia     PVD (peripheral vascular disease) with claudication ([facility] [address] 4/25/2019       Past Surgical History:   Procedure Laterality Date    ACETABULUM FRACTURE SURGERY  3/24/11    ANESTHESIA NERVE BLOCK Bilateral 6/16/2020    BILATERAL L3 L4 L5 MEDIAL NERVE BRANCH BLOCK performed by Alberto Cook DO at [address] [place] [address] N/A 2/24/2022    BRONCHOSCOPY W/EBUS FNA performed by Africa Lees MD at [address] [place] [address]  2/24/2022    BRONCHOSCOPY DIAGNOSTIC OR CELL [address] ONLY performed by Africa Lees MD at [address] [place] [address]  2016   [facility]  Dec. 1996    [facility]/ Dr. Peyman London  1/25/2022    CT NEEDLE BIOPSY LUNG PERCUTANEOUS 1/25/2022 Todd Mooney MD [facility]    ENDOSCOPY, COLON, DIAGNOSTIC  2016    EYE SURGERY Bilateral 01/2018    cataract, glaucoma 2020-left eye    FEMUR FRACTURE SURGERY  1981    left   [address]    JOINT REPLACEMENT      bilat knee 2010, righ thip 2011     LYMPH NODE DISSECTION  10/2007    PAIN MANAGEMENT PROCEDURE N/A 5/19/2020    CAUDAL EPIDURAL STEROID INJECTION performed by Jefferson Fink DO at [facility] [address]  8/4/20    PAIN MANAGEMENT PROCEDURE Right 8/4/2020    RIGHT L3 4 5 MEDIAL BRANCH RADIOFREQUENCY ABLATION performed by Jefferson Fink DO at [facility] [address] Left 9/10/2020    LEFT L3 4 5 MEDIAL BRANCH RADIOFREQUENCY ABLATION     +++IODINE ALLERGY+++   CPT: [id number] performed by Jefferson Fink DO at [facility] [address] Bilateral 3/25/2021    BILATERAL L3,4,5 MEDIAL NERVE BRANCH RADIOFREQUENCY ABLATION performed by Jefferson Fink DO at [address] [facility].  10/31/07    [address] M[address] [place] LAPAROSCOPIC PROSTATECTOMY    TESTICLE REMOVAL Bilateral 02/2019    hx prostate ca-    TONSILLECTOMY      TOTAL HIP ARTHROPLASTY  11/30/11    right    TOTAL KNEE ARTHROPLASTY  2010    bilateral       Prior to Admission medications    Medication Sig Start Date End Date Taking? Authorizing Provider   pregabalin (LYRICA) 25 MG capsule Take 1 capsule by mouth nightly as needed (pain) for up to 30 days.   Patient not taking: Reported on 6/23/2022 6/7/22 7/7/22  Jefferson Fink DO   calcium carbonate (OSCAL) 500 MG TABS tablet Take 500 mg by mouth daily    Historical Provider, MD   denosumab (XGEVA) 120 MG/1.7ML SOLN SC injection Inject 120 mg into the skin every 3 months     Historical Provider, MD   omeprazole (PRILOSEC) 20 MG delayed release capsule Take 1 capsule by mouth daily 5/4/22   Alison Mart MD   Vitamin D, Cholecalciferol, 50 MCG (2000 UT) CAPS Take 2,000 Units by mouth daily 1/1/18   Historical Provider, MD   omega-3 acid ethyl esters (LOVAZA) 1 g capsule Take 1 capsule by mouth 2 times daily 11/30/21   Alison Mart MD   Handicap Placard MISC by Does not apply route Duration: 5 years 11/4/21   Alison Mart MD   lisinopril (PRINIVIL;ZESTRIL) 10 MG tablet Take 1 tablet by mouth daily 21   Ruta Dance, MD   atorvastatin (LIPITOR) 40 MG tablet Take 1 tablet by mouth daily 21   Ruta Dance, MD   magnesium gluconate (MAGONATE) 500 MG tablet Take 500 mg by mouth at bedtime     Historical Provider, MD   ERLEADA 60 MG TABS 60 mg Every 4 days 3/2/20   Historical Provider, MD   meclizine (ANTIVERT) 25 MG tablet 1/2 - 1 by mouth every 6 hours as needed    Historical Provider, MD   Multiple Vitamins-Minerals (THERAPEUTIC MULTIVITAMIN-MINERALS) tablet Take 1 tablet by mouth daily     Historical Provider, MD   metoprolol tartrate (LOPRESSOR) 25 MG tablet Take 25 mg by mouth 2 times daily    Historical Provider, MD   Coenzyme Q10 (COQ10) 50 MG CAPS Take 1 capsule by mouth daily     Historical Provider, MD   aspirin 81 MG tablet Take 81 mg by mouth daily     Historical Provider, MD   Cyanocobalamin (VITAMIN B 12 PO) Take  by mouth daily. sublingual  11   Historical Provider, MD       No Known Allergies    Social History     Socioeconomic History    Marital status:      Spouse name: Not on file    Number of children: 3    Years of education: Not on file    Highest education level: Not on file   Occupational History    Not on file   Tobacco Use    Smoking status: Former Smoker     Packs/day: 1.00     Years: 40.00     Pack years: 40.00     Types: Cigarettes     Start date:      Quit date: 10/1/1994     Years since quittin.7    Smokeless tobacco: Former User     Types: [address] date:    Vaping Use    Vaping Use: Never used   Substance and Sexual Activity    Alcohol use:  Yes     Alcohol/week: 1.0 standard drink     Types: 1 Cans of beer per week     Comment: rare    Drug use: No    Sexual activity: Not on file   Other Topics Concern    Not on file   Social History Narrative    Problem List: History of polyp of colon, Disorder of bone density and structure, unspecified,    Osteochondropathy, Carcinoma in situ of prostate, Aneurysm of thoracic Housing Stability:     Unable to Pay for Housing in the Last Year: Not on file    Number of Places Lived in the Last Year: Not on file    Unstable Housing in the Last Year: Not on file       Family History   Problem Relation Age of Onset    Stroke Mother     Cancer Mother         pancreatic    Cancer Father         prostate    Stroke Father          REVIEW OF SYSTEMS:    CONSTITUTIONAL:  negative for  fevers, chills, sweats and fatigue    RESPIRATORY:  negative for  dry cough, cough with sputum, dyspnea, wheezing and chest pain    CARDIOVASCULAR:  negative for chest pain, dyspnea, palpitations, syncope    GASTROINTESTINAL:  negative for nausea, vomiting, change in bowel habits, diarrhea, constipation and abdominal pain    MUSCULOSKELETAL: negative for muscle weakness    SKIN: negative for itching or rashes. BEHAVIOR/PSYCH:  negative for poor appetite, increased appetite, decreased sleep and poor concentration    All other systems negative      PHYSICAL EXAM:    VITALS:  /68   Pulse 74   Temp 97.6 °F (36.4 °C)   Resp 15   Ht 5' 8\" (1.727 m)   Wt 226 lb (102.5 kg)   SpO2 95%   BMI 34.36 kg/m²     CONSTITUTIONAL:  awake, alert, cooperative, no apparent distress, and appears stated age    EYES: PERRLA, EOMI    LUNGS:  No increased work of breathing, no audible wheezing    CARDIOVASCULAR:  regular rate and rhythm    ABDOMEN:  Soft non tender non distended     EXTREMITIES: no signs of clubbing or cyanosis. MUSCULOSKELETAL: negative for flaccid muscle tone or spastic movements. SKIN: gross examination reveals no signs of rashes, or diaphoresis. NEURO: Cranial nerves II-XII grossly intact. No signs of agitated mood.        Assessment/Plan:    LB pain for b/l L3,4,5 RFA

## 2022-06-24 ENCOUNTER — HOSPITAL ENCOUNTER (OUTPATIENT)
Dept: RADIATION ONCOLOGY | Age: 87
Discharge: HOME | End: 2022-06-24
Payer: MEDICARE

## 2022-06-24 VITALS
TEMPERATURE: 97.6 F | WEIGHT: 223 LBS | HEART RATE: 97 BPM | SYSTOLIC BLOOD PRESSURE: 132 MMHG | DIASTOLIC BLOOD PRESSURE: 78 MMHG | OXYGEN SATURATION: 98 % | BODY MASS INDEX: 33.91 KG/M2 | RESPIRATION RATE: 18 BRPM

## 2022-06-24 DIAGNOSIS — C34.90 MALIGNANT NEOPLASM OF LUNG, UNSPECIFIED LATERALITY, UNSPECIFIED PART OF LUNG (HCC): Primary | ICD-10-CM

## 2022-06-24 PROCEDURE — 99999 PR OFFICE/OUTPT VISIT,PROCEDURE ONLY: CPT | Performed by: RADIOLOGY

## 2022-06-24 NOTE — PROGRESS NOTES
Carroll Napoles  6/24/2022  10:35 AM      Vitals:    06/24/22 1033   BP: 132/78   Pulse: 97   Resp: 18   Temp: 97.6 °F (36.4 °C)   SpO2: 98%    : Wt Readings from Last 3 Encounters:   06/24/22 223 lb (101.2 kg)   06/21/22 226 lb (102.5 kg)   06/15/22 226 lb (102.5 kg)                Current Outpatient Medications:     pregabalin (LYRICA) 25 MG capsule, Take 1 capsule by mouth nightly as needed (pain) for up to 30 days.  (Patient not taking: Reported on 6/23/2022), Disp: 30 capsule, Rfl: 2    calcium carbonate (OSCAL) 500 MG TABS tablet, Take 500 mg by mouth daily, Disp: , Rfl:     denosumab (XGEVA) 120 MG/1.7ML SOLN SC injection, Inject 120 mg into the skin every 3 months , Disp: , Rfl:     omeprazole (PRILOSEC) 20 MG delayed release capsule, Take 1 capsule by mouth daily, Disp: 90 capsule, Rfl: 3    Vitamin D, Cholecalciferol, 50 MCG (2000 UT) CAPS, Take 2,000 Units by mouth daily, Disp: , Rfl:     omega-3 acid ethyl esters (LOVAZA) 1 g capsule, Take 1 capsule by mouth 2 times daily, Disp: 180 capsule, Rfl: 3    Handicap Placard MISC, by Does not apply route Duration: 5 years, Disp: 1 each, Rfl: 0    lisinopril (PRINIVIL;ZESTRIL) 10 MG tablet, Take 1 tablet by mouth daily, Disp: 90 tablet, Rfl: 3    atorvastatin (LIPITOR) 40 MG tablet, Take 1 tablet by mouth daily, Disp: 90 tablet, Rfl: 3    magnesium gluconate (MAGONATE) 500 MG tablet, Take 500 mg by mouth at bedtime , Disp: , Rfl:     ERLEADA 60 MG TABS, 60 mg Every 4 days, Disp: , Rfl:     meclizine (ANTIVERT) 25 MG tablet, 1/2 - 1 by mouth every 6 hours as needed, Disp: , Rfl:     Multiple Vitamins-Minerals (THERAPEUTIC MULTIVITAMIN-MINERALS) tablet, Take 1 tablet by mouth daily , Disp: , Rfl:     metoprolol tartrate (LOPRESSOR) 25 MG tablet, Take 25 mg by mouth 2 times daily, Disp: , Rfl:     Coenzyme Q10 (COQ10) 50 MG CAPS, Take 1 capsule by mouth daily , Disp: , Rfl:     aspirin 81 MG tablet, Take 81 mg by mouth daily , Disp: , Rfl:    Cyanocobalamin (VITAMIN B 12 PO), Take  by mouth daily. sublingual , Disp: , Rfl:       Patient is seen today in follow up for completion RLL SBRT 5/23/22-6/2/22 5fx/5500cGy. FALLS RISK SCREENING ASSESSMENT    Instructions:  Assess the patient and enter the appropriate indicators that are present for fall risk identification. Total the numbers entered and assign a fall risk score from Table 2.  Reassess patient at a minimum every 12 weeks or with status change. Assessment   Date  6/24/2022     1. Mental Ability: confusion/cognitively impaired No - 0       2. Elimination Issues: incontinence, frequency No - 0       3. Ambulatory: use of assistive devices (walker, cane, off-loading devices), attached to equipment (IV pole, oxygen) No - 0     4. Sensory Limitations: dizziness, vertigo, impaired vision No - 0       5. Age 72 years or greater - 1       10. Medication: diuretics, strong analgesics, hypnotics, sedatives, antihypertensive agents   Yes - 3   7. Falls:  recent history of falls within the last 3 months (not to include slipping or tripping)   No - 0   TOTAL 4    If score of 4 or greater was education given? Yes       TABLE 2   Risk Score Risk Level Plan of Care   0-3 Little or  No Risk 1. Provide assistance as indicated for ambulation activities  2. Reorient confused/cognitively impaired patient  3. Call-light/bell within patient's reach  4. Chair/bed in low position, stretcher/bed with siderails up except when performing patient care activities  5. Educate patient/family/caregiver on falls prevention  6.  Reassess in 12 weeks or with any noted change in patient condition which places them at a risk for a fall   4-6 Moderate Risk 1. Provide assistance as indicated for ambulation activities  2. Reorient confused/cognitively impaired patient  3. Call-light/bell within patient's reach  4.   Chair/bed in low position, stretcher/bed with siderails up except when performing patient care activities  5. Educate patient/family/caregiver on falls prevention  6. Falls risk precaution (Yellow sticker Level II) placed on patient chart   7 or   Higher High Risk 1. Place patient in easily observable treatment room  2. Patient attended at all times by family member or staff  3. Provide assistance as indicated for ambulation activities  4. Reorient confused/cognitively impaired patient  5. Call-light/bell within patient's reach  6. Chair/bed in low position, stretcher/bed with siderails up except when performing patient care activities  7. Educate patient/family/caregiver on falls prevention  8. Falls risk precaution (Yellow sticker Level III) placed on patient chart           MALNUTRITION RISK SCREENING ASSESSMENT    6/24/2022   Patient:  iXomy [place]  Sex:  male    Instructions:  Assess the patient and enter the appropriate indicators that are present for nutrition risk identification. Total the numbers entered and assign a risk score. Follow the appropriate action for total score listed below. Assessment   Date  6/24/2022     1. Have you lost weight without trying? 0- No     2. Have you been eating poorly because of a decreased appetite? 0- No   3. Do you have a diagnosis of head and neck cancer?       0- No                                                                                    TOTAL 0          Score of 0-1: No action  Score 2 or greater:  · For Non-Diabetic Patient: Recommend adding Ensure Complete 2 x daily and provide patient with Ensure wellness bag with coupons  · For Diabetic Patient: Recommend adding Glucerna Shake 2 x daily and provide patient with Glucerna Wellness bag with coupons  · Route to the dietitian via Suni Mcdonald RN

## 2022-06-24 NOTE — PROGRESS NOTES
Radiation Oncology      RLL SBRT comp 6/2/22, 5500 cGy      This patient was seen for a no charge FU today within 1 month of SBRT / SRS (or other hypofractionation radiotherapy) to review and discuss and potential post acute or early delayed side effect of the treatment. In addition to this, we reviewed the SILVA and course to date (see previous [address] [place] [address] consultation note), completed treatment (see previous daily treatment notes), and any applicable laboratory or radiographic evaluations since last visit. This patient reports no concerning post treatment side effects today however verbalized understanding for the potential early delayed and delayed effects of hypofractionation radiotherapy again today and is amenable to NCCN based FU. We answered all of the pts questions to the best of our ability. Israel Lemons has all of our contact informations should any new symptom occur.        -notes periodic cough but no hemoptysis  -no dysphagia or odynophagia  -no CW pain  -FU ~ 3 mo CT chest for first 12 mo post SBRT Tx --- defer to Dr. Alfonso Khan.  Avis Bullard MD [place] [address] Oncology  Cell: [phone]    [facility]:  [phone]   FAX: [phone]  [facility]:  [address] [place] [address] Avenue:    [phone]  [facility]:  [address] [place] [address] Road:  [phone]

## 2022-08-04 DIAGNOSIS — M25.552 LEFT HIP PAIN: Primary | ICD-10-CM

## 2022-08-04 DIAGNOSIS — C61 PROSTATE CANCER METASTATIC TO BONE (HCC): ICD-10-CM

## 2022-08-04 DIAGNOSIS — M84.452A: ICD-10-CM

## 2022-08-04 DIAGNOSIS — C79.51 PROSTATE CANCER METASTATIC TO BONE (HCC): ICD-10-CM

## 2022-08-08 ENCOUNTER — HOSPITAL ENCOUNTER (OUTPATIENT)
Age: 87
Discharge: HOME | End: 2022-08-08
Payer: MEDICARE

## 2022-08-08 ENCOUNTER — HOSPITAL ENCOUNTER (OUTPATIENT)
Age: 87
Discharge: HOME | End: 2022-08-10
Payer: MEDICARE

## 2022-08-08 DIAGNOSIS — C61 PROSTATE CANCER METASTATIC TO BONE (HCC): ICD-10-CM

## 2022-08-08 DIAGNOSIS — M25.552 LEFT HIP PAIN: ICD-10-CM

## 2022-08-08 DIAGNOSIS — C79.51 PROSTATE CANCER METASTATIC TO BONE (HCC): ICD-10-CM

## 2022-08-08 DIAGNOSIS — M84.452A: ICD-10-CM

## 2022-08-08 LAB
BUN BLDV-MCNC: 21 MG/DL (ref 6–23)
CREAT SERPL-MCNC: 1.4 MG/DL (ref 0.7–1.2)
GFR AFRICAN AMERICAN: 58
GFR NON-AFRICAN AMERICAN: 48 ML/MIN/1.73

## 2022-08-08 PROCEDURE — 84520 ASSAY OF UREA NITROGEN: CPT

## 2022-08-08 PROCEDURE — 82565 ASSAY OF CREATININE: CPT

## 2022-08-08 PROCEDURE — 36415 COLL VENOUS BLD VENIPUNCTURE: CPT

## 2022-08-09 ENCOUNTER — HOSPITAL ENCOUNTER (OUTPATIENT)
Dept: MRI IMAGING | Age: 87
Discharge: HOME | End: 2022-08-11
Payer: MEDICARE

## 2022-08-09 DIAGNOSIS — C61 PROSTATE CANCER METASTATIC TO BONE (HCC): ICD-10-CM

## 2022-08-09 DIAGNOSIS — M84.452A: ICD-10-CM

## 2022-08-09 DIAGNOSIS — C79.51 PROSTATE CANCER METASTATIC TO BONE (HCC): ICD-10-CM

## 2022-08-09 DIAGNOSIS — M25.552 LEFT HIP PAIN: ICD-10-CM

## 2022-08-09 PROCEDURE — A9579 GAD-BASE MR CONTRAST NOS,1ML: HCPCS | Performed by: RADIOLOGY

## 2022-08-09 PROCEDURE — 73723 MRI JOINT LWR EXTR W/O&W/DYE: CPT

## 2022-08-09 PROCEDURE — 6360000004 HC RX CONTRAST MEDICATION: Performed by: RADIOLOGY

## 2022-08-09 RX ADMIN — GADOTERIDOL 20 ML: 279.3 INJECTION, SOLUTION INTRAVENOUS at 21:12

## 2022-08-11 ENCOUNTER — TELEPHONE (OUTPATIENT)
Dept: PAIN MANAGEMENT | Age: 87
End: 2022-08-11

## 2022-08-11 DIAGNOSIS — M84.452A: ICD-10-CM

## 2022-08-11 DIAGNOSIS — M25.552 LEFT HIP PAIN: Primary | ICD-10-CM

## 2022-08-11 DIAGNOSIS — C61 PROSTATE CANCER METASTATIC TO BONE (HCC): ICD-10-CM

## 2022-08-11 DIAGNOSIS — C79.51 PROSTATE CANCER METASTATIC TO BONE (HCC): ICD-10-CM

## 2022-08-11 RX ORDER — HYDROCODONE BITARTRATE AND ACETAMINOPHEN 5; 325 MG/1; MG/1
1 TABLET ORAL 3 TIMES DAILY PRN
Qty: 90 TABLET | Refills: 0 | Status: SHIPPED
Start: 2022-08-11 | End: 2022-08-23

## 2022-08-11 NOTE — TELEPHONE ENCOUNTER
Maria Guadalupe called in wanting to know if Wyatt's appointment could be changed to a phone call or virtual. Stated that he is in a lot of pain and has been taking some of her Norco pills. Requesting some pain medication for [place] if possible. Please advise. Thanks.

## 2022-08-11 NOTE — TELEPHONE ENCOUNTER
Spoke to [employer]  Discussed MRI results  They would like Dr. Joana Carter for surgery  Referral placed  Called Dr. Joana Carter office, they will expedite scheduling  Sanjuanita Lockhart will fax all needed documents  Will cancel tomorrow's appt as Roberto Rodriguez can hardly walk at this point  OARRS reviewed  He tolerates Norco without Se's  Script of [address] sent to pharmacy    Memory Richy, please cancel tomorrow's appt and make a 4 week follow up.     Carlos Vallejo, DO

## 2022-08-11 NOTE — TELEPHONE ENCOUNTER
Spoke to Dr. Guerita Mccallum and sent over the documents to Dr. Catalina Faith office on 8/11/22. Called their office to confirm they received them and they closed at 4pm. Will call back tomorrow to make sure they received the paperwork and when they will get Memory Beatris scheduled.

## 2022-08-12 NOTE — TELEPHONE ENCOUNTER
Per Jesús Mane, Dr. Doyle Jaime office called and stated they were declining referral and that patient needs to go to the ER as he has an active femur fracture. I will update Dr. Jenae Mejia.

## 2022-08-12 NOTE — TELEPHONE ENCOUNTER
Spoke with wife, she wants to go to [place]. Addresses given for [facility] and [facility] that were given to me by Dr. Alix Coleman office.

## 2022-08-12 NOTE — TELEPHONE ENCOUNTER
Wife called back again. States no need for any call backs. She states they spoke with someone who has gone through same type of issue and they are going to go to [facility] ER on Sunday. She wanted us to update Dr. Alexandra Fabian.

## 2022-08-12 NOTE — TELEPHONE ENCOUNTER
Called Wyatt's wife to let her know that I cancelled his appointment today at our office and Dr. Zain De Jesus wants to see him back in 4 weeks. He is already scheduled for 9/7/22.

## 2022-08-12 NOTE — TELEPHONE ENCOUNTER
Thank you for letting me know. He should go to the ER downtown as opposed to [facility]. Please let them know.     Thank you

## 2022-08-14 PROBLEM — C61 PROSTATE CANCER (HCC): Status: ACTIVE | Noted: 2022-08-14

## 2022-08-15 PROBLEM — C79.51 PROSTATE CANCER METASTATIC TO BONE (HCC): Status: ACTIVE | Noted: 2022-08-15

## 2022-08-15 PROBLEM — C61 PROSTATE CANCER METASTATIC TO BONE (HCC): Status: ACTIVE | Noted: 2022-08-15

## 2022-08-23 ENCOUNTER — OFFICE VISIT (OUTPATIENT)
Dept: PRIMARY CARE CLINIC | Age: 87
End: 2022-08-23
Payer: MEDICARE

## 2022-08-23 VITALS
TEMPERATURE: 97.6 F | HEART RATE: 96 BPM | OXYGEN SATURATION: 94 % | DIASTOLIC BLOOD PRESSURE: 68 MMHG | SYSTOLIC BLOOD PRESSURE: 134 MMHG

## 2022-08-23 DIAGNOSIS — I10 ESSENTIAL HYPERTENSION: ICD-10-CM

## 2022-08-23 DIAGNOSIS — E78.2 MIXED HYPERLIPIDEMIA: Primary | ICD-10-CM

## 2022-08-23 DIAGNOSIS — R82.998 DARK URINE: ICD-10-CM

## 2022-08-23 DIAGNOSIS — D64.9 ANEMIA, UNSPECIFIED TYPE: ICD-10-CM

## 2022-08-23 DIAGNOSIS — E78.2 MIXED HYPERLIPIDEMIA: ICD-10-CM

## 2022-08-23 LAB
ALBUMIN SERPL-MCNC: 3.9 G/DL (ref 3.5–5.2)
ALP BLD-CCNC: 77 U/L (ref 40–129)
ALT SERPL-CCNC: 56 U/L (ref 0–40)
ANION GAP SERPL CALCULATED.3IONS-SCNC: 14 MMOL/L (ref 7–16)
AST SERPL-CCNC: 36 U/L (ref 0–39)
BASOPHILS ABSOLUTE: 0.02 E9/L (ref 0–0.2)
BASOPHILS RELATIVE PERCENT: 0.5 % (ref 0–2)
BILIRUB SERPL-MCNC: 0.6 MG/DL (ref 0–1.2)
BILIRUBIN URINE: NEGATIVE
BLOOD, URINE: NEGATIVE
BUN BLDV-MCNC: 20 MG/DL (ref 6–23)
CALCIUM SERPL-MCNC: 9.2 MG/DL (ref 8.6–10.2)
CHLORIDE BLD-SCNC: 107 MMOL/L (ref 98–107)
CLARITY: CLEAR
CO2: 21 MMOL/L (ref 22–29)
COLOR: YELLOW
CREAT SERPL-MCNC: 1.2 MG/DL (ref 0.7–1.2)
EOSINOPHILS ABSOLUTE: 0.06 E9/L (ref 0.05–0.5)
EOSINOPHILS RELATIVE PERCENT: 1.4 % (ref 0–6)
GFR AFRICAN AMERICAN: >60
GFR NON-AFRICAN AMERICAN: 58 ML/MIN/1.73
GLUCOSE BLD-MCNC: 107 MG/DL (ref 74–99)
GLUCOSE URINE: NEGATIVE MG/DL
HCT VFR BLD CALC: 25.9 % (ref 37–54)
HEMOGLOBIN: 8.2 G/DL (ref 12.5–16.5)
IMMATURE GRANULOCYTES #: 0.17 E9/L
IMMATURE GRANULOCYTES %: 3.9 % (ref 0–5)
KETONES, URINE: ABNORMAL MG/DL
LEUKOCYTE ESTERASE, URINE: NEGATIVE
LYMPHOCYTES ABSOLUTE: 0.75 E9/L (ref 1.5–4)
LYMPHOCYTES RELATIVE PERCENT: 17.2 % (ref 20–42)
MCH RBC QN AUTO: 30.1 PG (ref 26–35)
MCHC RBC AUTO-ENTMCNC: 31.7 % (ref 32–34.5)
MCV RBC AUTO: 95.2 FL (ref 80–99.9)
MONOCYTES ABSOLUTE: 0.34 E9/L (ref 0.1–0.95)
MONOCYTES RELATIVE PERCENT: 7.8 % (ref 2–12)
NEUTROPHILS ABSOLUTE: 3.02 E9/L (ref 1.8–7.3)
NEUTROPHILS RELATIVE PERCENT: 69.2 % (ref 43–80)
NITRITE, URINE: NEGATIVE
PDW BLD-RTO: 16.3 FL (ref 11.5–15)
PH UA: 5.5 (ref 5–9)
PLATELET # BLD: 184 E9/L (ref 130–450)
PMV BLD AUTO: 10 FL (ref 7–12)
POTASSIUM SERPL-SCNC: 4.1 MMOL/L (ref 3.5–5)
PROTEIN UA: NEGATIVE MG/DL
RBC # BLD: 2.72 E12/L (ref 3.8–5.8)
SODIUM BLD-SCNC: 142 MMOL/L (ref 132–146)
SPECIFIC GRAVITY UA: >=1.03 (ref 1–1.03)
TOTAL PROTEIN: 6.5 G/DL (ref 6.4–8.3)
UROBILINOGEN, URINE: 0.2 E.U./DL
WBC # BLD: 4.4 E9/L (ref 4.5–11.5)

## 2022-08-23 PROCEDURE — G8427 DOCREV CUR MEDS BY ELIG CLIN: HCPCS | Performed by: FAMILY MEDICINE

## 2022-08-23 PROCEDURE — 99214 OFFICE O/P EST MOD 30 MIN: CPT | Performed by: FAMILY MEDICINE

## 2022-08-23 PROCEDURE — 1123F ACP DISCUSS/DSCN MKR DOCD: CPT | Performed by: FAMILY MEDICINE

## 2022-08-23 PROCEDURE — 1036F TOBACCO NON-USER: CPT | Performed by: FAMILY MEDICINE

## 2022-08-23 PROCEDURE — G8417 CALC BMI ABV UP PARAM F/U: HCPCS | Performed by: FAMILY MEDICINE

## 2022-08-23 RX ORDER — UREA 10 %
140 LOTION (ML) TOPICAL 2 TIMES DAILY WITH MEALS
COMMUNITY

## 2022-08-23 RX ORDER — ENOXAPARIN SODIUM 100 MG/ML
INJECTION SUBCUTANEOUS
COMMUNITY
Start: 2022-08-20 | End: 2022-09-13

## 2022-08-23 NOTE — PROGRESS NOTES
Anibal Diamond : 1935 Sex: male  Age: 82 y.o. Chief Complaint   Patient presents with    Post-Op Check     08/15/22 left hip Dr Laurie Belle     Swelling     Left foot    Medication Check     Hospital had him stop atorvastatin and lisinopril        HPI:       Here with wife. About 1 week postop left partial hip arthroplasty. Overall doing well. Some leg swelling but it has been not elevated. He is on Lovenox. Declines imaging or ultrasound. Incision clean dry and intact. He states Lipitor and lisinopril were held in the hospital, not sure why. Urine dark at times. Hydrating. Hemoglobin was 7.8 on discharge, on iron now. No other complaints or concerns.         Most Recent Labs  CBC  Lab Results   Component Value Date/Time    WBC 4.3 2022 12:45 PM    WBC 4.5 2022 11:17 AM    WBC 4.3 2022 10:13 AM    RBC 3.97 2022 12:45 PM    RBC 4.03 2022 11:17 AM    RBC 3.76 2022 10:13 AM    HGB 12.2 2022 12:45 PM    HGB 12.1 2022 11:17 AM    HGB 11.2 2022 10:13 AM    HCT 34.8 2022 12:45 PM    HCT 35.5 2022 11:17 AM    HCT 33.7 2022 10:13 AM    MCV 87.7 2022 12:45 PM    MCV 88.1 2022 11:17 AM    MCV 89.6 2022 10:13 AM     2022 12:45 PM     2022 11:17 AM     2022 10:13 AM      CMP  Lab Results   Component Value Date/Time     2022 12:45 PM     2022 11:17 AM     2022 10:13 AM    K 4.7 2022 12:45 PM    K 4.5 2022 11:17 AM    K 3.7 2022 10:13 AM    K 4.4 2021 03:04 PM    K 4.4 2021 03:33 AM     2022 12:45 PM     2022 11:17 AM     2022 10:13 AM    CO2 27 2022 12:45 PM    CO2 23 2022 11:17 AM    CO2 21 2022 10:13 AM    ANIONGAP 11 2022 12:45 PM    ANIONGAP 14 2022 11:17 AM    ANIONGAP 15 2022 10:13 AM    GLUCOSE 111 2022 12:45 PM    GLUCOSE 117 02/21/2022 11:17 AM    GLUCOSE 113 01/25/2022 10:13 AM    GLUCOSE 113 03/28/2011 03:00 PM    GLUCOSE 93 03/28/2011 04:40 AM    GLUCOSE 101 03/26/2011 04:30 AM    BUN 21 08/08/2022 02:38 PM    BUN 25 05/24/2022 12:45 PM    BUN 20 02/21/2022 11:17 AM    CREATININE 1.4 08/08/2022 02:38 PM    CREATININE 1.4 05/24/2022 12:45 PM    CREATININE 1.4 02/21/2022 11:17 AM    LABGLOM 48 08/08/2022 02:38 PM    LABGLOM 48 05/24/2022 12:45 PM    LABGLOM 48 02/21/2022 11:17 AM    GFRAA 58 08/08/2022 02:38 PM    GFRAA 58 05/24/2022 12:45 PM    GFRAA 58 02/21/2022 11:17 AM    CALCIUM 10.0 05/24/2022 12:45 PM    CALCIUM 10.1 02/21/2022 11:17 AM    CALCIUM 9.6 01/25/2022 10:13 AM    PROT 7.2 05/24/2022 12:45 PM    PROT 7.2 02/21/2022 11:17 AM    PROT 6.7 01/25/2022 10:13 AM    LABALBU 4.8 05/24/2022 12:45 PM    LABALBU 4.5 02/21/2022 11:17 AM    LABALBU 4.3 01/25/2022 10:13 AM    LABALBU 2.9 03/26/2011 04:30 AM    LABALBU 3.0 03/25/2011 04:50 AM    LABALBU 3.4 03/24/2011 03:20 AM    BILITOT 0.5 05/24/2022 12:45 PM    BILITOT 0.8 02/21/2022 11:17 AM    BILITOT 0.6 01/25/2022 10:13 AM    ALKPHOS 51 05/24/2022 12:45 PM    ALKPHOS 50 02/21/2022 11:17 AM    ALKPHOS 46 01/25/2022 10:13 AM    AST 30 05/24/2022 12:45 PM    AST 26 02/21/2022 11:17 AM    AST 25 01/25/2022 10:13 AM    ALT 31 05/24/2022 12:45 PM    ALT 20 02/21/2022 11:17 AM    ALT 19 01/25/2022 10:13 AM     A1C  Lab Results   Component Value Date/Time    LABA1C 5.2 03/01/2022 02:33 PM    LABA1C 5.0 11/22/2021 10:07 AM    LABA1C 4.7 08/25/2021 11:41 AM     TSH  Lab Results   Component Value Date/Time    TSH 3.410 05/24/2022 12:45 PM    TSH 5.300 02/21/2022 11:17 AM    TSH 5.040 11/22/2021 10:07 AM     FREET4  Lab Results   Component Value Date/Time    K5VMEWP 9.5 01/31/2011 03:05 PM     LIPID  Lab Results   Component Value Date/Time    CHOL 150 05/24/2022 12:45 PM    CHOL 141 02/21/2022 11:17 AM    CHOL 125 11/22/2021 10:07 AM    HDL 46 05/24/2022 12:45 PM    HDL 43 02/21/2022 11/22/2021 10:07 AM    PHUR 5.5 05/24/2022 12:45 PM    PHUR 5.0 02/21/2022 11:17 AM    PHUR 5.0 11/22/2021 10:07 AM    PROTEINU Negative 05/24/2022 12:45 PM    PROTEINU Negative 02/21/2022 11:17 AM    PROTEINU Negative 11/22/2021 10:07 AM    UROBILINOGEN 0.2 05/24/2022 12:45 PM    UROBILINOGEN 0.2 02/21/2022 11:17 AM    UROBILINOGEN 0.2 11/22/2021 10:07 AM    NITRU Negative 05/24/2022 12:45 PM    NITRU Negative 02/21/2022 11:17 AM    NITRU Negative 11/22/2021 10:07 AM    LEUKOCYTESUR Negative 05/24/2022 12:45 PM    LEUKOCYTESUR Negative 02/21/2022 11:17 AM    LEUKOCYTESUR Negative 11/22/2021 10:07 AM     Urine Micro/Albumin Ratio  Lab Results   Component Value Date/Time    MALBCR 8.0 05/24/2022 12:45 PM    MALBCR 13.4 02/21/2022 11:17 AM    MALBCR 16.2 11/22/2021 10:06 AM               Review of Systems  ROS:    Const: Denies chills, fever, malaise and sweats. Eyes: Denies discharge, pain, redness and visual disturbance. ENMT: Denies earaches, other ear symptoms. Denies nasal or sinus symptoms other than if stated  above. Denies mouth and tongue lesions and sore throat. CV: Denies chest discomfort, pain; diaphoresis, dizziness, edema, lightheadedness, orthopnea,  palpitations, syncope and near syncopal episode or any exertional symptoms other than swelling as above. Resp: Denies cough, hemoptysis, pleuritic pain, SOB, sputum production and wheezing. GI: Denies abdominal pain, change in bowel habits, hematochezia, melena, nausea and vomiting. : Denies urinary problems including dysuria. Musculo: Chronic pain stable, appropriate postop pain  Skin: Denies bruising and rash or changing skin lesions. Neuro: Denies headache, numbness, stiff neck, tingling and focal weakness slurred speech or facial  Droop  Extremities: Denies calf pain, redness or swelling. Hema/Lymph: Denies bleeding/bruising tendency and enlarged lymph nodes.         Current Outpatient Medications:     ferrous sulfate (IRON SLOW RELEASE) 140 MANAGEMENT PROCEDURE Right 2020    RIGHT L3 4 5 MEDIAL BRANCH RADIOFREQUENCY ABLATION performed by Leopold Penner, DO at [address] Left 09/10/2020    LEFT L3 4 5 MEDIAL BRANCH RADIOFREQUENCY ABLATION     +++IODINE ALLERGY+++   CPT: 47839 [id number] performed by Leopold Penner, DO at [address] Bilateral 2021    BILATERAL L3,4,5 MEDIAL NERVE BRANCH RADIOFREQUENCY ABLATION performed by Leopold Penner, DO at [address] Bilateral 2022    BILATERAL L3, 4,5 RADIOFREQUENCY ABLATION performed by Leopold Penner, DO at [address] [place] [address] Left 08/15/2022    Dr Loretto Fleischer  10/31/2007    [address] M.D./ DA LETICIA LAPAROSCOPIC PROSTATECTOMY    TESTICLE REMOVAL Bilateral 2019    hx prostate ca-    TONSILLECTOMY      TOTAL HIP ARTHROPLASTY  2011    right    TOTAL KNEE ARTHROPLASTY  2010    bilateral     Family History   Problem Relation Age of Onset    Stroke Mother     Cancer Mother         pancreatic    Cancer Father         prostate    Stroke Father      Social History     Socioeconomic History    Marital status:      Spouse name: Not on file    Number of children: 3    Years of education: Not on file    Highest education level: Not on file   Occupational History    Not on file   Tobacco Use    Smoking status: Former     Packs/day: 1.00     Years: 40.00     Pack years: 40.00     Types: Cigarettes     Start date:      Quit date: 10/1/1994     Years since quittin.9    Smokeless tobacco: Former     Types: Chew     Quit date:    Vaping Use    Vaping Use: Never used   Substance and Sexual Activity    Alcohol use:  Yes     Alcohol/week: 1.0 standard drink     Types: 1 Cans of beer per week     Comment: rare    Drug use: No    Sexual activity: Not on file   Other Topics Concern    Not on file   Social History Narrative    Problem List: History of polyp of colon, Disorder of bone density and structure, unspecified,    Osteochondropathy, Carcinoma in situ of prostate, Aneurysm of thoracic aorta, Anemia, Coronary    arteriosclerosis, Cobalamin deficiency, Multiple closed fractures of pelvis with disruption of pelvic [place],    Hyperlipidemia, Essential hypertension    Health Maintenance:    Bone Density Test Screening - (3/5/2019)    Bone Density Scan - (12/18/2015)    Influenza Vaccination - (10/7/2016)    Colonoscopy - (4/3/2012)    Couseled on Home Safety - (11/23/2015)    Colonoscopy Screening - (4/3/2012)    US Liver - 8/1/08    Medical Problems:    Coronary Artery Disease (CAD), Hypertension, Hyperlipidemia, Prostate Cancer    Surgical Hx:    Prostatectomy, Coronary Artery Bypass Graft (CABG)            FH: Father:    . (Hx)    Mother:    . (Hx)        SH: Marital:  - retired . Personal Habits: Cigarette Use: former cigarette smoker, Nonsmoker. Alcohol: Occasionally    consumes alcohol. Social Determinants of Health     Financial Resource Strain: Low Risk     Difficulty of Paying Living Expenses: Not hard at all   Food Insecurity: No Food Insecurity    Worried About Running Out of Food in the Last Year: Never true    Ran Out of Food in the Last Year: Never true   Transportation Needs: Not on file   Physical Activity: Not on file   Stress: Not on file   Social Connections: Not on file   Intimate Partner Violence: Not on file   Housing Stability: Not on file       Vitals:    08/23/22 1503   BP: 134/68   Pulse: 96   Temp: 97.6 °F (36.4 °C)   SpO2: 94%      Wt Readings from Last 3 Encounters:   06/24/22 223 lb (101.2 kg)   06/21/22 226 lb (102.5 kg)   06/15/22 226 lb (102.5 kg)        Physical Exam  Exam:  Const: Appears comfortable. No signs of acute distress present. Head/Face: Atraumatic on inspection. Eyes: EOMI in both eyes. No discharge from the eyes. PERRL. Sclerae clear.   ENMT: Auditory canals normal. Tympanic membranes: intact and translucent. External nose WNL. Nasal mucosa is clear. Oropharynx: No erythema or exudate. Posterior pharynx is normal.  Neck: Supple. Palpation reveals no adenopathy. No masses appreciated. No JVD. Carotids: no  bruits. Resp: Respirations are unlabored. Clear to auscultation. No rales, rhonchi or wheezes appreciated  over the lungs bilaterally. CV: Rate is regular. Rhythm regular today. 3/6 TRIPP  Abdomen: Bowel sounds are normoactive. Abdomen is soft, nontender, and nondistended. No  abdominal masses. No palpable hepatosplenomegaly. Lymph: No palpable or visible regional lymphadenopathy. Skin: Dry and warm with no rash or concerning lesions. Extremities: No clubbing cyanosis  No calf inflammation or tenderness. Normal pulses. 1+ left pitting edema, none right, no calf inflammation or tenderness. Incision clean dry intact  Neuro: Alert and oriented. Affect: appropriate. Upper Extremities: 5/5 bilaterally. Lower Extremities:  5/5 bilaterally. Sensation intact to light touch. Reflexes: DTR's are symmetric and 2+ bilaterally. .  Cranial Nerves: Cranial nerves grossly intact. Muscular skeletal-chronic limited mobility, no acute inflammation    Lab Results   Component Value Date    PSA <0.01 03/22/2022    PSA SEE NOTE (AA) 12/06/2021    PSA <0.03 12/06/2021    PSADIA 0.02 12/09/2014    PSADIA <0.01 09/05/2014    PSADIA 0.32 06/03/2014          Assessment and Plan:   Diagnosis Orders   1. Mixed hyperlipidemia  Comprehensive Metabolic Panel      2. Essential hypertension  Comprehensive Metabolic Panel    Urinalysis      3. Anemia, unspecified type  CBC with Auto Differential      4. Dark urine  Culture, Urine      Left hip pathologic fracture  Status post partial left hip arthroplasty. [facility] Follows up next week. Some swelling. Leg elevation reviewed. Compression reviewed. On Lovenox. Declines imaging. Lisinopril Lipitor held for some reason. Check CBC CMP urinalysis and culture.   Complains vasomotor rhinitis    History of COVID-19  Counseled. Resolved. Counseled on vaccine, declines, would like antibodies monitored, ordered for 3 months      Disc disease, degenerative, lumbar or lumbosacral  Counseled extensively. Differential reviewed, including serious etiologies. Was Following with [facility], had to change because of insurance, now seeing Dr. Patel Hart pain management, saw Dr. Areli Dominique. Previously saw [facility]. Counseled on injections, radiofrequency ablation etc.  Failed physical therapy. . Had  radiofrequency ablation x2, overall doing well, last MRI lumbar spine ordered by Dr. Dejuan Torres 3/2022  Bilateral carpal tunnel syndrome  Had emg/ncs Nov 2019 [facility]. Referred to [facility] but did not go because no symptoms    Health maintenance examination  Health maintenance issues discussed at length 12/8/2021, encouraged yearly. Pancytopenia ([facility] [address]  Counseled extensively. Differential reviewed, including serious etiologies. Possibly from medication. .  Continue per Dr. Dejuan Torres. White count and hemoglobin mildly low but stable, platelets normalized. Monitor. Asymptomatic            Low magnesium level  Counseled. Goals reviewed. Currently 1.9. We will continue magnesium oxide 400 mg daily             Vitamin D deficiency  Continue current therapy. Monitor. Vitamin B12 deficiency  Appropriate supplementation reviewed. Vitamin W14 elevated, folic acid now normal, appropriate supplementation reviewed, monitor, risk of low and high reviewed, no longer on injection       Hyperglycemia  Counseled, not interested in meds now, monitor. A1c excellent in the past,. Monitor      Essential hypertension  Counseled. Became hypotensive. Cardiology low-dose lisinopril 10 mg. Now stable. Monitor. The risks of hypertension and hypotension reviewed. Watch closely ambulatory.  Hyper and hypotensive precautions and parameters reviewed and previously written as well as parameters on pulse, call if out of range, ER dangers numbers. Lifestyle modification reviewed. Tolerating therapy. Tubular adenoma   Last colonoscopy Dr. Jae Gimenez, 2/15 complaining repeat 3 years from then. Overdue. Declines now, fit cards or Cologuard. . Hemoglobin consistently low but stable. Declines any intervention now, declines any tests or procedures now. Thyroid nodule  Counseled, incidental 7 mm right thyroid nodule on carotid ultrasound 3/19. Declines workup or follow-up now     Thoracic aneurysm, not ruptured  Counseled, potential serious is reviewed, if any symptoms directly to ER. Continue per Dr. Ramirez Magallon. He saw him 3/15 and had CT at least then. Dr. Ramirez Magallon discharged him at this point unless symptoms and he not interested in follow-up imaging. Of note CT was done for Covid 1/21 and they stated no acute abnormality of the thoracic aorta     PVD (peripheral vascular disease) with claudication [facility])  He saw Dr. Abhinav Gtz, who had him on pletal, had minimal results. Now follows with Dr Fabrice Clifford vascular in [address] [place] [address] Dr who states he did have a femoral blockage proximal that would require bypass as well as some distal blockages that would require stenting but they felt observation most appropriate. He did not feel medication was necessary or helpful at this time. They state Dr. Abhinav Gtz agrees with this plan. He is comfortable with this plan at this point. Serious signs and symptoms watch were reviewed. Admits to missing an appointment because of Covid pandemic. They were planning surgery but after discussion, and the risks of complications and the complexity of the surgery he is declining intervention now. States he watches the distance he walks but otherwise doing well. No sores etc.      Mixed hyperlipidemia  Did very well on d.a.w. Crestor unfortunately severe myalgias on generic.  He could try to preauthorize d.a.w., but he chose to go on Lipitor instead, tolerating, declines change in dose, goals reviewed, risk benefits ADRs reviewed. Counseling CoQ10 and vitamin D. .  We discussed appropriate dosing given his risk factors. Also on Lovaza 1 twice a day, defers increase at this time. Metabolic disorder  Counseled. Blood sugar had been borderline, amidst a lot of sweets, globin A1c has been excellent not interested in insulin sensitizer. Lifestyle modification reviewed. Micro-macrovascular complications monitor. Monitor     Coronary artery disease involving native heart without angina pectoris  Continue per cardiology. Saw Dr. Hugo Lamas  May 2022, and had a stress test January 2022 showing fixed defect. Continue per cardiology. ,  There is a risk that the Erleada could cause cardiac arrhythmia     Age-related osteoporosis without current pathological fracture  Counseled extensively. He tolerated Fosamax but discontinued after he took from 7/09 through 7/16. Morbidity/mortality related to fracture reviewed. 11/13 bone density showed improvement, 12/15 -1.3 stable, 3/19 showed L2 -2.6, hip -1.0.   bone density scan 5/21 had nearly normalized, T score -1.3 L1 and -1.2 neck which is an improvement of 14.7% in the lumbar spine and   a slight worsening of 2.6% in the hip since previous study. He had been on a course of alendronate 2 different times. The rationale of the Prolia was with the expected osteoporosis as shown on DEXA scan 2 years ago but again nearly normal at this time although false negatives reviewed. After discussion it was decided to hold off on Prolia, continue current management, falls precautions, weightbearing exercise, appropriate calcium/D and recheck bone density in 1 year sooner as needed    Gastroesophageal reflux disease without esophagitis  Asymptomatic as long as takes medication, had EGD in the past.  Risk of meds reviewed including electrolyte imbalance, renal etc.  Wants to continue. Glaucoma of left eye   Follows with eye care.   Planning surgery        Renal with above counseling, assessment and plan. All questions answered. Signs and symptoms to watch for discussed, serious signs and symptoms reviewed. ER if any. Over 30 minutes  spent with the patient in reviewing records, reviewing with patient/family, counseling, ordering,  prescribing, completing h&p, etc., with over 50% of the time spent face to face counseling. Rose Medina MD    Patients are advised to check with insurance company to ensure coverage and to fully understand benefits and cost prior to any testing. This note was created with the assistance of voice recognition software. Document was reviewed however may contain grammatical errors.

## 2022-08-24 NOTE — RESULT ENCOUNTER NOTE
1 liver enzyme mildly elevated which is may be why they were holding statin, creatinine was mildly elevated 2 weeks ago but now normal, blood count trending up to 8.2. Urinalysis overall negative. When I like him to do is repeat a CBC and CMP next week before their follow-up.   We will likely restart lisinopril and statin at that time

## 2022-08-26 LAB
ORGANISM: ABNORMAL
URINE CULTURE, ROUTINE: ABNORMAL

## 2022-08-26 NOTE — RESULT ENCOUNTER NOTE
Small amount of bacteria in urine, not generally thought to be clinically significant. But if having symptoms of infection I will call in antibiotic with standard precautions. If not I would like him to repeat urinalysis and culture with next blood work. Proper hydration.   Let me know

## 2022-09-01 ENCOUNTER — TELEMEDICINE (OUTPATIENT)
Dept: PRIMARY CARE CLINIC | Age: 87
End: 2022-09-01
Payer: MEDICARE

## 2022-09-01 DIAGNOSIS — D64.9 ANEMIA, UNSPECIFIED TYPE: ICD-10-CM

## 2022-09-01 DIAGNOSIS — D61.818 PANCYTOPENIA (HCC): ICD-10-CM

## 2022-09-01 DIAGNOSIS — C61 PROSTATE CA (HCC): ICD-10-CM

## 2022-09-01 DIAGNOSIS — I25.10 CORONARY ARTERY DISEASE INVOLVING NATIVE HEART WITHOUT ANGINA PECTORIS, UNSPECIFIED VESSEL OR LESION TYPE: ICD-10-CM

## 2022-09-01 DIAGNOSIS — I10 ESSENTIAL HYPERTENSION: ICD-10-CM

## 2022-09-01 DIAGNOSIS — C61 PROSTATE CANCER METASTATIC TO BONE (HCC): ICD-10-CM

## 2022-09-01 DIAGNOSIS — C79.51 PROSTATE CANCER METASTATIC TO BONE (HCC): ICD-10-CM

## 2022-09-01 DIAGNOSIS — E78.2 MIXED HYPERLIPIDEMIA: Primary | ICD-10-CM

## 2022-09-01 DIAGNOSIS — I71.20 THORACIC AORTIC ANEURYSM WITHOUT RUPTURE: ICD-10-CM

## 2022-09-01 DIAGNOSIS — C34.91 ADENOCARCINOMA OF RIGHT LUNG (HCC): ICD-10-CM

## 2022-09-01 DIAGNOSIS — N28.9 RENAL INSUFFICIENCY: ICD-10-CM

## 2022-09-01 DIAGNOSIS — M84.452D PATHOLOGICAL FRACTURE OF NECK OF LEFT FEMUR WITH ROUTINE HEALING, SUBSEQUENT ENCOUNTER: ICD-10-CM

## 2022-09-01 PROBLEM — M84.452A PATHOLOGIC FRACTURE OF NECK OF LEFT FEMUR (HCC): Status: RESOLVED | Noted: 2022-05-05 | Resolved: 2022-09-01

## 2022-09-01 PROCEDURE — 1123F ACP DISCUSS/DSCN MKR DOCD: CPT | Performed by: FAMILY MEDICINE

## 2022-09-01 PROCEDURE — G8427 DOCREV CUR MEDS BY ELIG CLIN: HCPCS | Performed by: FAMILY MEDICINE

## 2022-09-01 PROCEDURE — G8417 CALC BMI ABV UP PARAM F/U: HCPCS | Performed by: FAMILY MEDICINE

## 2022-09-01 PROCEDURE — 1036F TOBACCO NON-USER: CPT | Performed by: FAMILY MEDICINE

## 2022-09-01 PROCEDURE — 99214 OFFICE O/P EST MOD 30 MIN: CPT | Performed by: FAMILY MEDICINE

## 2022-09-01 SDOH — ECONOMIC STABILITY: FOOD INSECURITY: WITHIN THE PAST 12 MONTHS, YOU WORRIED THAT YOUR FOOD WOULD RUN OUT BEFORE YOU GOT MONEY TO BUY MORE.: NEVER TRUE

## 2022-09-01 SDOH — ECONOMIC STABILITY: FOOD INSECURITY: WITHIN THE PAST 12 MONTHS, THE FOOD YOU BOUGHT JUST DIDN'T LAST AND YOU DIDN'T HAVE MONEY TO GET MORE.: NEVER TRUE

## 2022-09-01 ASSESSMENT — SOCIAL DETERMINANTS OF HEALTH (SDOH)
HOW HARD IS IT FOR YOU TO PAY FOR THE VERY BASICS LIKE FOOD, HOUSING, MEDICAL CARE, AND HEATING?: NOT HARD AT ALL
HOW HARD IS IT FOR YOU TO PAY FOR THE VERY BASICS LIKE FOOD, HOUSING, MEDICAL CARE, AND HEATING?: NOT HARD AT ALL

## 2022-09-01 NOTE — PROGRESS NOTES
TeleMedicine Patient Consent    This visit was performed as a virtual video visit using a synchronous, two-way, audio-video telehealth technology platform. Patient identification was verified at the start of the visit, including the patient's telephone number and physical location. I discussed with the patient the nature of our telehealth visits, that:     Due to the nature of an audio- video modality, the only components of a physical exam that could be done are the elements supported by direct observation. I would evaluate the patient and recommend diagnostics and treatments based on my assessment. If it was felt that the patient should be evaluated in clinic or an emergency room setting, then they would be directed there. Our sessions are not being recorded and that personal health information is protected. Our team would provide follow up care in person if/when the patient needs it. Patient does agree to proceed with telemedicine consultation. Patient's location: home address in [facility]    Physician  location other address in [place]     Other people involved in call:   Spouse    This visit was completed virtually using D[employer]. me    2022    TELEHEALTH EVALUATION -- Audio/Visual (During YBIVH-10 public health emergency)    Chief Complaint   Patient presents with    Discuss Labs           HPI:    Jayesh Fitzpatrick (:  1935) has requested an audio/video evaluation for the following concern(s):    Feeling much better. Blood pressure has been in the high 130s to 141 over 80s. One time in morning it was 108 over? Does not like the iron, makes his stool messy, no other complaints or concerns    Got the first set of blood test but not the second. He has no urinary symptoms and counseled on UA and culture.   BUN/creatinine normalized, hemoglobin climbed to 8.2 AST 36 ALT 56    Most Recent Labs  CBC  Lab Results   Component Value Date/Time    WBC 4.4 2022 04:09 PM    WBC 4.3 2022 12:45 PM WBC 4.5 02/21/2022 11:17 AM    RBC 2.72 08/23/2022 04:09 PM    RBC 3.97 05/24/2022 12:45 PM    RBC 4.03 02/21/2022 11:17 AM    HGB 8.2 08/23/2022 04:09 PM    HGB 12.2 05/24/2022 12:45 PM    HGB 12.1 02/21/2022 11:17 AM    HCT 25.9 08/23/2022 04:09 PM    HCT 34.8 05/24/2022 12:45 PM    HCT 35.5 02/21/2022 11:17 AM    MCV 95.2 08/23/2022 04:09 PM    MCV 87.7 05/24/2022 12:45 PM    MCV 88.1 02/21/2022 11:17 AM     08/23/2022 04:09 PM     05/24/2022 12:45 PM     02/21/2022 11:17 AM      CMP  Lab Results   Component Value Date/Time     08/23/2022 04:09 PM     05/24/2022 12:45 PM     02/21/2022 11:17 AM    K 4.1 08/23/2022 04:09 PM    K 4.7 05/24/2022 12:45 PM    K 4.5 02/21/2022 11:17 AM    K 4.4 01/16/2021 03:04 PM    K 4.4 01/14/2021 03:33 AM     08/23/2022 04:09 PM     05/24/2022 12:45 PM     02/21/2022 11:17 AM    CO2 21 08/23/2022 04:09 PM    CO2 27 05/24/2022 12:45 PM    CO2 23 02/21/2022 11:17 AM    ANIONGAP 14 08/23/2022 04:09 PM    ANIONGAP 11 05/24/2022 12:45 PM    ANIONGAP 14 02/21/2022 11:17 AM    GLUCOSE 107 08/23/2022 04:09 PM    GLUCOSE 111 05/24/2022 12:45 PM    GLUCOSE 117 02/21/2022 11:17 AM    GLUCOSE 113 03/28/2011 03:00 PM    GLUCOSE 93 03/28/2011 04:40 AM    GLUCOSE 101 03/26/2011 04:30 AM    BUN 20 08/23/2022 04:09 PM    BUN 21 08/08/2022 02:38 PM    BUN 25 05/24/2022 12:45 PM    CREATININE 1.2 08/23/2022 04:09 PM    CREATININE 1.4 08/08/2022 02:38 PM    CREATININE 1.4 05/24/2022 12:45 PM    LABGLOM 58 08/23/2022 04:09 PM    LABGLOM 48 08/08/2022 02:38 PM    LABGLOM 48 05/24/2022 12:45 PM    GFRAA >60 08/23/2022 04:09 PM    GFRAA 58 08/08/2022 02:38 PM    GFRAA 58 05/24/2022 12:45 PM    CALCIUM 9.2 08/23/2022 04:09 PM    CALCIUM 10.0 05/24/2022 12:45 PM    CALCIUM 10.1 02/21/2022 11:17 AM    PROT 6.5 08/23/2022 04:09 PM    PROT 7.2 05/24/2022 12:45 PM    PROT 7.2 02/21/2022 11:17 AM    LABALBU 3.9 08/23/2022 04:09 PM    LABALBU 4.8 05/24/2022 12:45 PM    LABALBU 4.5 02/21/2022 11:17 AM    LABALBU 2.9 03/26/2011 04:30 AM    LABALBU 3.0 03/25/2011 04:50 AM    LABALBU 3.4 03/24/2011 03:20 AM    BILITOT 0.6 08/23/2022 04:09 PM    BILITOT 0.5 05/24/2022 12:45 PM    BILITOT 0.8 02/21/2022 11:17 AM    ALKPHOS 77 08/23/2022 04:09 PM    ALKPHOS 51 05/24/2022 12:45 PM    ALKPHOS 50 02/21/2022 11:17 AM    AST 36 08/23/2022 04:09 PM    AST 30 05/24/2022 12:45 PM    AST 26 02/21/2022 11:17 AM    ALT 56 08/23/2022 04:09 PM    ALT 31 05/24/2022 12:45 PM    ALT 20 02/21/2022 11:17 AM     A1C  Lab Results   Component Value Date/Time    LABA1C 5.2 03/01/2022 02:33 PM    LABA1C 5.0 11/22/2021 10:07 AM    LABA1C 4.7 08/25/2021 11:41 AM     TSH  Lab Results   Component Value Date/Time    TSH 3.410 05/24/2022 12:45 PM    TSH 5.300 02/21/2022 11:17 AM    TSH 5.040 11/22/2021 10:07 AM     FREET4  Lab Results   Component Value Date/Time    H2XQDJT 9.5 01/31/2011 03:05 PM     LIPID  Lab Results   Component Value Date/Time    CHOL 150 05/24/2022 12:45 PM    CHOL 141 02/21/2022 11:17 AM    CHOL 125 11/22/2021 10:07 AM    HDL 46 05/24/2022 12:45 PM    HDL 43 02/21/2022 11:17 AM    HDL 44 11/22/2021 10:07 AM    LDLCALC 46 05/24/2022 12:45 PM    LDLCALC 49 02/21/2022 11:17 AM    LDLCALC 49 11/22/2021 10:07 AM    TRIG 292 05/24/2022 12:45 PM    TRIG 245 02/21/2022 11:17 AM    TRIG 162 11/22/2021 10:07 AM     VITAMIN D  Lab Results   Component Value Date/Time    VITD25 47 05/24/2022 12:45 PM    VITD25 41 02/21/2022 11:17 AM    VITD25 50 11/22/2021 10:07 AM     MAGNESIUM  Lab Results   Component Value Date/Time    MG 1.9 05/24/2022 12:45 PM    MG 1.9 02/21/2022 11:17 AM    MG 1.8 11/22/2021 10:07 AM      PHOS  Lab Results   Component Value Date/Time    PHOS 3.9 08/25/2021 11:41 AM    PHOS 4.0 01/21/2021 11:48 AM    PHOS 3.8 01/16/2021 03:04 PM      PAWAN   No results found for: PAWAN  RHEUMATOID FACTOR  No results found for: RF  PSA  Lab Results   Component Value Date/Time    PSA <0.01 03/22/2022 02:12 PM    PSA SEE NOTE 12/06/2021 01:01 PM    PSA <0.03 12/06/2021 01:01 PM      HEPATITIS C  No results found for: HCVABI  HIV  No results found for: CML8OQK, HIV1QT  UA  Lab Results   Component Value Date/Time    COLORU Yellow 08/23/2022 04:08 PM    COLORU Yellow 05/24/2022 12:45 PM    COLORU Yellow 02/21/2022 11:17 AM    CLARITYU Clear 08/23/2022 04:08 PM    CLARITYU Clear 05/24/2022 12:45 PM    CLARITYU Clear 02/21/2022 11:17 AM    GLUCOSEU Negative 08/23/2022 04:08 PM    GLUCOSEU Negative 05/24/2022 12:45 PM    GLUCOSEU Negative 02/21/2022 11:17 AM    GLUCOSEU NEGATIVE 12/21/2010 05:25 PM    BILIRUBINUR Negative 08/23/2022 04:08 PM    BILIRUBINUR Negative 05/24/2022 12:45 PM    BILIRUBINUR Negative 02/21/2022 11:17 AM    BILIRUBINUR NEGATIVE 12/21/2010 05:25 PM    KETUA TRACE 08/23/2022 04:08 PM    KETUA Negative 05/24/2022 12:45 PM    KETUA Negative 02/21/2022 11:17 AM    SPECGRAV >=1.030 08/23/2022 04:08 PM    SPECGRAV 1.025 05/24/2022 12:45 PM    SPECGRAV >=1.030 02/21/2022 11:17 AM    BLOODU Negative 08/23/2022 04:08 PM    BLOODU Negative 05/24/2022 12:45 PM    BLOODU Negative 02/21/2022 11:17 AM    PHUR 5.5 08/23/2022 04:08 PM    PHUR 5.5 05/24/2022 12:45 PM    PHUR 5.0 02/21/2022 11:17 AM    PROTEINU Negative 08/23/2022 04:08 PM    PROTEINU Negative 05/24/2022 12:45 PM    PROTEINU Negative 02/21/2022 11:17 AM    UROBILINOGEN 0.2 08/23/2022 04:08 PM    UROBILINOGEN 0.2 05/24/2022 12:45 PM    UROBILINOGEN 0.2 02/21/2022 11:17 AM    NITRU Negative 08/23/2022 04:08 PM    NITRU Negative 05/24/2022 12:45 PM    NITRU Negative 02/21/2022 11:17 AM    LEUKOCYTESUR Negative 08/23/2022 04:08 PM    LEUKOCYTESUR Negative 05/24/2022 12:45 PM    LEUKOCYTESUR Negative 02/21/2022 11:17 AM     Urine Micro/Albumin Ratio  Lab Results   Component Value Date/Time    MALBCR 8.0 05/24/2022 12:45 PM    MALBCR 13.4 02/21/2022 11:17 AM    MALBCR 16.2 11/22/2021 10:06 AM       ROS:  Const: Denies chills, fever, malaise and sweats. Eyes: Denies discharge, pain, redness and visual disturbance. ENMT: Denies earaches, other ear symptoms. Denies nasal or sinus symptoms other than stated  above. Denies mouth and tongue lesions and sore throat. CV: Denies chest discomfort, pain; diaphoresis, dizziness, edema, lightheadedness, orthopnea,  palpitations, syncope and near syncopal episode or any exertional symptoms  Resp: Denies cough, hemoptysis, pleuritic pain, SOB, sputum production and wheezing. GI: Denies abdominal pain, change in bowel habits, hematochezia, melena, nausea and vomiting. : Denies urinary symptoms including dysuria , urgency, frequency or hematuria. Musculo: Denies any acute musculoskeletal complaints   skin: Denies bruising and rash.   Neuro: Denies headache, numbness, stiff neck, tingling and focal weakness slurred speech or facial  droop  Hema/Lymph: Denies bleeding/bruising tendency and enlarged lymph nodes         Current Outpatient Medications:     enoxaparin (LOVENOX) 40 MG/0.4ML, INJECT 0.4 MLS INTO THE SKIN DAILY FOR 8 DAYS, Disp: , Rfl:     ferrous sulfate (SLOW FE) 140 (45 Fe) MG extended release tablet, Take 140 mg by mouth 2 times daily (with meals), Disp: , Rfl:     LORazepam (ATIVAN) 0.5 MG tablet, Take 1-2 tabs PO 30 minutes prior to MRI, Disp: 2 tablet, Rfl: 0    calcium carbonate (OSCAL) 500 MG TABS tablet, Take 500 mg by mouth daily, Disp: , Rfl:     denosumab (XGEVA) 120 MG/1.7ML SOLN SC injection, Inject 120 mg into the skin every 3 months , Disp: , Rfl:     omeprazole (PRILOSEC) 20 MG delayed release capsule, Take 1 capsule by mouth daily, Disp: 90 capsule, Rfl: 3    Vitamin D, Cholecalciferol, 50 MCG (2000 UT) CAPS, Take 2,000 Units by mouth daily, Disp: , Rfl:     omega-3 acid ethyl esters (LOVAZA) 1 g capsule, Take 1 capsule by mouth 2 times daily, Disp: 180 capsule, Rfl: 3    Handicap Placard MISC, by Does not apply route Duration: 5 years, Disp: 1 each, Rfl: 0    lisinopril Years: 40.00     Pack years: 40.00     Types: Cigarettes     Start date: 5     Quit date: 10/1/1994     Years since quittin.9    Smokeless tobacco: Former     Types: Chew     Quit date:    Vaping Use    Vaping Use: Never used   Substance Use Topics    Alcohol use: Yes     Alcohol/week: 1.0 standard drink     Types: 1 Cans of beer per week     Comment: rare    Drug use: No     Social History     Social History Narrative    Problem List: History of polyp of colon, Disorder of bone density and structure, unspecified,    Osteochondropathy, Carcinoma in situ of prostate, Aneurysm of thoracic aorta, Anemia, Coronary    arteriosclerosis, Cobalamin deficiency, Multiple closed fractures of pelvis with disruption of pelvic [place],    Hyperlipidemia, Essential hypertension    Health Maintenance:    Bone Density Test Screening - (3/5/2019)    Bone Density Scan - (2015)    Influenza Vaccination - (10/7/2016)    Colonoscopy - (4/3/2012)    Couseled on Home Safety - (2015)    Colonoscopy Screening - (4/3/2012)    US Liver - 08    Medical Problems:    Coronary Artery Disease (CAD), Hypertension, Hyperlipidemia, Prostate Cancer    Surgical Hx:    Prostatectomy, Coronary Artery Bypass Graft (CABG)            FH: Father:    . (Hx)    Mother:    . (Hx)        SH: Marital:  - retired . Personal Habits: Cigarette Use: former cigarette smoker, Nonsmoker. Alcohol: Occasionally    consumes alcohol. PHYSICAL EXAMINATION:  [ INSTRUCTIONS:  \"[x]\" Indicates a positive item  \"[]\" Indicates a negative item  -- DELETE ALL ITEMS NOT EXAMINED]  Vital Signs: (As obtained by patient/caregiver or practitioner observation)    There were no vitals filed for this visit.       Blood pressure-  Heart rate-    Respiratory rate-    Temperature-  Pulse oximetry-     Constitutional: [x] Appears well-developed and well-nourished [x] No apparent distress      [] Abnormal-   Mental status  [x] Alert and awake  [x] Oriented to person/place/time [x]Able to follow commands      Eyes:  EOM    [x]  Normal  [] Abnormal-  Sclera  [x]  Normal  [] Abnormal -         Discharge [x]  None visible  [] Abnormal -    HENT:   [x] Normocephalic, atraumatic. [] Abnormal   [x] Mouth/Throat: Mucous membranes are moist.     External Ears [x] Normal  [] Abnormal-     Neck: [x] No visualized mass     Pulmonary/Chest: [x] Respiratory effort normal.  [x] No visualized signs of difficulty breathing or respiratory distress        [] Abnormal-      Musculoskeletal:   [x] Normal gait with no signs of ataxia         [x] Normal range of motion of neck        [] Abnormal-       Neurological:        [x] No Facial Asymmetry (Cranial nerve 7 motor function) (limited exam to video visit)          [x] No gaze palsy        [] Abnormal-         Skin:        [x] No significant exanthematous lesions or discoloration noted on facial skin         [] Abnormal-            Psychiatric:       [x] Normal Affect [x] No Hallucinations        [] Abnormal-     Other pertinent observable physical exam findings-     ASSESSMENT/PLAN:   Diagnosis Orders   1. Mixed hyperlipidemia        2. Essential hypertension        3. Anemia, unspecified type        4. Coronary artery disease involving native heart without angina pectoris, unspecified vessel or lesion type        5. Renal insufficiency        6. Pancytopenia ([address]        7. Pathological fracture of neck of left femur with routine healing, subsequent encounter        8. Prostate CA ([address]        9. Thoracic aortic aneurysm without rupture ([address]        10. Prostate cancer metastatic to bone ([address]        11. Adenocarcinoma of right lung ([address]            No problem-specific Assessment & Plan notes found for this encounter. Left hip pathologic fracture  Status post partial left hip arthroplasty. Follows with [facility]. Doing well now. Staples have been removed.       Postop anemia  They state his hemoglobin was 7.8 on discharge, received IV iron and now on oral iron twice a day but given issues with stool. He would like to reduce to once a day. Hemoglobin trending up    Cerebral aneurysm    counseled, risk of rupture reviewed. Signs and symptoms watch were reviewed. Avoid Valsalva. Blood pressure control. Following with .  Mining coiling procedure. Reviewed pictures from angiogram with him        Adenocarcinoma of right lung [facility])    counseled extensively, last measurement of approximately 2.4 x 1.8 cm. Right posterior lung. Biopsy 1/22 reveals     Diagnosis:   Right lung, core needle biopsy: Invasive, moderately differentiated   adenocarcinoma (grade 2) consistent with primary lung origin      bronchoscopy as of 3/1/2022 revealed negative Biopsy of reachable lymph nodes. Finished radiation, follows with Dr. Randy Diamond and Dr. Maria Teresa Torres with mets to bone [facility])  Metastatic. Continue per Dr. Tiny Bowen. PSA less than 0.01-0.07-0.3 , he had been on Lupron, had metastasis, since orchiectomy, did not tolerate xtandi so he stopped it , overall feeling well, on Erleada, follows regularly with Dr. Manuel Tom extensively. Differential reviewed, including serious etiologies. Gets echoes through cardiology,. Cont per dr Willard Flores, saw May 2022. No change, sees 6mos           Allergic rhinitis  Counseled, chronic. Counseled on use of nasal steroid, vaporizer, nasal saline as well as additional treatment options for vasomotor rhinitis     History of COVID-19  Counseled. Resolved. Counseled on vaccine, declines, would like antibodies monitored, ordered for 3 months        Disc disease, degenerative, lumbar or lumbosacral  Counseled extensively. Differential reviewed, including serious etiologies. Was Following with [facility], had to change because of insurance, now seeing Dr. Hollingsworth Mask pain management, saw Dr. Mily Casiano.   Previously [facility].  Counseled on injections, radiofrequency ablation etc.  Failed physical therapy. . Had  radiofrequency ablation x2, overall doing well, last MRI lumbar spine ordered by Dr. Kathya Soto 3/2022    Bilateral carpal tunnel syndrome  Had emg/ncs Nov 2019 [facility]. Referred to [facility] but did not go because no symptoms     Health maintenance examination  Health maintenance issues discussed at length 12/8/2021, encouraged yearly. Pancytopenia ([facility]  Counseled extensively. Differential reviewed, including serious etiologies. Possibly from medication. .  Continue per Dr. Kathya Soto. White count low but stable, hemoglobin trending up postop, platelets now normal.  Asymptomatic           Low magnesium level  Counseled. Goals reviewed. Currently 1.9. We will continue magnesium oxide 400 mg daily                Vitamin D deficiency  Continue current therapy. Monitor. Vitamin B12 deficiency  Appropriate supplementation reviewed. Vitamin P02 elevated, folic acid now normal, appropriate supplementation reviewed, monitor, risk of low and high reviewed, no longer on injection        Hyperglycemia  Counseled, not interested in meds now, monitor. A1c excellent in the past,. Monitor        Essential hypertension  Counseled. Became hypotensive. Cardiology lowered dose to lisinopril 10 mg. Then was held in the hospital for some reason. He is going to resume half dose 5 mg as below, increase to 10 if blood pressure not at goal.  Monitor. The risks of hypertension and hypotension reviewed. Watch closely ambulatory. Hyper and hypotensive precautions and parameters reviewed and previously written as well as parameters on pulse, call if out of range, ER dangers numbers. Lifestyle modification reviewed. Tolerating therapy. Tubular adenoma   Last colonoscopy Dr. Rc Belle, 2/15 complaining repeat 3 years from then. Overdue. Declines now, fit cards or Cologuard. . Hemoglobin consistently low but stable. Declines any intervention now, declines any tests or procedures now. Thyroid nodule  Counseled, incidental 7 mm right thyroid nodule on carotid ultrasound 3/19. Declines workup or follow-up now     Thoracic aneurysm, not ruptured  Counseled, potential serious is reviewed, if any symptoms directly to ER. Continue per Dr. Stanton Plan. He saw him 3/15 and had CT at least then. Dr. Stanton Plan discharged him at this point unless symptoms and he not interested in follow-up imaging. Of note CT was done for Covid 1/21 and they stated no acute abnormality of the thoracic aorta     PVD (peripheral vascular disease) with claudication [facility])  He saw Dr. Tamara Dee, who had him on pletal, had minimal results. Now follows with Dr Bernadette England vascular in [place] who states he did have a femoral blockage proximal that would require bypass as well as some distal blockages that would require stenting but they felt observation most appropriate. He did not feel medication was necessary or helpful at this time. They state Dr. Tamara Dee agrees with this plan. He is comfortable with this plan at this point. Serious signs and symptoms watch were reviewed. Admits to missing an appointment because of Covid pandemic. They were planning surgery but after discussion, and the risks of complications and the complexity of the surgery he is declining intervention now. States he watches the distance he walks but otherwise doing well. No sores etc.       Mixed hyperlipidemia  Did very well on d.a.w. Crestor unfortunately severe myalgias on generic. He could try to preauthorize d.a.w., but he chose to go on Lipitor instead. He was tolerating but got held after hip surgery for some reason, blood work relatively stable, resume as below, declines change in dose, goals reviewed, risk benefits ADRs reviewed. Counseling CoQ10 and vitamin D. .  We discussed appropriate dosing given his risk factors.   Also on Lovaza 1 twice a day, defers increase at this time. Metabolic disorder  Counseled. Blood sugar had been borderline, amidst a lot of sweets, globin A1c has been excellent not interested in insulin sensitizer. Lifestyle modification reviewed. Micro-macrovascular complications monitor. Monitor     Coronary artery disease involving native heart without angina pectoris  Continue per cardiology. Saw Dr. Hemal Beverly  May 2022, and had a stress test January 2022 showing fixed defect. Continue per cardiology. ,  There is a risk that the Erleada could cause cardiac arrhythmia     Age-related osteoporosis without current pathological fracture  Counseled extensively. He tolerated Fosamax but discontinued after he took from 7/09 through 7/16. Morbidity/mortality related to fracture reviewed. 11/13 bone density showed improvement, 12/15 -1.3 stable, 3/19 showed L2 -2.6, hip -1.0.   bone density scan 5/21 had nearly normalized, T score -1.3 L1 and -1.2 neck which is an improvement of 14.7% in the lumbar spine and   a slight worsening of 2.6% in the hip since previous study. He had been on a course of alendronate 2 different times. The rationale of the Prolia was with the expected osteoporosis as shown on DEXA scan 2 years ago but again nearly normal at this time although false negatives reviewed. After discussion it was decided to hold off on Prolia, continue current management, falls precautions, weightbearing exercise, appropriate calcium/D and recheck bone density in 1 year sooner as needed     Gastroesophageal reflux disease without esophagitis  Asymptomatic as long as takes medication, had EGD in the past.  Risk of meds reviewed including electrolyte imbalance, renal etc.  Wants to continue. Glaucoma of left eye   Follows with eye care. Planning surgery           Renal insufficiency  Counseled, differential reviewed. Emphasize proper hydration and avoidance of nephrotoxic agents. He simply wants basic monitoring.   Not interested in referral. BUN/creatinine normalized, GFR 58            Counseled extensively and differential diagnoses of above were reviewed, including serious etiologies. Side effects and interactions of medications were reviewed. Plan as above:  Defers follow-up blood work for about 5 to 7 days. After discussion shared decision making he like to restart Lipitor standard precautions and a half dose lisinopril 5 mg, resume 10 mg blood pressure not at goal, blood work 5 to 7 days, keep follow-up September 13, sooner as needed. As long as symptoms steadily improve/resolve and medical conditions are following the expected course, FU as below, sooner PRN      Return for Keep Scheduled FU, SOONER PRN. Time spent: Greater than Not billed by johanna Zaragoza, was evaluated through a synchronous (real-time) audio-video encounter. The patient (or guardian if applicable) is aware that this is a billable service, which includes applicable co-pays. This Virtual Visit was conducted with patient's (and/or legal guardian's) consent. The visit was conducted pursuant to the emergency declaration under the [address] [place] [address] authority and the [employer] and [email or web address] General Act. Patient identification was verified, and a caregiver was present when appropriate. The patient was located in a state where the provider was licensed to provide care. The patient (and/or legal guardian) has also been advised to contact this office for worsening conditions or problems, and seek emergency medical treatment and/or call 911 if deemed necessary. As this was a virtual encounter, patient (and /or legal guardian) was instructed on various ways to be seen in person. Patients are advised to check with insurance company to ensure coverage and to fully understand benefits and cost prior to any testing to try to avoid unexpected charges.  This note was created with the assistance of voice recognition software. Inadvertent errors may be present. Signs and symptoms to watch for were discussed. Serious signs and symptoms reviewed. ER if any    --Patsy Jauregui MD on 9/1/2022 at 5:37 PM    An electronic signature was used to authenticate this note.

## 2022-09-07 ENCOUNTER — OFFICE VISIT (OUTPATIENT)
Dept: PAIN MANAGEMENT | Age: 87
End: 2022-09-07
Payer: MEDICARE

## 2022-09-07 VITALS
TEMPERATURE: 97.6 F | HEIGHT: 69 IN | SYSTOLIC BLOOD PRESSURE: 98 MMHG | WEIGHT: 216 LBS | OXYGEN SATURATION: 88 % | HEART RATE: 93 BPM | RESPIRATION RATE: 16 BRPM | DIASTOLIC BLOOD PRESSURE: 63 MMHG | BODY MASS INDEX: 31.99 KG/M2

## 2022-09-07 DIAGNOSIS — C61 PROSTATE CANCER METASTATIC TO BONE (HCC): ICD-10-CM

## 2022-09-07 DIAGNOSIS — C79.51 PROSTATE CANCER METASTATIC TO BONE (HCC): ICD-10-CM

## 2022-09-07 DIAGNOSIS — M54.50 CHRONIC BILATERAL LOW BACK PAIN WITHOUT SCIATICA: ICD-10-CM

## 2022-09-07 DIAGNOSIS — M47.817 LUMBOSACRAL SPONDYLOSIS WITHOUT MYELOPATHY: ICD-10-CM

## 2022-09-07 DIAGNOSIS — G89.4 CHRONIC PAIN SYNDROME: Primary | ICD-10-CM

## 2022-09-07 DIAGNOSIS — G89.29 CHRONIC BILATERAL LOW BACK PAIN WITHOUT SCIATICA: ICD-10-CM

## 2022-09-07 DIAGNOSIS — M43.16 SPONDYLOLISTHESIS OF LUMBAR REGION: ICD-10-CM

## 2022-09-07 PROCEDURE — G8427 DOCREV CUR MEDS BY ELIG CLIN: HCPCS | Performed by: PAIN MEDICINE

## 2022-09-07 PROCEDURE — 99213 OFFICE O/P EST LOW 20 MIN: CPT | Performed by: PAIN MEDICINE

## 2022-09-07 PROCEDURE — 99213 OFFICE O/P EST LOW 20 MIN: CPT

## 2022-09-07 PROCEDURE — 1036F TOBACCO NON-USER: CPT | Performed by: PAIN MEDICINE

## 2022-09-07 PROCEDURE — G8417 CALC BMI ABV UP PARAM F/U: HCPCS | Performed by: PAIN MEDICINE

## 2022-09-07 PROCEDURE — 1123F ACP DISCUSS/DSCN MKR DOCD: CPT | Performed by: PAIN MEDICINE

## 2022-09-07 NOTE — PROGRESS NOTES
Do you currently have any of the following:    Fever: No  Headache:  No  Cough: No  Shortness of breath: No  Exposed to anyone with these symptoms: No         Marj Clarke presents to the [facility] on 9/7/2022. Maribel Mayer is complaining of pain lower back. The pain is intermittent. The pain is described as stiffness. Pain is rated on his best day at a 0, on his worst day at a 2, and on average at a 2 on the VAS scale. Any procedures since your last visit: Yes, with 100 % relief. Pacemaker or defibrillator: No .    He is not on NSAIDS and is  on anticoagulation medications managed by  Rodriguez Logan MD       Medication Contract and Consent for Opioid Use Documents Filed       Patient Documents       Type of Document Status Date Received Received By Description    Medication Contract Received 3/6/2020 10:13 AM Harshil TINAJERO Alanis med contract    Medication Contract Received 6/7/2022  4:29 PM Carole MEYER PA[facility] MED CONTRACT                    Resp 16   Ht 5' 9\" (1.753 m)   Wt 216 lb (98 kg)   BMI 31.90 kg/m²      No LMP for male patient.

## 2022-09-07 NOTE — PROGRESS NOTES
[facility]  [address]  [facility]    Follow up Note      Anibal Diamond     Date of Visit:  9/7/2022    CC:  Patient presents for follow up   Chief Complaint   Patient presents with    Follow Up After Procedure     BILATERAL L3, 4,5 RADIOFREQUENCY ABLATION    Lower Back Pain       HPI:    Pain is better  Change in quality of symptoms:yes. Medication side effects:none. Recent diagnostic testing:none. Recent interventional procedures:L THR with excellent results. He has been on anticoagulation medications to include ASA and has not been on herbal supplements. He is not diabetic. Imaging:   3/2022 lumbar MRI -     FINDINGS:   BONES/ALIGNMENT: There is normal alignment of the spine. The vertebral body   heights are maintained. T1 and T2 hypointense signal in the anterior S1   vertebral body does not demonstrate enhancement and likely represents a   treated metastasis. Additional metastases are seen the right lateral sacral   ala, at the level of S2, as well as in the right posterior iliac. None   demonstrate any enhancement. SPINAL CORD:  The conus terminates normally. SOFT TISSUES: No paraspinal mass identified. L1-L2: There is no significant disc protrusion, spinal canal stenosis or   neural foraminal narrowing. L2-L3: Moderate loss of disc height with a small disc bulge. Mild facet and   ligamentous hypertrophy. No significant central canal or lateral recess   stenosis. Mild-to-moderate right and mild left neural foraminal stenoses. L3-L4: Mild loss of disc height with a small disc bulge. Mild facet and   ligamentous hypertrophy. Fluid in the bilateral facet joints. Mild central   canal and lateral recess stenoses. Moderate neural foraminal stenoses. L4-L5: Severe loss of disc height. No significant disc herniation Mild facet   and ligamentous hypertrophy. No significant central canal or lateral recess   stenosis.   Moderate neural foraminal stenoses. L5-S1: Grade 1 anterolisthesis of L5 over S1 by approximately 7 mm. Bilateral L5 spondylolysis. No central canal stenosis. Mild lateral recess   stenoses. Severe neural foraminal stenoses. Impression   1. Stable, treated, nonenhancing metastases S1 and S2 vertebra, as well as   the right iliac bone. 2. No fracture or marrow edema. 3. Bilateral L5 spondylolysis with grade 1 spondylolisthesis of L5 over S1 by   approximately 7 mm. 4. Mild central canal stenosis at L3-4.   5.  Multilevel neural foraminal stenoses, worst (severe) at L5-S1.     3/2022 MRI pelvis -  Appearance of the left proximal femur is suspicious for nondisplaced   pathologic fracture. Focal lesion within the right sacral ala may demonstrate minimal enhancement   and could represent a healing treated metastatic lesion. Evaluation is   limited secondary to metallic artifact. Attention on follow-up recommended. Scattered, treated metastasis throughout the bony pelvis and left proximal   femur without discrete enhancement. Lumbar MRI 4/2020 -  1. Metastatic lesions are noted in the right iliac bone and first   sacral body, but are not associated with neural encroachment. Other   metastases previously documented in the pelvis are not included on   this exam. Metastatic foci do enhance. 2. There is grade 1 anterolisthesis of L5 on S1 and loss of the L4-5   disc space. 3. Lateral portions of the circumferential disc bulge at the L5-S1   level could encroach on the L5 nerve roots lateral to the foramina on   either side. 4. Telescoping of facets and disc bulging results in severe central   stenosis of the right-sided L4 and L5 nerve roots and moderately   severe stenosis of the left-sided L2-L5 root levels, more severe   inferiorly. Lumbar MRI 7/2019 -   IMPRESSION:    Bone lesions at S1, S2 and in the right iliac wing worrisome for a marrow   infiltrative process. Recommend clinical correlation and correlation   with nuclear medicine bone scanning. Dextroconvex scoliosis and multilevel malalignment associated with   multilevel spondylosis as detailed. L2-3 level, disc bulging with mild spinal canal stenosis and mild to   moderate right foraminal stenosis. L3-4 level, disc bulging with mild spinal canal stenosis and mild to   moderate left foraminal stenosis. L4-5 level, disc degeneration and disc osteophyte complex with mild to   moderate foraminal stenosis. L5-S1 level, grade 1 anterolisthesis from bilateral L5 pars defects and   degenerative disc disease with moderate to severe foraminal stenosis and   impingement of the L5 nerve roots. Anatomic Thoracic/Lumbar Variant: None. L4-5 is considered the level of   the iliac crest and assume there are 5 lumbar-type vertebrae. There are hepatic and renal cysts. Recommend correlation with ultrasound. Cervical MRI 2019 -   IMPRESSION:    10 mm low signal intensity lesion at the right side of the C6 vertebral   body and 7 mm signal intensity lesion at the base of C2 are worrisome for   a marrow infiltrative process such as osteoblastic metastasis. Clinical   correlation and correlation with nuclear medicine bone scanning   recommended for further evaluation. Levoconvex scoliosis and multilevel malalignment associated multilevel   spondylosis most notably moderate to advanced degenerative disc disease   at C6-7 and mild to moderate degenerative disc disease at C5-6 and C4-5. There is no substantial spinal canal stenosis. There is multilevel acquired foraminal stenosis most notably moderate to   severe left and moderate right foraminal stenosis at C5-6. Anatomic Variant:  None. Assume 7 cervical vertebrae with counting from   the craniocervical junction.                                         Potential Aberrant Drug-Related Behavior:  no     Urine Drug Screenin2020 consistent  2022 consistent     OARRS report:  7/2020-6/2022 consistent    Opioid Agreement:  Date enacted:  03/06/2020 - updated 6/7/2022  Renewal date:    Past Medical History:   Diagnosis Date    CA prostate, adenoca ([facility] [address] 4/25/2019    CAD (coronary artery disease)     Dr. Glory Neal [facility])     prostate- PO chemotherapy     Chronic bilateral low back pain without sciatica 3/6/2020    Cobalamin deficiency     Glaucoma     Right eye    History of blood transfusion     Hyperlipidemia     Hypertension     Osteoarthritis     Osteochondropathy     Osteopenia     Pain in lower limb     Peripheral venous insufficiency     PONV (postoperative nausea and vomiting)     Prolonged emergence from general anesthesia     PVD (peripheral vascular disease) with claudication ([facility] [address] 4/25/2019       Past Surgical History:   Procedure Laterality Date    ACETABULUM FRACTURE SURGERY  03/24/2011    ANESTHESIA NERVE BLOCK Bilateral 06/16/2020    BILATERAL L3 L4 L5 MEDIAL NERVE BRANCH BLOCK performed by Sarahy Yanez DO at [address]  N/A 02/24/2022    BRONCHOSCOPY W/EBUS FNA performed by Benjamin Fraga MD at [address] [facility]  02/24/2022    BRONCHOSCOPY DIAGNOSTIC OR CELL [address] ONLY performed by Benjamin Fraga MD at [facility] [address]  2016    CORONARY ARTERY BYPASS GRAFT  12/1996    [facility]/ Dr. Kirstie Rodriguez  01/25/2022    CT NEEDLE BIOPSY LUNG PERCUTANEOUS 1/25/2022 Mikey Wood II, MD [facility]    ENDOSCOPY, COLON, DIAGNOSTIC  2016    EYE SURGERY Bilateral 01/2018    cataract, glaucoma 2020-left eye    FEMUR FRACTURE SURGERY  1981    left    HERNIA REPAIR  1993    JOINT REPLACEMENT      bilat knee 2010, padmini dunne 2011     LYMPH NODE DISSECTION  10/2007    PAIN MANAGEMENT PROCEDURE N/A 05/19/2020    CAUDAL EPIDURAL STEROID INJECTION performed by Sarahy Yanez DO at [address]  08/04/2020    PAIN MANAGEMENT PROCEDURE Right 08/04/2020    RIGHT L3 4 5 MEDIAL BRANCH RADIOFREQUENCY ABLATION performed by [place] Ochoa, DO at [address] Left 09/10/2020    LEFT L3 4 5 MEDIAL BRANCH RADIOFREQUENCY ABLATION     +++IODINE ALLERGY+++   CPT: 38556 [id number] performed by [place] Don, DO at [address] Bilateral 03/25/2021    BILATERAL L3,4,5 MEDIAL NERVE BRANCH RADIOFREQUENCY ABLATION [facility] at [address] Bilateral 06/23/2022    BILATERAL L3, 4,5 RADIOFREQUENCY ABLATION performed [facility] at [address] Left 08/15/2022    Dr Evelyn Sweet  10/31/2007    Kem Salazar M.D./ [facility] [address] Bilateral 02/2019    hx prostate ca-    TONSILLECTOMY      TOTAL HIP ARTHROPLASTY  11/30/2011    right    TOTAL KNEE ARTHROPLASTY  2010    bilateral       Prior to Admission medications    Medication Sig Start Date End Date Taking?  Authorizing Provider   ferrous sulfate (SLOW FE) 140 (45 Fe) MG extended release tablet Take 140 mg by mouth 2 times daily (with meals)   Yes Historical Provider, MD   calcium carbonate (OSCAL) 500 MG TABS tablet Take 500 mg by mouth daily   Yes Historical Provider, MD   denosumab (XGEVA) 120 MG/1.7ML SOLN SC injection Inject 120 mg into the skin every 3 months    Yes Historical Provider, MD   omeprazole (PRILOSEC) 20 MG delayed release capsule Take 1 capsule by mouth daily 5/4/22  Yes Kofi Pritchett MD   Vitamin D, Cholecalciferol, 50 MCG (2000 UT) CAPS Take 2,000 Units by mouth daily 1/1/18  Yes Historical Provider, MD   omega-3 acid ethyl esters (LOVAZA) 1 g capsule Take 1 capsule by mouth 2 times daily 11/30/21  Yes Kofi Pritchett MD   Handicap Placard MISC by Does not apply route Duration: 5 years 11/4/21  Yes Kofi Pritchett MD   lisinopril (PRINIVIL;ZESTRIL) 10 MG tablet Take 1 tablet by mouth daily  Patient taking differently: Take 5 mg by mouth daily 21  Yes Angela Melvin MD   atorvastatin (LIPITOR) 40 MG tablet Take 1 tablet by mouth daily 21  Yes Angela Melvin MD   magnesium gluconate (MAGONATE) 500 MG tablet Take 500 mg by mouth at bedtime    Yes Historical Provider, MD   ERLEADA 60 MG TABS 60 mg Every 4 days 3/2/20  Yes Historical Provider, MD   meclizine (ANTIVERT) 25 MG tablet 1/2 - 1 by mouth every 6 hours as needed   Yes Historical Provider, MD   Multiple Vitamins-Minerals (THERAPEUTIC MULTIVITAMIN-MINERALS) tablet Take 1 tablet by mouth daily    Yes Historical Provider, MD   metoprolol tartrate (LOPRESSOR) 25 MG tablet Take 25 mg by mouth 2 times daily   Yes Historical Provider, MD   Coenzyme Q10 (COQ10) 50 MG CAPS Take 1 capsule by mouth daily    Yes Historical Provider, MD   aspirin 81 MG tablet Take 81 mg by mouth daily    Yes Historical Provider, MD   Cyanocobalamin (VITAMIN B 12 PO) Take  by mouth daily. sublingual  11  Yes Historical Provider, MD   enoxaparin (LOVENOX) 40 MG/0.4ML INJECT 0.4 MLS INTO THE SKIN DAILY FOR 8 DAYS 22   Historical Provider, MD       No Known Allergies    Social History     Socioeconomic History    Marital status:      Spouse name: Not on file    Number of children: 3    Years of education: Not on file    Highest education level: Not on file   Occupational History    Not on file   Tobacco Use    Smoking status: Former     Packs/day: 1.00     Years: 40.00     Pack years: 40.00     Types: Cigarettes     Start date:      Quit date: 10/1/1994     Years since quittin.9    Smokeless tobacco: Former     Types: Chew     Quit date:    Vaping Use    Vaping Use: Never used   Substance and Sexual Activity    Alcohol use:  Yes     Alcohol/week: 1.0 standard drink     Types: 1 Cans of beer per week     Comment: rare    Drug use: No    Sexual activity: Not on file   Other Topics Concern    Not on file   Social History Narrative Problem List: History of polyp of colon, Disorder of bone density and structure, unspecified,    Osteochondropathy, Carcinoma in situ of prostate, Aneurysm of thoracic aorta, Anemia, Coronary    arteriosclerosis, Cobalamin deficiency, Multiple closed fractures of pelvis with disruption of pelvic [place],    Hyperlipidemia, Essential hypertension    Health Maintenance:    Bone Density Test Screening - (3/5/2019)    Bone Density Scan - (12/18/2015)    Influenza Vaccination - (10/7/2016)    Colonoscopy - (4/3/2012)    Couseled on Home Safety - (11/23/2015)    Colonoscopy Screening - (4/3/2012)    US Liver - 8/1/08    Medical Problems:    Coronary Artery Disease (CAD), Hypertension, Hyperlipidemia, Prostate Cancer    Surgical Hx:    Prostatectomy, Coronary Artery Bypass Graft (CABG)            FH: Father:    . (Hx)    Mother:    . (Hx)        SH: Marital:  - retired . Personal Habits: Cigarette Use: former cigarette smoker, Nonsmoker. Alcohol: Occasionally    consumes alcohol. Social Determinants of Health     Financial Resource Strain: Low Risk     Difficulty of Paying Living Expenses: Not hard at all   Food Insecurity: No Food Insecurity    Worried About Running Out of Food in the Last Year: Never true    Ran Out of Food in the Last Year: Never true   Transportation Needs: Not on file   Physical Activity: Not on file   Stress: Not on file   Social Connections: Not on file   Intimate Partner Violence: Not on file   Housing Stability: Not on file       Family History   Problem Relation Age of Onset    Stroke Mother     Cancer Mother         pancreatic    Cancer Father         prostate    Stroke Father        REVIEW OF SYSTEMS:     Vesta Savers denies fever/chills, chest pain, shortness of breath, new bowel or bladder complaints. All other review of systems was negative.     PHYSICAL EXAMINATION:      BP 98/63   Pulse 93   Temp 97.6 °F (36.4 °C) (Infrared)   Resp 16   Ht 5' 9\" (1.753 m)   Wt 216 lb (98 kg)   SpO2 (!) 88%   BMI 31.90 kg/m²     General:      General appearance:pleasant and well-hydrated, in no discomfort and A & O x3  Build:Normal Weight    HEENT:    Head:normocephalic and atraumatic  Sclera: icterus absent    Lungs:    Breathing:Normal expansion. No accessory muscle use. Abdomen:    Shape:non-distended and normal    Lumbar spine:    Tenderness:- bilateral paraspinals  Range of motion:normal in lateral bending, flexion, extension rotation bilateral and is not painful. Extremities:    Tremors:None bilaterally upper and lower  Range of motion:pain with internal rotation of left hip not done.   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 Fadumo Freeman  Referred by Dr. John Keller for consideration for further injections as Dr. Fadumo Freeman no longer takes his insurance  Pt's wife states she has discussed his LE symptoms and they have determined they feel his LE weakness is a vascular issue, pt states he can stand still     He developed L hip/groin pain 10/2021 when getting out of the car after having pulled into the garage too close to the wall  3/2022 MRI pelvis w/ and w/o shows concern for pathologic fracture, moderate L hip OA  Saw Dr. Norma Giles for eval of L hip pain prior to the diagnosis of the pathologic fracture   Most recent L hip MRI showed worsening of the pathologic fracture  Pt went for eval at [facility] and had subsequent partial hip done with excellent results  L foot neuropathic symptoms resolved with once daily pregabalin, he d/c the medication and the pain has not returned  Continues on [place] for bone strength  Dx with cerebral aneurysm, pending CTA and coiling  Completed radiation for lung CA, diagnosed when having CT scan for covid pneumonia, pending f/u with Dr. Chris Subramanian at the [facility]  He feels his back pain is improved with the RFA     Pending CT chest for eval of lung CA status                 Plan:    UDS reviewed  OARRS reviewed  continue Stoney Amy #90 - no RF needed, he is not currently using  Caudal EVGENY done with 60% relief of lumbar symptoms x 2-3 days until he did yardwork              b/l L3,4,5 RFA with near 100% relief, pain now returning and pt would like to repeat                We discussed with the patient that combining opioids, benzodiazepines, alcohol, illicit drugs or sleep aids increases the risk of respiratory depression including death. We discussed that these medications may cause drowsiness, sedation or dizziness and have counseled the patient not to drive or operate machinery. We have discussed that these medications will be prescribed only by one provider. We have discussed with the patient about age related risk factors and have thoroughly discussed the importance of taking these medications as prescribed. The patient verbalizes understanding.     Cc: Referring physician

## 2022-09-09 ENCOUNTER — HOSPITAL ENCOUNTER (OUTPATIENT)
Age: 87
Discharge: HOME | End: 2022-09-09
Payer: MEDICARE

## 2022-09-09 DIAGNOSIS — E78.2 MIXED HYPERLIPIDEMIA: ICD-10-CM

## 2022-09-09 DIAGNOSIS — I10 ESSENTIAL HYPERTENSION: ICD-10-CM

## 2022-09-09 DIAGNOSIS — D64.9 ANEMIA, UNSPECIFIED TYPE: ICD-10-CM

## 2022-09-09 LAB
ALBUMIN SERPL-MCNC: 4.5 G/DL (ref 3.5–5.2)
ALP BLD-CCNC: 59 U/L (ref 40–129)
ALT SERPL-CCNC: 20 U/L (ref 0–40)
ANION GAP SERPL CALCULATED.3IONS-SCNC: 13 MMOL/L (ref 7–16)
AST SERPL-CCNC: 23 U/L (ref 0–39)
BASOPHILS ABSOLUTE: 0.03 E9/L (ref 0–0.2)
BASOPHILS RELATIVE PERCENT: 0.7 % (ref 0–2)
BILIRUB SERPL-MCNC: 0.7 MG/DL (ref 0–1.2)
BUN BLDV-MCNC: 22 MG/DL (ref 6–23)
CALCIUM SERPL-MCNC: 9.9 MG/DL (ref 8.6–10.2)
CHLORIDE BLD-SCNC: 105 MMOL/L (ref 98–107)
CO2: 24 MMOL/L (ref 22–29)
CREAT SERPL-MCNC: 1.3 MG/DL (ref 0.7–1.2)
EOSINOPHILS ABSOLUTE: 0.08 E9/L (ref 0.05–0.5)
EOSINOPHILS RELATIVE PERCENT: 2 % (ref 0–6)
GFR AFRICAN AMERICAN: >60
GFR NON-AFRICAN AMERICAN: 52 ML/MIN/1.73
GLUCOSE BLD-MCNC: 134 MG/DL (ref 74–99)
HCT VFR BLD CALC: 33.6 % (ref 37–54)
HEMOGLOBIN: 11 G/DL (ref 12.5–16.5)
IMMATURE GRANULOCYTES #: 0.04 E9/L
IMMATURE GRANULOCYTES %: 1 % (ref 0–5)
LYMPHOCYTES ABSOLUTE: 1.27 E9/L (ref 1.5–4)
LYMPHOCYTES RELATIVE PERCENT: 31.4 % (ref 20–42)
MCH RBC QN AUTO: 29.8 PG (ref 26–35)
MCHC RBC AUTO-ENTMCNC: 32.7 % (ref 32–34.5)
MCV RBC AUTO: 91.1 FL (ref 80–99.9)
MONOCYTES ABSOLUTE: 0.38 E9/L (ref 0.1–0.95)
MONOCYTES RELATIVE PERCENT: 9.4 % (ref 2–12)
NEUTROPHILS ABSOLUTE: 2.24 E9/L (ref 1.8–7.3)
NEUTROPHILS RELATIVE PERCENT: 55.5 % (ref 43–80)
PDW BLD-RTO: 15.3 FL (ref 11.5–15)
PLATELET # BLD: 161 E9/L (ref 130–450)
PMV BLD AUTO: 9.3 FL (ref 7–12)
POTASSIUM SERPL-SCNC: 4.9 MMOL/L (ref 3.5–5)
RBC # BLD: 3.69 E12/L (ref 3.8–5.8)
SODIUM BLD-SCNC: 142 MMOL/L (ref 132–146)
TOTAL PROTEIN: 7.1 G/DL (ref 6.4–8.3)
WBC # BLD: 4 E9/L (ref 4.5–11.5)

## 2022-09-09 PROCEDURE — 36415 COLL VENOUS BLD VENIPUNCTURE: CPT

## 2022-09-09 PROCEDURE — 85025 COMPLETE CBC W/AUTO DIFF WBC: CPT

## 2022-09-09 PROCEDURE — 80053 COMPREHEN METABOLIC PANEL: CPT

## 2022-09-12 ENCOUNTER — TELEPHONE (OUTPATIENT)
Dept: PAIN MANAGEMENT | Age: 87
End: 2022-09-12

## 2022-09-12 DIAGNOSIS — M47.817 LUMBOSACRAL SPONDYLOSIS WITHOUT MYELOPATHY: ICD-10-CM

## 2022-09-12 DIAGNOSIS — C61 PROSTATE CANCER METASTATIC TO BONE (HCC): ICD-10-CM

## 2022-09-12 DIAGNOSIS — M43.16 SPONDYLOLISTHESIS OF LUMBAR REGION: ICD-10-CM

## 2022-09-12 DIAGNOSIS — C79.51 PROSTATE CANCER METASTATIC TO BONE (HCC): ICD-10-CM

## 2022-09-12 DIAGNOSIS — M54.50 CHRONIC BILATERAL LOW BACK PAIN WITHOUT SCIATICA: ICD-10-CM

## 2022-09-12 DIAGNOSIS — M54.16 LUMBAR RADICULOPATHY: ICD-10-CM

## 2022-09-12 DIAGNOSIS — G89.29 CHRONIC BILATERAL LOW BACK PAIN WITHOUT SCIATICA: ICD-10-CM

## 2022-09-12 DIAGNOSIS — G89.4 CHRONIC PAIN SYNDROME: Primary | ICD-10-CM

## 2022-09-13 ENCOUNTER — OFFICE VISIT (OUTPATIENT)
Dept: PRIMARY CARE CLINIC | Age: 87
End: 2022-09-13
Payer: MEDICARE

## 2022-09-13 VITALS
WEIGHT: 224 LBS | BODY MASS INDEX: 33.18 KG/M2 | SYSTOLIC BLOOD PRESSURE: 126 MMHG | DIASTOLIC BLOOD PRESSURE: 60 MMHG | HEIGHT: 69 IN | OXYGEN SATURATION: 96 % | TEMPERATURE: 97.8 F | HEART RATE: 78 BPM

## 2022-09-13 DIAGNOSIS — C34.91 ADENOCARCINOMA OF RIGHT LUNG (HCC): ICD-10-CM

## 2022-09-13 DIAGNOSIS — I10 ESSENTIAL HYPERTENSION: ICD-10-CM

## 2022-09-13 DIAGNOSIS — I87.2 VENOUS INSUFFICIENCY: Primary | ICD-10-CM

## 2022-09-13 DIAGNOSIS — I25.10 CORONARY ARTERY DISEASE INVOLVING NATIVE HEART WITHOUT ANGINA PECTORIS, UNSPECIFIED VESSEL OR LESION TYPE: ICD-10-CM

## 2022-09-13 DIAGNOSIS — D61.818 PANCYTOPENIA (HCC): ICD-10-CM

## 2022-09-13 DIAGNOSIS — D64.9 ANEMIA, UNSPECIFIED TYPE: ICD-10-CM

## 2022-09-13 DIAGNOSIS — I71.20 THORACIC AORTIC ANEURYSM WITHOUT RUPTURE: ICD-10-CM

## 2022-09-13 DIAGNOSIS — E78.2 MIXED HYPERLIPIDEMIA: ICD-10-CM

## 2022-09-13 DIAGNOSIS — N28.9 RENAL INSUFFICIENCY: ICD-10-CM

## 2022-09-13 PROCEDURE — G8427 DOCREV CUR MEDS BY ELIG CLIN: HCPCS | Performed by: FAMILY MEDICINE

## 2022-09-13 PROCEDURE — 1123F ACP DISCUSS/DSCN MKR DOCD: CPT | Performed by: FAMILY MEDICINE

## 2022-09-13 PROCEDURE — G8417 CALC BMI ABV UP PARAM F/U: HCPCS | Performed by: FAMILY MEDICINE

## 2022-09-13 PROCEDURE — 99214 OFFICE O/P EST MOD 30 MIN: CPT | Performed by: FAMILY MEDICINE

## 2022-09-13 PROCEDURE — 1036F TOBACCO NON-USER: CPT | Performed by: FAMILY MEDICINE

## 2022-09-13 RX ORDER — PREGABALIN 25 MG/1
25 CAPSULE ORAL 3 TIMES DAILY PRN
Qty: 90 CAPSULE | Refills: 2 | Status: SHIPPED
Start: 2022-09-13 | End: 2022-10-12

## 2022-09-13 NOTE — PROGRESS NOTES
02/21/2022 11:17 AM    CHOL 125 11/22/2021 10:07 AM    HDL 46 05/24/2022 12:45 PM    HDL 43 02/21/2022 11:17 AM    HDL 44 11/22/2021 10:07 AM    LDLCALC 46 05/24/2022 12:45 PM    LDLCALC 49 02/21/2022 11:17 AM    LDLCALC 49 11/22/2021 10:07 AM    TRIG 292 05/24/2022 12:45 PM    TRIG 245 02/21/2022 11:17 AM    TRIG 162 11/22/2021 10:07 AM     VITAMIN D  Lab Results   Component Value Date/Time    VITD25 47 05/24/2022 12:45 PM    VITD25 41 02/21/2022 11:17 AM    VITD25 50 11/22/2021 10:07 AM     MAGNESIUM  Lab Results   Component Value Date/Time    MG 1.9 05/24/2022 12:45 PM    MG 1.9 02/21/2022 11:17 AM    MG 1.8 11/22/2021 10:07 AM      PHOS  Lab Results   Component Value Date/Time    PHOS 3.9 08/25/2021 11:41 AM    PHOS 4.0 01/21/2021 11:48 AM    PHOS 3.8 01/16/2021 03:04 PM      PAWAN   No results found for: PAWAN  RHEUMATOID FACTOR  No results found for: RF  PSA  Lab Results   Component Value Date/Time    PSA <0.01 03/22/2022 02:12 PM    PSA SEE NOTE 12/06/2021 01:01 PM    PSA <0.03 12/06/2021 01:01 PM      HEPATITIS C  No results found for: HCVABI  HIV  No results found for: KAJ5PYV, HIV1QT  UA  Lab Results   Component Value Date/Time    COLORU Yellow 08/23/2022 04:08 PM    COLORU Yellow 05/24/2022 12:45 PM    COLORU Yellow 02/21/2022 11:17 AM    CLARITYU Clear 08/23/2022 04:08 PM    CLARITYU Clear 05/24/2022 12:45 PM    CLARITYU Clear 02/21/2022 11:17 AM    GLUCOSEU Negative 08/23/2022 04:08 PM    GLUCOSEU Negative 05/24/2022 12:45 PM    GLUCOSEU Negative 02/21/2022 11:17 AM    GLUCOSEU NEGATIVE 12/21/2010 05:25 PM    BILIRUBINUR Negative 08/23/2022 04:08 PM    BILIRUBINUR Negative 05/24/2022 12:45 PM    BILIRUBINUR Negative 02/21/2022 11:17 AM    BILIRUBINUR NEGATIVE 12/21/2010 05:25 PM    KETUA TRACE 08/23/2022 04:08 PM    KETUA Negative 05/24/2022 12:45 PM    KETUA Negative 02/21/2022 11:17 AM    SPECGRAV >=1.030 08/23/2022 04:08 PM    SPECGRAV 1.025 05/24/2022 12:45 PM    SPECGRAV >=1.030 02/21/2022 11:17 AM BLOODU Negative 08/23/2022 04:08 PM    BLOODU Negative 05/24/2022 12:45 PM    BLOODU Negative 02/21/2022 11:17 AM    PHUR 5.5 08/23/2022 04:08 PM    PHUR 5.5 05/24/2022 12:45 PM    PHUR 5.0 02/21/2022 11:17 AM    PROTEINU Negative 08/23/2022 04:08 PM    PROTEINU Negative 05/24/2022 12:45 PM    PROTEINU Negative 02/21/2022 11:17 AM    UROBILINOGEN 0.2 08/23/2022 04:08 PM    UROBILINOGEN 0.2 05/24/2022 12:45 PM    UROBILINOGEN 0.2 02/21/2022 11:17 AM    NITRU Negative 08/23/2022 04:08 PM    NITRU Negative 05/24/2022 12:45 PM    NITRU Negative 02/21/2022 11:17 AM    LEUKOCYTESUR Negative 08/23/2022 04:08 PM    LEUKOCYTESUR Negative 05/24/2022 12:45 PM    LEUKOCYTESUR Negative 02/21/2022 11:17 AM     Urine Micro/Albumin Ratio  Lab Results   Component Value Date/Time    MALBCR 8.0 05/24/2022 12:45 PM    MALBCR 13.4 02/21/2022 11:17 AM    MALBCR 16.2 11/22/2021 10:06 AM               Review of Systems  ROS:    Const: Denies chills, fever, malaise and sweats. Eyes: Denies discharge, pain, redness and visual disturbance. ENMT: Denies earaches, other ear symptoms. Denies nasal or sinus symptoms other than if stated  above. Denies mouth and tongue lesions and sore throat. CV: Denies chest discomfort, pain; diaphoresis, dizziness, edema, lightheadedness, orthopnea,  palpitations, syncope and near syncopal episode or any exertional symptoms other than swelling as above. Resp: Denies cough, hemoptysis, pleuritic pain, SOB, sputum production and wheezing. GI: Denies abdominal pain, change in bowel habits, hematochezia, melena, nausea and vomiting. : Denies urinary problems including dysuria. Musculo: Chronic pain stable, appropriate postop pain  Skin: Denies bruising and rash or changing skin lesions. Neuro: Denies headache, numbness, stiff neck, tingling and focal weakness slurred speech or facial  Droop  Extremities: Denies calf pain, redness or swelling.    Hema/Lymph: Denies bleeding/bruising tendency and enlarged lymph nodes. Current Outpatient Medications:     Compression Stockings MISC, by Does not apply route B/L BELOW KNEE AS DIRECTED 20-30 mmHg  Disp 2 pair, 2rf's, Disp: 2 each, Rfl: 1    ferrous sulfate (SLOW FE) 140 (45 Fe) MG extended release tablet, Take 140 mg by mouth 2 times daily (with meals), Disp: , Rfl:     calcium carbonate (OSCAL) 500 MG TABS tablet, Take 500 mg by mouth daily, Disp: , Rfl:     denosumab (XGEVA) 120 MG/1.7ML SOLN SC injection, Inject 120 mg into the skin every 3 months , Disp: , Rfl:     omeprazole (PRILOSEC) 20 MG delayed release capsule, Take 1 capsule by mouth daily, Disp: 90 capsule, Rfl: 3    Vitamin D, Cholecalciferol, 50 MCG (2000 UT) CAPS, Take 2,000 Units by mouth daily, Disp: , Rfl:     omega-3 acid ethyl esters (LOVAZA) 1 g capsule, Take 1 capsule by mouth 2 times daily, Disp: 180 capsule, Rfl: 3    Handicap [facility], by Does not apply route Duration: 5 years, Disp: 1 each, Rfl: 0    lisinopril (PRINIVIL;ZESTRIL) 10 MG tablet, Take 1 tablet by mouth daily (Patient taking differently: Take 5 mg by mouth daily), Disp: 90 tablet, Rfl: 3    atorvastatin (LIPITOR) 40 MG tablet, Take 1 tablet by mouth daily, Disp: 90 tablet, Rfl: 3    magnesium gluconate (MAGONATE) 500 MG tablet, Take 500 mg by mouth at bedtime , Disp: , Rfl:     ERLEADA 60 MG TABS, 60 mg Every 4 days, Disp: , Rfl:     meclizine (ANTIVERT) 25 MG tablet, 1/2 - 1 by mouth every 6 hours as needed, Disp: , Rfl:     Multiple Vitamins-Minerals (THERAPEUTIC MULTIVITAMIN-MINERALS) tablet, Take 1 tablet by mouth daily , Disp: , Rfl:     metoprolol tartrate (LOPRESSOR) 25 MG tablet, Take 25 mg by mouth 2 times daily, Disp: , Rfl:     Coenzyme Q10 (COQ10) 50 MG CAPS, Take 1 capsule by mouth daily , Disp: , Rfl:     aspirin 81 MG tablet, Take 81 mg by mouth daily , Disp: , Rfl:     Cyanocobalamin (VITAMIN B 12 PO), Take  by mouth daily.  sublingual , Disp: , Rfl:     pregabalin (LYRICA) 25 MG capsule, Take 1 capsule by MEDIAL BRANCH RADIOFREQUENCY ABLATION performed by Janina Miles, DO at [facility] Left 09/10/2020    LEFT L3 4 5 MEDIAL BRANCH RADIOFREQUENCY ABLATION     +++IODINE ALLERGY+++   CPT: 47829 [id number] performed by Janina Miles, DO at [facility] Bilateral 2021    BILATERAL L3,4,5 MEDIAL NERVE BRANCH RADIOFREQUENCY ABLATION performed by Janina Miles, DO at [facility] Bilateral 2022    BILATERAL L3, 4,5 RADIOFREQUENCY ABLATION performed by Janinarenita Miles, DO at [facility] Left 08/15/2022    Dr Mary Scruggs  10/31/2005    [address] M[address] DA LETICIA LAPAROSCOPIC PROSTATECTOMY    TESTICLE REMOVAL Bilateral 2019    hx prostate ca-    TONSILLECTOMY      TOTAL HIP ARTHROPLASTY  2011    right    TOTAL KNEE ARTHROPLASTY  2010    bilateral     Family History   Problem Relation Age of Onset    Stroke Mother     Cancer Mother         pancreatic    Cancer Father         prostate    Stroke Father      Social History     Socioeconomic History    Marital status:      Spouse name: Not on file    Number of children: 3    Years of education: Not on file    Highest education level: Not on file   Occupational History    Not on file   Tobacco Use    Smoking status: Former     Packs/day: 1.00     Years: 40.00     Pack years: 40.00     Types: Cigarettes     Start date: 5     Quit date: 10/1/1994     Years since quittin.9    Smokeless tobacco: Former     Types: Chew     Quit date:    Vaping Use    Vaping Use: Never used   Substance and Sexual Activity    Alcohol use:  Yes     Alcohol/week: 1.0 standard drink     Types: 1 Cans of beer per week     Comment: rare    Drug use: No    Sexual activity: Not on file   Other Topics Concern    Not on file   Social History Narrative    Problem List: History of polyp of colon, Disorder of bone density and structure, unspecified, Osteochondropathy, Carcinoma in situ of prostate, Aneurysm of thoracic aorta, Anemia, Coronary    arteriosclerosis, Cobalamin deficiency, Multiple closed fractures of pelvis with disruption of pelvic [place],    Hyperlipidemia, Essential hypertension    Health Maintenance:    Bone Density Test Screening - (3/5/2019)    Bone Density Scan - (12/18/2015)    Influenza Vaccination - (10/7/2016)    Colonoscopy - (4/3/2012)    Couseled on Home Safety - (11/23/2015)    Colonoscopy Screening - (4/3/2012)    US Liver - 8/1/08    Medical Problems:    Coronary Artery Disease (CAD), Hypertension, Hyperlipidemia, Prostate Cancer    Surgical Hx:    Prostatectomy, Coronary Artery Bypass Graft (CABG)            FH: Father:    . (Hx)    Mother:    . (Hx)        SH: Marital:  - retired . Personal Habits: Cigarette Use: former cigarette smoker, Nonsmoker. Alcohol: Occasionally    consumes alcohol. Social Determinants of Health     Financial Resource Strain: Low Risk     Difficulty of Paying Living Expenses: Not hard at all   Food Insecurity: No Food Insecurity    Worried About Running Out of Food in the Last Year: Never true    Ran Out of Food in the Last Year: Never true   Transportation Needs: Not on file   Physical Activity: Not on file   Stress: Not on file   Social Connections: Not on file   Intimate Partner Violence: Not on file   Housing Stability: Not on file       Vitals:    09/13/22 1358   BP: 126/60   Pulse: 78   Temp: 97.8 °F (36.6 °C)   SpO2: 96%   Weight: 224 lb (101.6 kg)   Height: 5' 9\" (1.753 m)      Wt Readings from Last 3 Encounters:   09/13/22 224 lb (101.6 kg)   09/07/22 216 lb (98 kg)   06/24/22 223 lb (101.2 kg)        Physical Exam  Exam:  Const: Appears comfortable. No signs of acute distress present. Head/Face: Atraumatic on inspection. Eyes: EOMI in both eyes. No discharge from the eyes. PERRL. Sclerae clear. ENMT: Auditory canals normal. Tympanic membranes: intact and translucent. External nose WNL. Nasal mucosa is clear. Oropharynx: No erythema or exudate. Posterior pharynx is normal.  Neck: Supple. Palpation reveals no adenopathy. No masses appreciated. No JVD. Carotids: no  bruits. Resp: Respirations are unlabored. Clear to auscultation. No rales, rhonchi or wheezes appreciated  over the lungs bilaterally. CV: Rate is regular. Rhythm regular today. 3/6 TRIPP  Abdomen: Bowel sounds are normoactive. Abdomen is soft, nontender, and nondistended. No  abdominal masses. No palpable hepatosplenomegaly. Lymph: No palpable or visible regional lymphadenopathy. Skin: Dry and warm with no rash or concerning lesions. Extremities: No clubbing cyanosis  No calf inflammation or tenderness. Normal pulses. No clubbing cyanosis or edema at this time, he does state that his left lateral malleolus and foot swells at times  Neuro: Alert and oriented. Affect: appropriate. Upper Extremities: 5/5 bilaterally. Lower Extremities:  5/5 bilaterally. Sensation intact to light touch. Reflexes: DTR's are symmetric and 2+ bilaterally. .  Cranial Nerves: Cranial nerves grossly intact. Muscular skeletal-chronic limited mobility, no acute inflammation    Lab Results   Component Value Date    PSA <0.01 03/22/2022    PSA SEE NOTE (AA) 12/06/2021    PSA <0.03 12/06/2021    PSADIA 0.02 12/09/2014    PSADIA <0.01 09/05/2014    PSADIA 0.32 06/03/2014          Assessment and Plan:   Diagnosis Orders   1. Venous insufficiency  Compression Stockings MISC      2. Mixed hyperlipidemia        3. Essential hypertension        4. Anemia, unspecified type        5. Coronary artery disease involving native heart without angina pectoris, unspecified vessel or lesion type        6. Renal insufficiency        7. Pancytopenia ([address]        8. Thoracic aortic aneurysm without rupture ([address]        9. Adenocarcinoma of right lung (HCC)             Left hip pathologic fracture  Status post partial left hip arthroplasty. Follows with [place]. Doing well now. Venous insufficiency  Counseled extensively. Differential reviewed, including serious etiologies. Again other differential reviewed, emphasized importance of low-salt diet, foot elevation at rest.  Recommended ultrasound, declines this or other work-up now. No other associated symptoms. He start compression stockings 20-30 mmHg below-knee he prefers. Notify persist worsens. Postop anemia  Trending up. Asymptomatic    Cerebral aneurysm    counseled, risk of rupture reviewed. Signs and symptoms watch were reviewed. Avoid Valsalva. Blood pressure control. Following with .  Mining coiling procedure. Reviewed pictures from angiogram with him        Adenocarcinoma of right lung [facility])    counseled extensively, last measurement of approximately 2.4 x 1.8 cm. Right posterior lung. Biopsy 1/22 reveals     Diagnosis:   Right lung, core needle biopsy: Invasive, moderately differentiated   adenocarcinoma (grade 2) consistent with primary lung origin      bronchoscopy as of 3/1/2022 revealed negative Biopsy of reachable lymph nodes. Finished radiation, follows with Dr. Randy Diamond and Dr. Maria Teresa Torres with mets to bone [facility])  Metastatic. Continue per Dr. Tiny Bowen. PSA less than 0.01-0.07-0.3 , he had been on Lupron, had metastasis, since orchiectomy, did not tolerate xtandi so he stopped it , overall feeling well, on Erleada, follows regularly with Dr. Manuel Tom extensively. Differential reviewed, including serious etiologies. Gets echoes through cardiology,. Cont per dr Willard Flores, saw May 2022. No change, sees 6mos           Allergic rhinitis  Counseled, chronic. Counseled on use of nasal steroid, vaporizer, nasal saline as well as additional treatment options for vasomotor rhinitis     History of COVID-19  Counseled. Resolved.   Counseled on vaccine, declines, would like antibodies monitored, ordered for 3 months Disc disease, degenerative, lumbar or lumbosacral  Counseled extensively. Differential reviewed, including serious etiologies. Was Following with [facility], had to change because of insurance, now seeing Dr. Elpidio Eaton pain management, saw Dr. Brent Cerna. Previously saw [facility]. Counseled on injections, radiofrequency ablation etc.  Failed physical therapy. . Had  radiofrequency ablation x2, overall doing well, last MRI lumbar spine ordered by Dr. Jackie Sanchez 3/2022    Bilateral carpal tunnel syndrome  Had emg/ncs Nov 2019 [facility]. Referred to [facility] but did not go because no symptoms     Health maintenance examination  Health maintenance issues discussed at length 12/8/2021, encouraged yearly. Pancytopenia ([facility] [address]  Counseled extensively. Differential reviewed, including serious etiologies. Possibly from medication. .  Continue per Dr. Jackie Sanchez. White count low but stable, hemoglobin trending up postop, platelets now normal.  Asymptomatic           Low magnesium level  Counseled. Goals reviewed. Currently 1.9. We will continue magnesium oxide 400 mg daily                Vitamin D deficiency  Continue current therapy. Monitor. Vitamin B12 deficiency  Appropriate supplementation reviewed. Vitamin V01 elevated, folic acid now normal, appropriate supplementation reviewed, monitor, risk of low and high reviewed, no longer on injection        Hyperglycemia  Counseled, not interested in meds now, monitor. A1c excellent in the past,. Monitor        Essential hypertension  Counseled. Became hypotensive. Cardiology lowered dose to lisinopril 10 mg. Then was held in the hospital for some reason. He since resumed half dose 5 mg, tolerating well with good results. Consider increase to 10 if blood pressure not at goal.  Monitor. The risks of hypertension and hypotension reviewed. Watch closely ambulatory.  Hyper and hypotensive precautions and parameters reviewed and previously written as well as parameters on pulse, call if out of range, ER dangers numbers. Lifestyle modification reviewed. Tolerating therapy. Tubular adenoma   Last colonoscopy Dr. Mancilla Brought, 2/15 complaining repeat 3 years from then. Overdue. Declines now, fit cards or Cologuard. . Hemoglobin consistently low but stable. Declines any intervention now, declines any tests or procedures now. Thyroid nodule  Counseled, incidental 7 mm right thyroid nodule on carotid ultrasound 3/19. Declines workup or follow-up now     Thoracic aneurysm, not ruptured  Counseled, potential serious is reviewed, if any symptoms directly to ER. Continue per Dr. Messi Oro. He saw him 3/15 and had CT at least then. Dr. Messi Oro discharged him at this point unless symptoms and he not interested in follow-up imaging. Of note CT was done for Covid 1/21 and they stated no acute abnormality of the thoracic aorta     PVD (peripheral vascular disease) with claudication [facility])  He saw Dr. Michael Robledo, who had him on pletal, had minimal results. Now follows with Dr Kianna Rivas vascular in [facility] [employer] who states he did have a femoral blockage proximal that would require bypass as well as some distal blockages that would require stenting but they felt observation most appropriate. He did not feel medication was necessary or helpful at this time. They state Dr. Michael Robledo agrees with this plan. He is comfortable with this plan at this point. Serious signs and symptoms watch were reviewed. Admits to missing an appointment because of Covid pandemic. They were planning surgery but after discussion, and the risks of complications and the complexity of the surgery he is declining intervention now. States he watches the distance he walks but otherwise doing well. No sores etc.       Mixed hyperlipidemia  Did very well on d.a.w. Crestor unfortunately severe myalgias on generic. He could try to preauthorize d.a.w., but he chose to go on Lipitor instead.   He was tolerating but got held after hip surgery for some reason, blood work relatively stable, resumed 8/22 declines change in dose, goals reviewed, risk benefits ADRs reviewed. Counseling CoQ10 and vitamin D. .  We discussed appropriate dosing given his risk factors. Also on Lovaza 1 twice a day, defers increase at this time. Metabolic disorder  Counseled. Blood sugar had been borderline, amidst a lot of sweets, globin A1c has been excellent not interested in insulin sensitizer. Lifestyle modification reviewed. Micro-macrovascular complications monitor. Monitor     Coronary artery disease involving native heart without angina pectoris  Continue per cardiology. Saw Dr. Jordan Rowe  May 2022, and had a stress test January 2022 showing fixed defect. Continue per cardiology. ,  There is a risk that the Erleada could cause cardiac arrhythmia     Age-related osteoporosis without current pathological fracture  Counseled extensively. He tolerated Fosamax but discontinued after he took from 7/09 through 7/16. Morbidity/mortality related to fracture reviewed. 11/13 bone density showed improvement, 12/15 -1.3 stable, 3/19 showed L2 -2.6, hip -1.0.   bone density scan 5/21 had nearly normalized, T score -1.3 L1 and -1.2 neck which is an improvement of 14.7% in the lumbar spine and   a slight worsening of 2.6% in the hip since previous study. He had been on a course of alendronate 2 different times. The rationale of the Prolia was with the expected osteoporosis as shown on DEXA scan 2 years ago but again nearly normal at this time although false negatives reviewed.   After discussion it was decided to hold off on Prolia, continue current management, falls precautions, weightbearing exercise, appropriate calcium/D and recheck bone density in 1 year sooner as needed     Gastroesophageal reflux disease without esophagitis  Asymptomatic as long as takes medication, had EGD in the past.  Risk of meds reviewed including electrolyte imbalance, renal etc.  Wants to continue. Glaucoma of left eye   Follows with eye care. Planning surgery           Renal insufficiency  Counseled, differential reviewed. Emphasize proper hydration and avoidance of nephrotoxic agents. He simply wants basic monitoring. Not interested in referral.  BUN/creatinine normalized, GFR 58     Plan as above. Counseled extensively and differential diagnoses relevant to above were reviewed, including serious etiologies, risks and complications, especially of left uncontrolled. If relevant, instructions and  alternatives to meds/treatment reviewed, as well as interactions, and  SE's/ADRs reviewed, notify immediately if any, discontinuing new meds if any. Plan made after discussion and shared decision making. Counseled. Continue per multiple specialist.  Low-salt diet. Leg elevation at rest.  Compression stockings. Otherwise simply would like blood work and follow-up 1 month sooner as needed. Blood work originally ordered for early September but they are  going to defer 1 month at this point  Return in about 1 month (around 10/13/2022), or if symptoms worsen or fail to improve, for Review BW.            Educational materials and/or home exercises printed for patient's review and were included in patient instructions on his/her After Visit Summary and given to patient at the end of visit. After discussion, patient and/or guardian verbalizes understanding, agrees, feels comfortable with and wishes to proceed with above treatment plan. Call for any pending results, FU sooner if abnormal, as needed or if any current symptoms persist/worsen. Advised patient to call with any new medication issues, and read all Rx info from pharmacy to assure aware of all possible risks and side effects of medication before taking. Reviewed age and gender appropriate health screening exams and vaccinations.   Advised patient regarding importance of keeping up with recommended health maintenance and to schedule as soon as possible if overdue, as this is important in assessing for undiagnosed pathology, especially cancer, as well as protecting against potentially harmful/life threatening disease. Patient and/or guardian verbalizes understanding and agrees with above counseling, assessment and plan. All questions answered. Signs and symptoms to watch for discussed, serious signs and symptoms reviewed. ER if any. Over 30 minutes  spent with the patient in reviewing records, reviewing with patient/family, counseling, ordering,  prescribing, completing h&p, etc., with over 50% of the time spent face to face counseling. Jef Gusman MD    Patients are advised to check with insurance company to ensure coverage and to fully understand benefits and cost prior to any testing. This note was created with the assistance of voice recognition software. Document was reviewed however may contain grammatical errors.

## 2022-09-15 ENCOUNTER — HOSPITAL ENCOUNTER (OUTPATIENT)
Dept: CT IMAGING | Age: 87
Discharge: HOME | End: 2022-09-17
Payer: MEDICARE

## 2022-09-15 DIAGNOSIS — C79.52 SECONDARY MALIGNANT NEOPLASM OF BONE AND BONE MARROW (HCC): ICD-10-CM

## 2022-09-15 DIAGNOSIS — C79.51 SECONDARY MALIGNANT NEOPLASM OF BONE AND BONE MARROW (HCC): ICD-10-CM

## 2022-09-15 DIAGNOSIS — C61 MALIGNANT NEOPLASM OF PROSTATE (HCC): ICD-10-CM

## 2022-09-15 DIAGNOSIS — C34.31 MALIGNANT NEOPLASM OF LOWER LOBE OF RIGHT LUNG (HCC): ICD-10-CM

## 2022-09-15 PROCEDURE — 6360000004 HC RX CONTRAST MEDICATION: Performed by: RADIOLOGY

## 2022-09-15 PROCEDURE — 71260 CT THORAX DX C+: CPT

## 2022-09-15 RX ADMIN — IOPAMIDOL 75 ML: 755 INJECTION, SOLUTION INTRAVENOUS at 14:25

## 2022-09-28 ENCOUNTER — HOSPITAL ENCOUNTER (OUTPATIENT)
Dept: RADIATION ONCOLOGY | Age: 87
Discharge: HOME | End: 2022-09-28
Payer: MEDICARE

## 2022-09-28 VITALS
HEART RATE: 80 BPM | TEMPERATURE: 97 F | RESPIRATION RATE: 18 BRPM | SYSTOLIC BLOOD PRESSURE: 110 MMHG | DIASTOLIC BLOOD PRESSURE: 60 MMHG | WEIGHT: 225.6 LBS | OXYGEN SATURATION: 95 % | BODY MASS INDEX: 33.32 KG/M2

## 2022-09-28 DIAGNOSIS — C34.91 ADENOCARCINOMA OF RIGHT LUNG (HCC): Primary | ICD-10-CM

## 2022-09-28 DIAGNOSIS — Z92.3 S/P RADIATION THERAPY > 12 WKS AGO: ICD-10-CM

## 2022-09-28 PROCEDURE — 99212 OFFICE O/P EST SF 10 MIN: CPT

## 2022-09-28 PROCEDURE — 99213 OFFICE O/P EST LOW 20 MIN: CPT | Performed by: NURSE PRACTITIONER

## 2022-09-28 NOTE — PROGRESS NOTES
Saeed Armenta  9/28/2022  1:11 PM      Vitals:    09/28/22 1309   BP: 110/60   Pulse: 80   Resp: 18   Temp: 97 °F (36.1 °C)   SpO2: 95%    : Wt Readings from Last 3 Encounters:   09/28/22 225 lb 9.6 oz (102.3 kg)   09/13/22 224 lb (101.6 kg)   09/07/22 216 lb (98 kg)                Current Outpatient Medications:     Compression Stockings MISC, by Does not apply route B/L BELOW KNEE AS DIRECTED 20-30 mmHg  Disp 2 pair, 2rf's, Disp: 2 each, Rfl: 1    pregabalin (LYRICA) 25 MG capsule, Take 1 capsule by mouth 3 times daily as needed (pain) for up to 30 days. , Disp: 90 capsule, Rfl: 2    ferrous sulfate (SLOW FE) 140 (45 Fe) MG extended release tablet, Take 140 mg by mouth 2 times daily (with meals), Disp: , Rfl:     calcium carbonate (OSCAL) 500 MG TABS tablet, Take 500 mg by mouth daily, Disp: , Rfl:     denosumab (XGEVA) 120 MG/1.7ML SOLN SC injection, Inject 120 mg into the skin every 3 months , Disp: , Rfl:     omeprazole (PRILOSEC) 20 MG delayed release capsule, Take 1 capsule by mouth daily, Disp: 90 capsule, Rfl: 3    Vitamin D, Cholecalciferol, 50 MCG (2000 UT) CAPS, Take 2,000 Units by mouth daily, Disp: , Rfl:     omega-3 acid ethyl esters (LOVAZA) 1 g capsule, Take 1 capsule by mouth 2 times daily, Disp: 180 capsule, Rfl: 3    Handicap Placard [facility], by Does not apply route Duration: 5 years, Disp: 1 each, Rfl: 0    lisinopril (PRINIVIL;ZESTRIL) 10 MG tablet, Take 1 tablet by mouth daily (Patient taking differently: Take 5 mg by mouth daily), Disp: 90 tablet, Rfl: 3    atorvastatin (LIPITOR) 40 MG tablet, Take 1 tablet by mouth daily, Disp: 90 tablet, Rfl: 3    magnesium gluconate (MAGONATE) 500 MG tablet, Take 500 mg by mouth at bedtime , Disp: , Rfl:     ERLEADA 60 MG TABS, 60 mg Every 4 days, Disp: , Rfl:     meclizine (ANTIVERT) 25 MG tablet, 1/2 - 1 by mouth every 6 hours as needed, Disp: , Rfl:     Multiple Vitamins-Minerals (THERAPEUTIC MULTIVITAMIN-MINERALS) tablet, Take 1 tablet by mouth daily , Disp: , Rfl:     metoprolol tartrate (LOPRESSOR) 25 MG tablet, Take 25 mg by mouth 2 times daily, Disp: , Rfl:     Coenzyme Q10 (COQ10) 50 MG CAPS, Take 1 capsule by mouth daily , Disp: , Rfl:     aspirin 81 MG tablet, Take 81 mg by mouth daily , Disp: , Rfl:     Cyanocobalamin (VITAMIN B 12 PO), Take  by mouth daily. sublingual , Disp: , Rfl:       Patient is seen today in follow up for SBRT to his RLL from 05/23/22-06/02/22, completing 5500cGy of radiation in 5 fractions for adenocarcinoma of the Right Lower Lung. Patient has a history of prostate cancer with bone metastases. Patient denies any pain or skin issues to the irradiated right chest area. Patient states he continues to feel fatigued and has some mild shortness of breath. Patient states he is following with Dr. Kaia Chris for Med Onc, last seenon 09/19/22. Patient has no other questions or concerns for nursing at this time. FALLS RISK SCREENING ASSESSMENT    Instructions:  Assess the patient and enter the appropriate indicators that are present for fall risk identification. Total the numbers entered and assign a fall risk score from Table 2.  Reassess patient at a minimum every 12 weeks or with status change. Assessment   Date  9/28/2022     1. Mental Ability: confusion/cognitively impaired No - 0       2. Elimination Issues: incontinence, frequency No - 0       3. Ambulatory: use of assistive devices (walker, cane, off-loading devices), attached to equipment (IV pole, oxygen) No - 0     4. Sensory Limitations: dizziness, vertigo, impaired vision No - 0       5. Age 72 years or greater - 1       10. Medication: diuretics, strong analgesics, hypnotics, sedatives, antihypertensive agents   Yes - 3   7. Falls:  recent history of falls within the last 3 months (not to include slipping or tripping)   No - 0   TOTAL 4    If score of 4 or greater was education given?  Yes       TABLE 2   Risk Score Risk Level Plan of Care   0-3 Little or  No Risk 1.  Provide assistance as indicated for ambulation activities  2. Reorient confused/cognitively impaired patient  3. Call-light/bell within patient's reach  4. Chair/bed in low position, stretcher/bed with siderails up except when performing patient care activities  5. Educate patient/family/caregiver on falls prevention  6.  Reassess in 12 weeks or with any noted change in patient condition which places them at a risk for a fall   4-6 Moderate Risk 1. Provide assistance as indicated for ambulation activities  2. Reorient confused/cognitively impaired patient  3. Call-light/bell within patient's reach  4. Chair/bed in low position, stretcher/bed with siderails up except when performing patient care activities  5. Educate patient/family/caregiver on falls prevention  6. Falls risk precaution (Yellow sticker Level II) placed on patient chart   7 or   Higher High Risk 1. Place patient in easily observable treatment room  2. Patient attended at all times by family member or staff  3. Provide assistance as indicated for ambulation activities  4. Reorient confused/cognitively impaired patient  5. Call-light/bell within patient's reach  6. Chair/bed in low position, stretcher/bed with siderails up except when performing patient care activities  7. Educate patient/family/caregiver on falls prevention  8. Falls risk precaution (Yellow sticker Level III) placed on patient chart           MALNUTRITION RISK SCREENING ASSESSMENT    9/28/2022   Patient:  Steven Ward  Sex:  male    Instructions:  Assess the patient and enter the appropriate indicators that are present for nutrition risk identification. Total the numbers entered and assign a risk score. Follow the appropriate action for total score listed below. Assessment   Date  9/28/2022     Have you lost weight without trying? 0- No     Have you been eating poorly because of a decreased appetite? 0- No   3.  Do you have a diagnosis of head

## 2022-09-28 NOTE — PROGRESS NOTES
Radiation Oncology   3 Month Follow Up Note    09/28/2022    Anibal Diamond  Vitals:    09/28/22 1309   BP: 110/60   Pulse: 80   Resp: 18   Temp: 97 °F (36.1 °C)   SpO2: 95%     Wt Readings from Last 1 Encounters:   09/28/22 225 lb 9.6 oz (102.3 kg)       CC: Patient is here for 3 month Radiation Oncology follow-up visit. HPI:  Anibal Diamond is a pleasant 82 y.o. male with a diagnosis of AJCC stage IV prostate cancer. S/p definitive intent XRT completed 06/19/2008. The patient developed bone mets in 2018 at the time did trial of Xtandi, then received 6 dose Xofigo in 2019. Followed by Melissa Flores which patient continues to take today, taking 1 tablet every 4 days. The patient following with Pulmonary having serial CT chest due to lung nodule. CT chest 12/13/2021 showed slight increased size in RLL lung nodule. PET/CT scan 12/28/2021 showed enlarging noncalcified RLL pulmonary nodule measuring 2.2 x 1.5 cm with metabolic activity, max SUV 4.1. On 01/25/2022 right lung core needle biopsy completed with pathology invasive moderately differentiated adenocarcinoma (grade 2) consistent with primary lung origin. Lymph nodes FNA R4, R11 negative for malignant cells. Diagnosed AJCC stage group IA3 RLL lung cancer in setting of stable metastatic prostate cancer. The patient referred to Radiation Oncology for evaluation of SBRT. The patient proceeded with SBRT directed to the RLL lung nodule, SBRT completed 06/02/2022, total dose 5500 cGy/ 5 fractions. 09/16/2022 CT CHEST W CONTRAST: Advanced emphysema with persistent nearly 2 cm spiculated nodule in the right lower lobe without change with additional new nodules predominantly in the RUL + RLL ranging from 8-10 mm concerning for stable or slightly progressive malignancy with persistent multiple osteoblastic bone metastasis noted. Recommended for continued surveillance (ordered per Medical Oncology).      The patient presents today to be seen in 3 month Radiation Oncology follow-up visit. He is accompanied by his wife Fiordaliza Gardner into the exam room. The patient reports SOB with exertion as ongoing, unchanged. Denies productive coughing, hemoptysis, wheezing or pleuritic pain. No fevers or chills. No nausea, vomiting or diarrhea. No dysuria, hematuria or increased urinary frequency. Fiordaliza Gardner reports patient's interval history includes left hip surgery due to old bone metastasis with pathological fracture. MRI 08/04/2022 revealed pathologic left subcapital femoral neck fracture, progressed since the previous exam. Several sclerotic bony metastases are re-identified. The patient had left hip surgery on 08/15/2022 at [facility] by orthopedic surgeon Dr. Dmitriy Camarillo. The patient hospitalized for 5  days, post-operatively then discharged to home with home physical therapy visits. The patient is doing well post-op walking without assistive devices. Patient is following with:    Eliza Chris. Most recent appointment 09/19/2022, next appointment scheduled 12/12/2022. Pain Management- Dr. Fernando Crawford. Most recent visit 09/08/2022. Pulmonary- Dr. Tashia Hernandez. Follow-up appointment every 6-12 months. Primary Care- Shasta Lentz MD. Next appointment 10/13/2022. Past Medical History:   Diagnosis Date    CA prostate, adenoca ([facility] [address] 04/25/2019    CAD (coronary artery disease)     Dr. Skip Brandon - [facility])     Prostate cancer. S/p pelvic XRT completed 06/19/2008. Chronic bilateral low back pain without sciatica 03/06/2020    Cobalamin deficiency     Glaucoma     Right eye    History of blood transfusion     Hyperlipidemia     Hypertension     Malignant neoplasm of lower lobe of right lung ([address] 01/25/2022    Adencarcinoma per biopsy. Stage IA3. Treated with SBRT.  SBRT completed 06/02/2022    Osteoarthritis     Osteochondropathy     Osteopenia     Pain in lower limb     Peripheral venous insufficiency     PONV (postoperative nausea and vomiting)     Prolonged emergence from general anesthesia     Prostate cancer metastatic to bone [facility]) 2019    Takin oral chemotherapy.     PVD (peripheral vascular disease) with claudication ([address] 04/25/2019       Past Surgical History:   Procedure Laterality Date    ACETABULUM FRACTURE SURGERY  03/24/2011    ANESTHESIA NERVE BLOCK Bilateral 06/16/2020    BILATERAL L3 L4 L5 MEDIAL NERVE BRANCH BLOCK performed by Jennifer Ochoa DO at [address] N/A 02/24/2022    BRONCHOSCOPY W/EBUS FNA performed by Kervin Narvaez MD at [address] [facility]  02/24/2022    BRONCHOSCOPY DIAGNOSTIC OR CELL [address] ONLY performed by Kervin Narvaez MD at [facility] [address]  2016    CORONARY ARTERY BYPASS GRAFT  12/1996    [facility]/ Dr. Catie Fraire  01/25/2022    CT NEEDLE BIOPSY LUNG PERCUTANEOUS 1/25/2022 Tesfaye Gillespie II, MD [facility]    ENDOSCOPY, COLON, DIAGNOSTIC  2016    EYE SURGERY Bilateral 01/2018    cataract, glaucoma 2020-left eye    FEMUR FRACTURE SURGERY  1981    left    HERNIA REPAIR  1993    JOINT REPLACEMENT      bilat knee 2010, righ thip 2011     LYMPH NODE DISSECTION  10/2007    PAIN MANAGEMENT PROCEDURE N/A 05/19/2020    CAUDAL EPIDURAL STEROID INJECTION performed by Jennifer Brewer, DO at [address]  08/04/2020    PAIN MANAGEMENT PROCEDURE Right 08/04/2020    RIGHT L3 4 5 MEDIAL BRANCH RADIOFREQUENCY ABLATION performed by Jennifer Brewer, DO at [address] Left 09/10/2020    LEFT L3 4 5 MEDIAL BRANCH RADIOFREQUENCY ABLATION     +++IODINE ALLERGY+++   CPT: 41281 [id number] performed by Bellinghamjulio Ochoa DO at [address] Bilateral 03/25/2021    BILATERAL L3,4,5 MEDIAL NERVE BRANCH RADIOFREQUENCY ABLATION performed by [place] Brewer, DO at [address] Bilateral 06/23/2022    BILATERAL L3, 4,5 RADIOFREQUENCY ABLATION performed by Parminder Ratliff Eva Brown, DO at [address] [facility] Left 08/15/2022    Dr Neno Yu  10/31/2007    Tran Graham M.D./ [place] LAPAROSCOPIC PROSTATECTOMY    TESTICLE REMOVAL Bilateral 2019    hx prostate ca-    TONSILLECTOMY      TOTAL HIP ARTHROPLASTY  2011    right    TOTAL KNEE ARTHROPLASTY  2010    bilateral         Social History     Socioeconomic History    Marital status:      Spouse name: Not on file    Number of children: 3    Years of education: Not on file    Highest education level: Not on file   Occupational History    Not on file   Tobacco Use    Smoking status: Former     Packs/day: 1.00     Years: 40.00     Pack years: 40.00     Types: Cigarettes     Start date: 5     Quit date: 10/1/1994     Years since quittin.0    Smokeless tobacco: Former     Types: Chew     Quit date:    Vaping Use    Vaping Use: Never used   Substance and Sexual Activity    Alcohol use:  Yes     Alcohol/week: 1.0 standard drink     Types: 1 Cans of beer per week     Comment: rare    Drug use: No    Sexual activity: Not on file   Other Topics Concern    Not on file   Social History Narrative    Problem List: History of polyp of colon, Disorder of bone density and structure, unspecified,    Osteochondropathy, Carcinoma in situ of prostate, Aneurysm of thoracic aorta, Anemia, Coronary    arteriosclerosis, Cobalamin deficiency, Multiple closed fractures of pelvis with disruption of pelvic [place],    Hyperlipidemia, Essential hypertension    Health Maintenance:    Bone Density Test Screening - (3/5/2019)    Bone Density Scan - (2015)    Influenza Vaccination - (10/7/2016)    Colonoscopy - (4/3/2012)    Couseled on Home Safety - (2015)    Colonoscopy Screening - (4/3/2012)    US Liver - 08    Medical Problems:    Coronary Artery Disease (CAD), Hypertension, Hyperlipidemia, Prostate Cancer    Surgical Hx:    Prostatectomy, Coronary Artery Bypass Graft (CABG) FH:    Father:    . (Hx)    Mother:    . (Hx)        SH: Marital:  - retired . Personal Habits: Cigarette Use: former cigarette smoker, Nonsmoker. Alcohol: Occasionally    consumes alcohol. Social Determinants of Health     Financial Resource Strain: Low Risk     Difficulty of Paying Living Expenses: Not hard at all   Food Insecurity: No Food Insecurity    Worried About Running Out of Food in the Last Year: Never true    Ran Out of Food in the Last Year: Never true   Transportation Needs: Not on file   Physical Activity: Not on file   Stress: Not on file   Social Connections: Not on file   Intimate Partner Violence: Not on file   Housing Stability: Not on file       Family History   Problem Relation Age of Onset    Stroke Mother     Cancer Mother         pancreatic    Cancer Father         prostate    Stroke Father        Allergies:   Patient has no known allergies. Current Outpatient Medications   Medication Sig Dispense Refill    Compression Stockings MISC by Does not apply route B/L BELOW KNEE AS DIRECTED 20-30 mmHg    Disp 2 pair, 2rf's 2 each 1    pregabalin (LYRICA) 25 MG capsule Take 1 capsule by mouth 3 times daily as needed (pain) for up to 30 days.  90 capsule 2    ferrous sulfate (SLOW FE) 140 (45 Fe) MG extended release tablet Take 140 mg by mouth 2 times daily (with meals)      calcium carbonate (OSCAL) 500 MG TABS tablet Take 500 mg by mouth daily      denosumab (XGEVA) 120 MG/1.7ML SOLN SC injection Inject 120 mg into the skin every 3 months       omeprazole (PRILOSEC) 20 MG delayed release capsule Take 1 capsule by mouth daily 90 capsule 3    Vitamin D, Cholecalciferol, 50 MCG (2000 UT) CAPS Take 2,000 Units by mouth daily      omega-3 acid ethyl esters (LOVAZA) 1 g capsule Take 1 capsule by mouth 2 times daily 180 capsule 3    Handicap Placard MISC by Does not apply route Duration: 5 years 1 each 0    lisinopril (PRINIVIL;ZESTRIL) 10 MG tablet Take 1 tablet by mouth daily (Patient taking differently: Take 5 mg by mouth daily) 90 tablet 3    atorvastatin (LIPITOR) 40 MG tablet Take 1 tablet by mouth daily 90 tablet 3    magnesium gluconate (MAGONATE) 500 MG tablet Take 500 mg by mouth at bedtime       ERLEADA 60 MG TABS 60 mg Every 4 days      meclizine (ANTIVERT) 25 MG tablet 1/2 - 1 by mouth every 6 hours as needed      Multiple Vitamins-Minerals (THERAPEUTIC MULTIVITAMIN-MINERALS) tablet Take 1 tablet by mouth daily       metoprolol tartrate (LOPRESSOR) 25 MG tablet Take 25 mg by mouth 2 times daily      Coenzyme Q10 (COQ10) 50 MG CAPS Take 1 capsule by mouth daily       aspirin 81 MG tablet Take 81 mg by mouth daily       Cyanocobalamin (VITAMIN B 12 PO) Take  by mouth daily. sublingual        No current facility-administered medications for this encounter. REVIEW OF SYSTEMS:    Constitutional:  No fever, chills or rigors. Eyes: No changes in vision. No discharge or pain  ENT: No headaches, hearing loss or vertigo. No mouth sores or sore throat. No change in taste or smell. Cardiovascular: No chest discomfort, palpitations or loss of consciousness or phlebitis. Respiratory: + chronic SOB on exertion at baseline, no productive coughing, no hemoptysis. No wheezing or pleuritic pain. Gastrointestinal: No abdominal pain, appetite loss, blood in stools. No change in bowel habits. No hematemesis   Genitourinary: Patient acknowledges no dysuria, trouble voiding, or hematuria. No increased frequency. Musculoskeletal: No gait disturbance or weakness. Integumentary: No rash or pruritis. Neurological: No headache, diplopia, change in muscle strength. No change in gait, balance, coordination, mood, affect, memory, mentation, behavior. Psychiatric: No anxiety, or depression. Endocrine: No temperature intolerance. No excessive thirst, fluid intake, or urination. No tremor. Hematologic/Lymphatic: No abnormal bruising or bleeding, blood clots or swollen lymph nodes. Allergic/Immunologic: No nasal congestion or hives. PHYSICAL EXAMINATION:   Vitals:    22 1309   BP: 110/60   Pulse: 80   Resp: 18   Temp: 97 °F (36.1 °C)   TempSrc: Temporal   SpO2: 95%   Weight: 225 lb 9.6 oz (102.3 kg)       Body mass index is 33.32 kg/m². Appearance: Well-developed, well-nourished 82year old male. Skin: Warm and dry, no rashes or hives. Head: Normocephalic, atraumatic  Eyes: EOMI, no conjunctival erythema  ENMT: No pharyngeal erythema, MMM, no rhinorrhea. Neck: Supple, no elevated JVP  Lungs: Clear to auscultation bilaterally. No wheezes, rales or rhonchi. Cardiac: Regular rate and rhythm, +S1S2, no murmurs apparent  Abdomen: Soft, round and non-tender. Bowel sounds present x 4. Extremities: Moves all extremities x 4, no lower extremity edema  Neurologic: Alert and oriented x 3. No focal motor deficits apparent         IMAGIN/15/2022     CT Chest W Contrast: (Imaging ordered per Dr. Jarad Zurita). FINDINGS:   There is borderline cardiac size. There is diffuse coronary artery   calcification. The great vessels are normal.  Moderately enlarged   mediastinal lymph nodes are present. The trachea and major bronchi are   patent. There is emphysema. A previously noted 2 x 1.4 cm spiculated mass   in the right lower lobe is noted without change. There are multiple   additional pulmonary nodules predominantly in the right upper lobe and right   lower lobe ranging from 8-10 mm which are new and worrisome for metastatic   nodules. There is no focal consolidation or pleural effusion. Bullous   changes are identified in the lungs. Persistent hypodense hepatic lesions   are noted without change. There is severe vascular calcification aorta. Calcified pleural plaques are noted. There is persistent multiple sclerotic lesions in the lower cervical and   thoracic spine, multiple ribs, and shoulders consistent with the bone   metastasis without significant change. Impression:  Advanced emphysema with persistent nearly 2 cm spiculated nodule in the right   lower lobe without change with additional new nodules in the right lung   concerning for stable or slightly progressive malignancy with the persistent   multiple osteoblastic bone metastasis noted. Continued surveillance is   recommended. Coronary artery calcification. ASSESSMENT/PLAN:    AJCC stage group IA3 adenocarcinoma of the RLL lung. S/p SBRT completion 06/02/2022. Most recent CT chest (above) stable RLL lung nodule with additional new nodules in right lung concerning for stable or slightly progressive  disease. AJCC SG IV prostate cancer. S/p definitive intent XRT 2008. Bone mets on [place]. Left femoral head pathologic fracture. S/p hip surgery 08/15/2022. Skin care discussed. Imaging per Medical Oncology. Discussed possible future palliative XRT for any progressive bony mets. The patient states he does not want to proceed with any future XRT even if palliative intent for symptom management. I discussed follow up plans with Adi Matt. Ongoing Oncology follow-up and treatment as per patient primary Medical Oncologist Dr. Michael Harden. Radiation Oncology follow-up visit 6 months. Adi Matt instructed to follow up with other physicians involved in their care as directed (including but not limited to Medical Oncology, Pain Management, Pulmonary, Orthopedic Surgery and Primary Care). The patient was given our contact number in the event that if at any time they change their mind and would like to return to the clinic to see either myself or one of the Radiation Oncologists, they can simply call us and we would be happy to see them. Thank you for involving us in the management of this extremely pleasant patient. More than 25 min was in direct contact with pt coordinating/giving care.  >50% of the visit was spent in counseling the pt on the following: Follow up care    The nurses notes were reviewed and incorporated into this assessment and plan.         Rebecca Keller, MSN, APRN-CNP  Certified Nurse Practitioner for [address] [place] [address] [place]: [phone]/ F: [phone]   [address] [facility] [place]: [phone] / F: [phone]

## 2022-10-07 ENCOUNTER — HOSPITAL ENCOUNTER (OUTPATIENT)
Age: 87
Discharge: HOME | End: 2022-10-07
Payer: MEDICARE

## 2022-10-07 DIAGNOSIS — E78.2 MIXED HYPERLIPIDEMIA: ICD-10-CM

## 2022-10-07 LAB
CHOLESTEROL, TOTAL: 131 MG/DL (ref 0–199)
HDLC SERPL-MCNC: 43 MG/DL
LDL CHOLESTEROL CALCULATED: 43 MG/DL (ref 0–99)
TRIGL SERPL-MCNC: 226 MG/DL (ref 0–149)
VLDLC SERPL CALC-MCNC: 45 MG/DL

## 2022-10-07 PROCEDURE — 36415 COLL VENOUS BLD VENIPUNCTURE: CPT

## 2022-10-07 PROCEDURE — 80061 LIPID PANEL: CPT

## 2022-10-12 ENCOUNTER — HOSPITAL ENCOUNTER (EMERGENCY)
Age: 87
Discharge: HOME | End: 2022-10-12
Attending: STUDENT IN AN ORGANIZED HEALTH CARE EDUCATION/TRAINING PROGRAM
Payer: MEDICARE

## 2022-10-12 ENCOUNTER — APPOINTMENT (OUTPATIENT)
Dept: GENERAL RADIOLOGY | Age: 87
End: 2022-10-12
Payer: MEDICARE

## 2022-10-12 VITALS
HEIGHT: 69 IN | TEMPERATURE: 98.6 F | BODY MASS INDEX: 33.18 KG/M2 | RESPIRATION RATE: 26 BRPM | DIASTOLIC BLOOD PRESSURE: 62 MMHG | SYSTOLIC BLOOD PRESSURE: 127 MMHG | HEART RATE: 94 BPM | WEIGHT: 224 LBS | OXYGEN SATURATION: 94 %

## 2022-10-12 DIAGNOSIS — U07.1 COVID-19: Primary | ICD-10-CM

## 2022-10-12 PROCEDURE — 71046 X-RAY EXAM CHEST 2 VIEWS: CPT

## 2022-10-12 PROCEDURE — 99283 EMERGENCY DEPT VISIT LOW MDM: CPT

## 2022-10-12 RX ORDER — BENZONATATE 100 MG/1
100 CAPSULE ORAL 3 TIMES DAILY PRN
Qty: 30 CAPSULE | Refills: 0 | Status: SHIPPED | OUTPATIENT
Start: 2022-10-12 | End: 2022-10-19

## 2022-10-12 RX ORDER — 0.9 % SODIUM CHLORIDE 0.9 %
1000 INTRAVENOUS SOLUTION INTRAVENOUS ONCE
Status: DISCONTINUED | OUTPATIENT
Start: 2022-10-12 | End: 2022-10-12 | Stop reason: HOSPADM

## 2022-10-12 RX ORDER — BENZONATATE 100 MG/1
100 CAPSULE ORAL 3 TIMES DAILY PRN
Status: DISCONTINUED | OUTPATIENT
Start: 2022-10-12 | End: 2022-10-12

## 2022-10-12 RX ORDER — GUAIFENESIN 600 MG/1
600 TABLET, EXTENDED RELEASE ORAL 2 TIMES DAILY
Qty: 30 TABLET | Refills: 0 | Status: SHIPPED | OUTPATIENT
Start: 2022-10-12 | End: 2022-10-27

## 2022-10-12 RX ORDER — GUAIFENESIN 400 MG/1
400 TABLET ORAL 4 TIMES DAILY PRN
Status: DISCONTINUED | OUTPATIENT
Start: 2022-10-12 | End: 2022-10-12

## 2022-10-12 ASSESSMENT — ENCOUNTER SYMPTOMS
COUGH: 1
EYE DISCHARGE: 0
RHINORRHEA: 1
EYE REDNESS: 0
DIARRHEA: 0
ABDOMINAL PAIN: 0
BACK PAIN: 0
WHEEZING: 0
SORE THROAT: 0
EYE PAIN: 0
SINUS PRESSURE: 0
VOMITING: 0
SHORTNESS OF BREATH: 0
NAUSEA: 0

## 2022-10-12 ASSESSMENT — PAIN SCALES - GENERAL: PAINLEVEL_OUTOF10: 5

## 2022-10-12 ASSESSMENT — PAIN DESCRIPTION - LOCATION: LOCATION: GENERALIZED

## 2022-10-12 ASSESSMENT — PAIN DESCRIPTION - DESCRIPTORS: DESCRIPTORS: ACHING

## 2022-10-12 ASSESSMENT — PAIN - FUNCTIONAL ASSESSMENT: PAIN_FUNCTIONAL_ASSESSMENT: 0-10

## 2022-10-12 ASSESSMENT — PAIN DESCRIPTION - ONSET: ONSET: SUDDEN

## 2022-10-12 ASSESSMENT — PAIN DESCRIPTION - FREQUENCY: FREQUENCY: CONTINUOUS

## 2022-10-12 ASSESSMENT — PAIN DESCRIPTION - PAIN TYPE: TYPE: ACUTE PAIN

## 2022-10-12 NOTE — ED PROVIDER NOTES
Department of Emergency Medicine   ED  Provider Note  Admit Date/RoomTime: 10/12/2022  4:33 PM  ED Room: DYLLAN/DYLLAN          History of Present Illness:  10/12/22, Time: 4:58 PM EDT  Chief Complaint   Patient presents with    Positive For Covid-19     Runny nose for 2 days and cough. Low grade fever, body aches. Positive covid test at home today. Wiley La is a 82 y.o. male presenting to the ED for URI symptoms, beginning 2 days ago. The complaint has been constant, moderate in severity, and worsened by nothing. Patient states he did test positive for covid. His symptoms include myalgias, rhinorrhea, cough, and congestion. He has taking dayquil without improvement. He otherwise denies any fevers, chest pain, shortness of breath, nausea, vomiting, abdominal pain, or changes to his urine or stool. Review of Systems   Constitutional:  Negative for chills and fever. HENT:  Positive for congestion and rhinorrhea. Negative for ear pain, sinus pressure and sore throat. Eyes:  Negative for pain, discharge and redness. Respiratory:  Positive for cough. Negative for shortness of breath and wheezing. Cardiovascular:  Negative for chest pain. Gastrointestinal:  Negative for abdominal pain, diarrhea, nausea and vomiting. Genitourinary:  Negative for dysuria and frequency. Musculoskeletal:  Positive for myalgias. Negative for arthralgias and back pain. Skin:  Negative for rash and wound. Neurological:  Negative for weakness and headaches. Hematological:  Negative for adenopathy.    All other systems reviewed and are negative.       --------------------------------------------- PAST HISTORY ---------------------------------------------  Past Medical History:  has a past medical history of CA prostate, adenoca ([facility] CAD (coronary artery disease), Cancer ([facility] Chronic bilateral low back pain without sciatica, Cobalamin deficiency, Glaucoma, History of blood transfusion, Hyperlipidemia, Hypertension, Malignant neoplasm of lower lobe of right lung ([address] Osteoarthritis, Osteochondropathy, Osteopenia, Pain in lower limb, Peripheral venous insufficiency, PONV (postoperative nausea and vomiting), Prolonged emergence from general anesthesia, Prostate cancer metastatic to bone ([address] and PVD (peripheral vascular disease) with claudication ([address] Past Surgical History:  has a past surgical history that includes Cholecystectomy; hernia repair (1993); Femur fracture surgery (1981); lymph node dissection (10/2007); Prostate surgery (10/31/2007); Total knee arthroplasty (2010); Coronary artery bypass graft (12/1996); Acetabulum fracture surgery (03/24/2011); Total hip arthroplasty (11/30/2011); Colonoscopy (2016); Endoscopy, colon, diagnostic (2016); Testicle removal (Bilateral, 02/2019); Tonsillectomy; Pain management procedure (N/A, 05/19/2020); joint replacement; Anesthesia Nerve Block (Bilateral, 06/16/2020); Pain management procedure (08/04/2020); Pain management procedure (Right, 08/04/2020); Pain management procedure (Left, 09/10/2020); eye surgery (Bilateral, 01/2018); Pain management procedure (Bilateral, 03/25/2021); CT NEEDLE BIOPSY LUNG PERCUTANEOUS (01/25/2022); bronchoscopy (N/A, 02/24/2022); bronchoscopy (02/24/2022); Pain management procedure (Bilateral, 06/23/2022); and Partial hip arthroplasty (Left, 08/15/2022). Social History:  reports that he quit smoking about 28 years ago. His smoking use included cigarettes. He started smoking about 69 years ago. He has a 40.00 pack-year smoking history. He quit smokeless tobacco use about 42 years ago. His smokeless tobacco use included chew. He reports current alcohol use of about 1.0 standard drink per week. He reports that he does not use drugs. Family History: family history includes Cancer in his father and mother; Stroke in his father and mother. . Unless otherwise noted, family history is non contributory    The patients home medications have been reviewed. Allergies: Patient has no known allergies. ---------------------------------------------------PHYSICAL EXAM--------------------------------------    Physical Exam  Vitals and nursing note reviewed. Constitutional:       General: He is not in acute distress. Appearance: He is well-developed. He is obese. HENT:      Head: Normocephalic and atraumatic. Eyes:      Conjunctiva/sclera: Conjunctivae normal.   Cardiovascular:      Rate and Rhythm: Normal rate and regular rhythm. Heart sounds: Normal heart sounds. No murmur heard. Pulmonary:      Effort: Pulmonary effort is normal. No respiratory distress. Breath sounds: Normal breath sounds. No wheezing or rales. Abdominal:      General: Bowel sounds are normal.      Palpations: Abdomen is soft. Tenderness: There is no abdominal tenderness. There is no guarding or rebound. Musculoskeletal:         General: No tenderness or deformity. Cervical back: Normal range of motion and neck supple. Right lower leg: No edema. Left lower leg: No edema. Skin:     General: Skin is warm and dry. Neurological:      Mental Status: He is alert and oriented to person, place, and time. Cranial Nerves: No cranial nerve deficit. Coordination: Coordination normal.          -------------------------------------------------- RESULTS -------------------------------------------------  I have personally reviewed all laboratory and imaging results for this patient. Results are listed below.      LABS: (Lab results interpreted by me)  No results found for this visit on 10/12/22.,       RADIOLOGY:  Interpreted by Radiologist unless otherwise specified  XR CHEST (2 VW)   Final Result   No acute cardiopulmonary abnormality.                          ------------------------- NURSING NOTES AND VITALS REVIEWED ---------------------------   The nursing notes within the ED encounter and vital signs as below have been reviewed by myself  /62   Pulse 94   Temp 98.6 °F (37 °C) (Oral)   Resp 26   Ht 5' 9\" (1.753 m)   Wt 224 lb (101.6 kg)   SpO2 94%   BMI 33.08 kg/m²     Oxygen Saturation Interpretation: Normal    The patients available past medical records and past encounters were reviewed. ------------------------------ ED COURSE/MEDICAL DECISION MAKING----------------------  Medications - No data to display           Medical Decision Making:     Patient presents with URI symptoms. CXR was ordered and was pending on patient's disposition but further review shows no acute cardiopulmonary processes. Patient was initially tachycardic but refused fluids. Patient was ambulated and maintained sats. Patient requested to leave AMA. Patient will be given prescriptions for tessalon perles and mucinex and advised to follow with his PCP. Patient given return precautions and expresses understanding. Patient left AMA          Re-Evaluations:  Patient was reevaluted and continued to be stable. This patient's ED course included: a personal history and physicial examination and re-evaluation prior to disposition    This patient has remained hemodynamically stable during their ED course. Consultations:  None      Critical Care: None       Counseling: The emergency provider has spoken with the patient and discussed todays results, in addition to providing specific details for the plan of care and counseling regarding the diagnosis and prognosis. Questions are answered at this time and they are agreeable with the plan.       --------------------------------- IMPRESSION AND DISPOSITION ---------------------------------    IMPRESSION  1. COVID-19        DISPOSITION  Disposition: Other Disposition: Left AMA  Patient condition is stable        NOTE: This report was transcribed using voice recognition software.  Every effort was made to ensure accuracy; however, inadvertent computerized transcription errors may be present           Cortney Braden DO  Resident  10/13/22 [id number]

## 2022-10-12 NOTE — ED NOTES
Pulse ox during ambulation is 91% and heart rate [address] [facility], [address] [place] [address]  10/12/22 [id number]

## 2022-10-12 NOTE — Clinical Note
The patient has decided to leave against medical advice, because he doesn't want to wait for his CXR results. He has normal mental status and adequate capacity to make medical decisions. The patient refuses hospital admission and wants to be di scharged. The risks have been explained to the patient, including worsening illness, chronic pain, permanent disability, and death. The benefits of admission have also been explained, including the availability and proximity of nurses, physicia ns, monitoring, diagnostic testing, and treatment. The patient was able to understand and state the risks and benefits of hospital admission. This was witnessed by nurse and me. He had the opportunity to ask questions about his medical conditi on. The patient was treated to the extent that he would allow, and knows that he may return for care at any time. Follow up has been discussed and arranged with Dr. Alec Reyes.

## 2022-10-13 ENCOUNTER — TELEMEDICINE (OUTPATIENT)
Dept: PRIMARY CARE CLINIC | Age: 87
End: 2022-10-13
Payer: MEDICARE

## 2022-10-13 DIAGNOSIS — I10 ESSENTIAL HYPERTENSION: ICD-10-CM

## 2022-10-13 DIAGNOSIS — D64.9 ANEMIA, UNSPECIFIED TYPE: ICD-10-CM

## 2022-10-13 DIAGNOSIS — N28.9 RENAL INSUFFICIENCY: ICD-10-CM

## 2022-10-13 DIAGNOSIS — I25.10 CORONARY ARTERY DISEASE INVOLVING NATIVE HEART WITHOUT ANGINA PECTORIS, UNSPECIFIED VESSEL OR LESION TYPE: ICD-10-CM

## 2022-10-13 DIAGNOSIS — E78.2 MIXED HYPERLIPIDEMIA: ICD-10-CM

## 2022-10-13 DIAGNOSIS — U07.1 COVID: Primary | ICD-10-CM

## 2022-10-13 PROCEDURE — 99214 OFFICE O/P EST MOD 30 MIN: CPT | Performed by: FAMILY MEDICINE

## 2022-10-13 PROCEDURE — G8417 CALC BMI ABV UP PARAM F/U: HCPCS | Performed by: FAMILY MEDICINE

## 2022-10-13 PROCEDURE — G8427 DOCREV CUR MEDS BY ELIG CLIN: HCPCS | Performed by: FAMILY MEDICINE

## 2022-10-13 PROCEDURE — 1036F TOBACCO NON-USER: CPT | Performed by: FAMILY MEDICINE

## 2022-10-13 PROCEDURE — 1123F ACP DISCUSS/DSCN MKR DOCD: CPT | Performed by: FAMILY MEDICINE

## 2022-10-13 PROCEDURE — G8484 FLU IMMUNIZE NO ADMIN: HCPCS | Performed by: FAMILY MEDICINE

## 2022-10-13 RX ORDER — PREDNISONE 10 MG/1
TABLET ORAL
Qty: 12 TABLET | Refills: 0 | Status: SHIPPED | OUTPATIENT
Start: 2022-10-13 | End: 2022-10-19

## 2022-10-13 RX ORDER — ALBUTEROL SULFATE 90 UG/1
AEROSOL, METERED RESPIRATORY (INHALATION)
Qty: 18 G | Refills: 0 | Status: SHIPPED | OUTPATIENT
Start: 2022-10-13

## 2022-10-13 RX ORDER — DOXYCYCLINE HYCLATE 100 MG/1
100 CAPSULE ORAL 2 TIMES DAILY
Qty: 20 CAPSULE | Refills: 0 | Status: SHIPPED | OUTPATIENT
Start: 2022-10-13 | End: 2022-10-23

## 2022-10-13 RX ORDER — NIRMATRELVIR AND RITONAVIR 300-100 MG
KIT ORAL
Qty: 30 TABLET | Refills: 0 | Status: SHIPPED | OUTPATIENT
Start: 2022-10-13 | End: 2022-10-18

## 2022-10-13 NOTE — ASSESSMENT & PLAN NOTE
Positive Covid. Counseled extensively. Potential comorbidities that may increase risk reviewed. Standard precautions reviewed. Quarantine recommendations reviewed. There was shared decision making throughout the process. Counseled on the  risk and benefits of, and literature regarding the use of various over-the-counter products including (at appropriate doses and dosing intervals if no contraindication) Tylenol, zinc, vitamin C, probiotics etc.  Also counseled on appropriate hydration, potential role of prone position, use of nasal saline, coolmist vaporizer, honey, plain Mucinex and nasal steroids. Reviewed statements recommending against NSAIDs first-line and risks and benefits of this class of meds anyhow including GI/renal/cardiac/pulmonary as well as the contraindications for these types of meds. Limitations of virtual visits reviewed. Role and  ways of being seen in person reviewed ,role of emergency room reviewed. Discussed role of labs and imaging including additional chest imaging, declines at this time. Left ER AMA yesterday  The risk of secondary bacterial infection reviewed. The risk and benefits of antibiotic therapy reviewed with various options reviewed. Did well with doxycycline last time and would like this again with standard precautions including C. difficile. We counseled on the role of systemic steroids, the potential risks and benefits of and various dosing options. Did well with steroids in the past and would like a prednisone taper with standard precautions. The role of and the potential risks and benefits of albuterol reviewed. Prescribed as directed with precautions. ER prescribed Tessalon with standard precautions including that of cough suppression, sedation    Risk benefits and timeframe indications of Paxlovid reviewed including potential interactions and that which she is unknown, under emergency authorization, risk of rebound COVID.   His wife was reluctant but he ultimately agreed, he would like it called him but they will look into it further. They will check with pharmacist regarding potential interactions as well. The unfortunate uncertainties and unpredictable nature of this virus reviewed. Various potential outcomes reviewed including complete recovery, debilitating sequelae which can be permanent as well as fatality. Certain aspects of this virus including the potential thrombotic risk can cause sudden debilitating/fatal events, without warning. Therefore signs and symptoms to watch for reviewed at length, with very low threshold for reevaluation if needed and presentation to the emergency room immediately if any serious signs or symptoms which were reviewed at length, including the persistence or worsening of any symptoms mentioned above. The risk of postponing this reviewed. The patient verbalizes complete understanding, verbalizes understanding of various options and agrees to proceed as above. As long as symptoms steadily improve/resolve follow-up within the  week sooner as needed. Emphasized importance of close monitoring of   vital signs and other signs and symptoms.

## 2022-10-13 NOTE — PROGRESS NOTES
TeleMedicine Patient Consent    This visit was performed as a virtual video visit using a synchronous, two-way, audio-video telehealth technology platform. Patient identification was verified at the start of the visit, including the patient's telephone number and physical location. I discussed with the patient the nature of our telehealth visits, that:     Due to the nature of an audio- video modality, the only components of a physical exam that could be done are the elements supported by direct observation. I would evaluate the patient and recommend diagnostics and treatments based on my assessment. If it was felt that the patient should be evaluated in clinic or an emergency room setting, then they would be directed there. Our sessions are not being recorded and that personal health information is protected. Our team would provide follow up care in person if/when the patient needs it. Patient does agree to proceed with telemedicine consultation. Patient's location: home address in [facility]    Physician  location other address in [place]     Other people involved in call:   Spouse    This visit was completed virtually using Doxy. me    10/13/2022    TELEHEALTH EVALUATION -- Audio/Visual (During REDBQ-64 public health emergency)    Chief Complaint   Patient presents with    Discuss Labs    Congestion     Was in ER            HPI:    Pham Alvarez. Geovany (:  1935) has requested an audio/video evaluation for the following concern(s):    Originally scheduled today for follow-up Lipitor, lab [place] only lipid panel, missed all of the other labs for what ever reason. In the meantime developed URI symptoms 4 days ago with low-grade fever nine 9.5 nasal congestion deep cough at times still some short of breath but no chest pain abdominal pain nausea vomiting change in bowels leg pain swelling weakness or otherwise. He has a higher risk unvaccinated individual.  He was hospitalized with COVID .   He did go to the ER yesterday but left AMA with Tessalon Perles only. Chest x-ray showed no acute changes, 9/15 CT showed advanced emphysema with persistent nearly 2 cm spiculated nodule in the right lower lobe without change with additional new nodules in the right lung concerning for stable or slightly progressive malignancy with the persistent multiple osteoblastic bone metastasis noted.   They have already followed with specialist in regard to this  Most Recent Labs  CBC  Lab Results   Component Value Date/Time    WBC 4.0 09/09/2022 11:19 AM    WBC 4.4 08/23/2022 04:09 PM    WBC 3.2 08/20/2022 05:21 AM    WBC 3.7 08/18/2022 02:50 AM    WBC 4.3 08/17/2022 05:33 AM    WBC 4.3 05/24/2022 12:45 PM    RBC 3.69 09/09/2022 11:19 AM    RBC 2.72 08/23/2022 04:09 PM    RBC 2.49 08/20/2022 05:21 AM    RBC 2.58 08/18/2022 02:50 AM    RBC 2.68 08/17/2022 05:33 AM    HGB 11.0 09/09/2022 11:19 AM    HGB 8.2 08/23/2022 04:09 PM    HGB 7.6 08/20/2022 05:21 AM    HCT 33.6 09/09/2022 11:19 AM    HCT 25.9 08/23/2022 04:09 PM    HCT 22.6 08/20/2022 05:21 AM    MCV 91.1 09/09/2022 11:19 AM    MCV 95.2 08/23/2022 04:09 PM    MCV 91.0 08/20/2022 05:21 AM     09/09/2022 11:19 AM     08/23/2022 04:09 PM     08/20/2022 05:21 AM      CMP  Lab Results   Component Value Date/Time     09/09/2022 11:19 AM     08/23/2022 04:09 PM     08/20/2022 05:21 AM    K 4.9 09/09/2022 11:19 AM    K 4.1 08/23/2022 04:09 PM    K 3.7 08/20/2022 05:21 AM    K 4.4 01/16/2021 03:04 PM    K 4.4 01/14/2021 03:33 AM     09/09/2022 11:19 AM     08/23/2022 04:09 PM     08/20/2022 05:21 AM    CO2 24 09/09/2022 11:19 AM    CO2 21 08/23/2022 04:09 PM    CO2 24 08/20/2022 05:21 AM    ANIONGAP 13 09/09/2022 11:19 AM    ANIONGAP 14 08/23/2022 04:09 PM    ANIONGAP 5 08/20/2022 05:21 AM    GLUCOSE 134 09/09/2022 11:19 AM    GLUCOSE 107 08/23/2022 04:09 PM    GLUCOSE 102 08/20/2022 05:21 AM    GLUCOSE 113 03/28/2011 03:00 PM GLUCOSE 93 03/28/2011 04:40 AM    GLUCOSE 101 03/26/2011 04:30 AM    BUN 22 09/09/2022 11:19 AM    BUN 20 08/23/2022 04:09 PM    BUN 19 08/20/2022 05:21 AM    CREATININE 1.3 09/09/2022 11:19 AM    CREATININE 1.2 08/23/2022 04:09 PM    CREATININE 1.11 08/20/2022 05:21 AM    LABGLOM 52 09/09/2022 11:19 AM    LABGLOM 58 08/23/2022 04:09 PM    LABGLOM 48 08/08/2022 02:38 PM    GFRAA >60 09/09/2022 11:19 AM    GFRAA >60 08/23/2022 04:09 PM    GFRAA 58 08/08/2022 02:38 PM    CALCIUM 9.9 09/09/2022 11:19 AM    CALCIUM 9.2 08/23/2022 04:09 PM    CALCIUM 8.3 08/20/2022 05:21 AM    PROT 7.1 09/09/2022 11:19 AM    PROT 6.5 08/23/2022 04:09 PM    PROT 5.8 08/20/2022 05:21 AM    LABALBU 4.5 09/09/2022 11:19 AM    LABALBU 3.9 08/23/2022 04:09 PM    LABALBU 3.2 08/20/2022 05:21 AM    LABALBU 3.2 08/18/2022 02:50 AM    LABALBU 3.4 08/17/2022 05:33 AM    LABALBU 4.8 05/24/2022 12:45 PM    LABALBU 2.9 03/26/2011 04:30 AM    LABALBU 3.0 03/25/2011 04:50 AM    LABALBU 3.4 03/24/2011 03:20 AM    BILITOT 0.7 09/09/2022 11:19 AM    BILITOT 0.6 08/23/2022 04:09 PM    BILITOT 0.9 08/20/2022 05:21 AM    ALKPHOS 59 09/09/2022 11:19 AM    ALKPHOS 77 08/23/2022 04:09 PM    ALKPHOS 69 08/20/2022 05:21 AM    AST 23 09/09/2022 11:19 AM    AST 36 08/23/2022 04:09 PM    AST 88 08/20/2022 05:21 AM    ALT 20 09/09/2022 11:19 AM    ALT 56 08/23/2022 04:09 PM     08/20/2022 05:21 AM     A1C  Lab Results   Component Value Date/Time    LABA1C 4.8 08/17/2022 05:33 AM    LABA1C 5.2 03/01/2022 02:33 PM    LABA1C 5.0 11/22/2021 10:07 AM     TSH  Lab Results   Component Value Date/Time    TSH 5.516 08/18/2022 02:50 AM    TSH 3.410 05/24/2022 12:45 PM    TSH 5.300 02/21/2022 11:17 AM     FREET4  Lab Results   Component Value Date/Time    B5OONOG 9.5 01/31/2011 03:05 PM     LIPID  Lab Results   Component Value Date/Time    CHOL 131 10/07/2022 11:08 AM    CHOL 150 05/24/2022 12:45 PM    CHOL 141 02/21/2022 11:17 AM    HDL 43 10/07/2022 11:08 AM    HDL 46 05/24/2022 12:45 PM    HDL 43 02/21/2022 11:17 AM    LDLCALC 43 10/07/2022 11:08 AM    LDLCALC 46 05/24/2022 12:45 PM    LDLCALC 49 02/21/2022 11:17 AM    TRIG 226 10/07/2022 11:08 AM    TRIG 292 05/24/2022 12:45 PM    TRIG 245 02/21/2022 11:17 AM     VITAMIN D  Lab Results   Component Value Date/Time    VITD25 47 05/24/2022 12:45 PM    VITD25 41 02/21/2022 11:17 AM    VITD25 50 11/22/2021 10:07 AM     MAGNESIUM  Lab Results   Component Value Date/Time    MG 1.8 08/18/2022 02:50 AM    MG 1.9 05/24/2022 12:45 PM    MG 1.9 02/21/2022 11:17 AM      PHOS  Lab Results   Component Value Date/Time    PHOS 3.9 08/25/2021 11:41 AM    PHOS 4.0 01/21/2021 11:48 AM    PHOS 3.8 01/16/2021 03:04 PM      PAWAN   No results found for: PAWAN  RHEUMATOID FACTOR  No results found for: RF  PSA  Lab Results   Component Value Date/Time    PSA <0.01 03/22/2022 02:12 PM    PSA SEE NOTE 12/06/2021 01:01 PM    PSA <0.03 12/06/2021 01:01 PM      HEPATITIS C  No results found for: HCVABI  HIV  No results found for: ZJS0OYE, HIV1QT  UA  Lab Results   Component Value Date/Time    COLORU Yellow 08/23/2022 04:08 PM    COLORU Yellow 05/24/2022 12:45 PM    COLORU Yellow 02/21/2022 11:17 AM    CLARITYU Clear 08/23/2022 04:08 PM    CLARITYU Clear 05/24/2022 12:45 PM    CLARITYU Clear 02/21/2022 11:17 AM    GLUCOSEU Negative 08/23/2022 04:08 PM    GLUCOSEU Negative 05/24/2022 12:45 PM    GLUCOSEU Negative 02/21/2022 11:17 AM    GLUCOSEU NEGATIVE 12/21/2010 05:25 PM    BILIRUBINUR Negative 08/23/2022 04:08 PM    BILIRUBINUR Negative 05/24/2022 12:45 PM    BILIRUBINUR Negative 02/21/2022 11:17 AM    BILIRUBINUR NEGATIVE 12/21/2010 05:25 PM    KETUA TRACE 08/23/2022 04:08 PM    KETUA Negative 05/24/2022 12:45 PM    KETUA Negative 02/21/2022 11:17 AM    SPECGRAV >=1.030 08/23/2022 04:08 PM    SPECGRAV 1.025 05/24/2022 12:45 PM    SPECGRAV >=1.030 02/21/2022 11:17 AM    BLOODU Negative 08/23/2022 04:08 PM    BLOODU Negative 05/24/2022 12:45 PM    BLOODU Negative 02/21/2022 11:17 AM    PHUR 5.5 08/23/2022 04:08 PM    PHUR 5.5 05/24/2022 12:45 PM    PHUR 5.0 02/21/2022 11:17 AM    PROTEINU Negative 08/23/2022 04:08 PM    PROTEINU Negative 05/24/2022 12:45 PM    PROTEINU Negative 02/21/2022 11:17 AM    UROBILINOGEN 0.2 08/23/2022 04:08 PM    UROBILINOGEN 0.2 05/24/2022 12:45 PM    UROBILINOGEN 0.2 02/21/2022 11:17 AM    NITRU Negative 08/23/2022 04:08 PM    NITRU Negative 05/24/2022 12:45 PM    NITRU Negative 02/21/2022 11:17 AM    LEUKOCYTESUR Negative 08/23/2022 04:08 PM    LEUKOCYTESUR Negative 05/24/2022 12:45 PM    LEUKOCYTESUR Negative 02/21/2022 11:17 AM     Urine Micro/Albumin Ratio  Lab Results   Component Value Date/Time    MALBCR 8.0 05/24/2022 12:45 PM    MALBCR 13.4 02/21/2022 11:17 AM    MALBCR 16.2 11/22/2021 10:06 AM       ROS:  Const: Denies chills, fever, malaise and sweats. Eyes: Denies discharge, pain, redness and visual disturbance. ENMT: Denies earaches, other ear symptoms. Denies nasal or sinus symptoms other than stated  above. Denies mouth and tongue lesions and sore throat. CV: Denies chest discomfort, pain; diaphoresis, dizziness, edema, lightheadedness, orthopnea,  palpitations, syncope and near syncopal episode or any exertional symptoms  Resp: Denies  hemoptysis, pleuritic pain, positive cough with occasional SOB, sputum production and wheezing. GI: Denies abdominal pain, change in bowel habits, hematochezia, melena, nausea and vomiting. : Denies urinary symptoms including dysuria , urgency, frequency or hematuria. Musculo: Denies any acute musculoskeletal complaints   skin: Denies bruising and rash.   Neuro: Denies headache, numbness, stiff neck, tingling and focal weakness slurred speech or facial  droop  Hema/Lymph: Denies bleeding/bruising tendency and enlarged lymph nodes         Current Outpatient Medications:     predniSONE (DELTASONE) 10 MG tablet, Take 3 tablets by mouth daily for 2 days, THEN 2 tablets daily for 2 days, THEN 1 tablet daily for 2 days. , Disp: 12 tablet, Rfl: 0    doxycycline hyclate (VIBRAMYCIN) 100 MG capsule, Take 1 capsule by mouth 2 times daily for 10 days, Disp: 20 capsule, Rfl: 0    albuterol sulfate HFA (PROVENTIL;VENTOLIN;PROAIR) 108 (90 Base) MCG/ACT inhaler, 1-2 puffs q4-6 hrs prn, Disp: 18 g, Rfl: 0    nirmatrelvir/ritonavir (PAXLOVID, 300/100,) 20 x 150 MG & 10 x 100MG TBPK, Take 3 tablets (two 150 mg nirmatrelvir and one 100 mg ritonavir tablets) by mouth every 12 hours for 5 days. , Disp: 30 tablet, Rfl: 0    benzonatate (TESSALON) 100 MG capsule, Take 1 capsule by mouth 3 times daily as needed for Cough, Disp: 30 capsule, Rfl: 0    guaiFENesin (MUCINEX) 600 MG extended release tablet, Take 1 tablet by mouth 2 times daily for 15 days, Disp: 30 tablet, Rfl: 0    Compression Stockings MISC, by Does not apply route B/L BELOW KNEE AS DIRECTED 20-30 mmHg  Disp 2 pair, 2rf's, Disp: 2 each, Rfl: 1    ferrous sulfate (SLOW FE) 140 (45 Fe) MG extended release tablet, Take 140 mg by mouth 2 times daily (with meals), Disp: , Rfl:     metoprolol tartrate (LOPRESSOR) 25 MG tablet, Take 25 mg by mouth in the morning and 25 mg before bedtime. , Disp: , Rfl:     Cholecalciferol (VITAMIN D3) 50 MCG (2000 UT) CAPS, Take 2,000 capsules by mouth in the morning., Disp: , Rfl:     calcium carbonate (OSCAL) 500 MG TABS tablet, Take 500 mg by mouth daily, Disp: , Rfl:     denosumab (XGEVA) 120 MG/1.7ML SOLN SC injection, Inject 120 mg into the skin every 3 months , Disp: , Rfl:     omeprazole (PRILOSEC) 20 MG delayed release capsule, Take 1 capsule by mouth daily, Disp: 90 capsule, Rfl: 3    omega-3 acid ethyl esters (LOVAZA) 1 g capsule, Take 1 capsule by mouth 2 times daily, Disp: 180 capsule, Rfl: 3    Handicap Placard MISC, by Does not apply route Duration: 5 years, Disp: 1 each, Rfl: 0    lisinopril (PRINIVIL;ZESTRIL) 10 MG tablet, Take 1 tablet by mouth daily (Patient taking differently: Take 5 mg by mouth daily), Disp: 90 tablet, Rfl: 3    atorvastatin (LIPITOR) 40 MG tablet, Take 1 tablet by mouth daily, Disp: 90 tablet, Rfl: 3    magnesium gluconate (MAGONATE) 500 MG tablet, Take 500 mg by mouth at bedtime , Disp: , Rfl:     ERLEADA 60 MG TABS, 60 mg Every 4 days, Disp: , Rfl:     meclizine (ANTIVERT) 25 MG tablet, 1/2 - 1 by mouth every 6 hours as needed, Disp: , Rfl:     Multiple Vitamins-Minerals (THERAPEUTIC MULTIVITAMIN-MINERALS) tablet, Take 1 tablet by mouth daily , Disp: , Rfl:     Coenzyme Q10 (COQ10) 50 MG CAPS, Take 1 capsule by mouth daily , Disp: , Rfl:     aspirin 81 MG tablet, Take 81 mg by mouth daily , Disp: , Rfl:     Cyanocobalamin (VITAMIN B 12 PO), Take  by mouth daily. sublingual , Disp: , Rfl:   No Known Allergies    Past Medical History:   Diagnosis Date    CA prostate, adenoca ([facility] [address] 04/25/2019    CAD (coronary artery disease)     Dr. Gregorio Prince [facility])     Prostate cancer. S/p pelvic XRT completed 06/19/2008. Chronic bilateral low back pain without sciatica 03/06/2020    Cobalamin deficiency     Glaucoma     Right eye    History of blood transfusion     Hyperlipidemia     Hypertension     Malignant neoplasm of lower lobe of right lung ([address] 01/25/2022    Adencarcinoma per biopsy. Stage IA3. Treated with SBRT. SBRT completed 06/02/2022    Osteoarthritis     Osteochondropathy     Osteopenia     Pain in lower limb     Peripheral venous insufficiency     PONV (postoperative nausea and vomiting)     Prolonged emergence from general anesthesia     Prostate cancer metastatic to bone [facility]) 2019    Takin oral chemotherapy.     PVD (peripheral vascular disease) with claudication ([facility] [address] 04/25/2019     Past Surgical History:   Procedure Laterality Date    ACETABULUM FRACTURE SURGERY  03/24/2011    ANESTHESIA NERVE BLOCK Bilateral 06/16/2020    BILATERAL L3 L4 L5 MEDIAL NERVE BRANCH BLOCK performed by Atul Baron DO at [address] N/A 02/24/2022    BRONCHOSCOPY W/EBUS FNA performed by Ansley Woo MD at [address] [facility]  02/24/2022    BRONCHOSCOPY DIAGNOSTIC OR CELL [address] ONLY performed by Ansley Woo MD at [facility] [address]  2016    CORONARY ARTERY BYPASS GRAFT  12/1996    [facility]/ Dr. Maddie Parkinson  01/25/2022    CT NEEDLE BIOPSY LUNG PERCUTANEOUS 1/25/2022 Flor Jones II, MD [facility]    ENDOSCOPY, COLON, DIAGNOSTIC  2016    EYE SURGERY Bilateral 01/2018    cataract, glaucoma 2020-left eye    FEMUR FRACTURE SURGERY  1981    left    HERNIA REPAIR  1993    JOINT REPLACEMENT      bilat knee 2010, righ thip 2011     LYMPH NODE DISSECTION  10/2007    PAIN MANAGEMENT PROCEDURE N/A 05/19/2020    CAUDAL EPIDURAL STEROID INJECTION performed by Phillip Beard DO at [address] [place] [address]  08/04/2020    PAIN MANAGEMENT PROCEDURE Right 08/04/2020    RIGHT L3 4 5 MEDIAL BRANCH RADIOFREQUENCY ABLATION performed by Phillip Beard DO at [address] [place] [address] Left 09/10/2020    LEFT L3 4 5 MEDIAL BRANCH RADIOFREQUENCY ABLATION     +++IODINE ALLERGY+++   CPT: [id number] performed by Phillip Beard DO at [address] [place] [address] Bilateral 03/25/2021    BILATERAL L3,4,5 MEDIAL NERVE BRANCH RADIOFREQUENCY ABLATION performed by Phillip Beard DO at [address] [place] [address] Bilateral 06/23/2022    BILATERAL L3, 4,5 RADIOFREQUENCY ABLATION performed by Phillip Beard DO at [address] [facility] Left 08/15/2022    Dr Srinath Johnston  10/31/2007    [address] M[address] [facility] [address] Bilateral 02/2019    hx prostate ca-    TONSILLECTOMY      TOTAL HIP ARTHROPLASTY  11/30/2011    right    TOTAL KNEE ARTHROPLASTY  2010    bilateral     Family History   Problem Relation Age of Onset    Stroke Mother     Cancer Mother         pancreatic    Cancer Father         prostate    Stroke Father Social History     Tobacco Use    Smoking status: Former     Packs/day: 1.00     Years: 40.00     Pack years: 40.00     Types: Cigarettes     Start date: 5     Quit date: 10/1/1994     Years since quittin.0    Smokeless tobacco: Former     Types: Chew     Quit date:    Vaping Use    Vaping Use: Never used   Substance Use Topics    Alcohol use: Yes     Alcohol/week: 1.0 standard drink     Types: 1 Cans of beer per week     Comment: rare    Drug use: No     Social History     Social History Narrative    ** Merged History Encounter **         Problem List: History of polyp of colon, Disorder of bone density and structure, unspecified,  Osteochondropathy, Carcinoma in situ of prostate, Aneurysm of thoracic aorta, Anemia, Coronary  arteriosclerosis, Cobalamin deficiency, Multiple closed fractur    es of pelvis with disruption of pelvic [place],  Hyperlipidemia, Essential hypertension  Health Maintenance:  Bone Density Test Screening - (3/5/2019)  Bone Density Scan - (2015)  Influenza Vaccination - (10/7/2016)  Colonoscopy - (4/3/2012)  [facility]    ed on Home Safety - (2015)  Colonoscopy Screening - (4/3/2012)  US Liver - 08  Medical Problems:  Coronary Artery Disease (CAD), Hypertension, Hyperlipidemia, Prostate Cancer  Surgical Hx:  Prostatectomy, Coronary Artery Bypass Graft (CABG)          FH: Father:  . (Hx)  Mother:  . (Hx)    SH: Marital:  - retired . Personal Habits: Cigarette Use: former cigarette smoker, Nonsmoker. Alcohol: Occasionally  consumes alcohol. PHYSICAL EXAMINATION:  [ INSTRUCTIONS:  \"[x]\" Indicates a positive item  \"[]\" Indicates a negative item  -- DELETE ALL ITEMS NOT EXAMINED]  Vital Signs: (As obtained by patient/caregiver or practitioner observation)    There were no vitals filed for this visit.       Blood pressure-  Heart rate-    Respiratory rate-    Temperature-  Pulse oximetry-     Constitutional: [x] Appears well-developed and well-nourished [x] No apparent distress      [] Abnormal-   Mental status  [x] Alert and awake  [x] Oriented to person/place/time [x]Able to follow commands      Eyes:  EOM    [x]  Normal  [] Abnormal-  Sclera  [x]  Normal  [] Abnormal -         Discharge [x]  None visible  [] Abnormal -    HENT:   [x] Normocephalic, atraumatic. [] Abnormal   [x] Mouth/Throat: Mucous membranes are moist.     External Ears [x] Normal  [] Abnormal-     Neck: [x] No visualized mass     Pulmonary/Chest: [x] Respiratory effort normal.  [x] No visualized signs of difficulty breathing or respiratory distress        [] Abnormal-      Musculoskeletal:   [x] Normal gait with no signs of ataxia         [x] Normal range of motion of neck        [] Abnormal-       Neurological:        [x] No Facial Asymmetry (Cranial nerve 7 motor function) (limited exam to video visit)          [x] No gaze palsy        [] Abnormal-         Skin:        [x] No significant exanthematous lesions or discoloration noted on facial skin         [] Abnormal-            Psychiatric:       [x] Normal Affect [x] No Hallucinations        [] Abnormal-     Other pertinent observable physical exam findings-     ASSESSMENT/PLAN:   Diagnosis Orders   1. COVID  predniSONE (DELTASONE) 10 MG tablet    doxycycline hyclate (VIBRAMYCIN) 100 MG capsule    albuterol sulfate HFA (PROVENTIL;VENTOLIN;PROAIR) 108 (90 Base) MCG/ACT inhaler    nirmatrelvir/ritonavir (PAXLOVID, 300/100,) 20 x 150 MG & 10 x 100MG TBPK      2. Mixed hyperlipidemia        3. Essential hypertension        4. Anemia, unspecified type        5. Renal insufficiency        6. Coronary artery disease involving native heart without angina pectoris, unspecified vessel or lesion type            COVID        Positive Covid. Counseled extensively. Potential comorbidities that may increase risk reviewed. Standard precautions reviewed. Quarantine recommendations reviewed.   There was shared decision making throughout the process. Counseled on the  risk and benefits of, and literature regarding the use of various over-the-counter products including (at appropriate doses and dosing intervals if no contraindication) Tylenol, zinc, vitamin C, probiotics etc.  Also counseled on appropriate hydration, potential role of prone position, use of nasal saline, coolmist vaporizer, honey, plain Mucinex and nasal steroids. Reviewed statements recommending against NSAIDs first-line and risks and benefits of this class of meds anyhow including GI/renal/cardiac/pulmonary as well as the contraindications for these types of meds. Limitations of virtual visits reviewed. Role and  ways of being seen in person reviewed ,role of emergency room reviewed. Discussed role of labs and imaging including additional chest imaging, declines at this time. Left ER AMA yesterday  The risk of secondary bacterial infection reviewed. The risk and benefits of antibiotic therapy reviewed with various options reviewed. Did well with doxycycline last time and would like this again with standard precautions including C. difficile. We counseled on the role of systemic steroids, the potential risks and benefits of and various dosing options. Did well with steroids in the past and would like a prednisone taper with standard precautions. The role of and the potential risks and benefits of albuterol reviewed. Prescribed as directed with precautions. ER prescribed Tessalon with standard precautions including that of cough suppression, sedation    Risk benefits and timeframe indications of Paxlovid reviewed including potential interactions and that which she is unknown, under emergency authorization, risk of rebound COVID. His wife was reluctant but he ultimately agreed, he would like it called him but they will look into it further. They will check with pharmacist regarding potential interactions as well.         The unfortunate uncertainties and unpredictable nature of this virus reviewed. Various potential outcomes reviewed including complete recovery, debilitating sequelae which can be permanent as well as fatality. Certain aspects of this virus including the potential thrombotic risk can cause sudden debilitating/fatal events, without warning. Therefore signs and symptoms to watch for reviewed at length, with very low threshold for reevaluation if needed and presentation to the emergency room immediately if any serious signs or symptoms which were reviewed at length, including the persistence or worsening of any symptoms mentioned above. The risk of postponing this reviewed. The patient verbalizes complete understanding, verbalizes understanding of various options and agrees to proceed as above. As long as symptoms steadily improve/resolve follow-up within the  week sooner as needed. Emphasized importance of close monitoring of   vital signs and other signs and symptoms. Left hip pathologic fracture  Status post partial left hip arthroplasty. Follows with [facility]. Doing well now. Venous insufficiency  Counseled extensively. Differential reviewed, including serious etiologies. Again other differential reviewed, emphasized importance of low-salt diet, foot elevation at rest.  Recommended ultrasound, declines this or other work-up now. No other associated symptoms. He start compression stockings 20-30 mmHg below-knee he prefers. Notify persist worsens. Postop anemia  Trending up. Asymptomatic    Cerebral aneurysm    counseled, risk of rupture reviewed. Signs and symptoms watch were reviewed. Avoid Valsalva. Blood pressure control. Following with .  Mining coiling procedure. Reviewed pictures from angiogram with him        Adenocarcinoma of right lung [facility])    counseled extensively, last measurement of approximately 2.4 x 1.8 cm. Right posterior lung.   Biopsy 1/22 reveals     Diagnosis:   Right lung, core needle biopsy: Invasive, moderately differentiated   adenocarcinoma (grade 2) consistent with primary lung origin      bronchoscopy as of 3/1/2022 revealed negative Biopsy of reachable lymph nodes. Finished radiation, follows with Dr. Payam Cary and Dr. Nnamdi Hampton    Had follow-up CT 9/22 as above. Continue per specialist     Prostate CA with mets to bone [facility])  Metastatic. Continue per Dr. Nikko Brandon. PSA less than 0.01-0.07-0.3 , he had been on Lupron, had metastasis, since orchiectomy, did not tolerate xtandi so he stopped it , overall feeling well, on Erleada, follows regularly with Dr. Sole Neely extensively. Differential reviewed, including serious etiologies. Gets echoes through cardiology,. Cont per dr Jeremy Denis, saw May 2022. No change, sees 6mos           Allergic rhinitis  Counseled, chronic. Counseled on use of nasal steroid, vaporizer, nasal saline as well as additional treatment options for vasomotor rhinitis           Disc disease, degenerative, lumbar or lumbosacral  Counseled extensively. Differential reviewed, including serious etiologies. Was Following with [facility], had to change because of insurance, now seeing Dr. Stephon Wharton pain management, saw Dr. Rexanne Sicard. Previously saw [facility]. Counseled on injections, radiofrequency ablation etc.  Failed physical therapy. . Had  radiofrequency ablation x2, overall doing well, last MRI lumbar spine ordered by Dr. Nnamdi Hampton 3/2022     Bilateral carpal tunnel syndrome  Had emg/ncs Nov 2019 [facility]. Referred to [facility] but did not go because no symptoms     Health maintenance examination  Health maintenance issues discussed at length 12/8/2021, encouraged yearly. Pancytopenia ([facility] [address]  Counseled extensively. Differential reviewed, including serious etiologies. Possibly from medication. .  Continue per Dr. Nnamdi Hampton.   White count low but stable, hemoglobin trending up postop, platelets now normal. Asymptomatic           Low magnesium level  Counseled. Goals reviewed. Currently 1.9. We will continue magnesium oxide 400 mg daily                Vitamin D deficiency  Continue current therapy. Monitor. Vitamin B12 deficiency  Appropriate supplementation reviewed. Vitamin X66 elevated, folic acid now normal, appropriate supplementation reviewed, monitor, risk of low and high reviewed, no longer on injection        Hyperglycemia  Counseled, not interested in meds now, monitor. A1c excellent in the past,. Monitor        Essential hypertension  Counseled. Became hypotensive. Cardiology lowered dose to lisinopril 10 mg. Then was held in the hospital for some reason. He since resumed half dose 5 mg, tolerating well with good results. Consider increase to 10 if blood pressure not at goal.  Monitor. The risks of hypertension and hypotension reviewed. Watch closely ambulatory. Hyper and hypotensive precautions and parameters reviewed and previously written as well as parameters on pulse, call if out of range, ER dangers numbers. Lifestyle modification reviewed. Tolerating therapy. Tubular adenoma   Last colonoscopy Dr. Meme Lu, 2/15 complaining repeat 3 years from then. Overdue. Declines now, fit cards or Cologuard. . Hemoglobin consistently low but stable. Declines any intervention now, declines any tests or procedures now. Thyroid nodule  Counseled, incidental 7 mm right thyroid nodule on carotid ultrasound 3/19. Declines workup or follow-up now     Thoracic aneurysm, not ruptured  Counseled, potential serious is reviewed, if any symptoms directly to ER. Continue per Dr. Brittni Keenan. He saw him 3/15 and had CT at least then. Dr. Brittni Keenan discharged him at this point unless symptoms and he not interested in follow-up imaging.   Of note CT was done for Covid 1/21 and they stated no acute abnormality of the thoracic aorta     PVD (peripheral vascular disease) with claudication [facility])  He saw  Melva Deleon, who had him on pletal, had minimal results. Now follows with Dr Ling Gentile vascular in [facility] who states he did have a femoral blockage proximal that would require bypass as well as some distal blockages that would require stenting but they felt observation most appropriate. He did not feel medication was necessary or helpful at this time. They state Dr. Melva Deleon agrees with this plan. He is comfortable with this plan at this point. Serious signs and symptoms watch were reviewed. Admits to missing an appointment because of Covid pandemic. They were planning surgery but after discussion, and the risks of complications and the complexity of the surgery he is declining intervention now. States he watches the distance he walks but otherwise doing well. No sores etc.       Mixed hyperlipidemia  Did very well on d.a.w. Crestor unfortunately severe myalgias on generic. He could try to preauthorize d.a.w., but he chose to go on Lipitor instead. He was tolerating but got held after hip surgery for some reason, blood work relatively stable, resumed 8/22 declined change in dose, goals reviewed, risk benefits ADRs reviewed. Counseling CoQ10 and vitamin D. .  We discussed appropriate dosing given his risk factors. Also on Lovaza 1 twice a day, defers increase at this time. [facility] [place] lipid panel which was reviewed, elevated triglycerides, low LDL and appropriate HDL 43, defers change in therapy but will hold Lipitor x7 days secondary to Paxlovid    Metabolic disorder  Counseled. Blood sugar had been borderline, amidst a lot of sweets, globin A1c has been excellent not interested in insulin sensitizer. Lifestyle modification reviewed. Micro-macrovascular complications monitor. Monitor     Coronary artery disease involving native heart without angina pectoris  Continue per cardiology. Saw Dr. Adeline rAce  May 2022, and had a stress test January 2022 showing fixed defect. Continue per cardiology. , There is a risk that the Erleada could cause cardiac arrhythmia     Age-related osteoporosis without current pathological fracture  Counseled extensively. He tolerated Fosamax but discontinued after he took from 7/09 through 7/16. Morbidity/mortality related to fracture reviewed. 11/13 bone density showed improvement, 12/15 -1.3 stable, 3/19 showed L2 -2.6, hip -1.0.   bone density scan 5/21 had nearly normalized, T score -1.3 L1 and -1.2 neck which is an improvement of 14.7% in the lumbar spine and   a slight worsening of 2.6% in the hip since previous study. He had been on a course of alendronate 2 different times. The rationale of the Prolia was with the expected osteoporosis as shown on DEXA scan 2 years ago but again nearly normal at this time although false negatives reviewed. After discussion it was decided to hold off on Prolia, continue current management, falls precautions, weightbearing exercise, appropriate calcium/D and recheck bone density in 1 year sooner as needed     Gastroesophageal reflux disease without esophagitis  Asymptomatic as long as takes medication, had EGD in the past.  Risk of meds reviewed including electrolyte imbalance, renal etc.  Wants to continue. Glaucoma of left eye   Follows with eye care. Planning surgery           Renal insufficiency  Counseled, differential reviewed. Emphasize proper hydration and avoidance of nephrotoxic agents. He simply wants basic monitoring. Not interested in referral.  BUN/creatinine normalized, GFR 58         Plan as above. Counseled extensively and differential diagnoses relevant to above were reviewed, including serious etiologies, risks and complications, especially of left uncontrolled. If relevant, instructions and  alternatives to meds/treatment reviewed, as well as interactions, and  SE's/ADRs reviewed, notify immediately if any, discontinuing new meds if any. Plan made after discussion and shared decision making.     Again lab for some reason missed all of his labs other than the lipid panel. He is going to defer for 2 weeks, should follow-up early next week but defers for 3 weeks sooner if symptoms should persist.  If worsens or other serious signs or symptoms or changes his mind he should proceed back to the hospital for admission. Risk of sudden DF event reviewed      As long as symptoms steadily improve/resolve and medical conditions are following the expected course, FU as below, sooner PRN      Return in about 3 weeks (around 11/3/2022), or if symptoms worsen or fail to improve. Educational materials and/or home exercises printed for patient's review and were included in patient instructions on his/her After Visit Summary and given to patient at the end of visit. After discussion, patient and/or guardian verbalizes understanding, agrees, feels comfortable with and wishes to proceed with above treatment plan. Call for any pending results, FU sooner if abnormal, as needed or if any current symptoms persist/worsen. Advised patient to call with any new medication issues, and read all Rx info from pharmacy to assure aware of all possible risks and side effects of medication before taking. Reviewed age and gender appropriate health screening exams and vaccinations. Advised patient regarding importance of keeping up with recommended health maintenance and to schedule as soon as possible if overdue, as this is important in assessing for undiagnosed pathology, especially cancer, as well as protecting against potentially harmful/life threatening disease. Patient and/or guardian verbalizes understanding and agrees with above counseling, assessment and plan. All questions answered. Signs and symptoms to watch for discussed, serious signs and symptoms reviewed. ER if any. Time spent: Greater than Not billed by johanna Brown.  Geovany, was evaluated through a synchronous (real-time) audio-video encounter. The patient (or guardian if applicable) is aware that this is a billable service, which includes applicable co-pays. This Virtual Visit was conducted with patient's (and/or legal guardian's) consent. The visit was conducted pursuant to the emergency declaration under the [address] [place] [address] authority and the [employer] General Act. Patient identification was verified, and a caregiver was present when appropriate. The patient was located in a state where the provider was licensed to provide care. The patient (and/or legal guardian) has also been advised to contact this office for worsening conditions or problems, and seek emergency medical treatment and/or call 911 if deemed necessary. As this was a virtual encounter, patient (and /or legal guardian) was instructed on various ways to be seen in person. Patients are advised to check with insurance company to ensure coverage and to fully understand benefits and cost prior to any testing to try to avoid unexpected charges. This note was created with the assistance of voice recognition software. Inadvertent errors may be present. Signs and symptoms to watch for were discussed. Serious signs and symptoms reviewed. ER if any    --Maykel Haas MD on 10/13/2022 at 4:15 PM    An electronic signature was used to authenticate this note.

## 2022-11-29 ENCOUNTER — PATIENT MESSAGE (OUTPATIENT)
Dept: PRIMARY CARE CLINIC | Age: 87
End: 2022-11-29

## 2022-12-06 ENCOUNTER — HOSPITAL ENCOUNTER (OUTPATIENT)
Age: 87
Discharge: HOME | End: 2022-12-06
Payer: MEDICARE

## 2022-12-06 DIAGNOSIS — D61.818 PANCYTOPENIA (HCC): ICD-10-CM

## 2022-12-06 DIAGNOSIS — E78.2 MIXED HYPERLIPIDEMIA: ICD-10-CM

## 2022-12-06 DIAGNOSIS — R79.0 LOW MAGNESIUM LEVEL: ICD-10-CM

## 2022-12-06 DIAGNOSIS — Z86.16 HISTORY OF COVID-19: ICD-10-CM

## 2022-12-06 DIAGNOSIS — R73.9 HYPERGLYCEMIA: ICD-10-CM

## 2022-12-06 DIAGNOSIS — I10 ESSENTIAL HYPERTENSION: ICD-10-CM

## 2022-12-06 DIAGNOSIS — E55.9 VITAMIN D DEFICIENCY: ICD-10-CM

## 2022-12-06 DIAGNOSIS — E53.8 VITAMIN B12 DEFICIENCY: ICD-10-CM

## 2022-12-06 LAB
ALBUMIN SERPL-MCNC: 4.3 G/DL (ref 3.5–5.2)
ALP BLD-CCNC: 48 U/L (ref 40–129)
ALT SERPL-CCNC: 28 U/L (ref 0–40)
ANION GAP SERPL CALCULATED.3IONS-SCNC: 10 MMOL/L (ref 7–16)
AST SERPL-CCNC: 29 U/L (ref 0–39)
BASOPHILS ABSOLUTE: 0.04 E9/L (ref 0–0.2)
BASOPHILS RELATIVE PERCENT: 0.8 % (ref 0–2)
BILIRUB SERPL-MCNC: 0.6 MG/DL (ref 0–1.2)
BUN BLDV-MCNC: 15 MG/DL (ref 6–23)
CALCIUM SERPL-MCNC: 9.9 MG/DL (ref 8.6–10.2)
CHLORIDE BLD-SCNC: 104 MMOL/L (ref 98–107)
CO2: 27 MMOL/L (ref 22–29)
CREAT SERPL-MCNC: 1.3 MG/DL (ref 0.7–1.2)
EOSINOPHILS ABSOLUTE: 0.13 E9/L (ref 0.05–0.5)
EOSINOPHILS RELATIVE PERCENT: 2.5 % (ref 0–6)
FOLATE: >20 NG/ML (ref 4.8–24.2)
GFR SERPL CREATININE-BSD FRML MDRD: 54 ML/MIN/1.73
GLUCOSE BLD-MCNC: 104 MG/DL (ref 74–99)
HBA1C MFR BLD: 5 % (ref 4–5.6)
HCT VFR BLD CALC: 33.4 % (ref 37–54)
HEMOGLOBIN: 11.5 G/DL (ref 12.5–16.5)
IMMATURE GRANULOCYTES #: 0.04 E9/L
IMMATURE GRANULOCYTES %: 0.8 % (ref 0–5)
LYMPHOCYTES ABSOLUTE: 1.3 E9/L (ref 1.5–4)
LYMPHOCYTES RELATIVE PERCENT: 25.3 % (ref 20–42)
MAGNESIUM: 1.6 MG/DL (ref 1.6–2.6)
MCH RBC QN AUTO: 30 PG (ref 26–35)
MCHC RBC AUTO-ENTMCNC: 34.4 % (ref 32–34.5)
MCV RBC AUTO: 87.2 FL (ref 80–99.9)
MONOCYTES ABSOLUTE: 0.36 E9/L (ref 0.1–0.95)
MONOCYTES RELATIVE PERCENT: 7 % (ref 2–12)
NEUTROPHILS ABSOLUTE: 3.26 E9/L (ref 1.8–7.3)
NEUTROPHILS RELATIVE PERCENT: 63.6 % (ref 43–80)
PDW BLD-RTO: 15 FL (ref 11.5–15)
PLATELET # BLD: 148 E9/L (ref 130–450)
PMV BLD AUTO: 9.5 FL (ref 7–12)
POTASSIUM SERPL-SCNC: 4.5 MMOL/L (ref 3.5–5)
PROSTATE SPECIFIC ANTIGEN: <0.03 NG/ML (ref 0–4)
RBC # BLD: 3.83 E12/L (ref 3.8–5.8)
SODIUM BLD-SCNC: 141 MMOL/L (ref 132–146)
TOTAL CK: 71 U/L (ref 20–200)
TOTAL PROTEIN: 7.2 G/DL (ref 6.4–8.3)
TSH SERPL DL<=0.05 MIU/L-ACNC: 5.47 UIU/ML (ref 0.27–4.2)
VITAMIN B-12: 1587 PG/ML (ref 211–946)
VITAMIN D 25-HYDROXY: 59 NG/ML (ref 30–100)
WBC # BLD: 5.1 E9/L (ref 4.5–11.5)

## 2022-12-06 PROCEDURE — 82746 ASSAY OF FOLIC ACID SERUM: CPT

## 2022-12-06 PROCEDURE — 83735 ASSAY OF MAGNESIUM: CPT

## 2022-12-06 PROCEDURE — 84153 ASSAY OF PSA TOTAL: CPT

## 2022-12-06 PROCEDURE — 80053 COMPREHEN METABOLIC PANEL: CPT

## 2022-12-06 PROCEDURE — 85025 COMPLETE CBC W/AUTO DIFF WBC: CPT

## 2022-12-06 PROCEDURE — 82550 ASSAY OF CK (CPK): CPT

## 2022-12-06 PROCEDURE — 82607 VITAMIN B-12: CPT

## 2022-12-06 PROCEDURE — 36415 COLL VENOUS BLD VENIPUNCTURE: CPT

## 2022-12-06 PROCEDURE — 83036 HEMOGLOBIN GLYCOSYLATED A1C: CPT

## 2022-12-06 PROCEDURE — 86769 SARS-COV-2 COVID-19 ANTIBODY: CPT

## 2022-12-06 PROCEDURE — 82306 VITAMIN D 25 HYDROXY: CPT

## 2022-12-06 PROCEDURE — 84443 ASSAY THYROID STIM HORMONE: CPT

## 2022-12-07 LAB — SARS-COV-2 ANTIBODY, TOTAL: REACTIVE

## 2022-12-21 ENCOUNTER — OFFICE VISIT (OUTPATIENT)
Dept: PRIMARY CARE CLINIC | Age: 87
End: 2022-12-21
Payer: MEDICARE

## 2022-12-21 VITALS
HEIGHT: 69 IN | WEIGHT: 226 LBS | TEMPERATURE: 97.7 F | OXYGEN SATURATION: 96 % | DIASTOLIC BLOOD PRESSURE: 76 MMHG | BODY MASS INDEX: 33.47 KG/M2 | SYSTOLIC BLOOD PRESSURE: 118 MMHG | HEART RATE: 87 BPM

## 2022-12-21 VITALS
DIASTOLIC BLOOD PRESSURE: 76 MMHG | WEIGHT: 226 LBS | HEIGHT: 69 IN | TEMPERATURE: 97.7 F | HEART RATE: 87 BPM | BODY MASS INDEX: 33.47 KG/M2 | OXYGEN SATURATION: 96 % | SYSTOLIC BLOOD PRESSURE: 118 MMHG

## 2022-12-21 DIAGNOSIS — I25.10 CORONARY ARTERY DISEASE INVOLVING NATIVE HEART WITHOUT ANGINA PECTORIS, UNSPECIFIED VESSEL OR LESION TYPE: ICD-10-CM

## 2022-12-21 DIAGNOSIS — D61.818 PANCYTOPENIA (HCC): ICD-10-CM

## 2022-12-21 DIAGNOSIS — E55.9 VITAMIN D DEFICIENCY: ICD-10-CM

## 2022-12-21 DIAGNOSIS — C61 PROSTATE CANCER METASTATIC TO BONE (HCC): ICD-10-CM

## 2022-12-21 DIAGNOSIS — E53.8 VITAMIN B12 DEFICIENCY: ICD-10-CM

## 2022-12-21 DIAGNOSIS — N28.9 RENAL INSUFFICIENCY: ICD-10-CM

## 2022-12-21 DIAGNOSIS — R73.9 HYPERGLYCEMIA: ICD-10-CM

## 2022-12-21 DIAGNOSIS — E03.2 HYPOTHYROIDISM DUE TO MEDICATION: ICD-10-CM

## 2022-12-21 DIAGNOSIS — I73.9 PVD (PERIPHERAL VASCULAR DISEASE) WITH CLAUDICATION (HCC): ICD-10-CM

## 2022-12-21 DIAGNOSIS — C34.91 ADENOCARCINOMA OF RIGHT LUNG (HCC): ICD-10-CM

## 2022-12-21 DIAGNOSIS — K21.9 GASTROESOPHAGEAL REFLUX DISEASE WITHOUT ESOPHAGITIS: ICD-10-CM

## 2022-12-21 DIAGNOSIS — R79.0 LOW MAGNESIUM LEVEL: ICD-10-CM

## 2022-12-21 DIAGNOSIS — E78.2 MIXED HYPERLIPIDEMIA: Primary | ICD-10-CM

## 2022-12-21 DIAGNOSIS — Z00.00 MEDICARE ANNUAL WELLNESS VISIT, SUBSEQUENT: Primary | ICD-10-CM

## 2022-12-21 DIAGNOSIS — I10 ESSENTIAL HYPERTENSION: ICD-10-CM

## 2022-12-21 DIAGNOSIS — C79.51 PROSTATE CANCER METASTATIC TO BONE (HCC): ICD-10-CM

## 2022-12-21 PROCEDURE — 1123F ACP DISCUSS/DSCN MKR DOCD: CPT | Performed by: FAMILY MEDICINE

## 2022-12-21 PROCEDURE — 3074F SYST BP LT 130 MM HG: CPT | Performed by: FAMILY MEDICINE

## 2022-12-21 PROCEDURE — G0439 PPPS, SUBSEQ VISIT: HCPCS | Performed by: FAMILY MEDICINE

## 2022-12-21 PROCEDURE — G8484 FLU IMMUNIZE NO ADMIN: HCPCS | Performed by: FAMILY MEDICINE

## 2022-12-21 PROCEDURE — 3078F DIAST BP <80 MM HG: CPT | Performed by: FAMILY MEDICINE

## 2022-12-21 ASSESSMENT — LIFESTYLE VARIABLES
HOW MANY STANDARD DRINKS CONTAINING ALCOHOL DO YOU HAVE ON A TYPICAL DAY: 1 OR 2
HOW OFTEN DO YOU HAVE A DRINK CONTAINING ALCOHOL: MONTHLY OR LESS

## 2022-12-21 ASSESSMENT — PATIENT HEALTH QUESTIONNAIRE - PHQ9
SUM OF ALL RESPONSES TO PHQ9 QUESTIONS 1 & 2: 0
2. FEELING DOWN, DEPRESSED OR HOPELESS: 0
SUM OF ALL RESPONSES TO PHQ QUESTIONS 1-9: 0
1. LITTLE INTEREST OR PLEASURE IN DOING THINGS: 0

## 2022-12-21 NOTE — PROGRESS NOTES
Medicare Annual Wellness Visit    Danyel Fernandez is here for Medicare AW    Assessment & Plan   Assessment and Plan:   Diagnosis Orders   1. Medicare annual wellness visit, subsequent               No problem-specific Assessment & Plan notes found for this encounter. Plan as above. Counseled extensively and differential diagnoses relevant to above were reviewed, including serious etiologies. Side effects and interactions of medications were reviewed. Health maintenance issues discussed at length as above, 12/21/2022 . Encouraged yearly physicals. As long as symptoms steadily improve/resolve, and medical conditions follow the expected course, FU as below, as previously directed and sooner PRN. Return for Medicare Annual Wellness Visit in 1 year. Signs and symptoms to watch for discussed, serious signs and symptoms reviewed. ER if any. Halima Parkinson MD    Patients are advised to check with insurance company to ensure coverage and to fully understand benefits and cost prior to any testing. This note was created with the assistance of voice recognition software. Document was reviewed however may contain grammatical errors. Recommendations for Preventive Services Due: see orders and patient instructions/AVS.  Recommended screening schedule for the next 5-10 years is provided to the patient in written form: see Patient Instructions/AVS.     Return for Medicare Annual Wellness Visit in 1 year. Subjective   The following acute and/or chronic problems were also addressed today:  See other note    Health Maintenance: Well balanced/proper diet reviewed. Counseled on vitamins/supplements. Exercise recommendations reviewed. Reviewed recommendations regarding Covid, had clinical January 2021, Oct 2022; no vaccine and declines, counseled.   Td (Tdap) (1/14), pneumovax (10/13), prevnar 13 (10/18) flu shot (counseled, declining), Hepatitis vaccines and shingles vaccine (counseled, declining). Importance of Regular  Eye and Dental exams reviewed. Risks/Benefits of ASA reviewed and discussed current  recommendations. Caffeine risks/limits and Sun protection reviewed. Notify if any new or changing  moles/skin lesions etc. sees kassy. Dexa Scan indications/ risk factors for osteoportic fractures  and prevention reviewed. Previously fosamax, now prolia. 3/19 DEXA showed TSCORE -2.6 L2, -1.0 HIP. was on alendronate, then 1 or 2 doses of prolia, followed by dexa 5/21. bone density scan that has nearly normalized, T score -1.3 L1 and -1.2 neck which is an improvement of 14.7% in the lumbar spine and   a slight worsening of 2.6% in the hip since previous study. He had been on a course of alendronate 2 different times. The rationale of the Prolia was with the expected osteoporosis as shown on DEXA scan 2 years ago but again nearly normal at this time although false negatives reviewed. After discussion we are going to hold off on Prolia, continue current management, falls precautions, weightbearing exercise, appropriate calcium/D and recheck bone density , declines before next year; Counseled  on testicular exams, prostate exams. Dr Will Hubbard. Colonoscopy recommendations reviewed. Had  4/12 [place]. Had 2/15 Dr Mali Martinez. Was supposed to have 2018, refusing this or cologuard now. .  Pt denies change in bowel habbits or [address] of colon polyps/CA. Heme Cards  Discussed, declines. Defers EKG to Dr Bernadette Neri, . Follows with Dr Colby Landa. Defers testing to him. Reviewed indications for  PFT's. Also counseled on indications for imaging. dasx other than as per specialists    Patient's complete Health Risk Assessment and screening values have been reviewed and are found in Flowsheets. The following problems were reviewed today and where indicated follow up appointments were made and/or referrals ordered.     Positive Risk Factor Screenings with Interventions: Weight and Activity:  Physical Activity: Insufficiently Active    Days of Exercise per Week: 1 day    Minutes of Exercise per Session: 20 min     On average, how many days per week do you engage in moderate to strenuous exercise (like a brisk walk)?: 1 day  Have you lost any weight without trying in the past 3 months?: No  Body mass index: (!) 33.37    Obesity Interventions:  counseled  See AVS for additional education material  See A/P for plan and any pertinent orders              ADL's:   Patient reports needing help with:  Select all that apply: (!) Dressing (Shoulders)  Select all that apply: (!) Laundry  Interventions:  counseled  See AVS for additional education material  See A/P for plan and any pertinent orders    Advanced Directives:  Do you have a Living Will?: (!) No    Intervention:  counseled                       Objective   Vitals:    12/21/22 1348   BP: 118/76   Pulse: 87   Temp: 97.7 °F (36.5 °C)   SpO2: 96%   Weight: 226 lb (102.5 kg)   Height: 5' 9\" (1.753 m)      Body mass index is 33.37 kg/m². No Known Allergies  Prior to Visit Medications    Medication Sig Taking? Authorizing Provider   albuterol sulfate HFA (PROVENTIL;VENTOLIN;PROAIR) 108 (90 Base) MCG/ACT inhaler 1-2 puffs q4-6 hrs prn Yes Felix Sharma MD   Compression Stockings MISC by Does not apply route B/L BELOW KNEE AS DIRECTED 20-30 mmHg    Disp 2 pair, 2rf's Yes Felix Sharma MD   metoprolol tartrate (LOPRESSOR) 25 MG tablet Take 25 mg by mouth in the morning and 25 mg before bedtime. Yes Historical Provider, MD   Cholecalciferol (VITAMIN D3) 50 MCG (2000 UT) CAPS Take 2,000 capsules by mouth in the morning.  Yes Historical Provider, MD   calcium carbonate (OSCAL) 500 MG TABS tablet Take 500 mg by mouth daily Yes Historical Provider, MD   denosumab (XGEVA) 120 MG/1.7ML SOLN SC injection Inject 120 mg into the skin every 3 months  Yes Historical Provider, MD   omeprazole (PRILOSEC) 20 MG delayed release capsule Take 1 capsule by mouth daily Yes Jonah Tafoya MD   omega-3 acid ethyl esters (LOVAZA) 1 g capsule Take 1 capsule by mouth 2 times daily Yes Jonah Tafoya MD   Handbaldo Sandhu MISC by Does not apply route Duration: 5 years Yes Jonah Tafoya MD   lisinopril (PRINIVIL;ZESTRIL) 10 MG tablet Take 1 tablet by mouth daily  Patient taking differently: Take 5 mg by mouth daily Yes Jonah Tafoya MD   atorvastatin (LIPITOR) 40 MG tablet Take 1 tablet by mouth daily Yes Jonah Tafoya MD   magnesium gluconate (MAGONATE) 500 MG tablet Take 500 mg by mouth at bedtime  Yes Historical Provider, MD   ERLEADA 60 MG TABS 60 mg Every 4 days Yes Historical Provider, MD   meclizine (ANTIVERT) 25 MG tablet 1/2 - 1 by mouth every 6 hours as needed Yes Historical Provider, MD   Multiple Vitamins-Minerals (THERAPEUTIC MULTIVITAMIN-MINERALS) tablet Take 1 tablet by mouth daily  Yes Historical Provider, MD   Coenzyme Q10 (COQ10) 50 MG CAPS Take 1 capsule by mouth daily  Yes Historical Provider, MD   aspirin 81 MG tablet Take 81 mg by mouth daily  Yes Historical Provider, MD   Cyanocobalamin (VITAMIN B 12 PO) Take  by mouth daily.  sublingual  Yes Historical Provider, MD   acetaminophen (TYLENOL) 325 MG tablet Take 2 tablets by mouth 3 times daily  Payal Murray MD   melatonin 5 MG TABS tablet Take 1 tablet by mouth nightly as needed (insomnia)  Payal Murray MD       [facility] (Including outside providers/suppliers regularly involved in providing care):   Patient Care Team:  Jonah Tafoya MD as PCP - General (Family Medicine)  Jonah Tafoya MD as PCP - [facility]  Porfirio Curry MD (Cardiology)  Chino Desai MD (Urology)  Meggan Hernandez DO as Surgeon (Cardiothoracic Surgery)  Spenser Oropeza MD as Consulting Physician (Cardiology)  Abbie Anthony MD as Consulting Physician (Pulmonary Disease)  Analia Cameron MD as Radiation Oncologist (Radiation Oncology)  Jonah Tafoya MD Reviewed and updated this visit:  Meds

## 2022-12-21 NOTE — PATIENT INSTRUCTIONS
Learning About Activities of Daily Living  What are activities of daily living? Activities of daily living (ADLs) are the basic self-care tasks you do every day. As you age, and if you have health problems, you may find that it's harder to do these things for yourself. That's when you may need some help. Your doctor uses ADLs to measure how much help you need. Knowing what you can and can't do for yourself is an important first step to getting help. And when you have the help you need, you can stay as independent as possible. Your doctor will want to know if you are able to do tasks such as: Take a bath or shower without help. Go to the bathroom by yourself. Dress and undress without help. Shave, comb your hair, and brush teeth on your own. Get in and out of bed or a chair without help. Feed yourself without help. If you are having trouble doing basic self-care tasks, talk with your doctor. You may want to bring a caregiver or family member who can help the doctor understand your needs and abilities. How will a doctor assess your ADLs? Asking about ADLs is part of a routine health checkup your doctor will likely do as you age. Your health check might be done in a doctor's office, in your home, or at a hospital. The goal is to find out if you are having any problems that could make your health problems worse or that make it unsafe for you to be on your own. To measure your ADLs, your doctor will ask how hard it is for you to do routine tasks. He or she may also want to know if you have changed the way you do a task because of a health problem. He or she may watch how you:  Walk back and forth. Keep your balance while you stand or walk. Move from sitting to standing or from a bed to a chair. Button or unbutton a shirt or sweater. Remove and put on your shoes. It's normal to feel a little worried or anxious if you find you can't do all the things you used to be able to do.  Talking with your doctor about ADLs isn't a test that you either pass or fail. It's just a way to get more information about your health and safety. Follow-up care is a key part of your treatment and safety. Be sure to make and go to all appointments, and call your doctor if you are having problems. It's also a good idea to know your test results and keep a list of the medicines you take. Current as of: October 6, 2021               Content Version: 13.5  © 2006-2022 [employer], [employer]. Care instructions adapted under license by [facility] [place] ([facility]). If you have questions about a medical condition or this instruction, always ask your healthcare professional. [address] any warranty or liability for your use of this information. Advance Directives: Care Instructions  Overview  An advance directive is a legal way to state your wishes at the end of your life. It tells your family and your doctor what to do if you can't say what you want. There are two main types of advance directives. You can change them any time your wishes change. Living will. This form tells your family and your doctor your wishes about life support and other treatment. The form is also called a declaration. Medical power of . This form lets you name a person to make treatment decisions for you when you can't speak for yourself. This person is called a health care agent (health care proxy, health care surrogate). The form is also called a durable power of  for health care. If you do not have an advance directive, decisions about your medical care may be made by a family member, or by a doctor or a  who doesn't know you. It may help to think of an advance directive as a gift to the people who care for you. If you have one, they won't have to make tough decisions by themselves.   For more information, including forms for your state, see the [address] website (www.caringinfo.org/planning/advance-directives/). Follow-up care is a key part of your treatment and safety. Be sure to make and go to all appointments, and call your doctor if you are having problems. It's also a good idea to know your test results and keep a list of the medicines you take. What should you include in an advance directive? Many states have a unique advance directive form. (It may ask you to address specific issues.) Or you might use a universal form that's approved by many states. If your form doesn't tell you what to address, it may be hard to know what to include in your advance directive. Use the questions below to help you get started. Who do you want to make decisions about your medical care if you are not able to? What life-support measures do you want if you have a serious illness that gets worse over time or can't be cured? What are you most afraid of that might happen? (Maybe you're afraid of having pain, losing your independence, or being kept alive by machines.)  Where would you prefer to die? (Your home? A hospital? A nursing home?)  Do you want to donate your organs when you die? Do you want certain [employer] practices performed before you die? When should you call for help? Be sure to contact your doctor if you have any questions. Where can you learn more? Go to http://www.ontiveros.com/ and enter R264 to learn more about \"Advance Directives: Care Instructions. \"  Current as of: June 16, 2022               Content Version: 13.5  © 8666-9820 Healthwise[employer]. Care instructions adapted under license by [facility] ([facility]). If you have questions about a medical condition or this instruction, always ask your healthcare professional. [place] [address] any warranty or liability for your use of this information. Personalized Preventive Plan for Victorino Wellington. Geovany - 12/21/2022  Medicare offers a range of preventive health benefits.  Some of the tests and screenings are paid in full while other may be subject to a deductible, co-insurance, and/or copay. Some of these benefits include a comprehensive review of your medical history including lifestyle, illnesses that may run in your family, and various assessments and screenings as appropriate. After reviewing your medical record and screening and assessments performed today your provider may have ordered immunizations, labs, imaging, and/or referrals for you. A list of these orders (if applicable) as well as your Preventive Care list are included within your After Visit Summary for your review. Other Preventive Recommendations:    A preventive eye exam performed by an eye specialist is recommended every 1-2 years to screen for glaucoma; cataracts, macular degeneration, and other eye disorders. A preventive dental visit is recommended every 6 months. Try to get at least 150 minutes of exercise per week or 10,000 steps per day on a pedometer . Order or download the FREE \"Exercise & Physical Activity: Your Everyday Guide\" from [employer]. Call 9-561.308.3009 or search [employer] online. You need 3927-6495 mg of calcium and 4179-0910 IU of vitamin D per day. It is possible to meet your calcium requirement with diet alone, but a vitamin D supplement is usually necessary to meet this goal.  When exposed to the sun, use a sunscreen that protects against both UVA and UVB radiation with an SPF of 30 or greater. Reapply every 2 to 3 hours or after sweating, drying off with a towel, or swimming. Always wear a seat belt when traveling in a car. Always wear a helmet when riding a bicycle or motorcycle.

## 2022-12-21 NOTE — PROGRESS NOTES
ANIONGAP 13 09/09/2022 11:19 AM    ANIONGAP 14 08/23/2022 04:09 PM    GLUCOSE 104 12/06/2022 02:02 PM    GLUCOSE 134 09/09/2022 11:19 AM    GLUCOSE 107 08/23/2022 04:09 PM    GLUCOSE 113 03/28/2011 03:00 PM    GLUCOSE 93 03/28/2011 04:40 AM    GLUCOSE 101 03/26/2011 04:30 AM    BUN 15 12/06/2022 02:02 PM    BUN 22 09/09/2022 11:19 AM    BUN 20 08/23/2022 04:09 PM    CREATININE 1.3 12/06/2022 02:02 PM    CREATININE 1.3 09/09/2022 11:19 AM    CREATININE 1.2 08/23/2022 04:09 PM    LABGLOM 54 12/06/2022 02:02 PM    LABGLOM 52 09/09/2022 11:19 AM    LABGLOM 58 08/23/2022 04:09 PM    LABGLOM 48 08/08/2022 02:38 PM    GFRAA >60 09/09/2022 11:19 AM    GFRAA >60 08/23/2022 04:09 PM    GFRAA 58 08/08/2022 02:38 PM    CALCIUM 9.9 12/06/2022 02:02 PM    CALCIUM 9.9 09/09/2022 11:19 AM    CALCIUM 9.2 08/23/2022 04:09 PM    PROT 7.2 12/06/2022 02:02 PM    PROT 7.1 09/09/2022 11:19 AM    PROT 6.5 08/23/2022 04:09 PM    LABALBU 4.3 12/06/2022 02:02 PM    LABALBU 4.5 09/09/2022 11:19 AM    LABALBU 3.9 08/23/2022 04:09 PM    LABALBU 2.9 03/26/2011 04:30 AM    LABALBU 3.0 03/25/2011 04:50 AM    LABALBU 3.4 03/24/2011 03:20 AM    BILITOT 0.6 12/06/2022 02:02 PM    BILITOT 0.7 09/09/2022 11:19 AM    BILITOT 0.6 08/23/2022 04:09 PM    ALKPHOS 48 12/06/2022 02:02 PM    ALKPHOS 59 09/09/2022 11:19 AM    ALKPHOS 77 08/23/2022 04:09 PM    AST 29 12/06/2022 02:02 PM    AST 23 09/09/2022 11:19 AM    AST 36 08/23/2022 04:09 PM    ALT 28 12/06/2022 02:02 PM    ALT 20 09/09/2022 11:19 AM    ALT 56 08/23/2022 04:09 PM     A1C  Lab Results   Component Value Date/Time    LABA1C 5.0 12/06/2022 02:02 PM    LABA1C 4.8 08/17/2022 05:33 AM    LABA1C 5.2 03/01/2022 02:33 PM     TSH  Lab Results   Component Value Date/Time    TSH 5.470 12/06/2022 02:02 PM    TSH 5.516 08/18/2022 02:50 AM    TSH 3.410 05/24/2022 12:45 PM     FREET4  Lab Results   Component Value Date/Time    N6TUILL 9.5 01/31/2011 03:05 PM     LIPID  Lab Results   Component Value Date/Time CHOL 131 10/07/2022 11:08 AM    CHOL 150 05/24/2022 12:45 PM    CHOL 141 02/21/2022 11:17 AM    HDL 43 10/07/2022 11:08 AM    HDL 46 05/24/2022 12:45 PM    HDL 43 02/21/2022 11:17 AM    LDLCALC 43 10/07/2022 11:08 AM    LDLCALC 46 05/24/2022 12:45 PM    LDLCALC 49 02/21/2022 11:17 AM    TRIG 226 10/07/2022 11:08 AM    TRIG 292 05/24/2022 12:45 PM    TRIG 245 02/21/2022 11:17 AM     VITAMIN D  Lab Results   Component Value Date/Time    VITD25 59 12/06/2022 02:02 PM    VITD25 47 05/24/2022 12:45 PM    VITD25 41 02/21/2022 11:17 AM     MAGNESIUM  Lab Results   Component Value Date/Time    MG 1.6 12/06/2022 02:02 PM    MG 1.8 08/18/2022 02:50 AM    MG 1.9 05/24/2022 12:45 PM      PHOS  Lab Results   Component Value Date/Time    PHOS 3.9 08/25/2021 11:41 AM    PHOS 4.0 01/21/2021 11:48 AM    PHOS 3.8 01/16/2021 03:04 PM      PAWAN   No results found for: PAWAN  RHEUMATOID FACTOR  No results found for: RF  PSA  Lab Results   Component Value Date/Time    PSA <0.03 12/06/2022 02:03 PM    PSA <0.01 03/22/2022 02:12 PM    PSA SEE NOTE 12/06/2021 01:01 PM    PSA <0.03 12/06/2021 01:01 PM      HEPATITIS C  No results found for: HCVABI  HIV  No results found for: BYT8VYL, HIV1QT  UA  Lab Results   Component Value Date/Time    COLORU Yellow 08/23/2022 04:08 PM    COLORU Yellow 05/24/2022 12:45 PM    COLORU Yellow 02/21/2022 11:17 AM    CLARITYU Clear 08/23/2022 04:08 PM    CLARITYU Clear 05/24/2022 12:45 PM    CLARITYU Clear 02/21/2022 11:17 AM    GLUCOSEU Negative 08/23/2022 04:08 PM    GLUCOSEU Negative 05/24/2022 12:45 PM    GLUCOSEU Negative 02/21/2022 11:17 AM    GLUCOSEU NEGATIVE 12/21/2010 05:25 PM    BILIRUBINUR Negative 08/23/2022 04:08 PM    BILIRUBINUR Negative 05/24/2022 12:45 PM    BILIRUBINUR Negative 02/21/2022 11:17 AM    BILIRUBINUR NEGATIVE 12/21/2010 05:25 PM    KETUA TRACE 08/23/2022 04:08 PM    KETUA Negative 05/24/2022 12:45 PM    KETUA Negative 02/21/2022 11:17 AM    SPECGRAV >=1.030 08/23/2022 04:08 PM SPECGRAV 1.025 05/24/2022 12:45 PM    SPECGRAV >=1.030 02/21/2022 11:17 AM    BLOODU Negative 08/23/2022 04:08 PM    BLOODU Negative 05/24/2022 12:45 PM    BLOODU Negative 02/21/2022 11:17 AM    PHUR 5.5 08/23/2022 04:08 PM    PHUR 5.5 05/24/2022 12:45 PM    PHUR 5.0 02/21/2022 11:17 AM    PROTEINU Negative 08/23/2022 04:08 PM    PROTEINU Negative 05/24/2022 12:45 PM    PROTEINU Negative 02/21/2022 11:17 AM    UROBILINOGEN 0.2 08/23/2022 04:08 PM    UROBILINOGEN 0.2 05/24/2022 12:45 PM    UROBILINOGEN 0.2 02/21/2022 11:17 AM    NITRU Negative 08/23/2022 04:08 PM    NITRU Negative 05/24/2022 12:45 PM    NITRU Negative 02/21/2022 11:17 AM    LEUKOCYTESUR Negative 08/23/2022 04:08 PM    LEUKOCYTESUR Negative 05/24/2022 12:45 PM    LEUKOCYTESUR Negative 02/21/2022 11:17 AM     Urine Micro/Albumin Ratio  Lab Results   Component Value Date/Time    MALBCR 8.0 05/24/2022 12:45 PM    MALBCR 13.4 02/21/2022 11:17 AM    MALBCR 16.2 11/22/2021 10:06 AM               Review of Systems  ROS:    Const: Denies chills, fever, malaise and sweats. Eyes: Denies discharge, pain, redness and visual disturbance. ENMT: Denies earaches, other ear symptoms. Denies nasal or sinus symptoms other than if stated  above. Denies mouth and tongue lesions and sore throat. CV: Denies chest discomfort, pain; diaphoresis, dizziness, edema, lightheadedness, orthopnea,  palpitations, syncope and near syncopal episode or any exertional symptoms   Resp: Denies cough, hemoptysis, pleuritic pain, SOB, sputum production and wheezing. GI: Denies abdominal pain, change in bowel habits, hematochezia, melena, nausea and vomiting. : Denies urinary problems including dysuria. Musculo: Chronic pain stable  Skin: Denies bruising and rash or changing skin lesions. Neuro: Denies headache, numbness, stiff neck, tingling and focal weakness slurred speech or facial  Droop  Extremities: Denies calf pain, redness or swelling.    Hema/Lymph: Denies bleeding/bruising tendency and enlarged lymph nodes. Current Outpatient Medications:     atorvastatin (LIPITOR) 40 MG tablet, Take 1 tablet by mouth daily, Disp: 90 tablet, Rfl: 3    lisinopril (PRINIVIL;ZESTRIL) 5 MG tablet, Take 1 tablet by mouth daily, Disp: 90 tablet, Rfl: 3    omega-3 acid ethyl esters (LOVAZA) 1 g capsule, Take 1 capsule by mouth 2 times daily, Disp: 180 capsule, Rfl: 3    omeprazole (PRILOSEC) 20 MG delayed release capsule, Take 1 capsule by mouth daily, Disp: 90 capsule, Rfl: 3    albuterol sulfate HFA (PROVENTIL;VENTOLIN;PROAIR) 108 (90 Base) MCG/ACT inhaler, 1-2 puffs q4-6 hrs prn, Disp: 18 g, Rfl: 0    Compression Stockings MISC, by Does not apply route B/L BELOW KNEE AS DIRECTED 20-30 mmHg  Disp 2 pair, 2rf's, Disp: 2 each, Rfl: 1    metoprolol tartrate (LOPRESSOR) 25 MG tablet, Take 25 mg by mouth in the morning and 25 mg before bedtime. , Disp: , Rfl:     Cholecalciferol (VITAMIN D3) 50 MCG (2000 UT) CAPS, Take 2,000 capsules by mouth in the morning., Disp: , Rfl:     calcium carbonate (OSCAL) 500 MG TABS tablet, Take 500 mg by mouth daily, Disp: , Rfl:     denosumab (XGEVA) 120 MG/1.7ML SOLN SC injection, Inject 120 mg into the skin every 3 months , Disp: , Rfl:     Handicap Placard [facility], by Does not apply route Duration: 5 years, Disp: 1 each, Rfl: 0    magnesium gluconate (MAGONATE) 500 MG tablet, Take 500 mg by mouth at bedtime , Disp: , Rfl:     ERLEADA 60 MG TABS, 60 mg Every 4 days, Disp: , Rfl:     meclizine (ANTIVERT) 25 MG tablet, 1/2 - 1 by mouth every 6 hours as needed, Disp: , Rfl:     Multiple Vitamins-Minerals (THERAPEUTIC MULTIVITAMIN-MINERALS) tablet, Take 1 tablet by mouth daily , Disp: , Rfl:     Coenzyme Q10 (COQ10) 50 MG CAPS, Take 1 capsule by mouth daily , Disp: , Rfl:     aspirin 81 MG tablet, Take 81 mg by mouth daily , Disp: , Rfl:     Cyanocobalamin (VITAMIN B 12 PO), Take  by mouth daily.  sublingual , Disp: , Rfl:   No Known Allergies    Past Medical History: Diagnosis Date    CA prostate, adenoca ([facility] [address] 04/25/2019    CAD (coronary artery disease)     Dr. Cr Rebollar [facility])     Prostate cancer. S/p pelvic XRT completed 06/19/2008. Chronic bilateral low back pain without sciatica 03/06/2020    Cobalamin deficiency     Glaucoma     Right eye    History of blood transfusion     Hyperlipidemia     Hypertension     Malignant neoplasm of lower lobe of right lung ([facility] [address] 01/25/2022    Adencarcinoma per biopsy. Stage IA3. Treated with SBRT. SBRT completed 06/02/2022    Osteoarthritis     Osteochondropathy     Osteopenia     Pain in lower limb     Peripheral venous insufficiency     PONV (postoperative nausea and vomiting)     Prolonged emergence from general anesthesia     Prostate cancer metastatic to bone [facility]) 2019    Takin oral chemotherapy.     PVD (peripheral vascular disease) [facility] cl[facility] 04/25/2019     Past Surgical History:   Procedure Laterality Date    ACETABULUM FRACTURE SURGERY  03/24/2011    ANESTHESIA NERVE BLOCK Bilateral 06/16/2020    BILATERAL L3 L4 L5 MEDIAL NERVE BRANCH BLOCK performed by Kaylee Alcaraz DO at [address] [facility]  N/A 02/24/2022    BRONCHOSCOPY W/EBUS FNA performed by Mabel Theodore MD at [facility]  02/24/2022    BRONCHOSCOPY DIAGNOSTIC OR CELL [address] ONLY performed by Mabel Theodore MD at [facility] [address]  2016    CORONARY ARTERY BYPASS GRAFT  12/1996    [facility]/ Dr. Minerva Perez  01/25/2022    CT NEEDLE BIOPSY LUNG PERCUTANEOUS 1/25/2022 Dany Boykin II, MD [facility]    ENDOSCOPY, COLON, DIAGNOSTIC  2016    EYE SURGERY Bilateral 01/2018    cataract, glaucoma 2020-left eye    FEMUR FRACTURE SURGERY  1981    left    HERNIA REPAIR  1993    JOINT REPLACEMENT      bilat knee 2010, rigrené dunne 2011     LYMPH NODE DISSECTION  10/2007    PAIN MANAGEMENT PROCEDURE N/A 05/19/2020    CAUDAL EPIDURAL STEROID INJECTION performed by Irais Jacobo DO at [address] MANAGEMENT PROCEDURE  2020    PAIN MANAGEMENT PROCEDURE Right 2020    RIGHT L3 4 5 MEDIAL BRANCH RADIOFREQUENCY ABLATION performed by Irais Jacobo DO at [address] [place] [address] Left 09/10/2020    LEFT L3 4 5 MEDIAL BRANCH RADIOFREQUENCY ABLATION     +++IODINE ALLERGY+++   CPT: 44177 [id number] performed by Irais Jacobo DO at [address] [place] [address] Bilateral 2021    BILATERAL L3,4,5 MEDIAL NERVE BRANCH RADIOFREQUENCY ABLATION performed by Irais Jacobo DO at [address] [place] [address] Bilateral 2022    BILATERAL L3, 4,5 RADIOFREQUENCY ABLATION performed by Irais Jacobo DO at [facility] Left 08/15/2022    Dr Ana Cedeño  10/31/2007    [address] M.D./ DA LETICIA LAPAROSCOPIC PROSTATECTOMY    TESTICLE REMOVAL Bilateral 2019    hx prostate ca-    TONSILLECTOMY      TOTAL HIP ARTHROPLASTY  2011    right    TOTAL KNEE ARTHROPLASTY  2010    bilateral     Family History   Problem Relation Age of Onset    Stroke Mother     Cancer Mother         pancreatic    Cancer Father         prostate    Stroke Father      Social History     Socioeconomic History    Marital status:      Spouse name: Not on file    Number of children: 3    Years of education: Not on file    Highest education level: Not on file   Occupational History    Not on file   Tobacco Use    Smoking status: Former     Packs/day: 1.00     Years: 40.00     Pack years: 40.00     Types: Cigarettes     Start date: 5     Quit date: 10/1/1994     Years since quittin.2    Smokeless tobacco: Former     Types: Chew     Quit date:    Vaping Use    Vaping Use: Never used   Substance and Sexual Activity    Alcohol use:  Yes     Alcohol/week: 1.0 standard drink     Types: 1 Cans of beer per week     Comment: rare    Drug use: No    Sexual activity: Not on file   Other Topics Concern    Not on file   Social History Narrative    ** Merged History Encounter **         Problem List: History of polyp of colon, Disorder of bone density and structure, unspecified,  Osteochondropathy, Carcinoma in situ of prostate, Aneurysm of thoracic aorta, Anemia, Coronary  arteriosclerosis, Cobalamin deficiency, Multiple closed fractur    es of pelvis with disruption of pelvic [place],  Hyperlipidemia, Essential hypertension  Health Maintenance:  Bone Density Test Screening - (3/5/2019)  Bone Density Scan - (12/18/2015)  Influenza Vaccination - (10/7/2016)  Colonoscopy - (4/3/2012)  [place]    ed on Home Safety - (11/23/2015)  Colonoscopy Screening - (4/3/2012)  US Liver - 8/1/08  Medical Problems:  Coronary Artery Disease (CAD), Hypertension, Hyperlipidemia, Prostate Cancer  Surgical Hx:  Prostatectomy, Coronary Artery Bypass Graft (CABG)          FH: Father:  . (Hx)  Mother:  . (Hx)    SH: Marital:  - retired . Personal Habits: Cigarette Use: former cigarette smoker, Nonsmoker. Alcohol: Occasionally  consumes alcohol. Social Determinants of Health     Financial Resource Strain: Low Risk     Difficulty of Paying Living Expenses: Not hard at all   Food Insecurity: No Food Insecurity    Worried About Running Out of Food in the Last Year: Never true    Ran Out of Food in the Last Year: Never true   Transportation Needs: Not on file   Physical Activity: Insufficiently Active    Days of Exercise per Week: 1 day    Minutes of Exercise per Session: 20 min   Stress: Not on file   Social Connections: Not on file   Intimate Partner Violence: Not on file   Housing Stability: Not on file       Vitals:    12/21/22 1331   BP: 118/76   Pulse: 87   Temp: 97.7 °F (36.5 °C)   SpO2: 96%   Weight: 226 lb (102.5 kg)   Height: 5' 9\" (1.753 m)      Wt Readings from Last 3 Encounters:   12/21/22 226 lb (102.5 kg)   12/21/22 226 lb (102.5 kg)   10/12/22 224 lb (101.6 kg)        Physical Exam  Exam:  Const: Appears comfortable. No signs of acute distress present. Head/Face: Atraumatic on inspection. Eyes: EOMI in both eyes. No discharge from the eyes. PERRL. Sclerae clear. ENMT: Auditory canals normal. Tympanic membranes: intact and translucent. External nose WNL. Nasal mucosa is clear. Oropharynx: No erythema or exudate. Posterior pharynx is normal.  Neck: Supple. Palpation reveals no adenopathy. No masses appreciated. No JVD. Carotids: no  bruits. Resp: Respirations are unlabored. Clear to auscultation. No rales, rhonchi or wheezes appreciated  over the lungs bilaterally. CV: Rate is regular. Rhythm regular today. 3/6 TRIPP  Abdomen: Bowel sounds are normoactive. Abdomen is soft, nontender, and nondistended. No  abdominal masses. No palpable hepatosplenomegaly. Lymph: No palpable or visible regional lymphadenopathy. Skin: Dry and warm with no rash or concerning lesions. Extremities: No clubbing cyanosis  No calf inflammation or tenderness. Normal pulses. No clubbing cyanosis or edema at this time  Neuro: Alert and oriented. Affect: appropriate. Upper Extremities: 5/5 bilaterally. Lower Extremities:  5/5 bilaterally. Sensation intact to light touch. Reflexes: DTR's are symmetric and 2+ bilaterally. .  Cranial Nerves: Cranial nerves grossly intact. Muscular skeletal-chronic limited mobility, no acute inflammation    Lab Results   Component Value Date    PSA <0.03 12/06/2022    PSA <0.01 03/22/2022    PSA SEE NOTE (AA) 12/06/2021    PSA <0.03 12/06/2021    PSADIA 0.02 12/09/2014    PSADIA <0.01 09/05/2014    PSADIA 0.32 06/03/2014          Assessment and Plan:   Diagnosis Orders   1. Mixed hyperlipidemia  atorvastatin (LIPITOR) 40 MG tablet    omega-3 acid ethyl esters (LOVAZA) 1 g capsule    CBC with Auto Differential    CK    Comprehensive Metabolic Panel    Lipid Panel      2.  Essential hypertension  lisinopril (PRINIVIL;ZESTRIL) 5 MG tablet    CBC with Auto Differential    Urinalysis    Microalbumin / Creatinine Urine Ratio 3. Gastroesophageal reflux disease without esophagitis  omeprazole (PRILOSEC) 20 MG delayed release capsule      4. Renal insufficiency        5. Coronary artery disease involving native heart without angina pectoris, unspecified vessel or lesion type        6. Pancytopenia ([address]        7. Prostate cancer metastatic to bone ([facility]        8. Adenocarcinoma of right lung ([facility] [address]        9. PVD (peripheral vascular disease) with claudication ([facility] [address]        10. Vitamin D deficiency  Vitamin D 25 Hydroxy      11. Vitamin B12 deficiency  Vitamin B12 & Folate      12. Hyperglycemia  Hemoglobin A1C      13. Hypothyroidism due to medication  TSH    T4, Free      14. Low magnesium level  Magnesium           Left hip pathologic fracture  Status post partial left hip arthroplasty. [facility] Doing well now. Venous insufficiency  Counseled extensively. Differential reviewed, including serious etiologies. Continue to emphasize importance of low-salt diet, foot elevation at rest.  Previously declined ultrasound no complaints today. No other associated symptoms. Continue compression stockings 20-30 mmHg below-knee he prefers. Notify persist worsens. Postop anemia  Trending up. Asymptomatic    Cerebral aneurysm    counseled, risk of rupture reviewed. Signs and symptoms watch were reviewed. Avoid Valsalva. Blood pressure control. Following with .  Mining coiling procedure. Reviewed pictures from angiogram with him        Adenocarcinoma of right lung [facility])    counseled extensively, last measurement of approximately 2.4 x 1.8 cm. Right posterior lung. Biopsy 1/22 reveals     Diagnosis:   Right lung, core needle biopsy: Invasive, moderately differentiated   adenocarcinoma (grade 2) consistent with primary lung origin      bronchoscopy as of 3/1/2022 revealed negative Biopsy of reachable lymph nodes.         Finished radiation, follows with Dr. Mikki Murdock and Dr. Brandy Felipe with mets to bone ([facility] [address]  Metastatic. Continue per Dr. Wild Kim. PSA less than 0.01-0.07-0.3-less than 0.03, he had been on Lupron, had metastasis, since orchiectomy, did not tolerate xtandi so he stopped it , overall feeling well, on Erleada, follows regularly with Dr. José Miguel Bedoya extensively. Differential reviewed, including serious etiologies. Gets echoes through cardiology,. Cont per dr Ankit Robin, saw May 2022. No change, sees 6mos           Allergic rhinitis  Counseled, chronic. Counseled on use of nasal steroid, vaporizer, nasal saline as well as additional treatment options for vasomotor rhinitis     History of COVID-19  Counseled. Resolved. Counseled on vaccine, declines, would like antibodies monitored, ordered for 3 months        Disc disease, degenerative, lumbar or lumbosacral  Counseled extensively. Differential reviewed, including serious etiologies. Was Following with [facility], had to change because of insurance, now seeing Dr. Deepali Motta pain management, saw Dr. Anne Marie Daniel. Previously [facility]. Counseled on injections, radiofrequency ablation etc.  Failed physical therapy. . Had  radiofrequency ablation x2, overall doing well, last MRI lumbar spine ordered by Dr. Kwabena Dobbs 3/2022    Bilateral carpal tunnel syndrome  Had emg/ncs Nov 2019 [facility]. Referred to [facility] but did not go because no symptoms     Health maintenance examination  Health maintenance issues discussed at length at today's annual office visit, see separate encounter, 12/22/2022. Encouraged yearly. Pancytopenia ([facility] [address]  Counseled extensively. Differential reviewed, including serious etiologies. Possibly from medication. .  Continue per Dr. Kwabena Dobbs. Now white count and platelets normalized, hemoglobin low but stable. Low magnesium level  Counseled. Goals reviewed. Currently 1.9-1.6. Continue magnesium oxide 400 mg daily.                Vitamin D deficiency  Continue current therapy. Monitor. Vitamin B12 deficiency  Appropriate supplementation reviewed. Vitamin O15 elevated, folic acid now normal, appropriate supplementation reviewed, monitor, risk of low and high reviewed, no longer on injection        Hyperglycemia  Counseled, not interested in meds now, monitor. A1c excellent. Monitor        Essential hypertension  Counseled. Became hypotensive. Cardiology lowered dose to lisinopril 10 mg. Then was held in the hospital for some reason. He since resumed half dose 5 mg, tolerating well with good results. Consider increase to 10 if blood pressure not at goal.  Continue 5 mg for now. Monitor. The risks of hypertension and hypotension reviewed. Watch closely ambulatory. Hyper and hypotensive precautions and parameters reviewed and previously written as well as parameters on pulse, call if out of range, ER dangers numbers. Lifestyle modification reviewed. Tolerating therapy. Tubular adenoma   Last colonoscopy Dr. Rj Castaneda, 2/15 complaining repeat 3 years from then. Overdue. Declines now, fit cards or Cologuard. . Hemoglobin consistently low but stable. Declines any intervention now, declines any tests or procedures now. Thyroid nodule  Counseled, incidental 7 mm right thyroid nodule on carotid ultrasound 3/19. Declines workup or follow-up now     Thoracic aneurysm, not ruptured  Counseled, potential serious is reviewed, if any symptoms directly to ER. Continue per Dr. Ingrid Pack. He saw him 3/15 and had CT at least then. Dr. Ingrid Pack discharged him at this point unless symptoms and he not interested in follow-up imaging. Of note CT was done for Covid 1/21 and they stated no acute abnormality of the thoracic aorta     PVD (peripheral vascular disease) with claudication [facility])  He saw Dr. Melva Deleon, who had him on pletal, had minimal results.    Now follows with Dr Ling Gentile vascular in [facility] [employer] who states he did have a femoral blockage proximal that would require bypass as well as some distal blockages that would require stenting but they felt observation most appropriate. He did not feel medication was necessary or helpful at this time. They state Dr. Sam Carr agrees with this plan. He is comfortable with this plan at this point. Serious signs and symptoms watch were reviewed. Admits to missing an appointment because of Covid pandemic. They were planning surgery but after discussion, and the risks of complications and the complexity of the surgery he is declining intervention now. States he watches the distance he walks but otherwise doing well. No sores etc.       Mixed hyperlipidemia  Did very well on d.a.w. Crestor unfortunately severe myalgias on generic. He could try to preauthorize d.a.w., but he chose to go on Lipitor instead. He was tolerating but got held after hip surgery for some reason, blood work relatively stable, resumed 8/22 declines change in dose, goals reviewed, risk benefits ADRs reviewed. Counseling CoQ10 and vitamin D. .  We discussed appropriate dosing given his risk factors. Also on Lovaza 1 twice a day, defers increase at this time. Metabolic disorder  Counseled. Blood sugar had been borderline, amidst a lot of sweets, globin A1c has been excellent not interested in insulin sensitizer. Lifestyle modification reviewed. Micro-macrovascular complications monitor. Monitor     Coronary artery disease involving native heart without angina pectoris  Continue per cardiology. Saw Dr. Mounika Walls  May 2022, and had a stress test January 2022 showing fixed defect. Continue per cardiology. ,  There is a risk that the Erleada could cause cardiac arrhythmia     Age-related osteoporosis without current pathological fracture  Counseled extensively. He tolerated Fosamax but discontinued after he took from 7/09 through 7/16. Morbidity/mortality related to fracture reviewed.  11/13 bone density showed improvement, 12/15 -1.3 stable, 3/19 showed L2 -2.6, hip -1.0.   bone density scan 5/21 had nearly normalized, T score -1.3 L1 and -1.2 neck which is an improvement of 14.7% in the lumbar spine and   a slight worsening of 2.6% in the hip since previous study. He had been on a course of alendronate 2 different times. The rationale of the Prolia was with the expected osteoporosis as shown on DEXA scan 2 years ago but again nearly normal at this time although false negatives reviewed. After discussion it was decided to hold off on Prolia, continue current management, falls precautions, weightbearing exercise, appropriate calcium/D does not wish repeat DEXA scan at this time      Gastroesophageal reflux disease without esophagitis  Asymptomatic as long as takes medication, had EGD in the past.  Risk of meds reviewed including electrolyte imbalance, renal etc.  Wants to continue. Glaucoma of left eye   Follows with eye care. Renal insufficiency  Counseled, differential reviewed. Emphasize proper hydration and avoidance of nephrotoxic agents. He simply wants basic monitoring. Not interested in referral.  BUN/creatinine normalized, GFR 58     Plan as above. Counseled extensively and differential diagnoses relevant to above were reviewed, including serious etiologies, risks and complications, especially of left uncontrolled. If relevant, instructions and  alternatives to meds/treatment reviewed, as well as interactions, and  SE's/ADRs reviewed, notify immediately if any, discontinuing new meds if any. Plan made after discussion and shared decision making. Counseled on all. Continue per multiple specialist.  Refills given. Otherwise simply wants blood work and follow-up in 3 months sooner as needed    Return in about 3 months (around 3/21/2023), or if symptoms worsen or fail to improve.             Educational materials and/or home exercises printed for patient's review and were included in patient instructions on his/her After Visit Summary and given to patient at the end of visit. After discussion, patient and/or guardian verbalizes understanding, agrees, feels comfortable with and wishes to proceed with above treatment plan. Call for any pending results, FU sooner if abnormal, as needed or if any current symptoms persist/worsen. Advised patient to call with any new medication issues, and read all Rx info from pharmacy to assure aware of all possible risks and side effects of medication before taking. Reviewed age and gender appropriate health screening exams and vaccinations. Advised patient regarding importance of keeping up with recommended health maintenance and to schedule as soon as possible if overdue, as this is important in assessing for undiagnosed pathology, especially cancer, as well as protecting against potentially harmful/life threatening disease. Patient and/or guardian verbalizes understanding and agrees with above counseling, assessment and plan. All questions answered. Signs and symptoms to watch for discussed, serious signs and symptoms reviewed. ER if any. Over 30 minutes  spent with the patient in reviewing records, reviewing with patient/family, counseling, ordering,  prescribing, completing h&p, etc., with over 50% of the time spent face to face counseling. Halima Parkinson MD    Patients are advised to check with insurance company to ensure coverage and to fully understand benefits and cost prior to any testing. This note was created with the assistance of voice recognition software. Document was reviewed however may contain grammatical errors.

## 2022-12-22 PROBLEM — C61 PROSTATE CANCER (HCC): Status: RESOLVED | Noted: 2022-08-14 | Resolved: 2022-12-22

## 2022-12-22 RX ORDER — LISINOPRIL 5 MG/1
5 TABLET ORAL DAILY
Qty: 90 TABLET | Refills: 3 | Status: SHIPPED | OUTPATIENT
Start: 2022-12-22

## 2022-12-22 RX ORDER — OMEPRAZOLE 20 MG/1
20 CAPSULE, DELAYED RELEASE ORAL DAILY
Qty: 90 CAPSULE | Refills: 3 | Status: SHIPPED | OUTPATIENT
Start: 2022-12-22

## 2022-12-22 RX ORDER — ATORVASTATIN CALCIUM 40 MG/1
40 TABLET, FILM COATED ORAL DAILY
Qty: 90 TABLET | Refills: 3 | Status: SHIPPED | OUTPATIENT
Start: 2022-12-22

## 2022-12-22 RX ORDER — OMEGA-3-ACID ETHYL ESTERS 1 G/1
1 CAPSULE, LIQUID FILLED ORAL 2 TIMES DAILY
Qty: 180 CAPSULE | Refills: 3 | Status: SHIPPED | OUTPATIENT
Start: 2022-12-22

## 2022-12-27 ENCOUNTER — HOSPITAL ENCOUNTER (OUTPATIENT)
Dept: CT IMAGING | Age: 87
Discharge: HOME | End: 2022-12-29
Payer: MEDICARE

## 2022-12-27 DIAGNOSIS — C61 MALIGNANT NEOPLASM OF PROSTATE (HCC): ICD-10-CM

## 2022-12-27 DIAGNOSIS — C34.31 SQUAMOUS CELL CARCINOMA OF BRONCHUS IN RIGHT LOWER LOBE (HCC): ICD-10-CM

## 2022-12-27 DIAGNOSIS — C79.51 BONE METASTASIS (HCC): ICD-10-CM

## 2022-12-27 PROCEDURE — 6360000004 HC RX CONTRAST MEDICATION: Performed by: RADIOLOGY

## 2022-12-27 PROCEDURE — 71260 CT THORAX DX C+: CPT

## 2022-12-27 PROCEDURE — 74177 CT ABD & PELVIS W/CONTRAST: CPT

## 2022-12-27 RX ADMIN — IOPAMIDOL 75 ML: 755 INJECTION, SOLUTION INTRAVENOUS at 13:45

## 2023-03-18 ENCOUNTER — HOSPITAL ENCOUNTER (OUTPATIENT)
Age: 88
Discharge: HOME | End: 2023-03-18
Payer: MEDICARE

## 2023-03-18 DIAGNOSIS — E53.8 VITAMIN B12 DEFICIENCY: ICD-10-CM

## 2023-03-18 DIAGNOSIS — R73.9 HYPERGLYCEMIA: ICD-10-CM

## 2023-03-18 DIAGNOSIS — E03.2 HYPOTHYROIDISM DUE TO MEDICATION: ICD-10-CM

## 2023-03-18 DIAGNOSIS — R79.0 LOW MAGNESIUM LEVEL: ICD-10-CM

## 2023-03-18 DIAGNOSIS — I10 ESSENTIAL HYPERTENSION: ICD-10-CM

## 2023-03-18 DIAGNOSIS — E55.9 VITAMIN D DEFICIENCY: ICD-10-CM

## 2023-03-18 DIAGNOSIS — E78.2 MIXED HYPERLIPIDEMIA: ICD-10-CM

## 2023-03-18 LAB
ALBUMIN SERPL-MCNC: 4.4 G/DL (ref 3.5–5.2)
ALP SERPL-CCNC: 52 U/L (ref 40–129)
ALT SERPL-CCNC: 28 U/L (ref 0–40)
ANION GAP SERPL CALCULATED.3IONS-SCNC: 14 MMOL/L (ref 7–16)
AST SERPL-CCNC: 27 U/L (ref 0–39)
BASOPHILS # BLD: 0.03 E9/L (ref 0–0.2)
BASOPHILS NFR BLD: 0.8 % (ref 0–2)
BILIRUB SERPL-MCNC: 0.6 MG/DL (ref 0–1.2)
BUN SERPL-MCNC: 16 MG/DL (ref 6–23)
CALCIUM SERPL-MCNC: 9.6 MG/DL (ref 8.6–10.2)
CHLORIDE SERPL-SCNC: 104 MMOL/L (ref 98–107)
CHOLESTEROL, TOTAL: 126 MG/DL (ref 0–199)
CK SERPL-CCNC: 71 U/L (ref 20–200)
CO2 SERPL-SCNC: 24 MMOL/L (ref 22–29)
CREAT SERPL-MCNC: 1.3 MG/DL (ref 0.7–1.2)
EOSINOPHIL # BLD: 0.1 E9/L (ref 0.05–0.5)
EOSINOPHIL NFR BLD: 2.5 % (ref 0–6)
ERYTHROCYTE [DISTWIDTH] IN BLOOD BY AUTOMATED COUNT: 14.6 FL (ref 11.5–15)
GLUCOSE SERPL-MCNC: 133 MG/DL (ref 74–99)
HBA1C MFR BLD: 4.7 % (ref 4–5.6)
HCT VFR BLD AUTO: 34.6 % (ref 37–54)
HDLC SERPL-MCNC: 44 MG/DL
HGB BLD-MCNC: 11.9 G/DL (ref 12.5–16.5)
IMM GRANULOCYTES # BLD: 0.03 E9/L
IMM GRANULOCYTES NFR BLD: 0.8 % (ref 0–5)
LDLC SERPL CALC-MCNC: 33 MG/DL (ref 0–99)
LYMPHOCYTES # BLD: 1.2 E9/L (ref 1.5–4)
LYMPHOCYTES NFR BLD: 30.1 % (ref 20–42)
MAGNESIUM SERPL-MCNC: 1.7 MG/DL (ref 1.6–2.6)
MCH RBC QN AUTO: 30.4 PG (ref 26–35)
MCHC RBC AUTO-ENTMCNC: 34.4 % (ref 32–34.5)
MCV RBC AUTO: 88.5 FL (ref 80–99.9)
MONOCYTES # BLD: 0.27 E9/L (ref 0.1–0.95)
MONOCYTES NFR BLD: 6.8 % (ref 2–12)
NEUTROPHILS # BLD: 2.36 E9/L (ref 1.8–7.3)
NEUTS SEG NFR BLD: 59 % (ref 43–80)
PLATELET # BLD AUTO: 143 E9/L (ref 130–450)
PMV BLD AUTO: 9.7 FL (ref 7–12)
POTASSIUM SERPL-SCNC: 4.1 MMOL/L (ref 3.5–5)
PROT SERPL-MCNC: 7 G/DL (ref 6.4–8.3)
RBC # BLD AUTO: 3.91 E12/L (ref 3.8–5.8)
SODIUM SERPL-SCNC: 142 MMOL/L (ref 132–146)
T4 FREE SERPL-MCNC: 1.16 NG/DL (ref 0.93–1.7)
TRIGL SERPL-MCNC: 243 MG/DL (ref 0–149)
TSH SERPL-MCNC: 6.01 UIU/ML (ref 0.27–4.2)
VITAMIN D 25-HYDROXY: 57 NG/ML (ref 30–100)
VLDLC SERPL CALC-MCNC: 49 MG/DL
WBC # BLD: 4 E9/L (ref 4.5–11.5)

## 2023-03-18 PROCEDURE — 83735 ASSAY OF MAGNESIUM: CPT

## 2023-03-18 PROCEDURE — 82550 ASSAY OF CK (CPK): CPT

## 2023-03-18 PROCEDURE — 85025 COMPLETE CBC W/AUTO DIFF WBC: CPT

## 2023-03-18 PROCEDURE — 82306 VITAMIN D 25 HYDROXY: CPT

## 2023-03-18 PROCEDURE — 82746 ASSAY OF FOLIC ACID SERUM: CPT

## 2023-03-18 PROCEDURE — 84443 ASSAY THYROID STIM HORMONE: CPT

## 2023-03-18 PROCEDURE — 36415 COLL VENOUS BLD VENIPUNCTURE: CPT

## 2023-03-18 PROCEDURE — 80061 LIPID PANEL: CPT

## 2023-03-18 PROCEDURE — 82607 VITAMIN B-12: CPT

## 2023-03-18 PROCEDURE — 84439 ASSAY OF FREE THYROXINE: CPT

## 2023-03-18 PROCEDURE — 83036 HEMOGLOBIN GLYCOSYLATED A1C: CPT

## 2023-03-18 PROCEDURE — 80053 COMPREHEN METABOLIC PANEL: CPT

## 2023-03-19 LAB
FOLATE SERPL-MCNC: >20 NG/ML (ref 4.8–24.2)
VIT B12 SERPL-MCNC: >2000 PG/ML (ref 211–946)

## 2023-03-21 SDOH — ECONOMIC STABILITY: FOOD INSECURITY: WITHIN THE PAST 12 MONTHS, YOU WORRIED THAT YOUR FOOD WOULD RUN OUT BEFORE YOU GOT MONEY TO BUY MORE.: NEVER TRUE

## 2023-03-21 SDOH — ECONOMIC STABILITY: TRANSPORTATION INSECURITY
IN THE PAST 12 MONTHS, HAS LACK OF TRANSPORTATION KEPT YOU FROM MEETINGS, WORK, OR FROM GETTING THINGS NEEDED FOR DAILY LIVING?: NO

## 2023-03-21 SDOH — ECONOMIC STABILITY: FOOD INSECURITY: WITHIN THE PAST 12 MONTHS, THE FOOD YOU BOUGHT JUST DIDN'T LAST AND YOU DIDN'T HAVE MONEY TO GET MORE.: NEVER TRUE

## 2023-03-21 SDOH — ECONOMIC STABILITY: INCOME INSECURITY: HOW HARD IS IT FOR YOU TO PAY FOR THE VERY BASICS LIKE FOOD, HOUSING, MEDICAL CARE, AND HEATING?: NOT HARD AT ALL

## 2023-03-21 SDOH — ECONOMIC STABILITY: HOUSING INSECURITY
IN THE LAST 12 MONTHS, WAS THERE A TIME WHEN YOU DID NOT HAVE A STEADY PLACE TO SLEEP OR SLEPT IN A SHELTER (INCLUDING NOW)?: NO

## 2023-03-22 ENCOUNTER — OFFICE VISIT (OUTPATIENT)
Dept: PRIMARY CARE CLINIC | Age: 88
End: 2023-03-22
Payer: MEDICARE

## 2023-03-22 VITALS
TEMPERATURE: 97.8 F | DIASTOLIC BLOOD PRESSURE: 72 MMHG | OXYGEN SATURATION: 96 % | WEIGHT: 229 LBS | SYSTOLIC BLOOD PRESSURE: 128 MMHG | BODY MASS INDEX: 33.82 KG/M2 | HEART RATE: 78 BPM

## 2023-03-22 DIAGNOSIS — E55.9 VITAMIN D DEFICIENCY: ICD-10-CM

## 2023-03-22 DIAGNOSIS — N28.9 RENAL INSUFFICIENCY: ICD-10-CM

## 2023-03-22 DIAGNOSIS — C61 PROSTATE CANCER METASTATIC TO BONE (HCC): ICD-10-CM

## 2023-03-22 DIAGNOSIS — M81.0 AGE-RELATED OSTEOPOROSIS WITHOUT CURRENT PATHOLOGICAL FRACTURE: ICD-10-CM

## 2023-03-22 DIAGNOSIS — D61.818 PANCYTOPENIA (HCC): ICD-10-CM

## 2023-03-22 DIAGNOSIS — K21.9 GASTROESOPHAGEAL REFLUX DISEASE WITHOUT ESOPHAGITIS: ICD-10-CM

## 2023-03-22 DIAGNOSIS — I25.10 CORONARY ARTERY DISEASE INVOLVING NATIVE HEART WITHOUT ANGINA PECTORIS, UNSPECIFIED VESSEL OR LESION TYPE: ICD-10-CM

## 2023-03-22 DIAGNOSIS — E03.2 HYPOTHYROIDISM DUE TO MEDICATION: ICD-10-CM

## 2023-03-22 DIAGNOSIS — C34.91 ADENOCARCINOMA OF RIGHT LUNG (HCC): ICD-10-CM

## 2023-03-22 DIAGNOSIS — R73.9 HYPERGLYCEMIA: ICD-10-CM

## 2023-03-22 DIAGNOSIS — E78.2 MIXED HYPERLIPIDEMIA: Primary | ICD-10-CM

## 2023-03-22 DIAGNOSIS — C79.51 PROSTATE CANCER METASTATIC TO BONE (HCC): ICD-10-CM

## 2023-03-22 DIAGNOSIS — E53.8 VITAMIN B12 DEFICIENCY: ICD-10-CM

## 2023-03-22 DIAGNOSIS — I10 ESSENTIAL HYPERTENSION: ICD-10-CM

## 2023-03-22 PROCEDURE — 3078F DIAST BP <80 MM HG: CPT | Performed by: FAMILY MEDICINE

## 2023-03-22 PROCEDURE — G8427 DOCREV CUR MEDS BY ELIG CLIN: HCPCS | Performed by: FAMILY MEDICINE

## 2023-03-22 PROCEDURE — 3074F SYST BP LT 130 MM HG: CPT | Performed by: FAMILY MEDICINE

## 2023-03-22 PROCEDURE — G8417 CALC BMI ABV UP PARAM F/U: HCPCS | Performed by: FAMILY MEDICINE

## 2023-03-22 PROCEDURE — 1036F TOBACCO NON-USER: CPT | Performed by: FAMILY MEDICINE

## 2023-03-22 PROCEDURE — 99214 OFFICE O/P EST MOD 30 MIN: CPT | Performed by: FAMILY MEDICINE

## 2023-03-22 PROCEDURE — 1123F ACP DISCUSS/DSCN MKR DOCD: CPT | Performed by: FAMILY MEDICINE

## 2023-03-22 PROCEDURE — G8484 FLU IMMUNIZE NO ADMIN: HCPCS | Performed by: FAMILY MEDICINE

## 2023-03-22 ASSESSMENT — PATIENT HEALTH QUESTIONNAIRE - PHQ9
1. LITTLE INTEREST OR PLEASURE IN DOING THINGS: 0
2. FEELING DOWN, DEPRESSED OR HOPELESS: 0
SUM OF ALL RESPONSES TO PHQ9 QUESTIONS 1 & 2: 0
SUM OF ALL RESPONSES TO PHQ QUESTIONS 1-9: 0

## 2023-03-22 NOTE — PROGRESS NOTES
133 03/18/2023 01:01 PM    GLUCOSE 104 12/06/2022 02:02 PM    GLUCOSE 134 09/09/2022 11:19 AM    GLUCOSE 113 03/28/2011 03:00 PM    GLUCOSE 93 03/28/2011 04:40 AM    GLUCOSE 101 03/26/2011 04:30 AM    BUN 16 03/18/2023 01:01 PM    BUN 15 12/06/2022 02:02 PM    BUN 22 09/09/2022 11:19 AM    CREATININE 1.3 03/18/2023 01:01 PM    CREATININE 1.3 12/06/2022 02:02 PM    CREATININE 1.3 09/09/2022 11:19 AM    LABGLOM 54 03/18/2023 01:01 PM    LABGLOM 54 12/06/2022 02:02 PM    LABGLOM 52 09/09/2022 11:19 AM    LABGLOM 58 08/23/2022 04:09 PM    LABGLOM 48 08/08/2022 02:38 PM    GFRAA >60 09/09/2022 11:19 AM    GFRAA >60 08/23/2022 04:09 PM    GFRAA 58 08/08/2022 02:38 PM    CALCIUM 9.6 03/18/2023 01:01 PM    CALCIUM 9.9 12/06/2022 02:02 PM    CALCIUM 9.9 09/09/2022 11:19 AM    PROT 7.0 03/18/2023 01:01 PM    PROT 7.2 12/06/2022 02:02 PM    PROT 7.1 09/09/2022 11:19 AM    LABALBU 4.4 03/18/2023 01:01 PM    LABALBU 4.3 12/06/2022 02:02 PM    LABALBU 4.5 09/09/2022 11:19 AM    LABALBU 2.9 03/26/2011 04:30 AM    LABALBU 3.0 03/25/2011 04:50 AM    LABALBU 3.4 03/24/2011 03:20 AM    BILITOT 0.6 03/18/2023 01:01 PM    BILITOT 0.6 12/06/2022 02:02 PM    BILITOT 0.7 09/09/2022 11:19 AM    ALKPHOS 52 03/18/2023 01:01 PM    ALKPHOS 48 12/06/2022 02:02 PM    ALKPHOS 59 09/09/2022 11:19 AM    AST 27 03/18/2023 01:01 PM    AST 29 12/06/2022 02:02 PM    AST 23 09/09/2022 11:19 AM    ALT 28 03/18/2023 01:01 PM    ALT 28 12/06/2022 02:02 PM    ALT 20 09/09/2022 11:19 AM     A1C  Lab Results   Component Value Date/Time    LABA1C 4.7 03/18/2023 01:01 PM    LABA1C 5.0 12/06/2022 02:02 PM    LABA1C 4.8 08/17/2022 05:33 AM     TSH  Lab Results   Component Value Date/Time    TSH 6.010 03/18/2023 01:01 PM    TSH 5.470 12/06/2022 02:02 PM    TSH 5.516 08/18/2022 02:50 AM     FREET4  Lab Results   Component Value Date/Time    C5CTKLI 9.5 01/31/2011 03:05 PM     LIPID  Lab Results   Component Value Date/Time    CHOL 126 03/18/2023 01:01 PM    CHOL 131

## 2023-03-22 NOTE — ASSESSMENT & PLAN NOTE
Counseled. There is a small risk of Erleada causing thyroid issues, 25% of people can experience increased TSH, about 7% true hypothyroid. This can be treated depending on clinical presentation/labs etc.  If TSH continues to rise and/or free T4 drops we will consider supplementation, asymptomatic now.   Monitor

## 2023-03-29 ENCOUNTER — APPOINTMENT (OUTPATIENT)
Dept: RADIATION ONCOLOGY | Age: 88
End: 2023-03-29
Payer: MEDICARE

## 2023-04-12 ENCOUNTER — HOSPITAL ENCOUNTER (OUTPATIENT)
Dept: RADIATION ONCOLOGY | Age: 88
Discharge: HOME | End: 2023-04-12
Payer: MEDICARE

## 2023-04-12 VITALS
HEART RATE: 86 BPM | BODY MASS INDEX: 33.68 KG/M2 | DIASTOLIC BLOOD PRESSURE: 70 MMHG | RESPIRATION RATE: 18 BRPM | SYSTOLIC BLOOD PRESSURE: 140 MMHG | TEMPERATURE: 97.1 F | WEIGHT: 228.1 LBS | OXYGEN SATURATION: 98 %

## 2023-04-12 DIAGNOSIS — C34.91 ADENOCARCINOMA OF RIGHT LUNG (HCC): Primary | ICD-10-CM

## 2023-04-12 DIAGNOSIS — Z92.3 S/P RADIATION THERAPY > 12 WKS AGO: ICD-10-CM

## 2023-04-12 PROCEDURE — 99213 OFFICE O/P EST LOW 20 MIN: CPT | Performed by: NURSE PRACTITIONER

## 2023-04-12 PROCEDURE — 99213 OFFICE O/P EST LOW 20 MIN: CPT

## 2023-04-12 NOTE — PROGRESS NOTES
Chris Armenta  4/12/2023  2:04 PM      Vitals:    04/12/23 1403   BP: (!) 140/70   Pulse: 86   Resp: 18   Temp: 97.1 °F (36.2 °C)   SpO2: 98%    : Wt Readings from Last 3 Encounters:   04/12/23 228 lb 1.6 oz (103.5 kg)   03/22/23 229 lb (103.9 kg)   12/21/22 226 lb (102.5 kg)                Current Outpatient Medications:     atorvastatin (LIPITOR) 40 MG tablet, Take 1 tablet by mouth daily, Disp: 90 tablet, Rfl: 3    lisinopril (PRINIVIL;ZESTRIL) 5 MG tablet, Take 1 tablet by mouth daily, Disp: 90 tablet, Rfl: 3    omega-3 acid ethyl esters (LOVAZA) 1 g capsule, Take 1 capsule by mouth 2 times daily, Disp: 180 capsule, Rfl: 3    omeprazole (PRILOSEC) 20 MG delayed release capsule, Take 1 capsule by mouth daily, Disp: 90 capsule, Rfl: 3    albuterol sulfate HFA (PROVENTIL;VENTOLIN;PROAIR) 108 (90 Base) MCG/ACT inhaler, 1-2 puffs q4-6 hrs prn, Disp: 18 g, Rfl: 0    Compression Stockings MISC, by Does not apply route B/L BELOW KNEE AS DIRECTED 20-30 mmHg  Disp 2 pair, 2rf's, Disp: 2 each, Rfl: 1    metoprolol tartrate (LOPRESSOR) 25 MG tablet, Take 25 mg by mouth in the morning and 25 mg before bedtime. , Disp: , Rfl:     Cholecalciferol (VITAMIN D3) 50 MCG (2000 UT) CAPS, Take 2,000 capsules by mouth in the morning., Disp: , Rfl:     calcium carbonate (OSCAL) 500 MG TABS tablet, Take 500 mg by mouth daily, Disp: , Rfl:     denosumab (XGEVA) 120 MG/1.7ML SOLN SC injection, Inject 120 mg into the skin every 3 months , Disp: , Rfl:     Handicap Placard MISC, by Does not apply route Duration: 5 years, Disp: 1 each, Rfl: 0    magnesium gluconate (MAGONATE) 500 MG tablet, Take 500 mg by mouth at bedtime , Disp: , Rfl:     ERLEADA 60 MG TABS, 60 mg Every 4 days, Disp: , Rfl:     meclizine (ANTIVERT) 25 MG tablet, 1/2 - 1 by mouth every 6 hours as needed, Disp: , Rfl:     Multiple Vitamins-Minerals (THERAPEUTIC MULTIVITAMIN-MINERALS) tablet, Take 1 tablet by mouth daily , Disp: , Rfl:     Coenzyme Q10 (COQ10) 50 MG

## 2023-04-12 NOTE — PROGRESS NOTES
Radiation Oncology   3 Month Follow Up Note    09/28/2022    John PeacockAyaan Armenta  Vitals:    04/12/23 1403   BP: (!) 140/70   Pulse: 86   Resp: 18   Temp: 97.1 °F (36.2 °C)   SpO2: 98%     Wt Readings from Last 1 Encounters:   04/12/23 228 lb 1.6 oz (103.5 kg)       CC: Patient is here for 3 month Radiation Oncology follow-up visit. HPI:  John Peacock. Richard Lassiter is a pleasant 82 y.o. male with a diagnosis of AJCC stage IV prostate cancer. S/p definitive intent XRT completed 06/19/2008. The patient developed bone mets in 2018 at the time did trial of Xtandi, then received 6 dose Xofigo in 2019. Followed by Justice Tadeo which patient continues to take today, taking 1 tablet every 4 days. The patient following with Pulmonary having serial CT chest due to lung nodule. CT chest 12/13/2021 showed slight increased size in RLL lung nodule. PET/CT scan 12/28/2021 showed enlarging noncalcified RLL pulmonary nodule measuring 2.2 x 1.5 cm with metabolic activity, max SUV 4.1. On 01/25/2022 right lung core needle biopsy completed with pathology invasive moderately differentiated adenocarcinoma (grade 2) consistent with primary lung origin. Lymph nodes FNA R4, R11 negative for malignant cells. Diagnosed AJCC stage group IA3 RLL lung cancer in setting of stable metastatic prostate cancer. The patient referred to Radiation Oncology for evaluation of SBRT. The patient proceeded with SBRT directed to the RLL lung nodule, SBRT completed 06/02/2022, total dose 5500 cGy/ 5 fractions. 09/16/2022 CT CHEST W CONTRAST: Advanced emphysema with persistent nearly 2 cm spiculated nodule in the right lower lobe without change with additional new nodules predominantly in the RUL + RLL ranging from 8-10 mm concerning for stable or slightly progressive malignancy with persistent multiple osteoblastic bone metastasis noted. Recommended for continued surveillance (ordered per Medical Oncology).      The patient presents today to be seen in 3 month

## 2023-06-06 ENCOUNTER — HOSPITAL ENCOUNTER (OUTPATIENT)
Age: 88
Discharge: HOME | End: 2023-06-06
Payer: MEDICARE

## 2023-06-06 DIAGNOSIS — I10 ESSENTIAL HYPERTENSION: ICD-10-CM

## 2023-06-06 DIAGNOSIS — R73.9 HYPERGLYCEMIA: ICD-10-CM

## 2023-06-06 DIAGNOSIS — E53.8 VITAMIN B12 DEFICIENCY: ICD-10-CM

## 2023-06-06 DIAGNOSIS — E55.9 VITAMIN D DEFICIENCY: ICD-10-CM

## 2023-06-06 DIAGNOSIS — D61.818 PANCYTOPENIA (HCC): ICD-10-CM

## 2023-06-06 DIAGNOSIS — E03.2 HYPOTHYROIDISM DUE TO MEDICATION: ICD-10-CM

## 2023-06-06 DIAGNOSIS — E78.2 MIXED HYPERLIPIDEMIA: ICD-10-CM

## 2023-06-06 LAB
ALBUMIN SERPL-MCNC: 4.5 G/DL (ref 3.5–5.2)
ALP SERPL-CCNC: 48 U/L (ref 40–129)
ALT SERPL-CCNC: 28 U/L (ref 0–40)
ANION GAP SERPL CALCULATED.3IONS-SCNC: 12 MMOL/L (ref 7–16)
AST SERPL-CCNC: 26 U/L (ref 0–39)
BASOPHILS # BLD: 0.03 E9/L (ref 0–0.2)
BASOPHILS NFR BLD: 0.7 % (ref 0–2)
BILIRUB SERPL-MCNC: 0.6 MG/DL (ref 0–1.2)
BILIRUB UR QL STRIP: NEGATIVE
BUN SERPL-MCNC: 21 MG/DL (ref 6–23)
CALCIUM SERPL-MCNC: 9.9 MG/DL (ref 8.6–10.2)
CHLORIDE SERPL-SCNC: 106 MMOL/L (ref 98–107)
CHOLESTEROL, TOTAL: 132 MG/DL (ref 0–199)
CK SERPL-CCNC: 88 U/L (ref 20–200)
CLARITY UR: CLEAR
CO2 SERPL-SCNC: 24 MMOL/L (ref 22–29)
COLOR UR: YELLOW
CREAT SERPL-MCNC: 1.3 MG/DL (ref 0.7–1.2)
EOSINOPHIL # BLD: 0.09 E9/L (ref 0.05–0.5)
EOSINOPHIL NFR BLD: 2.2 % (ref 0–6)
ERYTHROCYTE [DISTWIDTH] IN BLOOD BY AUTOMATED COUNT: 14.5 FL (ref 11.5–15)
FOLATE SERPL-MCNC: >20 NG/ML (ref 4.8–24.2)
GLUCOSE SERPL-MCNC: 104 MG/DL (ref 74–99)
GLUCOSE UR STRIP-MCNC: NEGATIVE MG/DL
HBA1C MFR BLD: 5 % (ref 4–5.6)
HCT VFR BLD AUTO: 35.9 % (ref 37–54)
HDLC SERPL-MCNC: 43 MG/DL
HGB BLD-MCNC: 12.1 G/DL (ref 12.5–16.5)
HGB UR QL STRIP: NEGATIVE
IMM GRANULOCYTES # BLD: 0.03 E9/L
IMM GRANULOCYTES NFR BLD: 0.7 % (ref 0–5)
KETONES UR STRIP-MCNC: NEGATIVE MG/DL
LDLC SERPL CALC-MCNC: 48 MG/DL (ref 0–99)
LEUKOCYTE ESTERASE UR QL STRIP: NEGATIVE
LYMPHOCYTES # BLD: 1.02 E9/L (ref 1.5–4)
LYMPHOCYTES NFR BLD: 25.4 % (ref 20–42)
MCH RBC QN AUTO: 30.8 PG (ref 26–35)
MCHC RBC AUTO-ENTMCNC: 33.7 % (ref 32–34.5)
MCV RBC AUTO: 91.3 FL (ref 80–99.9)
MONOCYTES # BLD: 0.32 E9/L (ref 0.1–0.95)
MONOCYTES NFR BLD: 8 % (ref 2–12)
NEUTROPHILS # BLD: 2.52 E9/L (ref 1.8–7.3)
NEUTS SEG NFR BLD: 63 % (ref 43–80)
NITRITE UR QL STRIP: NEGATIVE
PH UR STRIP: 5 [PH] (ref 5–9)
PLATELET # BLD AUTO: 142 E9/L (ref 130–450)
PMV BLD AUTO: 10.4 FL (ref 7–12)
POTASSIUM SERPL-SCNC: 4.4 MMOL/L (ref 3.5–5)
PROT SERPL-MCNC: 7 G/DL (ref 6.4–8.3)
PROT UR STRIP-MCNC: NEGATIVE MG/DL
RBC # BLD AUTO: 3.93 E12/L (ref 3.8–5.8)
SODIUM SERPL-SCNC: 142 MMOL/L (ref 132–146)
SP GR UR STRIP: 1.02 (ref 1–1.03)
T4 FREE SERPL-MCNC: 1.13 NG/DL (ref 0.93–1.7)
TRIGL SERPL-MCNC: 207 MG/DL (ref 0–149)
TSH SERPL-MCNC: 6.5 UIU/ML (ref 0.27–4.2)
UROBILINOGEN UR STRIP-ACNC: 0.2 E.U./DL
VIT B12 SERPL-MCNC: >2000 PG/ML (ref 211–946)
VITAMIN D 25-HYDROXY: 56 NG/ML (ref 30–100)
VLDLC SERPL CALC-MCNC: 41 MG/DL
WBC # BLD: 4 E9/L (ref 4.5–11.5)

## 2023-06-06 PROCEDURE — 83036 HEMOGLOBIN GLYCOSYLATED A1C: CPT

## 2023-06-06 PROCEDURE — 84439 ASSAY OF FREE THYROXINE: CPT

## 2023-06-06 PROCEDURE — 85025 COMPLETE CBC W/AUTO DIFF WBC: CPT

## 2023-06-06 PROCEDURE — 81003 URINALYSIS AUTO W/O SCOPE: CPT

## 2023-06-06 PROCEDURE — 80053 COMPREHEN METABOLIC PANEL: CPT

## 2023-06-06 PROCEDURE — 82607 VITAMIN B-12: CPT

## 2023-06-06 PROCEDURE — 36415 COLL VENOUS BLD VENIPUNCTURE: CPT

## 2023-06-06 PROCEDURE — 82746 ASSAY OF FOLIC ACID SERUM: CPT

## 2023-06-06 PROCEDURE — 80061 LIPID PANEL: CPT

## 2023-06-06 PROCEDURE — 82306 VITAMIN D 25 HYDROXY: CPT

## 2023-06-06 PROCEDURE — 82550 ASSAY OF CK (CPK): CPT

## 2023-06-06 PROCEDURE — 84443 ASSAY THYROID STIM HORMONE: CPT

## 2023-06-22 ENCOUNTER — OFFICE VISIT (OUTPATIENT)
Dept: PRIMARY CARE CLINIC | Age: 88
End: 2023-06-22
Payer: MEDICARE

## 2023-06-22 VITALS
TEMPERATURE: 97.8 F | OXYGEN SATURATION: 95 % | BODY MASS INDEX: 33.08 KG/M2 | HEART RATE: 72 BPM | SYSTOLIC BLOOD PRESSURE: 126 MMHG | DIASTOLIC BLOOD PRESSURE: 60 MMHG | WEIGHT: 224 LBS

## 2023-06-22 DIAGNOSIS — R79.0 LOW MAGNESIUM LEVEL: ICD-10-CM

## 2023-06-22 DIAGNOSIS — I10 ESSENTIAL HYPERTENSION: ICD-10-CM

## 2023-06-22 DIAGNOSIS — N28.9 RENAL INSUFFICIENCY: ICD-10-CM

## 2023-06-22 DIAGNOSIS — E03.2 HYPOTHYROIDISM DUE TO MEDICATION: ICD-10-CM

## 2023-06-22 DIAGNOSIS — K21.9 GASTROESOPHAGEAL REFLUX DISEASE WITHOUT ESOPHAGITIS: ICD-10-CM

## 2023-06-22 DIAGNOSIS — R73.9 HYPERGLYCEMIA: ICD-10-CM

## 2023-06-22 DIAGNOSIS — E55.9 VITAMIN D DEFICIENCY: ICD-10-CM

## 2023-06-22 DIAGNOSIS — I73.9 PVD (PERIPHERAL VASCULAR DISEASE) WITH CLAUDICATION (HCC): Primary | ICD-10-CM

## 2023-06-22 DIAGNOSIS — I25.10 CORONARY ARTERY DISEASE INVOLVING NATIVE HEART WITHOUT ANGINA PECTORIS, UNSPECIFIED VESSEL OR LESION TYPE: ICD-10-CM

## 2023-06-22 DIAGNOSIS — M81.0 AGE-RELATED OSTEOPOROSIS WITHOUT CURRENT PATHOLOGICAL FRACTURE: ICD-10-CM

## 2023-06-22 DIAGNOSIS — E53.8 VITAMIN B12 DEFICIENCY: ICD-10-CM

## 2023-06-22 DIAGNOSIS — E78.2 MIXED HYPERLIPIDEMIA: ICD-10-CM

## 2023-06-22 DIAGNOSIS — C79.51 PROSTATE CANCER METASTATIC TO BONE (HCC): ICD-10-CM

## 2023-06-22 DIAGNOSIS — C61 PROSTATE CANCER METASTATIC TO BONE (HCC): ICD-10-CM

## 2023-06-22 DIAGNOSIS — C34.91 ADENOCARCINOMA OF RIGHT LUNG (HCC): ICD-10-CM

## 2023-06-22 DIAGNOSIS — D61.818 PANCYTOPENIA (HCC): ICD-10-CM

## 2023-06-22 DIAGNOSIS — S46.011A TRAUMATIC COMPLETE TEAR OF RIGHT ROTATOR CUFF, INITIAL ENCOUNTER: ICD-10-CM

## 2023-06-22 PROCEDURE — G8427 DOCREV CUR MEDS BY ELIG CLIN: HCPCS | Performed by: FAMILY MEDICINE

## 2023-06-22 PROCEDURE — G8417 CALC BMI ABV UP PARAM F/U: HCPCS | Performed by: FAMILY MEDICINE

## 2023-06-22 PROCEDURE — 1123F ACP DISCUSS/DSCN MKR DOCD: CPT | Performed by: FAMILY MEDICINE

## 2023-06-22 PROCEDURE — 99215 OFFICE O/P EST HI 40 MIN: CPT | Performed by: FAMILY MEDICINE

## 2023-06-22 PROCEDURE — 1036F TOBACCO NON-USER: CPT | Performed by: FAMILY MEDICINE

## 2023-06-22 NOTE — PROGRESS NOTES
to specialist, overall stable      Bilateral carpal tunnel syndrome  Had emg/ncs Nov 2019 [facility]. Referred to [facility] but did not go because no symptoms     Health maintenance examination  Health maintenance issues discussed at length 12/22/2022. Encouraged yearly. Pancytopenia ([address]  Counseled extensively. Differential reviewed, including serious etiologies. Multifactorial.  Continue per Dr. Cristo Medellin. Now white count and hemoglobin/hematocrit mildly low but platelets normal.        Low magnesium level  Counseled. Goals reviewed. Currently 1.9-1.6-1.7. Continue magnesium oxide 400 mg daily. Vitamin D deficiency  Continue current therapy. Monitor. Vitamin B12 deficiency  Appropriate supplementation reviewed. Vitamin F32 elevated, folic acid now normal, appropriate supplementation reviewed, monitor, risk of low and high reviewed, no longer on injections        Hyperglycemia  Counseled, l stable, monitor        Essential hypertension  Counseled. Became hypotensive. Cardiology lowered dose to lisinopril 10 mg. Then was held in the hospital for some reason. He since resumed half dose 5 mg, tolerating well with good results. Consider increase to 10 if blood pressure not at goal.  Continue 5 mg for now. Monitor. The risks of hypertension and hypotension reviewed. Watch closely ambulatory. Hyper and hypotensive precautions and parameters reviewed and previously written as well as parameters on pulse, call if out of range, ER dangers numbers. Lifestyle modification reviewed. Tolerating therapy. Tubular adenoma   Last colonoscopy Dr. Sudha Moore, 2/15 complaining repeat 3 years from then. Overdue. Declines now, fit cards or Cologuard. . Hemoglobin consistently low but stable. Defers any intervention now, declines any tests or procedures now. Thyroid nodule  Counseled, incidental 7 mm right thyroid nodule on carotid ultrasound 3/19.  Declines workup or follow-up now

## 2023-07-05 ENCOUNTER — OFFICE VISIT (OUTPATIENT)
Dept: PAIN MANAGEMENT | Age: 88
End: 2023-07-05
Payer: MEDICARE

## 2023-07-05 VITALS
WEIGHT: 224 LBS | DIASTOLIC BLOOD PRESSURE: 79 MMHG | HEIGHT: 69 IN | OXYGEN SATURATION: 94 % | HEART RATE: 83 BPM | TEMPERATURE: 97.4 F | RESPIRATION RATE: 16 BRPM | BODY MASS INDEX: 33.18 KG/M2 | SYSTOLIC BLOOD PRESSURE: 136 MMHG

## 2023-07-05 DIAGNOSIS — G89.4 CHRONIC PAIN SYNDROME: Primary | ICD-10-CM

## 2023-07-05 PROCEDURE — 99214 OFFICE O/P EST MOD 30 MIN: CPT | Performed by: PAIN MEDICINE

## 2023-07-05 PROCEDURE — 20610 DRAIN/INJ JOINT/BURSA W/O US: CPT | Performed by: PAIN MEDICINE

## 2023-07-05 PROCEDURE — 1123F ACP DISCUSS/DSCN MKR DOCD: CPT | Performed by: PAIN MEDICINE

## 2023-07-05 PROCEDURE — 99213 OFFICE O/P EST LOW 20 MIN: CPT | Performed by: PAIN MEDICINE

## 2023-07-05 PROCEDURE — 99213 OFFICE O/P EST LOW 20 MIN: CPT

## 2023-07-05 PROCEDURE — G8417 CALC BMI ABV UP PARAM F/U: HCPCS | Performed by: PAIN MEDICINE

## 2023-07-05 PROCEDURE — 1036F TOBACCO NON-USER: CPT | Performed by: PAIN MEDICINE

## 2023-07-05 PROCEDURE — G8427 DOCREV CUR MEDS BY ELIG CLIN: HCPCS | Performed by: PAIN MEDICINE

## 2023-07-05 RX ORDER — BUPIVACAINE HYDROCHLORIDE 2.5 MG/ML
3 INJECTION, SOLUTION EPIDURAL; INFILTRATION; INTRACAUDAL ONCE
Status: COMPLETED | OUTPATIENT
Start: 2023-07-05 | End: 2023-07-05

## 2023-07-05 RX ORDER — METHYLPREDNISOLONE ACETATE 40 MG/ML
40 INJECTION, SUSPENSION INTRA-ARTICULAR; INTRALESIONAL; INTRAMUSCULAR; SOFT TISSUE ONCE
Status: COMPLETED | OUTPATIENT
Start: 2023-07-05 | End: 2023-07-05

## 2023-07-05 RX ADMIN — METHYLPREDNISOLONE ACETATE 40 MG: 40 INJECTION, SUSPENSION INTRA-ARTICULAR; INTRALESIONAL; INTRAMUSCULAR; SOFT TISSUE at 12:13

## 2023-07-05 RX ADMIN — BUPIVACAINE HYDROCHLORIDE 7.5 MG: 2.5 INJECTION, SOLUTION EPIDURAL; INFILTRATION; INTRACAUDAL at 12:12

## 2023-07-06 ENCOUNTER — HOSPITAL ENCOUNTER (OUTPATIENT)
Dept: CT IMAGING | Age: 88
Discharge: HOME | End: 2023-07-08
Attending: INTERNAL MEDICINE
Payer: MEDICARE

## 2023-07-06 DIAGNOSIS — C79.51 SECONDARY MALIGNANT NEOPLASM OF BONE (HCC): ICD-10-CM

## 2023-07-06 DIAGNOSIS — C61 MALIGNANT NEOPLASM OF PROSTATE (HCC): ICD-10-CM

## 2023-07-06 DIAGNOSIS — C34.31 MALIGNANT NEOPLASM OF LOWER LOBE, RIGHT BRONCHUS OR LUNG (HCC): ICD-10-CM

## 2023-07-06 PROCEDURE — 6360000004 HC RX CONTRAST MEDICATION: Performed by: RADIOLOGY

## 2023-07-06 PROCEDURE — 71260 CT THORAX DX C+: CPT

## 2023-07-06 PROCEDURE — 74177 CT ABD & PELVIS W/CONTRAST: CPT

## 2023-07-06 RX ADMIN — IOPAMIDOL 75 ML: 755 INJECTION, SOLUTION INTRAVENOUS at 14:15

## 2023-08-14 ENCOUNTER — OFFICE VISIT (OUTPATIENT)
Dept: PRIMARY CARE CLINIC | Age: 88
End: 2023-08-14
Payer: MEDICARE

## 2023-08-14 VITALS
HEIGHT: 69 IN | TEMPERATURE: 97.8 F | WEIGHT: 225 LBS | DIASTOLIC BLOOD PRESSURE: 68 MMHG | HEART RATE: 77 BPM | SYSTOLIC BLOOD PRESSURE: 132 MMHG | OXYGEN SATURATION: 96 % | BODY MASS INDEX: 33.33 KG/M2

## 2023-08-14 DIAGNOSIS — M48.061 SPINAL STENOSIS OF LUMBAR REGION, UNSPECIFIED WHETHER NEUROGENIC CLAUDICATION PRESENT: ICD-10-CM

## 2023-08-14 DIAGNOSIS — C61 PROSTATE CANCER METASTATIC TO BONE (HCC): Primary | ICD-10-CM

## 2023-08-14 DIAGNOSIS — C34.91 ADENOCARCINOMA OF RIGHT LUNG (HCC): ICD-10-CM

## 2023-08-14 DIAGNOSIS — M19.012 PRIMARY OSTEOARTHRITIS OF BOTH SHOULDERS: ICD-10-CM

## 2023-08-14 DIAGNOSIS — M19.031 ARTHRITIS OF BOTH WRISTS: ICD-10-CM

## 2023-08-14 DIAGNOSIS — M19.032 ARTHRITIS OF BOTH WRISTS: ICD-10-CM

## 2023-08-14 DIAGNOSIS — M50.30 DEGENERATIVE CERVICAL DISC: ICD-10-CM

## 2023-08-14 DIAGNOSIS — I73.9 PVD (PERIPHERAL VASCULAR DISEASE) WITH CLAUDICATION (HCC): ICD-10-CM

## 2023-08-14 DIAGNOSIS — I25.10 CORONARY ARTERY DISEASE INVOLVING NATIVE HEART WITHOUT ANGINA PECTORIS, UNSPECIFIED VESSEL OR LESION TYPE: ICD-10-CM

## 2023-08-14 DIAGNOSIS — M81.0 AGE-RELATED OSTEOPOROSIS WITHOUT CURRENT PATHOLOGICAL FRACTURE: ICD-10-CM

## 2023-08-14 DIAGNOSIS — C79.51 PROSTATE CANCER METASTATIC TO BONE (HCC): Primary | ICD-10-CM

## 2023-08-14 DIAGNOSIS — M19.011 PRIMARY OSTEOARTHRITIS OF BOTH SHOULDERS: ICD-10-CM

## 2023-08-14 PROBLEM — D68.9 COAGULATION DEFECT (HCC): Status: RESOLVED | Noted: 2023-08-14 | Resolved: 2023-08-14

## 2023-08-14 PROBLEM — D68.9 COAGULATION DEFECT (HCC): Status: ACTIVE | Noted: 2023-08-14

## 2023-08-14 PROBLEM — N18.30 CHRONIC RENAL DISEASE, STAGE III (HCC): Status: ACTIVE | Noted: 2023-08-14

## 2023-08-14 PROCEDURE — G8417 CALC BMI ABV UP PARAM F/U: HCPCS | Performed by: FAMILY MEDICINE

## 2023-08-14 PROCEDURE — 1123F ACP DISCUSS/DSCN MKR DOCD: CPT | Performed by: FAMILY MEDICINE

## 2023-08-14 PROCEDURE — G8427 DOCREV CUR MEDS BY ELIG CLIN: HCPCS | Performed by: FAMILY MEDICINE

## 2023-08-14 PROCEDURE — 1036F TOBACCO NON-USER: CPT | Performed by: FAMILY MEDICINE

## 2023-08-14 PROCEDURE — 99214 OFFICE O/P EST MOD 30 MIN: CPT | Performed by: FAMILY MEDICINE

## 2023-08-14 NOTE — PROGRESS NOTES
(VITAMIN D3) 50 MCG (2000 UT) CAPS, Take 2,000 capsules by mouth daily, Disp: , Rfl:     calcium carbonate (OSCAL) 500 MG TABS tablet, Take 1 tablet by mouth daily, Disp: , Rfl:     denosumab (XGEVA) 120 MG/1.7ML SOLN SC injection, Inject 1.7 mLs into the skin every 3 months, Disp: , Rfl:     Handicap Placard MISC, by Does not apply route Duration: 5 years, Disp: 1 each, Rfl: 0    magnesium gluconate (MAGONATE) 500 MG tablet, Take 1 tablet by mouth at bedtime, Disp: , Rfl:     ERLEADA 60 MG TABS, 60 mg Every 4 days, Disp: , Rfl:     meclizine (ANTIVERT) 25 MG tablet, 1/2 - 1 by mouth every 6 hours as needed, Disp: , Rfl:     Multiple Vitamins-Minerals (THERAPEUTIC MULTIVITAMIN-MINERALS) tablet, Take 1 tablet by mouth daily, Disp: , Rfl:     Coenzyme Q10 (COQ10) 50 MG CAPS, Take 1 each by mouth daily, Disp: , Rfl:     aspirin 81 MG tablet, Take 1 tablet by mouth daily, Disp: , Rfl:     Cyanocobalamin (VITAMIN B 12 PO), Take  by mouth daily. sublingual , Disp: , Rfl:   No Known Allergies    Past Medical History:   Diagnosis Date    CA prostate, adenoca ([address]) 04/25/2019    CAD (coronary artery disease)     Dr. Nella Stoner [facility])     Prostate cancer. S/p pelvic XRT completed 06/19/2008. Chronic bilateral low back pain without sciatica 03/06/2020    Cobalamin deficiency     Glaucoma     Right eye    History of blood transfusion     Hyperlipidemia     Hypertension     Malignant neoplasm of lower lobe of right lung ([address]) 01/25/2022    Adencarcinoma per biopsy. Stage IA3. Treated with SBRT. SBRT completed 06/02/2022    Osteoarthritis     Osteochondropathy     Osteopenia     Pain in lower limb     Peripheral venous insufficiency     PONV (postoperative nausea and vomiting)     Prolonged emergence from general anesthesia     Prostate cancer metastatic to bone [facility]) 2019    Takin oral chemotherapy.     PVD (peripheral vascular disease) with claudication ([address]) 04/25/2019     Past Surgical History:

## 2023-08-15 ENCOUNTER — HOSPITAL ENCOUNTER (OUTPATIENT)
Dept: NUCLEAR MEDICINE | Age: 88
Discharge: HOME | End: 2023-08-17
Attending: INTERNAL MEDICINE
Payer: MEDICARE

## 2023-08-15 DIAGNOSIS — C61 MALIGNANT NEOPLASM OF PROSTATE (HCC): ICD-10-CM

## 2023-08-15 DIAGNOSIS — C79.51 METASTASIS TO BONE (HCC): ICD-10-CM

## 2023-08-15 DIAGNOSIS — C34.31 SQUAMOUS CELL CARCINOMA OF BRONCHUS IN RIGHT LOWER LOBE (HCC): ICD-10-CM

## 2023-08-15 PROCEDURE — A9552 F18 FDG: HCPCS | Performed by: RADIOLOGY

## 2023-08-15 PROCEDURE — 3430000000 HC RX DIAGNOSTIC RADIOPHARMACEUTICAL: Performed by: RADIOLOGY

## 2023-08-15 RX ORDER — FLUDEOXYGLUCOSE F 18 200 MCI/ML
15 INJECTION, SOLUTION INTRAVENOUS
Status: COMPLETED | OUTPATIENT
Start: 2023-08-15 | End: 2023-08-15

## 2023-08-15 RX ADMIN — FLUDEOXYGLUCOSE F 18 15 MILLICURIE: 200 INJECTION, SOLUTION INTRAVENOUS at 12:18

## 2023-09-13 ENCOUNTER — HOSPITAL ENCOUNTER (OUTPATIENT)
Age: 88
Discharge: HOME | End: 2023-09-13
Payer: MEDICARE

## 2023-09-13 DIAGNOSIS — E55.9 VITAMIN D DEFICIENCY: ICD-10-CM

## 2023-09-13 DIAGNOSIS — D61.818 PANCYTOPENIA (HCC): ICD-10-CM

## 2023-09-13 DIAGNOSIS — N28.9 RENAL INSUFFICIENCY: ICD-10-CM

## 2023-09-13 DIAGNOSIS — I10 ESSENTIAL HYPERTENSION: ICD-10-CM

## 2023-09-13 DIAGNOSIS — E53.8 VITAMIN B12 DEFICIENCY: ICD-10-CM

## 2023-09-13 DIAGNOSIS — E78.2 MIXED HYPERLIPIDEMIA: ICD-10-CM

## 2023-09-13 DIAGNOSIS — R73.9 HYPERGLYCEMIA: ICD-10-CM

## 2023-09-13 DIAGNOSIS — R79.0 LOW MAGNESIUM LEVEL: ICD-10-CM

## 2023-09-13 DIAGNOSIS — E03.2 HYPOTHYROIDISM DUE TO MEDICATION: ICD-10-CM

## 2023-09-13 LAB
25(OH)D3 SERPL-MCNC: 57.6 NG/ML (ref 30–100)
ALBUMIN SERPL-MCNC: 4.3 G/DL (ref 3.5–5.2)
ALP SERPL-CCNC: 46 U/L (ref 40–129)
ALT SERPL-CCNC: 23 U/L (ref 0–40)
ANION GAP SERPL CALCULATED.3IONS-SCNC: 11 MMOL/L (ref 7–16)
AST SERPL-CCNC: 27 U/L (ref 0–39)
BASOPHILS # BLD: 0.02 K/UL (ref 0–0.2)
BASOPHILS NFR BLD: 1 % (ref 0–2)
BILIRUB SERPL-MCNC: 0.6 MG/DL (ref 0–1.2)
BILIRUB UR QL STRIP: NEGATIVE
BUN SERPL-MCNC: 24 MG/DL (ref 6–23)
CALCIUM SERPL-MCNC: 9.7 MG/DL (ref 8.6–10.2)
CHLORIDE SERPL-SCNC: 106 MMOL/L (ref 98–107)
CHOLEST SERPL-MCNC: 117 MG/DL
CK SERPL-CCNC: 93 U/L (ref 20–200)
CLARITY UR: CLEAR
CO2 SERPL-SCNC: 26 MMOL/L (ref 22–29)
COLOR UR: YELLOW
COMMENT: NORMAL
CREAT SERPL-MCNC: 1.4 MG/DL (ref 0.7–1.2)
CREAT UR-MCNC: 135 MG/DL (ref 40–278)
EOSINOPHIL # BLD: 0.08 K/UL (ref 0.05–0.5)
EOSINOPHILS RELATIVE PERCENT: 2 % (ref 0–6)
ERYTHROCYTE [DISTWIDTH] IN BLOOD BY AUTOMATED COUNT: 15.3 % (ref 11.5–15)
FOLATE SERPL-MCNC: >20 NG/ML (ref 4.8–24.2)
GFR SERPL CREATININE-BSD FRML MDRD: 49 ML/MIN/1.73M2
GLUCOSE SERPL-MCNC: 102 MG/DL (ref 74–99)
GLUCOSE UR STRIP-MCNC: NEGATIVE MG/DL
HBA1C MFR BLD: 4.7 % (ref 4–5.6)
HCT VFR BLD AUTO: 33.1 % (ref 37–54)
HDLC SERPL-MCNC: 43 MG/DL
HGB BLD-MCNC: 11 G/DL (ref 12.5–16.5)
HGB UR QL STRIP.AUTO: NEGATIVE
IMM GRANULOCYTES # BLD AUTO: 0.03 K/UL (ref 0–0.58)
IMM GRANULOCYTES NFR BLD: 1 % (ref 0–5)
KETONES UR STRIP-MCNC: NEGATIVE MG/DL
LDLC SERPL CALC-MCNC: 39 MG/DL
LEUKOCYTE ESTERASE UR QL STRIP: NEGATIVE
LYMPHOCYTES NFR BLD: 1.11 K/UL (ref 1.5–4)
LYMPHOCYTES RELATIVE PERCENT: 28 % (ref 20–42)
MAGNESIUM SERPL-MCNC: 1.8 MG/DL (ref 1.6–2.6)
MCH RBC QN AUTO: 30.6 PG (ref 26–35)
MCHC RBC AUTO-ENTMCNC: 33.2 G/DL (ref 32–34.5)
MCV RBC AUTO: 92.2 FL (ref 80–99.9)
MICROALBUMIN UR-MCNC: <12 MG/L (ref 0–19)
MICROALBUMIN/CREAT UR-RTO: NORMAL MCG/MG CREAT (ref 0–30)
MONOCYTES NFR BLD: 0.25 K/UL (ref 0.1–0.95)
MONOCYTES NFR BLD: 6 % (ref 2–12)
NEUTROPHILS NFR BLD: 62 % (ref 43–80)
NEUTS SEG NFR BLD: 2.42 K/UL (ref 1.8–7.3)
NITRITE UR QL STRIP: NEGATIVE
PH UR STRIP: 5.5 [PH] (ref 5–9)
PLATELET # BLD AUTO: 129 K/UL (ref 130–450)
PMV BLD AUTO: 9.6 FL (ref 7–12)
POTASSIUM SERPL-SCNC: 4.5 MMOL/L (ref 3.5–5)
PROT SERPL-MCNC: 6.9 G/DL (ref 6.4–8.3)
PROT UR STRIP-MCNC: NEGATIVE MG/DL
PSA SERPL-MCNC: 0.1 NG/ML (ref 0–4)
RBC # BLD AUTO: 3.59 M/UL (ref 3.8–5.8)
SODIUM SERPL-SCNC: 143 MMOL/L (ref 132–146)
SP GR UR STRIP: 1.02 (ref 1–1.03)
T4 FREE SERPL-MCNC: 1.3 NG/DL (ref 0.9–1.7)
TRIGL SERPL-MCNC: 175 MG/DL
TSH SERPL DL<=0.05 MIU/L-ACNC: 6.08 UIU/ML (ref 0.27–4.2)
UROBILINOGEN UR STRIP-ACNC: 0.2 EU/DL (ref 0–1)
VIT B12 SERPL-MCNC: >2000 PG/ML (ref 211–946)
VLDLC SERPL CALC-MCNC: 35 MG/DL
WBC OTHER # BLD: 3.9 K/UL (ref 4.5–11.5)

## 2023-09-13 PROCEDURE — 84443 ASSAY THYROID STIM HORMONE: CPT

## 2023-09-13 PROCEDURE — 36415 COLL VENOUS BLD VENIPUNCTURE: CPT

## 2023-09-13 PROCEDURE — 85025 COMPLETE CBC W/AUTO DIFF WBC: CPT

## 2023-09-13 PROCEDURE — 82306 VITAMIN D 25 HYDROXY: CPT

## 2023-09-13 PROCEDURE — 82550 ASSAY OF CK (CPK): CPT

## 2023-09-13 PROCEDURE — 81003 URINALYSIS AUTO W/O SCOPE: CPT

## 2023-09-13 PROCEDURE — 82607 VITAMIN B-12: CPT

## 2023-09-13 PROCEDURE — 84153 ASSAY OF PSA TOTAL: CPT

## 2023-09-13 PROCEDURE — 80053 COMPREHEN METABOLIC PANEL: CPT

## 2023-09-13 PROCEDURE — 80061 LIPID PANEL: CPT

## 2023-09-13 PROCEDURE — 82570 ASSAY OF URINE CREATININE: CPT

## 2023-09-13 PROCEDURE — 82043 UR ALBUMIN QUANTITATIVE: CPT

## 2023-09-13 PROCEDURE — 82746 ASSAY OF FOLIC ACID SERUM: CPT

## 2023-09-13 PROCEDURE — 84439 ASSAY OF FREE THYROXINE: CPT

## 2023-09-13 PROCEDURE — 83036 HEMOGLOBIN GLYCOSYLATED A1C: CPT

## 2023-09-13 PROCEDURE — 83735 ASSAY OF MAGNESIUM: CPT

## 2023-09-22 ENCOUNTER — OFFICE VISIT (OUTPATIENT)
Dept: PRIMARY CARE CLINIC | Age: 88
End: 2023-09-22
Payer: MEDICARE

## 2023-09-22 VITALS
DIASTOLIC BLOOD PRESSURE: 58 MMHG | HEART RATE: 67 BPM | SYSTOLIC BLOOD PRESSURE: 116 MMHG | HEIGHT: 69 IN | WEIGHT: 226.8 LBS | OXYGEN SATURATION: 97 % | TEMPERATURE: 97.7 F | BODY MASS INDEX: 33.59 KG/M2

## 2023-09-22 DIAGNOSIS — E78.2 MIXED HYPERLIPIDEMIA: ICD-10-CM

## 2023-09-22 DIAGNOSIS — D61.818 PANCYTOPENIA (HCC): ICD-10-CM

## 2023-09-22 DIAGNOSIS — C79.51 PROSTATE CANCER METASTATIC TO BONE (HCC): ICD-10-CM

## 2023-09-22 DIAGNOSIS — G47.00 INSOMNIA, UNSPECIFIED TYPE: Primary | ICD-10-CM

## 2023-09-22 DIAGNOSIS — I25.10 CORONARY ARTERY DISEASE INVOLVING NATIVE HEART WITHOUT ANGINA PECTORIS, UNSPECIFIED VESSEL OR LESION TYPE: ICD-10-CM

## 2023-09-22 DIAGNOSIS — C34.91 ADENOCARCINOMA OF RIGHT LUNG (HCC): ICD-10-CM

## 2023-09-22 DIAGNOSIS — I73.9 PVD (PERIPHERAL VASCULAR DISEASE) WITH CLAUDICATION (HCC): ICD-10-CM

## 2023-09-22 DIAGNOSIS — E03.2 HYPOTHYROIDISM DUE TO MEDICATION: ICD-10-CM

## 2023-09-22 DIAGNOSIS — E55.9 VITAMIN D DEFICIENCY: ICD-10-CM

## 2023-09-22 DIAGNOSIS — C61 PROSTATE CANCER METASTATIC TO BONE (HCC): ICD-10-CM

## 2023-09-22 DIAGNOSIS — M81.0 AGE-RELATED OSTEOPOROSIS WITHOUT CURRENT PATHOLOGICAL FRACTURE: ICD-10-CM

## 2023-09-22 DIAGNOSIS — I10 ESSENTIAL HYPERTENSION: ICD-10-CM

## 2023-09-22 DIAGNOSIS — E53.8 VITAMIN B12 DEFICIENCY: ICD-10-CM

## 2023-09-22 DIAGNOSIS — D69.6 THROMBOCYTOPENIA, UNSPECIFIED (HCC): ICD-10-CM

## 2023-09-22 DIAGNOSIS — N18.31 STAGE 3A CHRONIC KIDNEY DISEASE (HCC): ICD-10-CM

## 2023-09-22 DIAGNOSIS — R79.0 LOW MAGNESIUM LEVEL: ICD-10-CM

## 2023-09-22 DIAGNOSIS — R73.9 HYPERGLYCEMIA: ICD-10-CM

## 2023-09-22 PROCEDURE — 1036F TOBACCO NON-USER: CPT | Performed by: FAMILY MEDICINE

## 2023-09-22 PROCEDURE — G8417 CALC BMI ABV UP PARAM F/U: HCPCS | Performed by: FAMILY MEDICINE

## 2023-09-22 PROCEDURE — 99215 OFFICE O/P EST HI 40 MIN: CPT | Performed by: FAMILY MEDICINE

## 2023-09-22 PROCEDURE — G8427 DOCREV CUR MEDS BY ELIG CLIN: HCPCS | Performed by: FAMILY MEDICINE

## 2023-09-22 PROCEDURE — 1123F ACP DISCUSS/DSCN MKR DOCD: CPT | Performed by: FAMILY MEDICINE

## 2023-09-22 RX ORDER — TRAZODONE HYDROCHLORIDE 50 MG/1
25 TABLET ORAL NIGHTLY
Qty: 15 TABLET | Refills: 2 | Status: SHIPPED | OUTPATIENT
Start: 2023-09-22

## 2023-09-22 NOTE — PROGRESS NOTES
hydration and avoidance of nephrotoxic agents. He simply wants basic monitoring. Not interested in referral.  Stable      DDD cervical spine  No significant benefit to tizanidine and therefore recommended continuing. Recommend he follow-up with Dr. Molina Roman. Defers imaging to her. Bilateral OA shoulders  With known right rotator cuff tear as well as rotator cuff issues left. Ortho recommended surgery, not good surgical candidate at this time. Plan as above. Counseled extensively and differential diagnoses relevant to above were reviewed, including serious etiologies, risks and complications, especially of left uncontrolled. If relevant, instructions and  alternatives to meds/treatment reviewed, as well as interactions, and  SE's/ADRs reviewed, notify immediately if any, discontinuing new meds if any. Plan made after discussion and shared decision making. Counseled on all. Continue per multiple specialist.  Try low-dose trazodone. Otherwise [facility] blood work and follow-up 3 months sooner as needed. Does not wish vaccines including flu vaccine      Return in about 3 months (around 12/22/2023) for schedule AWV after 12/22/23. Educational materials and/or home exercises printed for patient's review and were included in patient instructions on his/her After Visit Summary and given to patient at the end of visit. After discussion, patient and/or guardian verbalizes understanding, agrees, feels comfortable with and wishes to proceed with above treatment plan. Call for any pending results, FU sooner if abnormal, as needed or if any current symptoms persist/worsen. Advised patient to call with any new medication issues, and read all Rx info from pharmacy to assure aware of all possible risks and side effects of medication before taking. Reviewed age and gender appropriate health screening exams and vaccinations.   Advised patient regarding importance of keeping up with

## 2023-09-28 DIAGNOSIS — E78.2 MIXED HYPERLIPIDEMIA: ICD-10-CM

## 2023-09-28 DIAGNOSIS — K21.9 GASTROESOPHAGEAL REFLUX DISEASE WITHOUT ESOPHAGITIS: ICD-10-CM

## 2023-09-28 DIAGNOSIS — I10 ESSENTIAL HYPERTENSION: ICD-10-CM

## 2023-09-28 RX ORDER — ATORVASTATIN CALCIUM 40 MG/1
40 TABLET, FILM COATED ORAL DAILY
Qty: 90 TABLET | Refills: 3 | Status: SHIPPED | OUTPATIENT
Start: 2023-09-28

## 2023-09-28 RX ORDER — OMEGA-3-ACID ETHYL ESTERS 1 G/1
1 CAPSULE, LIQUID FILLED ORAL 2 TIMES DAILY
Qty: 180 CAPSULE | Refills: 3 | Status: SHIPPED | OUTPATIENT
Start: 2023-09-28

## 2023-09-28 RX ORDER — LISINOPRIL 5 MG/1
5 TABLET ORAL DAILY
Qty: 90 TABLET | Refills: 3 | Status: SHIPPED | OUTPATIENT
Start: 2023-09-28

## 2023-09-28 RX ORDER — OMEPRAZOLE 20 MG/1
20 CAPSULE, DELAYED RELEASE ORAL DAILY
Qty: 90 CAPSULE | Refills: 3 | Status: SHIPPED | OUTPATIENT
Start: 2023-09-28

## 2023-09-28 NOTE — TELEPHONE ENCOUNTER
Patients last appointment 9/22/2023.   Patients next scheduled appointment   Future Appointments   Date Time Provider [address]   10/4/2023  1:30 PM DO NUPUR PaulsonM PAIN [facility]   12/27/2023  2:00 PM MD BENJIE Escudero [facility]

## 2023-10-04 ENCOUNTER — OFFICE VISIT (OUTPATIENT)
Dept: PAIN MANAGEMENT | Age: 88
End: 2023-10-04
Payer: MEDICARE

## 2023-10-04 VITALS
WEIGHT: 225 LBS | HEIGHT: 70 IN | TEMPERATURE: 97.2 F | DIASTOLIC BLOOD PRESSURE: 54 MMHG | HEART RATE: 69 BPM | SYSTOLIC BLOOD PRESSURE: 111 MMHG | RESPIRATION RATE: 16 BRPM | BODY MASS INDEX: 32.21 KG/M2 | OXYGEN SATURATION: 93 %

## 2023-10-04 DIAGNOSIS — M19.011 PRIMARY OSTEOARTHRITIS OF RIGHT SHOULDER: ICD-10-CM

## 2023-10-04 DIAGNOSIS — M25.511 CHRONIC RIGHT SHOULDER PAIN: ICD-10-CM

## 2023-10-04 DIAGNOSIS — G89.29 CHRONIC RIGHT SHOULDER PAIN: ICD-10-CM

## 2023-10-04 DIAGNOSIS — G89.4 CHRONIC PAIN SYNDROME: Primary | ICD-10-CM

## 2023-10-04 PROCEDURE — G8484 FLU IMMUNIZE NO ADMIN: HCPCS | Performed by: PAIN MEDICINE

## 2023-10-04 PROCEDURE — G8417 CALC BMI ABV UP PARAM F/U: HCPCS | Performed by: PAIN MEDICINE

## 2023-10-04 PROCEDURE — 1123F ACP DISCUSS/DSCN MKR DOCD: CPT | Performed by: PAIN MEDICINE

## 2023-10-04 PROCEDURE — 1036F TOBACCO NON-USER: CPT | Performed by: PAIN MEDICINE

## 2023-10-04 PROCEDURE — 99213 OFFICE O/P EST LOW 20 MIN: CPT | Performed by: PAIN MEDICINE

## 2023-10-04 PROCEDURE — G8427 DOCREV CUR MEDS BY ELIG CLIN: HCPCS | Performed by: PAIN MEDICINE

## 2023-10-04 NOTE — PROGRESS NOTES
Marivel Tabor presents to the [facility] on 10/4/2023. Laron Chase is complaining of pain right shoulder bilateral leg. The pain is intermittent. The pain is described as sharp. Pain is rated on his best day at a 0, on his worst day at a 6, and on average at a 4 on the VAS scale. Any procedures since your last visit: No    Pacemaker or defibrillator: No.    He is not on NSAIDS and is  on anticoagulation medications to include ASA and is managed by Roslyn Drake MD  .     Medication Contract and Consent for Opioid Use Documents Filed       Patient Documents       Type of Document Status Date Received Received By Description    Medication Contract Received 3/6/2020 10:13 AM Chayo TINAJERO med contract    Medication Contract Received 6/7/2022  4:29 PM Cory Connelly PAIN MED CONTRACT                    There were no vitals taken for this visit. No LMP for male patient.
Socioeconomic History    Marital status:      Spouse name: Not on file    Number of children: 3    Years of education: Not on file    Highest education level: Not on file   Occupational History    Not on file   Tobacco Use    Smoking status: Former     Packs/day: 1.00     Years: 40.00     Additional pack years: 0.00     Total pack years: 40.00     Types: Cigarettes     Start date: 5     Quit date: 10/1/1994     Years since quittin.0    Smokeless tobacco: Former     Types: Chew     Quit date:    Vaping Use    Vaping Use: Never used   Substance and Sexual Activity    Alcohol use: Yes     Alcohol/week: 1.0 standard drink of alcohol     Types: 1 Cans of beer per week     Comment: rare    Drug use: No    Sexual activity: Not on file   Other Topics Concern    Not on file   Social History Narrative    ** Merged History Encounter **         Problem List: History of polyp of colon, Disorder of bone density and structure, unspecified,  Osteochondropathy, Carcinoma in situ of prostate, Aneurysm of thoracic aorta, Anemia, Coronary  arteriosclerosis, Cobalamin deficiency, Multiple closed fractur    es of pelvis with disruption of pelvic [place],  Hyperlipidemia, Essential hypertension  Health Maintenance:  Bone Density Test Screening - (3/5/2019)  Bone Density Scan - (2015)  Influenza Vaccination - (10/7/2016)  Colonoscopy - (4/3/2012)  [facility]    ed on Home Safety - (2015)  Colonoscopy Screening - (4/3/2012)  US Liver - 08  Medical Problems:  Coronary Artery Disease (CAD), Hypertension, Hyperlipidemia, Prostate Cancer  Surgical Hx:  Prostatectomy, Coronary Artery Bypass Graft (CABG)          FH: Father:  . (Hx)  Mother:  . (Hx)    SH: Marital:  - retired . Personal Habits: Cigarette Use: former cigarette smoker, Nonsmoker. Alcohol: Occasionally  consumes alcohol.        Social Determinants of Health     Financial Resource Strain: Low Risk  (3/21/2023)    Overall Financial

## 2023-10-30 ENCOUNTER — TELEPHONE (OUTPATIENT)
Dept: PAIN MANAGEMENT | Age: 88
End: 2023-10-30

## 2023-10-30 NOTE — TELEPHONE ENCOUNTER
Tabitha Armenta's wife called stated pebbles is requesting a shoulder injection. His last one was done 7/5/2023. His wife is scheduled for an office visit on 11/14/2023 at 3:30. They would like to know if he can come at the same time to get a shoulder injection. Please advise.

## 2023-11-07 DIAGNOSIS — M25.511 CHRONIC RIGHT SHOULDER PAIN: ICD-10-CM

## 2023-11-07 DIAGNOSIS — M79.10 MYALGIA: ICD-10-CM

## 2023-11-07 DIAGNOSIS — G89.29 CHRONIC RIGHT SHOULDER PAIN: ICD-10-CM

## 2023-11-07 DIAGNOSIS — M19.011 PRIMARY OSTEOARTHRITIS OF RIGHT SHOULDER: ICD-10-CM

## 2023-11-07 DIAGNOSIS — C61 PROSTATE CANCER METASTATIC TO BONE (HCC): ICD-10-CM

## 2023-11-07 DIAGNOSIS — M47.817 LUMBOSACRAL SPONDYLOSIS WITHOUT MYELOPATHY: ICD-10-CM

## 2023-11-07 DIAGNOSIS — G89.4 CHRONIC PAIN SYNDROME: Primary | ICD-10-CM

## 2023-11-07 DIAGNOSIS — M43.16 SPONDYLOLISTHESIS OF LUMBAR REGION: ICD-10-CM

## 2023-11-07 DIAGNOSIS — G89.29 CHRONIC BILATERAL LOW BACK PAIN WITHOUT SCIATICA: ICD-10-CM

## 2023-11-07 DIAGNOSIS — M54.50 CHRONIC BILATERAL LOW BACK PAIN WITHOUT SCIATICA: ICD-10-CM

## 2023-11-07 DIAGNOSIS — C79.51 PROSTATE CANCER METASTATIC TO BONE (HCC): ICD-10-CM

## 2023-11-07 RX ORDER — PREGABALIN 25 MG/1
25 CAPSULE ORAL 3 TIMES DAILY
Qty: 90 CAPSULE | Refills: 2 | Status: SHIPPED | OUTPATIENT
Start: 2023-11-07 | End: 2024-02-05

## 2023-11-14 ENCOUNTER — PROCEDURE VISIT (OUTPATIENT)
Dept: PAIN MANAGEMENT | Age: 88
End: 2023-11-14
Payer: MEDICARE

## 2023-11-14 VITALS
OXYGEN SATURATION: 97 % | HEIGHT: 70 IN | DIASTOLIC BLOOD PRESSURE: 52 MMHG | RESPIRATION RATE: 16 BRPM | BODY MASS INDEX: 32.21 KG/M2 | WEIGHT: 225 LBS | HEART RATE: 84 BPM | SYSTOLIC BLOOD PRESSURE: 127 MMHG | TEMPERATURE: 97 F

## 2023-11-14 DIAGNOSIS — M19.011 PRIMARY OSTEOARTHRITIS OF RIGHT SHOULDER: ICD-10-CM

## 2023-11-14 DIAGNOSIS — M25.511 CHRONIC RIGHT SHOULDER PAIN: Primary | ICD-10-CM

## 2023-11-14 DIAGNOSIS — G89.29 CHRONIC RIGHT SHOULDER PAIN: Primary | ICD-10-CM

## 2023-11-14 PROCEDURE — 20610 DRAIN/INJ JOINT/BURSA W/O US: CPT | Performed by: PAIN MEDICINE

## 2023-11-14 RX ORDER — BUPIVACAINE HYDROCHLORIDE 2.5 MG/ML
3 INJECTION, SOLUTION INFILTRATION; PERINEURAL ONCE
Status: COMPLETED | OUTPATIENT
Start: 2023-11-14 | End: 2023-11-14

## 2023-11-14 RX ORDER — METHYLPREDNISOLONE ACETATE 40 MG/ML
40 INJECTION, SUSPENSION INTRA-ARTICULAR; INTRALESIONAL; INTRAMUSCULAR; SOFT TISSUE ONCE
Status: COMPLETED | OUTPATIENT
Start: 2023-11-14 | End: 2023-11-14

## 2023-11-14 RX ADMIN — BUPIVACAINE HYDROCHLORIDE 3 ML: 2.5 INJECTION, SOLUTION INFILTRATION; PERINEURAL at 16:03

## 2023-11-14 RX ADMIN — METHYLPREDNISOLONE ACETATE 40 MG: 40 INJECTION, SUSPENSION INTRA-ARTICULAR; INTRALESIONAL; INTRAMUSCULAR; INTRASYNOVIAL; SOFT TISSUE at 16:04

## 2023-11-14 NOTE — PROGRESS NOTES
2023    Patient: Genaro Armenta  :  1935  Age:  82 y.o. Sex:  male     PRE-OPERATIVE DIAGNOSIS: Right shoulder pain, osteoarthritis. POST-OPERATIVE DIAGNOSIS: Same. PROCEDURE PERFORMED: Right shoulder steroid injection (glenohumeral joint). SURGEON:   NAVARRO Perdomo. ANESTHESIA: local    ESTIMATED BLOOD LOSS: None. BRIEF HISTORY: Genaro Donahue comes in today for Right  Shoulder steroid injection. After discussing the potential risks and benefits of the procedure with the patient. Brianna Munguia did request that we proceed. A complete History & Physical was reviewed and it is unchanged. DESCRIPTION OF PROCEDURE:   The patient was placed in a seated position. The area of the Right  shoulder was prepped with chloraprep and draped in a sterile manner. Using the posterior approach, a 25 gauge 1 1/2 inch  needle was advanced  In anterolateral projection into the joint capsule. After negative aspiration a solution of 0.25 % marcaine 3 cc and 40 mg DepoMedrol was injected easily without complications. The needle was then removed and Band-Aid applied. Disposition the patient tolerated the procedure well and there were no complications. Brianna Munguia will follow up in our [facility] as scheduled. He was encouraged to call with questions, concerns or if worsening of symptoms occurs.     Raheel Patten DO

## 2024-01-03 PROCEDURE — 99283 EMERGENCY DEPT VISIT LOW MDM: CPT

## 2024-01-04 ENCOUNTER — APPOINTMENT (OUTPATIENT)
Dept: GENERAL RADIOLOGY | Age: 89
End: 2024-01-04
Payer: MEDICARE

## 2024-01-04 ENCOUNTER — HOSPITAL ENCOUNTER (EMERGENCY)
Age: 89
Discharge: LEFT BEFORE DISCHARGE | End: 2024-01-04
Payer: MEDICARE

## 2024-01-04 VITALS
BODY MASS INDEX: 32.21 KG/M2 | TEMPERATURE: 100 F | SYSTOLIC BLOOD PRESSURE: 145 MMHG | HEIGHT: 70 IN | HEART RATE: 91 BPM | RESPIRATION RATE: 14 BRPM | WEIGHT: 225 LBS | DIASTOLIC BLOOD PRESSURE: 77 MMHG | OXYGEN SATURATION: 97 %

## 2024-01-04 PROCEDURE — 74019 RADEX ABDOMEN 2 VIEWS: CPT

## 2024-01-04 ASSESSMENT — PAIN - FUNCTIONAL ASSESSMENT: PAIN_FUNCTIONAL_ASSESSMENT: 0-10

## 2024-01-04 ASSESSMENT — PAIN SCALES - GENERAL: PAINLEVEL_OUTOF10: 5

## 2024-01-04 ASSESSMENT — PAIN DESCRIPTION - LOCATION: LOCATION: ABDOMEN;BACK

## 2024-01-04 NOTE — ED NOTES
Department of Emergency Medicine  FIRST PROVIDER TRIAGE NOTE             Independent MLP           1/4/24  1:17 AM EST    Date of Encounter: 1/4/24   MRN: [id number]      HPI: Alejandro Armenta is a 88 y.o. male who presents to the ED for Abdominal Pain, Back Pain, and Constipation   PT presents to ED with lower abdominal pain x 10 days, mild when starting, had low back pain at the time also. Pain is getting worse and no back is hurting in the lower thoracic region.  He denies sweating, nausea, vomiting, diarrhea. He has been constipated. He took pepto bismol which did not help, and also turned his stool black.  He has hx of cholecystectomy. Denies chest pain, shortnessof breath, lower extremity pain or edema.     ROS: Negative for cp, sob, fever, cough, vomiting, diarrhea, urinary complaints, or rash.    PE: Gen Appearance/Constitutional: alert  CV: regular rate  Pulm: CTA bilat  GI: tender focal area at left lower quadrant near midline.     Initial Plan of Care: All treatment areas with department are currently occupied. Plan to order/Initiate the following while awaiting opening in ED: labs and imaging studies.  Initiate Treatment-Testing, Proceed toTreatment Area When Bed Available for ED Attending/MLP to Continue Care    Electronically signed by Deloris Chase PA-C   DD: 1/4/24

## 2024-01-04 NOTE — ED NOTES
[id number]- Nursing staff brought patient after triage to [facility].  This provider ordered blood work due to patient states he has lower abdominal pain with constipation for the last 10 days.  Denies any chest pain, lightheaded, dizziness, shortness of breath or upper back pain.    0915-patient's wife came up to triage and states that they are leaving that her  does not want to wait any longer.  Discussed with patient's wife the risks of signing out AMA or eloping including death.  She states verbal understanding.  Instructed her that they can come back at any time.  Instructed that they cannot rule out bowel obstruction versus any acute abdominal diagnosis due to not having blood work or CT scan done.  She states verbal understanding again.    This patient has chosen to leave against medical advice.  I have personally explained to them that choosing to do so may result in permanent bodily harm or death.  I discussed at length that without further evaluation and monitoring there may be unforeseen circumstances and deterioration resulting in permanent bodily harm or death as a result of their choice.    They are alert, oriented, and competent at this time.  They state that they are aware of the serious risks as explained, but they continue to wish to leave against medical advice.    In light of their decision to leave against medical advice, follow-up has been arranged and they are aware of the importance of following up as instructed.  They have been advised that they should return to the ED immediately if they change their mind at any time, or if their condition begins to change or worsen.          Mian Winkler APRN - [place]  01/04/24 [id number]

## 2024-01-06 SDOH — HEALTH STABILITY: PHYSICAL HEALTH: ON AVERAGE, HOW MANY MINUTES DO YOU ENGAGE IN EXERCISE AT THIS LEVEL?: 20 MIN

## 2024-01-06 SDOH — HEALTH STABILITY: PHYSICAL HEALTH: ON AVERAGE, HOW MANY DAYS PER WEEK DO YOU ENGAGE IN MODERATE TO STRENUOUS EXERCISE (LIKE A BRISK WALK)?: 1 DAY

## 2024-01-06 ASSESSMENT — LIFESTYLE VARIABLES
HOW MANY STANDARD DRINKS CONTAINING ALCOHOL DO YOU HAVE ON A TYPICAL DAY: 1
HOW MANY STANDARD DRINKS CONTAINING ALCOHOL DO YOU HAVE ON A TYPICAL DAY: 1 OR 2
HOW OFTEN DO YOU HAVE A DRINK CONTAINING ALCOHOL: 2
HOW OFTEN DO YOU HAVE SIX OR MORE DRINKS ON ONE OCCASION: 1
HOW OFTEN DO YOU HAVE A DRINK CONTAINING ALCOHOL: MONTHLY OR LESS

## 2024-01-06 ASSESSMENT — PATIENT HEALTH QUESTIONNAIRE - PHQ9
SUM OF ALL RESPONSES TO PHQ9 QUESTIONS 1 & 2: 0
SUM OF ALL RESPONSES TO PHQ QUESTIONS 1-9: 0
SUM OF ALL RESPONSES TO PHQ QUESTIONS 1-9: 0
2. FEELING DOWN, DEPRESSED OR HOPELESS: 0
SUM OF ALL RESPONSES TO PHQ QUESTIONS 1-9: 0
SUM OF ALL RESPONSES TO PHQ QUESTIONS 1-9: 0
1. LITTLE INTEREST OR PLEASURE IN DOING THINGS: 0

## 2024-01-07 ENCOUNTER — APPOINTMENT (OUTPATIENT)
Dept: CT IMAGING | Age: 89
End: 2024-01-07
Payer: MEDICARE

## 2024-01-07 ENCOUNTER — HOSPITAL ENCOUNTER (EMERGENCY)
Age: 89
Discharge: HOME | End: 2024-01-07
Attending: EMERGENCY MEDICINE
Payer: MEDICARE

## 2024-01-07 VITALS
TEMPERATURE: 97.9 F | HEART RATE: 77 BPM | OXYGEN SATURATION: 95 % | RESPIRATION RATE: 18 BRPM | DIASTOLIC BLOOD PRESSURE: 73 MMHG | SYSTOLIC BLOOD PRESSURE: 154 MMHG

## 2024-01-07 DIAGNOSIS — C79.51 METASTATIC CANCER TO BONE (HCC): ICD-10-CM

## 2024-01-07 DIAGNOSIS — R10.84 GENERALIZED ABDOMINAL PAIN: ICD-10-CM

## 2024-01-07 DIAGNOSIS — R10.9 ABDOMINAL PAIN, UNSPECIFIED ABDOMINAL LOCATION: Primary | ICD-10-CM

## 2024-01-07 LAB
ALBUMIN SERPL-MCNC: 4.2 G/DL (ref 3.5–5.2)
ALP SERPL-CCNC: 52 U/L (ref 40–129)
ALT SERPL-CCNC: 17 U/L (ref 0–40)
AMYLASE SERPL-CCNC: 32 U/L (ref 20–100)
ANION GAP SERPL CALCULATED.3IONS-SCNC: 12 MMOL/L (ref 7–16)
AST SERPL-CCNC: 20 U/L (ref 0–39)
BILIRUB SERPL-MCNC: 0.4 MG/DL (ref 0–1.2)
BILIRUB UR QL STRIP: NEGATIVE
BUN SERPL-MCNC: 20 MG/DL (ref 6–23)
CALCIUM SERPL-MCNC: 9.4 MG/DL (ref 8.6–10.2)
CHLORIDE SERPL-SCNC: 103 MMOL/L (ref 98–107)
CLARITY UR: CLEAR
CO2 SERPL-SCNC: 26 MMOL/L (ref 22–29)
COLOR UR: YELLOW
CREAT SERPL-MCNC: 1.3 MG/DL (ref 0.7–1.2)
ERYTHROCYTE [DISTWIDTH] IN BLOOD BY AUTOMATED COUNT: 14.3 % (ref 11.5–15)
GFR SERPL CREATININE-BSD FRML MDRD: 55 ML/MIN/1.73M2
GLUCOSE SERPL-MCNC: 117 MG/DL (ref 74–99)
GLUCOSE UR STRIP-MCNC: NEGATIVE MG/DL
HCT VFR BLD AUTO: 34.1 % (ref 37–54)
HGB BLD-MCNC: 11.6 G/DL (ref 12.5–16.5)
HGB UR QL STRIP.AUTO: NEGATIVE
INR PPP: 1.1
KETONES UR STRIP-MCNC: NEGATIVE MG/DL
LACTATE BLDV-SCNC: 1.6 MMOL/L (ref 0.5–2.2)
LEUKOCYTE ESTERASE UR QL STRIP: NEGATIVE
LIPASE SERPL-CCNC: 25 U/L (ref 13–60)
MAGNESIUM SERPL-MCNC: 1.7 MG/DL (ref 1.6–2.6)
MCH RBC QN AUTO: 29.3 PG (ref 26–35)
MCHC RBC AUTO-ENTMCNC: 34 G/DL (ref 32–34.5)
MCV RBC AUTO: 86.1 FL (ref 80–99.9)
NITRITE UR QL STRIP: NEGATIVE
PARTIAL THROMBOPLASTIN TIME: 30.2 SEC (ref 24.5–35.1)
PH UR STRIP: 5 [PH] (ref 5–9)
PLATELET # BLD AUTO: 87 K/UL (ref 130–450)
PLATELET CONFIRMATION: NORMAL
PMV BLD AUTO: 12.3 FL (ref 7–12)
POTASSIUM SERPL-SCNC: 4.2 MMOL/L (ref 3.5–5)
PROT SERPL-MCNC: 7 G/DL (ref 6.4–8.3)
PROT UR STRIP-MCNC: NEGATIVE MG/DL
PROTHROMBIN TIME: 12.3 SEC (ref 9.3–12.4)
RBC # BLD AUTO: 3.96 M/UL (ref 3.8–5.8)
RBC #/AREA URNS HPF: ABNORMAL /HPF
SODIUM SERPL-SCNC: 141 MMOL/L (ref 132–146)
SP GR UR STRIP: >1.03 (ref 1–1.03)
UROBILINOGEN UR STRIP-ACNC: 0.2 EU/DL (ref 0–1)
WBC #/AREA URNS HPF: ABNORMAL /HPF
WBC OTHER # BLD: 5.6 K/UL (ref 4.5–11.5)

## 2024-01-07 PROCEDURE — 80053 COMPREHEN METABOLIC PANEL: CPT

## 2024-01-07 PROCEDURE — 96375 TX/PRO/DX INJ NEW DRUG ADDON: CPT

## 2024-01-07 PROCEDURE — 85610 PROTHROMBIN TIME: CPT

## 2024-01-07 PROCEDURE — 83735 ASSAY OF MAGNESIUM: CPT

## 2024-01-07 PROCEDURE — 82150 ASSAY OF AMYLASE: CPT

## 2024-01-07 PROCEDURE — 6360000002 HC RX W HCPCS: Performed by: EMERGENCY MEDICINE

## 2024-01-07 PROCEDURE — A4216 STERILE WATER/SALINE, 10 ML: HCPCS | Performed by: EMERGENCY MEDICINE

## 2024-01-07 PROCEDURE — 2500000003 HC RX 250 WO HCPCS: Performed by: EMERGENCY MEDICINE

## 2024-01-07 PROCEDURE — 96361 HYDRATE IV INFUSION ADD-ON: CPT

## 2024-01-07 PROCEDURE — 74176 CT ABD & PELVIS W/O CONTRAST: CPT

## 2024-01-07 PROCEDURE — 85027 COMPLETE CBC AUTOMATED: CPT

## 2024-01-07 PROCEDURE — 85730 THROMBOPLASTIN TIME PARTIAL: CPT

## 2024-01-07 PROCEDURE — 99284 EMERGENCY DEPT VISIT MOD MDM: CPT

## 2024-01-07 PROCEDURE — 2580000003 HC RX 258: Performed by: EMERGENCY MEDICINE

## 2024-01-07 PROCEDURE — 83605 ASSAY OF LACTIC ACID: CPT

## 2024-01-07 PROCEDURE — 81001 URINALYSIS AUTO W/SCOPE: CPT

## 2024-01-07 PROCEDURE — 96374 THER/PROPH/DIAG INJ IV PUSH: CPT

## 2024-01-07 PROCEDURE — 71250 CT THORAX DX C-: CPT

## 2024-01-07 PROCEDURE — 83690 ASSAY OF LIPASE: CPT

## 2024-01-07 RX ORDER — KETOROLAC TROMETHAMINE 30 MG/ML
15 INJECTION, SOLUTION INTRAMUSCULAR; INTRAVENOUS ONCE
Status: COMPLETED | OUTPATIENT
Start: 2024-01-07 | End: 2024-01-07

## 2024-01-07 RX ORDER — OXYCODONE HYDROCHLORIDE AND ACETAMINOPHEN 5; 325 MG/1; MG/1
1 TABLET ORAL EVERY 6 HOURS PRN
Qty: 12 TABLET | Refills: 0 | Status: SHIPPED | OUTPATIENT
Start: 2024-01-07 | End: 2024-01-14

## 2024-01-07 RX ORDER — ONDANSETRON 2 MG/ML
4 INJECTION INTRAMUSCULAR; INTRAVENOUS ONCE
Status: COMPLETED | OUTPATIENT
Start: 2024-01-07 | End: 2024-01-07

## 2024-01-07 RX ORDER — FENTANYL CITRATE 50 UG/ML
25 INJECTION, SOLUTION INTRAMUSCULAR; INTRAVENOUS ONCE
Status: COMPLETED | OUTPATIENT
Start: 2024-01-07 | End: 2024-01-07

## 2024-01-07 RX ORDER — 0.9 % SODIUM CHLORIDE 0.9 %
500 INTRAVENOUS SOLUTION INTRAVENOUS ONCE
Status: COMPLETED | OUTPATIENT
Start: 2024-01-07 | End: 2024-01-07

## 2024-01-07 RX ORDER — DOCUSATE SODIUM 100 MG/1
100 CAPSULE, LIQUID FILLED ORAL 2 TIMES DAILY
Qty: 60 CAPSULE | Refills: 0 | Status: SHIPPED | OUTPATIENT
Start: 2024-01-07 | End: 2024-02-06

## 2024-01-07 RX ORDER — HYDROMORPHONE HYDROCHLORIDE 1 MG/ML
0.5 INJECTION, SOLUTION INTRAMUSCULAR; INTRAVENOUS; SUBCUTANEOUS ONCE
Status: DISCONTINUED | OUTPATIENT
Start: 2024-01-07 | End: 2024-01-07 | Stop reason: HOSPADM

## 2024-01-07 RX ADMIN — FAMOTIDINE 20 MG: 10 INJECTION, SOLUTION INTRAVENOUS at 01:20

## 2024-01-07 RX ADMIN — SODIUM CHLORIDE 500 ML: 9 INJECTION, SOLUTION INTRAVENOUS at 01:20

## 2024-01-07 RX ADMIN — KETOROLAC TROMETHAMINE 15 MG: 30 INJECTION, SOLUTION INTRAMUSCULAR; INTRAVENOUS at 02:16

## 2024-01-07 RX ADMIN — ONDANSETRON 4 MG: 2 INJECTION INTRAMUSCULAR; INTRAVENOUS at 01:20

## 2024-01-07 RX ADMIN — FENTANYL CITRATE 25 MCG: 50 INJECTION INTRAMUSCULAR; INTRAVENOUS at 01:24

## 2024-01-07 ASSESSMENT — PAIN DESCRIPTION - ORIENTATION
ORIENTATION: LOWER
ORIENTATION: LOWER
ORIENTATION: MID;LOWER

## 2024-01-07 ASSESSMENT — PAIN SCALES - GENERAL
PAINLEVEL_OUTOF10: 5
PAINLEVEL_OUTOF10: 5
PAINLEVEL_OUTOF10: 10

## 2024-01-07 ASSESSMENT — PAIN DESCRIPTION - LOCATION
LOCATION: ABDOMEN
LOCATION: ABDOMEN
LOCATION: BACK

## 2024-01-07 ASSESSMENT — PAIN DESCRIPTION - PAIN TYPE: TYPE: CHRONIC PAIN

## 2024-01-07 ASSESSMENT — PAIN DESCRIPTION - DESCRIPTORS
DESCRIPTORS: ACHING;DISCOMFORT
DESCRIPTORS: SHARP;CRAMPING

## 2024-01-07 NOTE — DISCHARGE INSTRUCTIONS
Any new neurodeficits fevers vomiting any weakness in her arms or legs or change in bowel or bladder continence heme-onc doctor soon as possible

## 2024-01-07 NOTE — ED PROVIDER NOTES
HPI:  1/7/24,   Time: 2:19 AM DEO Armenta is a 88 y.o. male presenting to the ED lower abdominal pain, beginning weeks ago.  The complaint has been persistent, mild in severity, and worsened by nothing.  Has not been vomiting and having normal bowel movements has been urinating normally please note that the patient has a history of prostate cancer and has has had bilateral orchiectomy's as well as he had lung cancer as well remotely not recently is not on any chemo at this time no fevers no vomiting he has no new neurodeficits getting a CT of the chest abdomen and pelvis we will evaluate for mets either in his spine or abdomen or chest or chest apparently he does have known metastasis patient denies any chest pain or trouble breathing    ROS:   Pertinent positives and negatives are stated within HPI, all other systems reviewed and are negative.  --------------------------------------------- PAST HISTORY ---------------------------------------------  Past Medical History:  has a past medical history of CA prostate, adenoca (HCC), CAD (coronary artery disease), Cancer (HCC), Chronic bilateral low back pain without sciatica, Cobalamin deficiency, Glaucoma, History of blood transfusion, Hyperlipidemia, Hypertension, Malignant neoplasm of lower lobe of right lung (HCC), Osteoarthritis, Osteochondropathy, Osteopenia, Pain in lower limb, Peripheral venous insufficiency, PONV (postoperative nausea and vomiting), Prolonged emergence from general anesthesia, Prostate cancer metastatic to bone (HCC), and PVD (peripheral vascular disease) with claudication (HCC).    Past Surgical History:  has a past surgical history that includes Cholecystectomy; hernia repair (1993); Femur fracture surgery (1981); lymph node dissection (10/2007); Prostate surgery (10/31/2007); Total knee arthroplasty (2010); Coronary artery bypass graft (12/1996); Acetabulum fracture surgery (03/24/2011); Total hip arthroplasty (11/30/2011);

## 2024-01-09 ENCOUNTER — OFFICE VISIT (OUTPATIENT)
Dept: PRIMARY CARE CLINIC | Age: 89
End: 2024-01-09
Payer: MEDICARE

## 2024-01-09 ENCOUNTER — OFFICE VISIT (OUTPATIENT)
Dept: PAIN MANAGEMENT | Age: 89
End: 2024-01-09
Payer: MEDICARE

## 2024-01-09 VITALS
HEART RATE: 76 BPM | TEMPERATURE: 97.2 F | OXYGEN SATURATION: 95 % | SYSTOLIC BLOOD PRESSURE: 134 MMHG | BODY MASS INDEX: 31.78 KG/M2 | RESPIRATION RATE: 16 BRPM | WEIGHT: 222 LBS | DIASTOLIC BLOOD PRESSURE: 77 MMHG | HEIGHT: 70 IN

## 2024-01-09 VITALS
WEIGHT: 222 LBS | BODY MASS INDEX: 31.78 KG/M2 | HEIGHT: 70 IN | DIASTOLIC BLOOD PRESSURE: 64 MMHG | DIASTOLIC BLOOD PRESSURE: 50 MMHG | HEART RATE: 77 BPM | OXYGEN SATURATION: 94 % | TEMPERATURE: 97.1 F | HEIGHT: 70 IN | SYSTOLIC BLOOD PRESSURE: 138 MMHG | WEIGHT: 222 LBS | BODY MASS INDEX: 31.78 KG/M2 | SYSTOLIC BLOOD PRESSURE: 118 MMHG | OXYGEN SATURATION: 94 % | TEMPERATURE: 97.1 F | HEART RATE: 77 BPM

## 2024-01-09 DIAGNOSIS — C79.51 PROSTATE CANCER METASTATIC TO BONE (HCC): ICD-10-CM

## 2024-01-09 DIAGNOSIS — E78.2 MIXED HYPERLIPIDEMIA: ICD-10-CM

## 2024-01-09 DIAGNOSIS — M54.6 PAIN IN THORACIC SPINE: ICD-10-CM

## 2024-01-09 DIAGNOSIS — E53.8 VITAMIN B12 DEFICIENCY: ICD-10-CM

## 2024-01-09 DIAGNOSIS — I71.20 THORACIC AORTIC ANEURYSM WITHOUT RUPTURE, UNSPECIFIED PART (HCC): ICD-10-CM

## 2024-01-09 DIAGNOSIS — R73.9 HYPERGLYCEMIA: ICD-10-CM

## 2024-01-09 DIAGNOSIS — D61.818 PANCYTOPENIA (HCC): ICD-10-CM

## 2024-01-09 DIAGNOSIS — R10.84 PAIN, ABDOMINAL, GENERALIZED: Primary | ICD-10-CM

## 2024-01-09 DIAGNOSIS — Z12.11 COLON CANCER SCREENING: ICD-10-CM

## 2024-01-09 DIAGNOSIS — R79.0 LOW MAGNESIUM LEVEL: ICD-10-CM

## 2024-01-09 DIAGNOSIS — I73.9 PVD (PERIPHERAL VASCULAR DISEASE) WITH CLAUDICATION (HCC): ICD-10-CM

## 2024-01-09 DIAGNOSIS — E55.9 VITAMIN D DEFICIENCY: ICD-10-CM

## 2024-01-09 DIAGNOSIS — G89.4 CHRONIC PAIN SYNDROME: Primary | ICD-10-CM

## 2024-01-09 DIAGNOSIS — C61 PROSTATE CANCER METASTATIC TO BONE (HCC): ICD-10-CM

## 2024-01-09 DIAGNOSIS — M54.50 CHRONIC BILATERAL LOW BACK PAIN WITHOUT SCIATICA: ICD-10-CM

## 2024-01-09 DIAGNOSIS — Z00.00 MEDICARE ANNUAL WELLNESS VISIT, SUBSEQUENT: Primary | ICD-10-CM

## 2024-01-09 DIAGNOSIS — C34.91 ADENOCARCINOMA OF RIGHT LUNG (HCC): ICD-10-CM

## 2024-01-09 DIAGNOSIS — E03.2 HYPOTHYROIDISM DUE TO MEDICATION: ICD-10-CM

## 2024-01-09 DIAGNOSIS — M43.16 SPONDYLOLISTHESIS OF LUMBAR REGION: ICD-10-CM

## 2024-01-09 DIAGNOSIS — M54.14 THORACIC RADICULOPATHY: ICD-10-CM

## 2024-01-09 DIAGNOSIS — C79.51 METASTATIC CANCER TO BONE (HCC): ICD-10-CM

## 2024-01-09 DIAGNOSIS — I25.10 CORONARY ARTERY DISEASE INVOLVING NATIVE CORONARY ARTERY OF NATIVE HEART WITHOUT ANGINA PECTORIS: ICD-10-CM

## 2024-01-09 DIAGNOSIS — G89.29 CHRONIC BILATERAL LOW BACK PAIN WITHOUT SCIATICA: ICD-10-CM

## 2024-01-09 DIAGNOSIS — N18.31 STAGE 3A CHRONIC KIDNEY DISEASE (HCC): ICD-10-CM

## 2024-01-09 DIAGNOSIS — I67.1 CEREBRAL ANEURYSM: ICD-10-CM

## 2024-01-09 DIAGNOSIS — I10 ESSENTIAL HYPERTENSION: ICD-10-CM

## 2024-01-09 PROBLEM — D69.6 THROMBOCYTOPENIA, UNSPECIFIED (HCC): Status: RESOLVED | Noted: 2023-09-22 | Resolved: 2024-01-09

## 2024-01-09 PROCEDURE — G8484 FLU IMMUNIZE NO ADMIN: HCPCS | Performed by: PAIN MEDICINE

## 2024-01-09 PROCEDURE — G8417 CALC BMI ABV UP PARAM F/U: HCPCS | Performed by: FAMILY MEDICINE

## 2024-01-09 PROCEDURE — 99214 OFFICE O/P EST MOD 30 MIN: CPT | Performed by: PAIN MEDICINE

## 2024-01-09 PROCEDURE — 1123F ACP DISCUSS/DSCN MKR DOCD: CPT | Performed by: FAMILY MEDICINE

## 2024-01-09 PROCEDURE — G8417 CALC BMI ABV UP PARAM F/U: HCPCS | Performed by: PAIN MEDICINE

## 2024-01-09 PROCEDURE — G8484 FLU IMMUNIZE NO ADMIN: HCPCS | Performed by: FAMILY MEDICINE

## 2024-01-09 PROCEDURE — 1036F TOBACCO NON-USER: CPT | Performed by: FAMILY MEDICINE

## 2024-01-09 PROCEDURE — 1036F TOBACCO NON-USER: CPT | Performed by: PAIN MEDICINE

## 2024-01-09 PROCEDURE — G8427 DOCREV CUR MEDS BY ELIG CLIN: HCPCS | Performed by: FAMILY MEDICINE

## 2024-01-09 PROCEDURE — G8427 DOCREV CUR MEDS BY ELIG CLIN: HCPCS | Performed by: PAIN MEDICINE

## 2024-01-09 PROCEDURE — G0439 PPPS, SUBSEQ VISIT: HCPCS | Performed by: FAMILY MEDICINE

## 2024-01-09 PROCEDURE — 1123F ACP DISCUSS/DSCN MKR DOCD: CPT | Performed by: PAIN MEDICINE

## 2024-01-09 PROCEDURE — 99213 OFFICE O/P EST LOW 20 MIN: CPT | Performed by: PAIN MEDICINE

## 2024-01-09 PROCEDURE — 99214 OFFICE O/P EST MOD 30 MIN: CPT | Performed by: FAMILY MEDICINE

## 2024-01-09 RX ORDER — APIXABAN 2.5 MG/1
TABLET, FILM COATED ORAL
COMMUNITY
Start: 2023-10-25

## 2024-01-09 NOTE — PROGRESS NOTES
Medicare Annual Wellness Visit    Alejandro Armenta is here for Medicare AW    Assessment & Plan   Assessment and Plan:   Diagnosis Orders   1. Medicare annual wellness visit, subsequent        2. Colon cancer screening  POCT Fecal Immunochemical Test (FIT)             No problem-specific Assessment & Plan notes found for this encounter.       Plan as above.  Counseled extensively and differential diagnoses relevant to above were reviewed, including serious etiologies.   Side effects and interactions of medications were reviewed.      Health maintenance issues discussed at length as above, 1/9/2024 .  Encouraged yearly physicals.          As long as symptoms steadily improve/resolve, and medical conditions follow the expected course, FU as below, as previously directed and sooner PRN.    Return for Medicare Annual Wellness Visit in 1 year.        Signs and symptoms to watch for discussed, serious signs and symptoms reviewed.  ER if any.         Jun Miller MD    Patients are advised to check with insurance company to ensure coverage and to fully understand benefits and cost prior to any testing.  This note was created with the assistance of voice recognition software.  Document was reviewed however may contain grammatical errors.  Recommendations for Preventive Services Due: see orders and patient instructions/AVS.  Recommended screening schedule for the next 5-10 years is provided to the patient in written form: see Patient Instructions/AVS.     Return for Medicare Annual Wellness Visit in 1 year.     Subjective   The following acute and/or chronic problems were also addressed today:  See other notes      Health Maintenance: Well balanced/proper diet reviewed. Counseled on vitamins/supplements.  Exercise recommendations reviewed.  Reviewed recommendations regarding RSV vaccine, Covid, had ; no vaccine and declines, counseled.  Td (Tdap) (1/14, can check with pharmacy), pneumovax (10/13), prevnar 13 (10/18) flu

## 2024-01-09 NOTE — PROGRESS NOTES
Alejandro Armenta : 1935 Sex: male  Age: 88 y.o.    Chief Complaint   Patient presents with    Abdominal Pain     Since end of December, complaints of low abdominal pain and mid back pain, t to [facility] ED 24 XR done and 24 with worsened pain, blood work and CT done.         HPI:       Presents for general follow-up and to ER follow-ups.  Was developing abdominal pain particularly later at night.  In the ER he had labs and CT    In the ER he had labs and CT and.  Also plain film x-ray, showing nonspecific bowel gas pattern.  Sclerosis left iliac bone and sacrum, CT chest abdomen pelvis showing no acute abnormality, stable right lower lobe nodule, stable scattered sclerotic lesions consistent with metastatic disease.  They are going to move the appointment up with Dr. Ovalle.  He is on chronic stool softeners.  He did feel constipated.  Once he moved his bowels he felt much better.  Feels well now.  They would like to see Dr. Schafer.  Large differential reviewed.  Blood work reviewed.  Creatinine elevated but stable, 1.4-1.3 B12 elevated glucose 107 folic acid normal magnesium around 1.9 triglyceride 191 LDL 54 HDL 50 hemoglobin A1c 5.2 TSH still elevated relatively stable 7.32 but still a normal free T41.2 Vitamin D 54 hemoglobin low but stable around 11.8 CBC otherwise grossly normal differential reviewed PSA 0.13 urinalysis overall negative        Most Recent Labs  CBC  Lab Results   Component Value Date/Time    WBC 5.6 2024 01:00 AM    WBC 4.8 2023 01:30 PM    WBC 3.9 2023 11:40 AM    RBC 3.96 2024 01:00 AM    RBC 4.00 2023 01:30 PM    RBC 3.59 2023 11:40 AM    RBC 2.49 2022 05:21 AM    RBC 2.58 2022 02:50 AM    RBC 2.68 2022 05:33 AM    HGB 11.6 2024 01:00 AM    HGB 11.8 2023 01:30 PM    HGB 11.0 2023 11:40 AM    HCT 34.1 2024 01:00 AM    HCT 35.7 2023 01:30 PM    HCT 33.1 2023 11:40 AM    MCV 86.1

## 2024-01-09 NOTE — PROGRESS NOTES
[facility]  [address] [place] [id number]    Follow up Note      Alejandro Armenta     Date of Visit:  1/9/2024    CC:  Patient presents for follow up   Chief Complaint   Patient presents with    Follow-up     Back pain        HPI:    Pain is worse  Change in quality of symptoms:yes - having T/L and lower abdominal pain.  Medication side effects:constipation and fatigue with pregabalin  Recent diagnostic testing:multiple - related to ER visit x2.  Recent interventional procedures:none.    He has been on anticoagulation medications to include ASA and has not been on herbal supplements.  He is not diabetic.     Imaging:   10/2019 rt shoulder xray -  FINDINGS: 3 views right shoulder. Intact alignment. No fracture.  Severe narrowing of the glenohumeral joint with subchondral sclerosis  and slight spurring. Mild narrowing of the acromioclavicular joint  with spurring.     IMPRESSION:  Severe osteoarthritis at the glenohumeral joint.    3/2022 lumbar MRI -     FINDINGS:   BONES/ALIGNMENT: There is normal alignment of the spine. The vertebral body   heights are maintained.  T1 and T2 hypointense signal in the anterior S1   vertebral body does not demonstrate enhancement and likely represents a   treated metastasis.  Additional metastases are seen the right lateral sacral   ala, at the level of S2, as well as in the right posterior iliac.  None   demonstrate any enhancement.       SPINAL CORD:  The conus terminates normally.       SOFT TISSUES: No paraspinal mass identified.       L1-L2: There is no significant disc protrusion, spinal canal stenosis or   neural foraminal narrowing.       L2-L3: Moderate loss of disc height with a small disc bulge.  Mild facet and   ligamentous hypertrophy.  No significant central canal or lateral recess   stenosis.  Mild-to-moderate right and mild left neural foraminal stenoses.       L3-L4: Mild loss of disc height with a small disc bulge.  Mild facet and

## 2024-01-09 NOTE — PROGRESS NOTES
Alejandro Armenta presents to the [facility] on 1/9/2024. Alejandro is complaining of pain back pain . The pain is constant. The pain is described as aching, throbbing, and shooting. Pain is rated on his best day at a 0, on his worst day at a 9, and on average at a 4 on the VAS scale. He took his last dose of Percocet last night.    Any procedures since your last visit: No,   He is not on NSAIDS and  is  on anticoagulation medications to include ASA and Eliquis and is managed by Jun Miller MD  .     Pacemaker or defibrillator: No    Medication Contract and Consent for Opioid Use Documents Filed       Patient Documents       Type of Document Status Date Received Received By Description    Medication Contract Received 3/6/2020 10:13 AM SHANTI TINAJERO med contract    Medication Contract Received 6/7/2022  4:29 PM MUKUND MEYER PAIN MED CONTRACT                       /77   Pulse 76   Temp 97.2 °F (36.2 °C) (Temporal)   Resp 16   Ht 1.778 m (5' 10\")   Wt 100.7 kg (222 lb)   SpO2 95%   BMI 31.85 kg/m²      No LMP for male patient.

## 2024-01-09 NOTE — PATIENT INSTRUCTIONS
of having heart disease. You can do lots of things to keep your heart healthy. It may not be easy, but you can change your diet, exercise more, and quit smoking. These steps really work to lower your chance of heart disease.  Follow-up care is a key part of your treatment and safety. Be sure to make and go to all appointments, and call your doctor if you are having problems. It's also a good idea to know your test results and keep a list of the medicines you take.  How can you care for yourself at home?  Diet    Use less salt when you cook and eat. This helps lower your blood pressure. Taste food before salting. Add only a little salt when you think you need it. With time, your taste buds will adjust to less salt.     Eat fewer snack items, fast foods, canned soups, and other high-salt, high-fat, processed foods.     Read food labels and try to avoid saturated and trans fats. They increase your risk of heart disease by raising cholesterol levels.     Limit the amount of solid fat-butter, margarine, and shortening-you eat. Use olive, peanut, or canola oil when you cook. Bake, broil, and steam foods instead of frying them.     Eat a variety of fruit and vegetables every day. Dark green, deep orange, red, or yellow fruits and vegetables are especially good for you. Examples include spinach, carrots, peaches, and berries.     Foods high in fiber can reduce your cholesterol and provide important vitamins and minerals. High-fiber foods include whole-grain cereals and breads, oatmeal, beans, brown rice, citrus fruits, and apples.     Eat lean proteins. Heart-healthy proteins include seafood, lean meats and poultry, eggs, beans, peas, nuts, seeds, and soy products.     Limit drinks and foods with added sugar. These include candy, desserts, and soda pop.   Lifestyle changes    If your doctor recommends it, get more exercise. Walking is a good choice. Bit by bit, increase the amount you walk every day. Try for at least 30

## 2024-01-10 ENCOUNTER — PATIENT MESSAGE (OUTPATIENT)
Dept: PRIMARY CARE CLINIC | Age: 89
End: 2024-01-10

## 2024-01-10 DIAGNOSIS — R10.84 PAIN, ABDOMINAL, GENERALIZED: Primary | ICD-10-CM

## 2024-01-10 NOTE — TELEPHONE ENCOUNTER
From: Alejandro Armenta  To: Dr. Jun Miller  Sent: 1/10/2024 12:25 PM EST  Subject: Wyatt - Gastro DR?    Hello!  We heard from [facility]. Dr Schafer does not have an appt until Feb 21st. Dr Galeas does not have an appt until Feb 1st.  Wyatt does not want to wait that long.  Please send a referral to:  [facility]  [address] [place] [address]  [phone] phone  Wyatt said he will take \"1st available\".... Since gastro problem is acute, Wyatt does not want to wait too long for attention, testing and treatment.  Good to see you again, Dr. Miller!  :) Sincerely, Wyatt + Maria Guadalupe Armenta

## 2024-01-18 ENCOUNTER — HOSPITAL ENCOUNTER (OUTPATIENT)
Dept: MRI IMAGING | Age: 89
Discharge: HOME | End: 2024-01-20
Attending: PAIN MEDICINE
Payer: MEDICARE

## 2024-01-18 DIAGNOSIS — M43.16 SPONDYLOLISTHESIS OF LUMBAR REGION: ICD-10-CM

## 2024-01-18 DIAGNOSIS — C79.51 PROSTATE CANCER METASTATIC TO BONE (HCC): ICD-10-CM

## 2024-01-18 DIAGNOSIS — M54.50 CHRONIC BILATERAL LOW BACK PAIN WITHOUT SCIATICA: ICD-10-CM

## 2024-01-18 DIAGNOSIS — G89.29 CHRONIC BILATERAL LOW BACK PAIN WITHOUT SCIATICA: ICD-10-CM

## 2024-01-18 DIAGNOSIS — M54.6 PAIN IN THORACIC SPINE: ICD-10-CM

## 2024-01-18 DIAGNOSIS — C61 PROSTATE CANCER METASTATIC TO BONE (HCC): ICD-10-CM

## 2024-01-18 PROCEDURE — A9577 INJ MULTIHANCE: HCPCS | Performed by: RADIOLOGY

## 2024-01-18 PROCEDURE — 72157 MRI CHEST SPINE W/O & W/DYE: CPT

## 2024-01-18 PROCEDURE — 6360000004 HC RX CONTRAST MEDICATION: Performed by: RADIOLOGY

## 2024-01-18 PROCEDURE — 72158 MRI LUMBAR SPINE W/O & W/DYE: CPT

## 2024-01-18 RX ADMIN — GADOBENATE DIMEGLUMINE 20 ML: 529 INJECTION, SOLUTION INTRAVENOUS at 18:24

## 2024-01-22 ENCOUNTER — TELEPHONE (OUTPATIENT)
Dept: PRIMARY CARE CLINIC | Age: 89
End: 2024-01-22

## 2024-01-22 NOTE — TELEPHONE ENCOUNTER
See if willing to set up at least a virtual visit to discuss further.  In regard to MiraLAX, as seeing GI I would recommend they check with him in regard to that part of the question.

## 2024-01-22 NOTE — TELEPHONE ENCOUNTER
Pt wife calling stating that Dr Solomon had told them that the pt should take laxatives for a few days to empty out his colon and pt wife is asking if the pt should continue with Miralax or something else.     Pt wife also states pt is interested in surgery as he is constipated frequently due to the narrowing of his colon per Dr Solomon and wants to discuss referral with you as well

## 2024-01-24 ENCOUNTER — TELEMEDICINE (OUTPATIENT)
Dept: PRIMARY CARE CLINIC | Age: 89
End: 2024-01-24
Payer: MEDICARE

## 2024-01-24 DIAGNOSIS — C34.91 ADENOCARCINOMA OF RIGHT LUNG (HCC): ICD-10-CM

## 2024-01-24 DIAGNOSIS — N18.31 STAGE 3A CHRONIC KIDNEY DISEASE (HCC): ICD-10-CM

## 2024-01-24 DIAGNOSIS — I67.1 CEREBRAL ANEURYSM: ICD-10-CM

## 2024-01-24 DIAGNOSIS — D61.818 PANCYTOPENIA (HCC): ICD-10-CM

## 2024-01-24 DIAGNOSIS — R10.84 PAIN, ABDOMINAL, GENERALIZED: ICD-10-CM

## 2024-01-24 DIAGNOSIS — I25.10 CORONARY ARTERY DISEASE INVOLVING NATIVE CORONARY ARTERY OF NATIVE HEART WITHOUT ANGINA PECTORIS: ICD-10-CM

## 2024-01-24 DIAGNOSIS — C79.51 METASTATIC CANCER TO BONE (HCC): ICD-10-CM

## 2024-01-24 DIAGNOSIS — K56.699 COLON STRICTURE (HCC): Primary | ICD-10-CM

## 2024-01-24 PROCEDURE — 1036F TOBACCO NON-USER: CPT | Performed by: FAMILY MEDICINE

## 2024-01-24 PROCEDURE — 1123F ACP DISCUSS/DSCN MKR DOCD: CPT | Performed by: FAMILY MEDICINE

## 2024-01-24 PROCEDURE — G8484 FLU IMMUNIZE NO ADMIN: HCPCS | Performed by: FAMILY MEDICINE

## 2024-01-24 PROCEDURE — G8427 DOCREV CUR MEDS BY ELIG CLIN: HCPCS | Performed by: FAMILY MEDICINE

## 2024-01-24 PROCEDURE — 99214 OFFICE O/P EST MOD 30 MIN: CPT | Performed by: FAMILY MEDICINE

## 2024-01-24 PROCEDURE — G8417 CALC BMI ABV UP PARAM F/U: HCPCS | Performed by: FAMILY MEDICINE

## 2024-01-24 RX ORDER — TIZANIDINE 2 MG/1
TABLET ORAL
COMMUNITY
Start: 2024-01-24

## 2024-01-24 RX ORDER — OXYCODONE HYDROCHLORIDE AND ACETAMINOPHEN 5; 325 MG/1; MG/1
TABLET ORAL
COMMUNITY
Start: 2024-01-15

## 2024-01-24 NOTE — PROGRESS NOTES
him on pletal, had minimal results.  Then saw Dr Bailey vascular in [place] who states he did have a femoral blockage proximal that would require bypass as well as some distal blockages that would require stenting but they felt observation most appropriate.    Did not have notable benefit from medication.  Staying conservative.  Dr. Lowe agrees with this plan.  He is comfortable with this plan at this point.  Serious signs and symptoms watch were reviewed.   No sores etc. however significantly limits his mobility     Mixed hyperlipidemia  Did very well on d.a.w. Crestor unfortunately severe myalgias on generic. He could try to preauthorize d.a.w., but he chose to go on Lipitor instead.  He was tolerating but got held after hip surgery for some reason, blood work relatively stable, resumed 8/22, overall stable, goals reviewed, risk benefits ADRs reviewed.  Counseling CoQ10 and vitamin D. .  We discussed appropriate dosing given his risk factors.  Also on Lovaza 1 twice a day, defers increase at this time.  Continue current management     Metabolic disorder  Counseled. Blood sugar had been borderline, amidst a lot of sweets, hemoglobin A1c has been excellent not interested in insulin sensitizer.  Lifestyle modification reviewed.  Micro-macrovascular complications monitor.  Monitor     Coronary artery disease involving native heart without angina pectoris  Continue per cardiology, Dr. montes.  And had a stress test January 2022 showing fixed defect.  Continue per cardiology.,  There is a risk that the Erleada could cause cardiac arrhythmia, follows with cardiology    Age-related osteoporosis without current pathological fracture  Counseled extensively. He tolerated Fosamax but discontinued after he took from 7/09 through 7/16. Morbidity/mortality related to fracture reviewed. 11/13 bone density showed improvement, 12/15 -1.3 stable, 3/19 showed L2 -2.6, hip -1.0.   bone density scan 5/21 had nearly

## 2024-01-31 ENCOUNTER — HOSPITAL ENCOUNTER (OUTPATIENT)
Dept: NUCLEAR MEDICINE | Age: 89
Discharge: HOME | End: 2024-02-02
Attending: INTERNAL MEDICINE
Payer: MEDICARE

## 2024-01-31 ENCOUNTER — HOSPITAL ENCOUNTER (OUTPATIENT)
Dept: CT IMAGING | Age: 89
Discharge: HOME | End: 2024-02-02
Attending: INTERNAL MEDICINE
Payer: MEDICARE

## 2024-01-31 DIAGNOSIS — C61 MALIGNANT NEOPLASM OF PROSTATE (HCC): ICD-10-CM

## 2024-01-31 DIAGNOSIS — C79.51 SECONDARY MALIGNANT NEOPLASM OF BONE (HCC): ICD-10-CM

## 2024-01-31 DIAGNOSIS — C34.31 MALIGNANT NEOPLASM OF LOWER LOBE, RIGHT BRONCHUS OR LUNG (HCC): ICD-10-CM

## 2024-01-31 PROCEDURE — 3430000000 HC RX DIAGNOSTIC RADIOPHARMACEUTICAL: Performed by: RADIOLOGY

## 2024-01-31 PROCEDURE — 78306 BONE IMAGING WHOLE BODY: CPT | Performed by: INTERNAL MEDICINE

## 2024-01-31 PROCEDURE — 74177 CT ABD & PELVIS W/CONTRAST: CPT

## 2024-01-31 PROCEDURE — 6360000004 HC RX CONTRAST MEDICATION: Performed by: RADIOLOGY

## 2024-01-31 PROCEDURE — A9503 TC99M MEDRONATE: HCPCS | Performed by: RADIOLOGY

## 2024-01-31 PROCEDURE — 71260 CT THORAX DX C+: CPT

## 2024-01-31 PROCEDURE — 2580000003 HC RX 258: Performed by: RADIOLOGY

## 2024-01-31 RX ORDER — SODIUM CHLORIDE 0.9 % (FLUSH) 0.9 %
10 SYRINGE (ML) INJECTION
Status: COMPLETED | OUTPATIENT
Start: 2024-01-31 | End: 2024-01-31

## 2024-01-31 RX ORDER — TC 99M MEDRONATE 20 MG/10ML
25 INJECTION, POWDER, LYOPHILIZED, FOR SOLUTION INTRAVENOUS
Status: COMPLETED | OUTPATIENT
Start: 2024-01-31 | End: 2024-01-31

## 2024-01-31 RX ADMIN — Medication 10 ML: at 10:22

## 2024-01-31 RX ADMIN — TC 99M MEDRONATE 28.8 MILLICURIE: 20 INJECTION, POWDER, LYOPHILIZED, FOR SOLUTION INTRAVENOUS at 09:00

## 2024-01-31 RX ADMIN — IOPAMIDOL 75 ML: 755 INJECTION, SOLUTION INTRAVENOUS at 10:22

## 2024-01-31 RX ADMIN — IOPAMIDOL 18 ML: 755 INJECTION, SOLUTION INTRAVENOUS at 09:21

## 2024-02-01 ENCOUNTER — PATIENT MESSAGE (OUTPATIENT)
Dept: PAIN MANAGEMENT | Age: 89
End: 2024-02-01

## 2024-02-08 NOTE — PROGRESS NOTES
[facility]  [address] [place], [place] [id number]    Follow up Note      Alejandro Armenta     Date of Visit:  2024    CC:  Patient presents for follow up   Chief Complaint   Patient presents with    Follow-up     Back pain       HPI:    Pain is better in the T-L spine, continued in the abdomen  Change in quality of symptoms:no.  Medication side effects:constipation and fatigue with pregabalin  Recent diagnostic testing:multiple   Recent interventional procedures:none.    He has been on anticoagulation medications to include ASA and has not been on herbal supplements.  He is not diabetic.     Imagin/2024 bone scan -  FINDINGS:  There is asymmetric activity along the right sacroiliac joint and the right  medial acetabulum.  There is a sclerotic bone lesion along the right  sacroiliac joint the had no increased activity on the prior PET-CT scan.  The  medial right acetabular activity is likely related to the prior fracture and  fixation.  No uptake associated with multiple other sclerotic bone lesions  seen on the prior PET-CT scan.  Foci of activity in the right shoulder and  bilateral wrists are most likely degenerative.  Photopenia associated with  bilateral knee arthroplasties.  Photopenia associated with bilateral hip  arthroplasties.     Physiologic activity within the bilateral kidneys and bladder.     IMPRESSION:  1.  Asymmetric activity along the right sacroiliac joint may be related to a  sclerotic lesion seen on the prior PET-CT scan.  There was no metabolic  activity associated associated with this lesion at that time.  No abnormal  uptake associated with multiple additional sclerotic bone lesions.    2024 MRI lumbar w/ and w/o -  FINDINGS:  BONES/ALIGNMENT: There is normal alignment of the spine. The vertebral body  heights are maintained.  No marrow edema is seen.  Hypointense lesion in the  S1 vertebral body consistent with patient's known metastatic disease.  It  does not

## 2024-02-13 ENCOUNTER — OFFICE VISIT (OUTPATIENT)
Dept: PAIN MANAGEMENT | Age: 89
End: 2024-02-13
Payer: MEDICARE

## 2024-02-13 VITALS
BODY MASS INDEX: 30.06 KG/M2 | OXYGEN SATURATION: 95 % | SYSTOLIC BLOOD PRESSURE: 145 MMHG | RESPIRATION RATE: 16 BRPM | WEIGHT: 210 LBS | TEMPERATURE: 98.2 F | HEART RATE: 55 BPM | DIASTOLIC BLOOD PRESSURE: 63 MMHG | HEIGHT: 70 IN

## 2024-02-13 DIAGNOSIS — G89.4 CHRONIC PAIN SYNDROME: Primary | ICD-10-CM

## 2024-02-13 PROCEDURE — G8427 DOCREV CUR MEDS BY ELIG CLIN: HCPCS | Performed by: PAIN MEDICINE

## 2024-02-13 PROCEDURE — G8484 FLU IMMUNIZE NO ADMIN: HCPCS | Performed by: PAIN MEDICINE

## 2024-02-13 PROCEDURE — G8417 CALC BMI ABV UP PARAM F/U: HCPCS | Performed by: PAIN MEDICINE

## 2024-02-13 PROCEDURE — 1036F TOBACCO NON-USER: CPT | Performed by: PAIN MEDICINE

## 2024-02-13 PROCEDURE — 99214 OFFICE O/P EST MOD 30 MIN: CPT | Performed by: PAIN MEDICINE

## 2024-02-13 PROCEDURE — 1123F ACP DISCUSS/DSCN MKR DOCD: CPT | Performed by: PAIN MEDICINE

## 2024-02-13 PROCEDURE — 99213 OFFICE O/P EST LOW 20 MIN: CPT | Performed by: PAIN MEDICINE

## 2024-02-13 NOTE — PROGRESS NOTES
Alejandro Armenta presents to the [facility] on 2/13/2024. Alejandro is complaining of pain back pain. The pain is intermittent. The pain is described as aching. Pain is rated on his best day at a 5, on his worst day at a 10, and on average at a 8 on the VAS scale. He took his last dose of Motrin yesterday.    Any procedures since your last visit: No    He is not on NSAIDS and  is  on anticoagulation medications to include Eliquis and is managed by .     Pacemaker or defibrillator: No         Medication Contract and Consent for Opioid Use Documents Filed       Patient Documents       Type of Document Status Date Received Received By Description    Medication Contract Received 3/6/2020 10:13 AM SHANTI TINAJERO med contract    Medication Contract Received 6/7/2022  4:29 PM MUKUND MEYER PAIN MED CONTRACT                       BP (!) 157/65   Pulse 55   Temp 98.2 °F (36.8 °C) (Temporal)   Resp 16   Ht 1.778 m (5' 10\")   Wt 95.3 kg (210 lb)   BMI 30.13 kg/m²      No LMP for male patient.

## 2024-03-19 ENCOUNTER — PATIENT MESSAGE (OUTPATIENT)
Dept: PRIMARY CARE CLINIC | Age: 89
End: 2024-03-19

## 2024-03-19 NOTE — TELEPHONE ENCOUNTER
LM for patient to call to clarify what exactly he needs from us.  Does he just need a letter regarding stopping the aspirin since he is going through [facility] for pre op testing?

## 2024-03-19 NOTE — TELEPHONE ENCOUNTER
Unfortunately, and although I want to make it as convenient for them as I can, if they are requiring true preoperative clearance that requires an appointment with me.  If it is something else that they need, let me know.  Dominick often times does their own preop clearance?  But the wording \"permission\" indicates clearance to me.  They will also have to seek clearance from the cardiologist (who may do it over the phone without seeing).  They should check with cardiologist regarding the aspirin as well

## 2024-03-19 NOTE — TELEPHONE ENCOUNTER
The pt's wife is calling back and says yes they just need a letter stating that it is okay to come off the aspirin 5 to 7 days prior to the surgery date

## 2024-03-19 NOTE — TELEPHONE ENCOUNTER
From: Alejandro Armenta  To: Dr. Jun Miller  Sent: 3/19/2024 12:56 PM EDT  Subject: Wyatt - surgery date    Hello Dr. Miller!  Wyatt has a tentative surgery date for Tuesday April 9, 2024 for a permanent colostomy bag. Preoperative tests are scheduled for Wed. April 3, 2024.  Wyatt will need to stop the low dose aspirin 5 to 7 days prior to the surgery date.  We need your written permission allowing Wyatt to stop the aspirin. And saying that you are comfortable with Wyatt undergoing surgery. If possible, could you write that note in here and I can print it out? Or have your nurse call with further information? :)  We are scheduled to see you on April 11, 2024 but those appointments might have to be rescheduled. We will know the definite date of surgery within the next few days. The preoperative test date of April 3, 2024 is already scheduled and [facility] is asking us to hand carry your permission.  Thank you very much!  Sincerely,  Wyatt + Maria Guadalupe Geovany  [phone]

## 2024-03-21 ENCOUNTER — HOSPITAL ENCOUNTER (OUTPATIENT)
Age: 89
Discharge: HOME | End: 2024-03-21
Payer: MEDICARE

## 2024-03-21 DIAGNOSIS — M43.16 SPONDYLOLISTHESIS OF LUMBAR REGION: ICD-10-CM

## 2024-03-21 DIAGNOSIS — E53.8 VITAMIN B12 DEFICIENCY: ICD-10-CM

## 2024-03-21 DIAGNOSIS — E78.2 MIXED HYPERLIPIDEMIA: ICD-10-CM

## 2024-03-21 DIAGNOSIS — G89.29 CHRONIC BILATERAL LOW BACK PAIN WITHOUT SCIATICA: ICD-10-CM

## 2024-03-21 DIAGNOSIS — E03.2 HYPOTHYROIDISM DUE TO MEDICATION: ICD-10-CM

## 2024-03-21 DIAGNOSIS — C79.51 PROSTATE CANCER METASTATIC TO BONE (HCC): ICD-10-CM

## 2024-03-21 DIAGNOSIS — R79.0 LOW MAGNESIUM LEVEL: ICD-10-CM

## 2024-03-21 DIAGNOSIS — R73.9 HYPERGLYCEMIA: ICD-10-CM

## 2024-03-21 DIAGNOSIS — E55.9 VITAMIN D DEFICIENCY: ICD-10-CM

## 2024-03-21 DIAGNOSIS — M54.6 PAIN IN THORACIC SPINE: ICD-10-CM

## 2024-03-21 DIAGNOSIS — D61.818 PANCYTOPENIA (HCC): ICD-10-CM

## 2024-03-21 DIAGNOSIS — C61 PROSTATE CANCER METASTATIC TO BONE (HCC): ICD-10-CM

## 2024-03-21 DIAGNOSIS — M54.50 CHRONIC BILATERAL LOW BACK PAIN WITHOUT SCIATICA: ICD-10-CM

## 2024-03-21 LAB
25(OH)D3 SERPL-MCNC: 62.7 NG/ML (ref 30–100)
ALBUMIN SERPL-MCNC: 4.4 G/DL (ref 3.5–5.2)
ALP SERPL-CCNC: 60 U/L (ref 40–129)
ALT SERPL-CCNC: 17 U/L (ref 0–40)
ANION GAP SERPL CALCULATED.3IONS-SCNC: 13 MMOL/L (ref 7–16)
AST SERPL-CCNC: 19 U/L (ref 0–39)
BASOPHILS # BLD: 0.03 K/UL (ref 0–0.2)
BASOPHILS NFR BLD: 1 % (ref 0–2)
BILIRUB SERPL-MCNC: 0.5 MG/DL (ref 0–1.2)
BUN SERPL-MCNC: 23 MG/DL (ref 6–23)
BUN SERPL-MCNC: 24 MG/DL (ref 6–23)
CALCIUM SERPL-MCNC: 10 MG/DL (ref 8.6–10.2)
CHLORIDE SERPL-SCNC: 104 MMOL/L (ref 98–107)
CHOLEST SERPL-MCNC: 116 MG/DL
CK SERPL-CCNC: 30 U/L (ref 20–200)
CO2 SERPL-SCNC: 26 MMOL/L (ref 22–29)
CREAT SERPL-MCNC: 1.3 MG/DL (ref 0.7–1.2)
CREAT SERPL-MCNC: 1.4 MG/DL (ref 0.7–1.2)
EOSINOPHIL # BLD: 0.13 K/UL (ref 0.05–0.5)
EOSINOPHILS RELATIVE PERCENT: 3 % (ref 0–6)
ERYTHROCYTE [DISTWIDTH] IN BLOOD BY AUTOMATED COUNT: 14.6 % (ref 11.5–15)
FOLATE SERPL-MCNC: >20 NG/ML (ref 4.8–24.2)
GFR SERPL CREATININE-BSD FRML MDRD: 50 ML/MIN/1.73M2
GFR SERPL CREATININE-BSD FRML MDRD: 52 ML/MIN/1.73M2
GLUCOSE SERPL-MCNC: 110 MG/DL (ref 74–99)
HBA1C MFR BLD: 4.8 % (ref 4–5.6)
HCT VFR BLD AUTO: 32.9 % (ref 37–54)
HDLC SERPL-MCNC: 41 MG/DL
HGB BLD-MCNC: 10.8 G/DL (ref 12.5–16.5)
IMM GRANULOCYTES # BLD AUTO: 0.03 K/UL (ref 0–0.58)
IMM GRANULOCYTES NFR BLD: 1 % (ref 0–5)
LDLC SERPL CALC-MCNC: 40 MG/DL
LYMPHOCYTES NFR BLD: 1.33 K/UL (ref 1.5–4)
LYMPHOCYTES RELATIVE PERCENT: 29 % (ref 20–42)
MAGNESIUM SERPL-MCNC: 1.8 MG/DL (ref 1.6–2.6)
MCH RBC QN AUTO: 28.3 PG (ref 26–35)
MCHC RBC AUTO-ENTMCNC: 32.8 G/DL (ref 32–34.5)
MCV RBC AUTO: 86.1 FL (ref 80–99.9)
MONOCYTES NFR BLD: 0.29 K/UL (ref 0.1–0.95)
MONOCYTES NFR BLD: 6 % (ref 2–12)
NEUTROPHILS NFR BLD: 60 % (ref 43–80)
NEUTS SEG NFR BLD: 2.73 K/UL (ref 1.8–7.3)
PLATELET # BLD AUTO: 151 K/UL (ref 130–450)
PMV BLD AUTO: 9.6 FL (ref 7–12)
POTASSIUM SERPL-SCNC: 4.1 MMOL/L (ref 3.5–5)
PROT SERPL-MCNC: 7.1 G/DL (ref 6.4–8.3)
RBC # BLD AUTO: 3.82 M/UL (ref 3.8–5.8)
SODIUM SERPL-SCNC: 143 MMOL/L (ref 132–146)
T4 FREE SERPL-MCNC: 1.2 NG/DL (ref 0.9–1.7)
TRIGL SERPL-MCNC: 175 MG/DL
TSH SERPL DL<=0.05 MIU/L-ACNC: 6.14 UIU/ML (ref 0.27–4.2)
VIT B12 SERPL-MCNC: 1375 PG/ML (ref 211–946)
VLDLC SERPL CALC-MCNC: 35 MG/DL
WBC OTHER # BLD: 4.5 K/UL (ref 4.5–11.5)

## 2024-03-21 PROCEDURE — 80053 COMPREHEN METABOLIC PANEL: CPT

## 2024-03-21 PROCEDURE — 82607 VITAMIN B-12: CPT

## 2024-03-21 PROCEDURE — 80061 LIPID PANEL: CPT

## 2024-03-21 PROCEDURE — 85025 COMPLETE CBC W/AUTO DIFF WBC: CPT

## 2024-03-21 PROCEDURE — 82550 ASSAY OF CK (CPK): CPT

## 2024-03-21 PROCEDURE — 36415 COLL VENOUS BLD VENIPUNCTURE: CPT

## 2024-03-21 PROCEDURE — 84439 ASSAY OF FREE THYROXINE: CPT

## 2024-03-21 PROCEDURE — 83735 ASSAY OF MAGNESIUM: CPT

## 2024-03-21 PROCEDURE — 82306 VITAMIN D 25 HYDROXY: CPT

## 2024-03-21 PROCEDURE — 84520 ASSAY OF UREA NITROGEN: CPT

## 2024-03-21 PROCEDURE — 82565 ASSAY OF CREATININE: CPT

## 2024-03-21 PROCEDURE — 82746 ASSAY OF FOLIC ACID SERUM: CPT

## 2024-03-21 PROCEDURE — 84443 ASSAY THYROID STIM HORMONE: CPT

## 2024-03-21 PROCEDURE — 83036 HEMOGLOBIN GLYCOSYLATED A1C: CPT

## 2024-03-26 ENCOUNTER — OFFICE VISIT (OUTPATIENT)
Dept: PRIMARY CARE CLINIC | Age: 89
End: 2024-03-26
Payer: MEDICARE

## 2024-03-26 VITALS
TEMPERATURE: 97.4 F | HEIGHT: 70 IN | SYSTOLIC BLOOD PRESSURE: 116 MMHG | BODY MASS INDEX: 30.35 KG/M2 | WEIGHT: 212 LBS | HEART RATE: 70 BPM | OXYGEN SATURATION: 95 % | DIASTOLIC BLOOD PRESSURE: 68 MMHG

## 2024-03-26 DIAGNOSIS — I67.1 CEREBRAL ANEURYSM: ICD-10-CM

## 2024-03-26 DIAGNOSIS — I71.20 THORACIC AORTIC ANEURYSM WITHOUT RUPTURE, UNSPECIFIED PART (HCC): ICD-10-CM

## 2024-03-26 DIAGNOSIS — R79.0 LOW MAGNESIUM LEVEL: ICD-10-CM

## 2024-03-26 DIAGNOSIS — E55.9 VITAMIN D DEFICIENCY: ICD-10-CM

## 2024-03-26 DIAGNOSIS — E53.8 VITAMIN B12 DEFICIENCY: ICD-10-CM

## 2024-03-26 DIAGNOSIS — C79.51 METASTATIC CANCER TO BONE (HCC): ICD-10-CM

## 2024-03-26 DIAGNOSIS — N18.31 STAGE 3A CHRONIC KIDNEY DISEASE (HCC): ICD-10-CM

## 2024-03-26 DIAGNOSIS — E78.2 MIXED HYPERLIPIDEMIA: ICD-10-CM

## 2024-03-26 DIAGNOSIS — C34.91 ADENOCARCINOMA OF RIGHT LUNG (HCC): ICD-10-CM

## 2024-03-26 DIAGNOSIS — D64.9 ANEMIA, UNSPECIFIED TYPE: ICD-10-CM

## 2024-03-26 DIAGNOSIS — I25.10 CORONARY ARTERY DISEASE INVOLVING NATIVE CORONARY ARTERY OF NATIVE HEART WITHOUT ANGINA PECTORIS: ICD-10-CM

## 2024-03-26 DIAGNOSIS — K56.699 COLON STRICTURE (HCC): Primary | ICD-10-CM

## 2024-03-26 DIAGNOSIS — I73.9 PVD (PERIPHERAL VASCULAR DISEASE) WITH CLAUDICATION (HCC): ICD-10-CM

## 2024-03-26 DIAGNOSIS — R73.9 HYPERGLYCEMIA: ICD-10-CM

## 2024-03-26 DIAGNOSIS — E03.2 HYPOTHYROIDISM DUE TO MEDICATION: ICD-10-CM

## 2024-03-26 DIAGNOSIS — I10 ESSENTIAL HYPERTENSION: ICD-10-CM

## 2024-03-26 PROBLEM — R07.89 CHEST WALL PAIN: Status: RESOLVED | Noted: 2021-04-28 | Resolved: 2024-03-26

## 2024-03-26 PROBLEM — J12.82 PNEUMONIA DUE TO COVID-19 VIRUS: Status: RESOLVED | Noted: 2021-01-13 | Resolved: 2024-03-26

## 2024-03-26 PROBLEM — U07.1 COVID: Status: RESOLVED | Noted: 2021-01-13 | Resolved: 2024-03-26

## 2024-03-26 PROBLEM — U07.1 PNEUMONIA DUE TO COVID-19 VIRUS: Status: RESOLVED | Noted: 2021-01-13 | Resolved: 2024-03-26

## 2024-03-26 PROCEDURE — 1036F TOBACCO NON-USER: CPT | Performed by: FAMILY MEDICINE

## 2024-03-26 PROCEDURE — G8427 DOCREV CUR MEDS BY ELIG CLIN: HCPCS | Performed by: FAMILY MEDICINE

## 2024-03-26 PROCEDURE — 99215 OFFICE O/P EST HI 40 MIN: CPT | Performed by: FAMILY MEDICINE

## 2024-03-26 PROCEDURE — G8484 FLU IMMUNIZE NO ADMIN: HCPCS | Performed by: FAMILY MEDICINE

## 2024-03-26 PROCEDURE — 1123F ACP DISCUSS/DSCN MKR DOCD: CPT | Performed by: FAMILY MEDICINE

## 2024-03-26 PROCEDURE — G2211 COMPLEX E/M VISIT ADD ON: HCPCS | Performed by: FAMILY MEDICINE

## 2024-03-26 PROCEDURE — G8417 CALC BMI ABV UP PARAM F/U: HCPCS | Performed by: FAMILY MEDICINE

## 2024-03-26 RX ORDER — PANTOPRAZOLE SODIUM 40 MG/1
40 TABLET, DELAYED RELEASE ORAL DAILY
COMMUNITY

## 2024-03-26 RX ORDER — TRAZODONE HYDROCHLORIDE 50 MG/1
50 TABLET ORAL DAILY PRN
COMMUNITY

## 2024-03-26 SDOH — ECONOMIC STABILITY: FOOD INSECURITY: WITHIN THE PAST 12 MONTHS, YOU WORRIED THAT YOUR FOOD WOULD RUN OUT BEFORE YOU GOT MONEY TO BUY MORE.: NEVER TRUE

## 2024-03-26 SDOH — ECONOMIC STABILITY: INCOME INSECURITY: HOW HARD IS IT FOR YOU TO PAY FOR THE VERY BASICS LIKE FOOD, HOUSING, MEDICAL CARE, AND HEATING?: NOT HARD AT ALL

## 2024-03-26 SDOH — ECONOMIC STABILITY: FOOD INSECURITY: WITHIN THE PAST 12 MONTHS, THE FOOD YOU BOUGHT JUST DIDN'T LAST AND YOU DIDN'T HAVE MONEY TO GET MORE.: NEVER TRUE

## 2024-03-26 NOTE — PROGRESS NOTES
and/or tests, referring/care coordination if applicable and documenting clinical information in the electronic health record.       Signs and symptoms to watch for discussed, serious signs and symptoms reviewed.  ER if any.              Jun Miller MD    Patients are advised to check with insurance company to ensure coverage and to fully understand benefits and cost prior to any testing.  This note was created with the assistance of voice recognition software.  Document was reviewed however may contain grammatical errors. This note or partial portions of this note may have been created using a copy forward or copy paste feature but these portions have been verified and re-edited for accuracy and any portions not in need of editing or reviews are not being used to generate any component necessary for billing purposes.  Some portions may be carried over for continuity purposes and to aid me with monitoring of past medical conditions and discussions.  Elements necessary for proper CPT code selection are based only on elements of the visit that are truly unique to this visit.

## 2024-06-24 ENCOUNTER — HOSPITAL ENCOUNTER (OUTPATIENT)
Age: 89
Discharge: HOME | End: 2024-06-24
Payer: MEDICARE

## 2024-06-24 DIAGNOSIS — R73.9 HYPERGLYCEMIA: ICD-10-CM

## 2024-06-24 DIAGNOSIS — R79.0 LOW MAGNESIUM LEVEL: ICD-10-CM

## 2024-06-24 DIAGNOSIS — E55.9 VITAMIN D DEFICIENCY: ICD-10-CM

## 2024-06-24 DIAGNOSIS — D64.9 ANEMIA, UNSPECIFIED TYPE: ICD-10-CM

## 2024-06-24 DIAGNOSIS — E53.8 VITAMIN B12 DEFICIENCY: ICD-10-CM

## 2024-06-24 DIAGNOSIS — E03.2 HYPOTHYROIDISM DUE TO MEDICATION: ICD-10-CM

## 2024-06-24 DIAGNOSIS — E78.2 MIXED HYPERLIPIDEMIA: ICD-10-CM

## 2024-06-24 DIAGNOSIS — I10 ESSENTIAL HYPERTENSION: ICD-10-CM

## 2024-06-24 LAB
25(OH)D3 SERPL-MCNC: 55.4 NG/ML (ref 30–100)
ALBUMIN SERPL-MCNC: 4.3 G/DL (ref 3.5–5.2)
ALP SERPL-CCNC: 51 U/L (ref 40–129)
ALT SERPL-CCNC: 14 U/L (ref 0–40)
ANION GAP SERPL CALCULATED.3IONS-SCNC: 12 MMOL/L (ref 7–16)
AST SERPL-CCNC: 19 U/L (ref 0–39)
BASOPHILS # BLD: 0.03 K/UL (ref 0–0.2)
BASOPHILS NFR BLD: 1 % (ref 0–2)
BILIRUB SERPL-MCNC: 0.6 MG/DL (ref 0–1.2)
BILIRUB UR QL STRIP: NEGATIVE
BUN SERPL-MCNC: 16 MG/DL (ref 6–23)
CALCIUM SERPL-MCNC: 10 MG/DL (ref 8.6–10.2)
CHLORIDE SERPL-SCNC: 106 MMOL/L (ref 98–107)
CHOLEST SERPL-MCNC: 134 MG/DL
CK SERPL-CCNC: 51 U/L (ref 20–200)
CLARITY UR: CLEAR
CO2 SERPL-SCNC: 26 MMOL/L (ref 22–29)
COLOR UR: YELLOW
COMMENT: NORMAL
CREAT SERPL-MCNC: 1.2 MG/DL (ref 0.7–1.2)
CREAT UR-MCNC: 127.4 MG/DL (ref 40–278)
EOSINOPHIL # BLD: 0.08 K/UL (ref 0.05–0.5)
EOSINOPHILS RELATIVE PERCENT: 2 % (ref 0–6)
ERYTHROCYTE [DISTWIDTH] IN BLOOD BY AUTOMATED COUNT: 15.2 % (ref 11.5–15)
FOLATE SERPL-MCNC: >20 NG/ML (ref 4.8–24.2)
GFR, ESTIMATED: 57 ML/MIN/1.73M2
GLUCOSE SERPL-MCNC: 104 MG/DL (ref 74–99)
GLUCOSE UR STRIP-MCNC: NEGATIVE MG/DL
HBA1C MFR BLD: 5 % (ref 4–5.6)
HCT VFR BLD AUTO: 33.2 % (ref 37–54)
HDLC SERPL-MCNC: 51 MG/DL
HGB BLD-MCNC: 10.8 G/DL (ref 12.5–16.5)
HGB UR QL STRIP.AUTO: NEGATIVE
IMM GRANULOCYTES # BLD AUTO: 0.03 K/UL (ref 0–0.58)
IMM GRANULOCYTES NFR BLD: 1 % (ref 0–5)
KETONES UR STRIP-MCNC: NEGATIVE MG/DL
LDLC SERPL CALC-MCNC: 48 MG/DL
LEUKOCYTE ESTERASE UR QL STRIP: NEGATIVE
LYMPHOCYTES NFR BLD: 1.11 K/UL (ref 1.5–4)
LYMPHOCYTES RELATIVE PERCENT: 24 % (ref 20–42)
MAGNESIUM SERPL-MCNC: 1.8 MG/DL (ref 1.6–2.6)
MCH RBC QN AUTO: 27.8 PG (ref 26–35)
MCHC RBC AUTO-ENTMCNC: 32.5 G/DL (ref 32–34.5)
MCV RBC AUTO: 85.6 FL (ref 80–99.9)
MICROALBUMIN UR-MCNC: <12 MG/L (ref 0–19)
MICROALBUMIN/CREAT UR-RTO: NORMAL MCG/MG CREAT (ref 0–30)
MONOCYTES NFR BLD: 0.32 K/UL (ref 0.1–0.95)
MONOCYTES NFR BLD: 7 % (ref 2–12)
NEUTROPHILS NFR BLD: 66 % (ref 43–80)
NEUTS SEG NFR BLD: 3.09 K/UL (ref 1.8–7.3)
NITRITE UR QL STRIP: NEGATIVE
PH UR STRIP: 5.5 [PH] (ref 5–9)
PLATELET # BLD AUTO: 153 K/UL (ref 130–450)
PMV BLD AUTO: 9.7 FL (ref 7–12)
POTASSIUM SERPL-SCNC: 4.4 MMOL/L (ref 3.5–5)
PROT SERPL-MCNC: 7 G/DL (ref 6.4–8.3)
PROT UR STRIP-MCNC: NEGATIVE MG/DL
RBC # BLD AUTO: 3.88 M/UL (ref 3.8–5.8)
SODIUM SERPL-SCNC: 144 MMOL/L (ref 132–146)
SP GR UR STRIP: 1.02 (ref 1–1.03)
T4 FREE SERPL-MCNC: 1.2 NG/DL (ref 0.9–1.7)
TRIGL SERPL-MCNC: 174 MG/DL
TSH SERPL DL<=0.05 MIU/L-ACNC: 5.67 UIU/ML (ref 0.27–4.2)
UROBILINOGEN UR STRIP-ACNC: 0.2 EU/DL (ref 0–1)
VIT B12 SERPL-MCNC: 1316 PG/ML (ref 211–946)
VLDLC SERPL CALC-MCNC: 35 MG/DL
WBC OTHER # BLD: 4.7 K/UL (ref 4.5–11.5)

## 2024-06-24 PROCEDURE — 80061 LIPID PANEL: CPT

## 2024-06-24 PROCEDURE — 84443 ASSAY THYROID STIM HORMONE: CPT

## 2024-06-24 PROCEDURE — 82570 ASSAY OF URINE CREATININE: CPT

## 2024-06-24 PROCEDURE — 81003 URINALYSIS AUTO W/O SCOPE: CPT

## 2024-06-24 PROCEDURE — 82746 ASSAY OF FOLIC ACID SERUM: CPT

## 2024-06-24 PROCEDURE — 36415 COLL VENOUS BLD VENIPUNCTURE: CPT

## 2024-06-24 PROCEDURE — 82607 VITAMIN B-12: CPT

## 2024-06-24 PROCEDURE — 82043 UR ALBUMIN QUANTITATIVE: CPT

## 2024-06-24 PROCEDURE — 82550 ASSAY OF CK (CPK): CPT

## 2024-06-24 PROCEDURE — 80053 COMPREHEN METABOLIC PANEL: CPT

## 2024-06-24 PROCEDURE — 85025 COMPLETE CBC W/AUTO DIFF WBC: CPT

## 2024-06-24 PROCEDURE — 84439 ASSAY OF FREE THYROXINE: CPT

## 2024-06-24 PROCEDURE — 83036 HEMOGLOBIN GLYCOSYLATED A1C: CPT

## 2024-06-24 PROCEDURE — 82306 VITAMIN D 25 HYDROXY: CPT

## 2024-06-24 PROCEDURE — 83735 ASSAY OF MAGNESIUM: CPT

## 2024-06-26 ENCOUNTER — OFFICE VISIT (OUTPATIENT)
Dept: PRIMARY CARE CLINIC | Age: 89
End: 2024-06-26
Payer: MEDICARE

## 2024-06-26 VITALS
HEART RATE: 70 BPM | BODY MASS INDEX: 29.56 KG/M2 | DIASTOLIC BLOOD PRESSURE: 60 MMHG | WEIGHT: 206 LBS | OXYGEN SATURATION: 97 % | TEMPERATURE: 98.7 F | SYSTOLIC BLOOD PRESSURE: 124 MMHG

## 2024-06-26 DIAGNOSIS — I73.9 PVD (PERIPHERAL VASCULAR DISEASE) WITH CLAUDICATION (HCC): ICD-10-CM

## 2024-06-26 DIAGNOSIS — I67.1 CEREBRAL ANEURYSM: ICD-10-CM

## 2024-06-26 DIAGNOSIS — M81.0 AGE-RELATED OSTEOPOROSIS WITHOUT CURRENT PATHOLOGICAL FRACTURE: ICD-10-CM

## 2024-06-26 DIAGNOSIS — C34.91 ADENOCARCINOMA OF RIGHT LUNG (HCC): ICD-10-CM

## 2024-06-26 DIAGNOSIS — I25.10 CORONARY ARTERY DISEASE INVOLVING NATIVE CORONARY ARTERY OF NATIVE HEART WITHOUT ANGINA PECTORIS: ICD-10-CM

## 2024-06-26 DIAGNOSIS — E03.2 HYPOTHYROIDISM DUE TO MEDICATION: ICD-10-CM

## 2024-06-26 DIAGNOSIS — I10 ESSENTIAL HYPERTENSION: Primary | ICD-10-CM

## 2024-06-26 DIAGNOSIS — K56.699 COLON STRICTURE (HCC): ICD-10-CM

## 2024-06-26 DIAGNOSIS — I71.20 THORACIC AORTIC ANEURYSM WITHOUT RUPTURE, UNSPECIFIED PART (HCC): ICD-10-CM

## 2024-06-26 DIAGNOSIS — C61 PROSTATE CANCER METASTATIC TO BONE (HCC): ICD-10-CM

## 2024-06-26 DIAGNOSIS — R79.0 LOW MAGNESIUM LEVEL: ICD-10-CM

## 2024-06-26 DIAGNOSIS — R73.9 HYPERGLYCEMIA: ICD-10-CM

## 2024-06-26 DIAGNOSIS — E55.9 VITAMIN D DEFICIENCY: ICD-10-CM

## 2024-06-26 DIAGNOSIS — C79.51 PROSTATE CANCER METASTATIC TO BONE (HCC): ICD-10-CM

## 2024-06-26 DIAGNOSIS — E78.2 MIXED HYPERLIPIDEMIA: ICD-10-CM

## 2024-06-26 DIAGNOSIS — E53.8 VITAMIN B12 DEFICIENCY: ICD-10-CM

## 2024-06-26 DIAGNOSIS — N18.31 STAGE 3A CHRONIC KIDNEY DISEASE (HCC): ICD-10-CM

## 2024-06-26 PROCEDURE — G2211 COMPLEX E/M VISIT ADD ON: HCPCS | Performed by: FAMILY MEDICINE

## 2024-06-26 PROCEDURE — 1036F TOBACCO NON-USER: CPT | Performed by: FAMILY MEDICINE

## 2024-06-26 PROCEDURE — G8427 DOCREV CUR MEDS BY ELIG CLIN: HCPCS | Performed by: FAMILY MEDICINE

## 2024-06-26 PROCEDURE — 1123F ACP DISCUSS/DSCN MKR DOCD: CPT | Performed by: FAMILY MEDICINE

## 2024-06-26 PROCEDURE — 99214 OFFICE O/P EST MOD 30 MIN: CPT | Performed by: FAMILY MEDICINE

## 2024-06-26 PROCEDURE — G8417 CALC BMI ABV UP PARAM F/U: HCPCS | Performed by: FAMILY MEDICINE

## 2024-06-26 RX ORDER — LISINOPRIL 5 MG/1
5 TABLET ORAL DAILY
Qty: 90 TABLET | Refills: 3 | Status: SHIPPED | OUTPATIENT
Start: 2024-06-26

## 2024-06-26 NOTE — PROGRESS NOTES
Alejandro Armenta : 1935 Sex: male  Age: 89 y.o.    Chief Complaint   Patient presents with    3 Month Follow-Up    Discuss Labs       HPI:       Presents today with his wife for follow-up.  Again the reiterate how much better he is doing after abdominal surgery.  Doing well with the colostomy.  Feeling well overall without any acute symptoms or complaints or concerns    Has follow-up soon with Dr. Almonte, following Dr. Ovalle (GI), Dr. Ovalle ([facility]) and cardiology.      ROS:  Const: Denies chills, fever, malaise and sweats.  Eyes: Denies discharge, pain, redness and visual disturbance.  ENMT: Denies earaches, other ear symptoms. Denies nasal or sinus symptoms other than stated  above. Denies mouth and tongue lesions and sore throat.  CV: Denies chest discomfort, pain; diaphoresis, dizziness, edema, lightheadedness, orthopnea,  palpitations, syncope and near syncopal episode or any exertional symptoms  Resp: Denies cough, hemoptysis, pleuritic pain, SOB, sputum production and wheezing.  GI: Denies pain, change in bowel habits, hematochezia, melena, nausea and vomiting.    : Denies urinary symptoms including dysuria , urgency, frequency or hematuria.  Musculo: Chronic pain stable.  Skin: Denies bruising and rash.  Neuro: Denies headache, numbness, stiff neck, tingling and focal weakness slurred speech or facial  droop  Hema/Lymph: Denies bleeding/bruising tendency and enlarged lymph nodes     Blood work reviewed, GFR low but stable 57 creatinine down to 1.2, B12 1316 folic acid greater than 20, CK 51 HDL 51 LDL 48 triglyceride 174, hemoglobin A1c 5.0 TSH elevated with stable 5.67 Free T41.2 Vitamin D 55 hemoglobin low but stable 10.8 white count 4.7 platelets 153 urinalysis negative      Most Recent Labs  CBC  Lab Results   Component Value Date/Time    WBC 4.7 2024 10:37 AM    WBC 4.5 2024 11:47 AM    WBC 5.6 2024 01:00 AM    RBC 3.88 2024 10:37 AM    RBC 3.82 2024

## 2024-07-23 ENCOUNTER — HOSPITAL ENCOUNTER (OUTPATIENT)
Dept: NUCLEAR MEDICINE | Age: 89
Discharge: HOME | End: 2024-07-25
Attending: INTERNAL MEDICINE
Payer: MEDICARE

## 2024-07-23 ENCOUNTER — HOSPITAL ENCOUNTER (OUTPATIENT)
Dept: CT IMAGING | Age: 89
Discharge: HOME | End: 2024-07-25
Attending: INTERNAL MEDICINE
Payer: MEDICARE

## 2024-07-23 DIAGNOSIS — C79.51 SECONDARY MALIGNANT NEOPLASM OF BONE (HCC): ICD-10-CM

## 2024-07-23 DIAGNOSIS — C34.31 MALIGNANT NEOPLASM OF LOWER LOBE, RIGHT BRONCHUS OR LUNG (HCC): ICD-10-CM

## 2024-07-23 DIAGNOSIS — C61 MALIGNANT NEOPLASM OF PROSTATE (HCC): ICD-10-CM

## 2024-07-23 PROCEDURE — 74177 CT ABD & PELVIS W/CONTRAST: CPT

## 2024-07-23 PROCEDURE — 78306 BONE IMAGING WHOLE BODY: CPT | Performed by: INTERNAL MEDICINE

## 2024-07-23 PROCEDURE — 6360000004 HC RX CONTRAST MEDICATION: Performed by: RADIOLOGY

## 2024-07-23 PROCEDURE — A9503 TC99M MEDRONATE: HCPCS | Performed by: RADIOLOGY

## 2024-07-23 PROCEDURE — 3430000000 HC RX DIAGNOSTIC RADIOPHARMACEUTICAL: Performed by: RADIOLOGY

## 2024-07-23 PROCEDURE — 71260 CT THORAX DX C+: CPT

## 2024-07-23 RX ORDER — SODIUM CHLORIDE 0.9 % (FLUSH) 0.9 %
10 SYRINGE (ML) INJECTION
Status: ACTIVE | OUTPATIENT
Start: 2024-07-23 | End: 2024-07-24

## 2024-07-23 RX ORDER — TC 99M MEDRONATE 20 MG/10ML
25 INJECTION, POWDER, LYOPHILIZED, FOR SOLUTION INTRAVENOUS
Status: COMPLETED | OUTPATIENT
Start: 2024-07-23 | End: 2024-07-23

## 2024-07-23 RX ADMIN — TC 99M MEDRONATE 25 MILLICURIE: 20 INJECTION, POWDER, LYOPHILIZED, FOR SOLUTION INTRAVENOUS at 09:50

## 2024-07-23 RX ADMIN — IOPAMIDOL 75 ML: 755 INJECTION, SOLUTION INTRAVENOUS at 11:06

## 2024-07-23 RX ADMIN — IOPAMIDOL 18 ML: 755 INJECTION, SOLUTION INTRAVENOUS at 11:05

## 2024-07-29 ENCOUNTER — HOSPITAL ENCOUNTER (OUTPATIENT)
Age: 89
Discharge: HOME | End: 2024-07-29
Payer: MEDICARE

## 2024-07-29 LAB — PSA SERPL-MCNC: 0.3 NG/ML (ref 0–4)

## 2024-07-29 PROCEDURE — 36415 COLL VENOUS BLD VENIPUNCTURE: CPT

## 2024-07-29 PROCEDURE — 84153 ASSAY OF PSA TOTAL: CPT

## 2024-08-16 ENCOUNTER — TELEPHONE (OUTPATIENT)
Dept: PRIMARY CARE CLINIC | Age: 89
End: 2024-08-16

## 2024-08-16 DIAGNOSIS — Z43.3 COLOSTOMY CARE (HCC): Primary | ICD-10-CM

## 2024-09-10 ENCOUNTER — OFFICE VISIT (OUTPATIENT)
Dept: PRIMARY CARE CLINIC | Age: 89
End: 2024-09-10
Payer: MEDICARE

## 2024-09-10 VITALS
HEART RATE: 70 BPM | RESPIRATION RATE: 20 BRPM | TEMPERATURE: 97 F | SYSTOLIC BLOOD PRESSURE: 126 MMHG | OXYGEN SATURATION: 96 % | DIASTOLIC BLOOD PRESSURE: 68 MMHG | HEIGHT: 70 IN | BODY MASS INDEX: 30.78 KG/M2 | WEIGHT: 215 LBS

## 2024-09-10 DIAGNOSIS — K56.699 COLON STRICTURE (HCC): ICD-10-CM

## 2024-09-10 DIAGNOSIS — K43.5 PARASTOMAL HERNIA WITHOUT OBSTRUCTION OR GANGRENE: Primary | ICD-10-CM

## 2024-09-10 DIAGNOSIS — C61 PROSTATE CANCER METASTATIC TO BONE (HCC): ICD-10-CM

## 2024-09-10 DIAGNOSIS — C34.91 ADENOCARCINOMA OF RIGHT LUNG (HCC): ICD-10-CM

## 2024-09-10 DIAGNOSIS — C79.51 PROSTATE CANCER METASTATIC TO BONE (HCC): ICD-10-CM

## 2024-09-10 PROCEDURE — G8427 DOCREV CUR MEDS BY ELIG CLIN: HCPCS | Performed by: FAMILY MEDICINE

## 2024-09-10 PROCEDURE — G2211 COMPLEX E/M VISIT ADD ON: HCPCS | Performed by: FAMILY MEDICINE

## 2024-09-10 PROCEDURE — G8417 CALC BMI ABV UP PARAM F/U: HCPCS | Performed by: FAMILY MEDICINE

## 2024-09-10 PROCEDURE — 99214 OFFICE O/P EST MOD 30 MIN: CPT | Performed by: FAMILY MEDICINE

## 2024-09-10 PROCEDURE — 1036F TOBACCO NON-USER: CPT | Performed by: FAMILY MEDICINE

## 2024-09-10 PROCEDURE — 1123F ACP DISCUSS/DSCN MKR DOCD: CPT | Performed by: FAMILY MEDICINE

## 2024-09-17 DIAGNOSIS — E78.2 MIXED HYPERLIPIDEMIA: ICD-10-CM

## 2024-09-17 RX ORDER — ATORVASTATIN CALCIUM 40 MG/1
40 TABLET, FILM COATED ORAL DAILY
Qty: 90 TABLET | Refills: 3 | Status: SHIPPED | OUTPATIENT
Start: 2024-09-17

## 2024-09-20 ENCOUNTER — PATIENT MESSAGE (OUTPATIENT)
Dept: PRIMARY CARE CLINIC | Age: 89
End: 2024-09-20

## 2024-09-20 DIAGNOSIS — K43.5 PARASTOMAL HERNIA WITHOUT OBSTRUCTION OR GANGRENE: Primary | ICD-10-CM

## 2024-09-24 ENCOUNTER — OFFICE VISIT (OUTPATIENT)
Dept: SURGERY | Age: 89
End: 2024-09-24
Payer: MEDICARE

## 2024-09-24 VITALS
HEIGHT: 70 IN | HEART RATE: 67 BPM | DIASTOLIC BLOOD PRESSURE: 76 MMHG | TEMPERATURE: 97.3 F | SYSTOLIC BLOOD PRESSURE: 125 MMHG | WEIGHT: 214 LBS | BODY MASS INDEX: 30.64 KG/M2 | OXYGEN SATURATION: 96 %

## 2024-09-24 DIAGNOSIS — K43.5 PARASTOMAL HERNIA WITHOUT OBSTRUCTION OR GANGRENE: Primary | ICD-10-CM

## 2024-09-24 PROCEDURE — G8417 CALC BMI ABV UP PARAM F/U: HCPCS | Performed by: SURGERY

## 2024-09-24 PROCEDURE — 1123F ACP DISCUSS/DSCN MKR DOCD: CPT | Performed by: SURGERY

## 2024-09-24 PROCEDURE — G8427 DOCREV CUR MEDS BY ELIG CLIN: HCPCS | Performed by: SURGERY

## 2024-09-24 PROCEDURE — 1036F TOBACCO NON-USER: CPT | Performed by: SURGERY

## 2024-09-24 PROCEDURE — 99204 OFFICE O/P NEW MOD 45 MIN: CPT | Performed by: SURGERY

## 2024-10-12 ENCOUNTER — HOSPITAL ENCOUNTER (OUTPATIENT)
Age: 89
Discharge: HOME | End: 2024-10-12
Payer: MEDICARE

## 2024-10-12 DIAGNOSIS — E78.2 MIXED HYPERLIPIDEMIA: ICD-10-CM

## 2024-10-12 DIAGNOSIS — R73.9 HYPERGLYCEMIA: ICD-10-CM

## 2024-10-12 DIAGNOSIS — I10 ESSENTIAL HYPERTENSION: ICD-10-CM

## 2024-10-12 DIAGNOSIS — R79.0 LOW MAGNESIUM LEVEL: ICD-10-CM

## 2024-10-12 DIAGNOSIS — E55.9 VITAMIN D DEFICIENCY: ICD-10-CM

## 2024-10-12 DIAGNOSIS — E53.8 VITAMIN B12 DEFICIENCY: ICD-10-CM

## 2024-10-12 DIAGNOSIS — E03.2 HYPOTHYROIDISM DUE TO MEDICATION: ICD-10-CM

## 2024-10-12 LAB
ALBUMIN SERPL-MCNC: 4.5 G/DL (ref 3.5–5.2)
ALP SERPL-CCNC: 49 U/L (ref 40–129)
ALT SERPL-CCNC: 13 U/L (ref 0–40)
ANION GAP SERPL CALCULATED.3IONS-SCNC: 14 MMOL/L (ref 7–16)
AST SERPL-CCNC: 21 U/L (ref 0–39)
BASOPHILS # BLD: 0.03 K/UL (ref 0–0.2)
BASOPHILS NFR BLD: 1 % (ref 0–2)
BILIRUB SERPL-MCNC: 0.6 MG/DL (ref 0–1.2)
BILIRUB UR QL STRIP: NEGATIVE
BUN SERPL-MCNC: 24 MG/DL (ref 6–23)
CALCIUM SERPL-MCNC: 9.6 MG/DL (ref 8.6–10.2)
CHLORIDE SERPL-SCNC: 105 MMOL/L (ref 98–107)
CHOLEST SERPL-MCNC: 125 MG/DL
CK SERPL-CCNC: 73 U/L (ref 20–200)
CLARITY UR: CLEAR
CO2 SERPL-SCNC: 23 MMOL/L (ref 22–29)
COLOR UR: YELLOW
CREAT SERPL-MCNC: 1.3 MG/DL (ref 0.7–1.2)
CREAT UR-MCNC: 125.8 MG/DL (ref 40–278)
EOSINOPHIL # BLD: 0.11 K/UL (ref 0.05–0.5)
EOSINOPHILS RELATIVE PERCENT: 3 % (ref 0–6)
ERYTHROCYTE [DISTWIDTH] IN BLOOD BY AUTOMATED COUNT: 14.6 % (ref 11.5–15)
FOLATE SERPL-MCNC: 12.1 NG/ML (ref 4.8–24.2)
GFR, ESTIMATED: 55 ML/MIN/1.73M2
GLUCOSE SERPL-MCNC: 105 MG/DL (ref 74–99)
GLUCOSE UR STRIP-MCNC: NEGATIVE MG/DL
HBA1C MFR BLD: 4.9 % (ref 4–5.6)
HCT VFR BLD AUTO: 32.9 % (ref 37–54)
HDLC SERPL-MCNC: 46 MG/DL
HGB BLD-MCNC: 11 G/DL (ref 12.5–16.5)
HGB UR QL STRIP.AUTO: NEGATIVE
IMM GRANULOCYTES # BLD AUTO: 0.03 K/UL (ref 0–0.58)
IMM GRANULOCYTES NFR BLD: 1 % (ref 0–5)
KETONES UR STRIP-MCNC: NEGATIVE MG/DL
LDLC SERPL CALC-MCNC: 49 MG/DL
LEUKOCYTE ESTERASE UR QL STRIP: NEGATIVE
LYMPHOCYTES NFR BLD: 1.29 K/UL (ref 1.5–4)
LYMPHOCYTES RELATIVE PERCENT: 31 % (ref 20–42)
MAGNESIUM SERPL-MCNC: 1.7 MG/DL (ref 1.6–2.6)
MCH RBC QN AUTO: 28.7 PG (ref 26–35)
MCHC RBC AUTO-ENTMCNC: 33.4 G/DL (ref 32–34.5)
MCV RBC AUTO: 85.9 FL (ref 80–99.9)
MICROALBUMIN UR-MCNC: 15 MG/L (ref 0–19)
MICROALBUMIN/CREAT UR-RTO: 12 MCG/MG CREAT (ref 0–30)
MONOCYTES NFR BLD: 0.28 K/UL (ref 0.1–0.95)
MONOCYTES NFR BLD: 7 % (ref 2–12)
NEUTROPHILS NFR BLD: 59 % (ref 43–80)
NEUTS SEG NFR BLD: 2.45 K/UL (ref 1.8–7.3)
NITRITE UR QL STRIP: NEGATIVE
PH UR STRIP: 5.5 [PH] (ref 5–9)
PLATELET # BLD AUTO: 142 K/UL (ref 130–450)
PMV BLD AUTO: 10 FL (ref 7–12)
POTASSIUM SERPL-SCNC: 4.3 MMOL/L (ref 3.5–5)
PROT SERPL-MCNC: 6.9 G/DL (ref 6.4–8.3)
PROT UR STRIP-MCNC: NEGATIVE MG/DL
RBC # BLD AUTO: 3.83 M/UL (ref 3.8–5.8)
SODIUM SERPL-SCNC: 142 MMOL/L (ref 132–146)
SP GR UR STRIP: >1.03 (ref 1–1.03)
TRIGL SERPL-MCNC: 149 MG/DL
TSH SERPL DL<=0.05 MIU/L-ACNC: 7.19 UIU/ML (ref 0.27–4.2)
UROBILINOGEN UR STRIP-ACNC: 0.2 EU/DL (ref 0–1)
VIT B12 SERPL-MCNC: 1317 PG/ML (ref 211–946)
VLDLC SERPL CALC-MCNC: 30 MG/DL
WBC OTHER # BLD: 4.2 K/UL (ref 4.5–11.5)

## 2024-10-12 PROCEDURE — 80061 LIPID PANEL: CPT

## 2024-10-12 PROCEDURE — 82306 VITAMIN D 25 HYDROXY: CPT

## 2024-10-12 PROCEDURE — 83036 HEMOGLOBIN GLYCOSYLATED A1C: CPT

## 2024-10-12 PROCEDURE — 82550 ASSAY OF CK (CPK): CPT

## 2024-10-12 PROCEDURE — 85025 COMPLETE CBC W/AUTO DIFF WBC: CPT

## 2024-10-12 PROCEDURE — 82043 UR ALBUMIN QUANTITATIVE: CPT

## 2024-10-12 PROCEDURE — 82746 ASSAY OF FOLIC ACID SERUM: CPT

## 2024-10-12 PROCEDURE — 83735 ASSAY OF MAGNESIUM: CPT

## 2024-10-12 PROCEDURE — 82607 VITAMIN B-12: CPT

## 2024-10-12 PROCEDURE — 80053 COMPREHEN METABOLIC PANEL: CPT

## 2024-10-12 PROCEDURE — 84439 ASSAY OF FREE THYROXINE: CPT

## 2024-10-12 PROCEDURE — 84443 ASSAY THYROID STIM HORMONE: CPT

## 2024-10-12 PROCEDURE — 36415 COLL VENOUS BLD VENIPUNCTURE: CPT

## 2024-10-12 PROCEDURE — 82570 ASSAY OF URINE CREATININE: CPT

## 2024-10-13 LAB
25(OH)D3 SERPL-MCNC: 54.9 NG/ML (ref 30–100)
T4 FREE SERPL-MCNC: 1.1 NG/DL (ref 0.9–1.7)

## 2024-10-16 ENCOUNTER — OFFICE VISIT (OUTPATIENT)
Dept: PRIMARY CARE CLINIC | Age: 89
End: 2024-10-16

## 2024-10-16 VITALS
SYSTOLIC BLOOD PRESSURE: 124 MMHG | DIASTOLIC BLOOD PRESSURE: 60 MMHG | HEIGHT: 70 IN | TEMPERATURE: 97.8 F | WEIGHT: 217.38 LBS | OXYGEN SATURATION: 96 % | HEART RATE: 70 BPM | BODY MASS INDEX: 31.12 KG/M2

## 2024-10-16 DIAGNOSIS — I25.10 CORONARY ARTERY DISEASE INVOLVING NATIVE CORONARY ARTERY OF NATIVE HEART WITHOUT ANGINA PECTORIS: ICD-10-CM

## 2024-10-16 DIAGNOSIS — C79.51 METASTATIC CANCER TO BONE (HCC): ICD-10-CM

## 2024-10-16 DIAGNOSIS — I73.9 PVD (PERIPHERAL VASCULAR DISEASE) WITH CLAUDICATION (HCC): ICD-10-CM

## 2024-10-16 DIAGNOSIS — R79.0 LOW MAGNESIUM LEVEL: ICD-10-CM

## 2024-10-16 DIAGNOSIS — K56.699 COLON STRICTURE (HCC): ICD-10-CM

## 2024-10-16 DIAGNOSIS — M81.0 AGE-RELATED OSTEOPOROSIS WITHOUT CURRENT PATHOLOGICAL FRACTURE: ICD-10-CM

## 2024-10-16 DIAGNOSIS — N18.31 STAGE 3A CHRONIC KIDNEY DISEASE (HCC): ICD-10-CM

## 2024-10-16 DIAGNOSIS — K43.5 PARASTOMAL HERNIA WITHOUT OBSTRUCTION OR GANGRENE: ICD-10-CM

## 2024-10-16 DIAGNOSIS — Z43.3 COLOSTOMY CARE (HCC): ICD-10-CM

## 2024-10-16 DIAGNOSIS — E53.8 VITAMIN B12 DEFICIENCY: ICD-10-CM

## 2024-10-16 DIAGNOSIS — I67.1 CEREBRAL ANEURYSM: ICD-10-CM

## 2024-10-16 DIAGNOSIS — E78.2 MIXED HYPERLIPIDEMIA: Primary | ICD-10-CM

## 2024-10-16 DIAGNOSIS — R73.9 HYPERGLYCEMIA: ICD-10-CM

## 2024-10-16 DIAGNOSIS — E55.9 VITAMIN D DEFICIENCY: ICD-10-CM

## 2024-10-16 DIAGNOSIS — C34.91 ADENOCARCINOMA OF RIGHT LUNG (HCC): ICD-10-CM

## 2024-10-16 DIAGNOSIS — K21.9 GASTROESOPHAGEAL REFLUX DISEASE WITHOUT ESOPHAGITIS: ICD-10-CM

## 2024-10-16 DIAGNOSIS — E03.2 HYPOTHYROIDISM DUE TO MEDICATION: ICD-10-CM

## 2024-10-16 DIAGNOSIS — I71.20 THORACIC AORTIC ANEURYSM WITHOUT RUPTURE, UNSPECIFIED PART (HCC): ICD-10-CM

## 2024-10-16 DIAGNOSIS — I10 ESSENTIAL HYPERTENSION: ICD-10-CM

## 2024-10-16 DIAGNOSIS — D61.818 PANCYTOPENIA (HCC): ICD-10-CM

## 2024-10-16 ASSESSMENT — PATIENT HEALTH QUESTIONNAIRE - PHQ9
2. FEELING DOWN, DEPRESSED OR HOPELESS: NOT AT ALL
SUM OF ALL RESPONSES TO PHQ QUESTIONS 1-9: 0
SUM OF ALL RESPONSES TO PHQ9 QUESTIONS 1 & 2: 0
SUM OF ALL RESPONSES TO PHQ QUESTIONS 1-9: 0
1. LITTLE INTEREST OR PLEASURE IN DOING THINGS: NOT AT ALL

## 2024-10-16 NOTE — PROGRESS NOTES
understanding and agrees with above counseling, assessment and plan.     All questions answered.        Signs and symptoms to watch for discussed, serious signs and symptoms reviewed.  ER if any.              Jun Miller MD    Patients are advised to check with insurance company to ensure coverage and to fully understand benefits and cost prior to any testing.  This note was created with the assistance of voice recognition software.  Document was reviewed however may contain grammatical errors. This note or partial portions of this note may have been created using a copy forward or copy paste feature but these portions have been verified and re-edited for accuracy and any portions not in need of editing or reviews are not being used to generate any component necessary for billing purposes.  Some portions may be carried over for continuity purposes and to aid me with monitoring of past medical conditions and discussions.  Elements necessary for proper CPT code selection are based only on elements of the visit that are truly unique to this visit.

## 2024-11-18 ENCOUNTER — PATIENT MESSAGE (OUTPATIENT)
Dept: PRIMARY CARE CLINIC | Age: 89
End: 2024-11-18

## 2024-11-18 NOTE — TELEPHONE ENCOUNTER
TSH only minimally elevated and free T4 still normal, so subclinical, doubt thyroid issue.  However there are a number of considerations that can cause sudden double vision, some of which are serious.  It does warrant an urgent evaluation through an eye doctor.  Does he have an eye doctor?  He should call and see them ASAP.  May require follow-up brain imaging.  Happy to do the virtual visit to discuss further if not able to come in, however I do want emphasize importance of at least seeing his eye doctor ASAP.  As always ER a consideration for acute onset potentially serious symptoms as well to error on the side of being safe

## 2024-11-19 NOTE — TELEPHONE ENCOUNTER
I am glad symptoms resolved.  I would at least encourage they call eye care and see if one of their eye doctors could take a look at Wy[place] sooner just to be safe

## 2024-11-22 ENCOUNTER — APPOINTMENT (OUTPATIENT)
Dept: CT IMAGING | Age: 89
End: 2024-11-22
Payer: MEDICARE

## 2024-11-22 ENCOUNTER — HOSPITAL ENCOUNTER (EMERGENCY)
Age: 89
Discharge: HOME | End: 2024-11-22
Payer: MEDICARE

## 2024-11-22 VITALS
TEMPERATURE: 97.8 F | WEIGHT: 215 LBS | HEART RATE: 80 BPM | OXYGEN SATURATION: 96 % | SYSTOLIC BLOOD PRESSURE: 144 MMHG | DIASTOLIC BLOOD PRESSURE: 92 MMHG | RESPIRATION RATE: 18 BRPM | BODY MASS INDEX: 30.85 KG/M2
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
## 2024-11-22 DIAGNOSIS — S20.212A CONTUSION OF LEFT CHEST WALL, INITIAL ENCOUNTER: Primary | ICD-10-CM

## 2024-11-22 DIAGNOSIS — R91.1 LUNG NODULE SEEN ON IMAGING STUDY: ICD-10-CM

## 2024-11-22 DIAGNOSIS — J90 PLEURAL EFFUSION: ICD-10-CM

## 2024-11-22 LAB
ALBUMIN SERPL-MCNC: 4.1 G/DL (ref 3.5–5.2)
ALP SERPL-CCNC: 55 U/L (ref 40–129)
ALT SERPL-CCNC: 15 U/L (ref 0–40)
ANION GAP SERPL CALCULATED.3IONS-SCNC: 11 MMOL/L (ref 7–16)
AST SERPL-CCNC: 22 U/L (ref 0–39)
BASOPHILS # BLD: 0.03 K/UL (ref 0–0.2)
BASOPHILS NFR BLD: 1 % (ref 0–2)
BILIRUB SERPL-MCNC: 0.5 MG/DL (ref 0–1.2)
BUN SERPL-MCNC: 21 MG/DL (ref 6–23)
CALCIUM SERPL-MCNC: 9.5 MG/DL (ref 8.6–10.2)
CHLORIDE SERPL-SCNC: 105 MMOL/L (ref 98–107)
CO2 SERPL-SCNC: 25 MMOL/L (ref 22–29)
CREAT SERPL-MCNC: 1.2 MG/DL (ref 0.7–1.2)
EKG ATRIAL RATE: 70 BPM
EKG P AXIS: 18 DEGREES
EKG P-R INTERVAL: 190 MS
EKG Q-T INTERVAL: 416 MS
EKG QRS DURATION: 148 MS
EKG QTC CALCULATION (BAZETT): 449 MS
EKG R AXIS: -49 DEGREES
EKG T AXIS: -24 DEGREES
EKG VENTRICULAR RATE: 70 BPM
EOSINOPHIL # BLD: 0.1 K/UL (ref 0.05–0.5)
EOSINOPHILS RELATIVE PERCENT: 2 % (ref 0–6)
ERYTHROCYTE [DISTWIDTH] IN BLOOD BY AUTOMATED COUNT: 14.3 % (ref 11.5–15)
GFR, ESTIMATED: 57 ML/MIN/1.73M2
GLUCOSE SERPL-MCNC: 124 MG/DL (ref 74–99)
HCT VFR BLD AUTO: 32.8 % (ref 37–54)
HGB BLD-MCNC: 11.2 G/DL (ref 12.5–16.5)
IMM GRANULOCYTES # BLD AUTO: <0.03 K/UL (ref 0–0.58)
IMM GRANULOCYTES NFR BLD: 0 % (ref 0–5)
LYMPHOCYTES NFR BLD: 1.04 K/UL (ref 1.5–4)
LYMPHOCYTES RELATIVE PERCENT: 23 % (ref 20–42)
MCH RBC QN AUTO: 29.1 PG (ref 26–35)
MCHC RBC AUTO-ENTMCNC: 34.1 G/DL (ref 32–34.5)
MCV RBC AUTO: 85.2 FL (ref 80–99.9)
MONOCYTES NFR BLD: 0.34 K/UL (ref 0.1–0.95)
MONOCYTES NFR BLD: 8 % (ref 2–12)
NEUTROPHILS NFR BLD: 66 % (ref 43–80)
NEUTS SEG NFR BLD: 2.99 K/UL (ref 1.8–7.3)
PLATELET # BLD AUTO: 143 K/UL (ref 130–450)
PMV BLD AUTO: 9.6 FL (ref 7–12)
POTASSIUM SERPL-SCNC: 4.2 MMOL/L (ref 3.5–5)
PROT SERPL-MCNC: 6.5 G/DL (ref 6.4–8.3)
RBC # BLD AUTO: 3.85 M/UL (ref 3.8–5.8)
SODIUM SERPL-SCNC: 141 MMOL/L (ref 132–146)
TROPONIN I SERPL HS-MCNC: 24 NG/L (ref 0–11)
TROPONIN I SERPL HS-MCNC: 24 NG/L (ref 0–11)
WBC OTHER # BLD: 4.5 K/UL (ref 4.5–11.5)

## 2024-11-22 PROCEDURE — 84484 ASSAY OF TROPONIN QUANT: CPT

## 2024-11-22 PROCEDURE — 71250 CT THORAX DX C-: CPT

## 2024-11-22 PROCEDURE — 85025 COMPLETE CBC W/AUTO DIFF WBC: CPT

## 2024-11-22 PROCEDURE — 93005 ELECTROCARDIOGRAM TRACING: CPT | Performed by: NURSE PRACTITIONER

## 2024-11-22 PROCEDURE — 80053 COMPREHEN METABOLIC PANEL: CPT

## 2024-11-22 PROCEDURE — 99284 EMERGENCY DEPT VISIT MOD MDM: CPT

## 2024-11-22 RX ORDER — LIDOCAINE 50 MG/G
1 PATCH TOPICAL DAILY
Qty: 10 PATCH | Refills: 0 | Status: SHIPPED | OUTPATIENT
Start: 2024-11-22 | End: 2024-12-02

## 2024-11-23 NOTE — ED PROVIDER NOTES
Independent KRISTOFER Visit.          [facility] EMERGENCY DEPARTMENT  EMERGENCY DEPARTMENT ENCOUNTER        Pt Name: Alejandro Armenta  MRN: [id number]  Birthdate 1935  Date of evaluation: 11/22/2024  Provider: VASILE Haynes - CNP  PCP: Jun Miller MD  Note Started: 10:59 PM EST 11/22/24    CHIEF COMPLAINT       Chief Complaint   Patient presents with    Rib Pain (injury)     Left sided rib pain, started about three weeks ago. Pain with cough, sneezing     Fall     Had a fall on left side on 10/9       HISTORY OF PRESENT ILLNESS: 1 or more Elements     History from : Patient    Limitations to history : None    Alejandro Armenta is a 89 y.o. male who presents to the emergency department for a fall.  Patient was brought into the emergency department accompanied by his family member for a left rib injury that he states that he sustained about a 3 weeks ago states that he did have a fall due to tripping on uneven pavement fell onto his left side.  He states that he has had no hemoptysis no chest pain no shortness of breath.  Patient denies hitting his head denies any nausea vomiting denies any blurred vision.  Patient states that he is on Eliquis but he did not hit his head.  Patient states that the pain is localized to the left lateral ribs states that it is reproducible states that he did wrap his chest for approximately 1 week which did completely resolve his symptoms when he took the chest wrapping off pain returned.  Patient's concerned that he may have fractured a rib.  Patient states that he has had no other symptoms has been walking normally and denies any feelings of dizziness or lightheadedness.  Patient denies any abdominal pain diarrhea nausea or vomiting.    Nursing Notes were all reviewed and agreed with or any disagreements were addressed in the HPI.    REVIEW OF SYSTEMS :      Review of Systems   All other systems reviewed and are negative.      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), CAD (coronary artery disease), Cancer (HCC), Chronic bilateral low back pain without sciatica (03/06/2020), Cobalamin deficiency, Erectile dysfunction (2006), Glaucoma, History of blood transfusion, Hyperlipidemia, Hypertension, Malignant neoplasm of lower lobe of right lung (HCC) (01/25/2022), Neuropathy (2021), Osteoarthritis, Osteochondropathy, Osteopenia, Pain in lower limb, Peripheral vascular disease (HCC) (2017), Peripheral venous insufficiency, PONV (postoperative nausea and vomiting), Prolonged emergence from general anesthesia, Prostate cancer metastatic to bone (HCC) (2019), and PVD (peripheral vascular disease) with claudication (HCC) (04/25/2019).     EMERGENCY DEPARTMENT COURSE and DIFFERENTIAL DIAGNOSIS/MDM:   Vitals:    Vitals:    11/22/24 1207 11/22/24 1216   BP: (!) 144/92    Pulse: 80    Resp: 18    Temp: 97.8 °F (36.6 °C)    TempSrc: Oral    SpO2: 96%    Weight:  97.5 kg (215 lb)       Patient was given the following medications:  Medications - No data to display            CONSULTS: (Who and What was discussed)  None    Discussion with Other Profesionals : None    Social Determinants Significantly Affecting Health : None    Records Reviewed External : None    CC/HPI Summary, DDx, ED Course, and Reassessment: 89-year-old malewho presents to the emergency department for a fall.  Patient was brought into the emergency department accompanied by his family member for a left rib injury that he states that he sustained about a 3 weeks ago states that he did have a fall due to tripping on uneven pavement fell onto his left side.  He states that he has had no hemoptysis no chest pain no shortness of breath.  Patient denies hitting his head denies any nausea vomiting denies any blurred vision.  Patient states that he is on Eliquis but he did not hit his head.  Patient states that the pain is localized to the left lateral ribs states that it is

## 2024-11-25 LAB
EKG ATRIAL RATE: 70 BPM
EKG P AXIS: 18 DEGREES
EKG P-R INTERVAL: 190 MS
EKG Q-T INTERVAL: 416 MS
EKG QRS DURATION: 148 MS
EKG QTC CALCULATION (BAZETT): 449 MS
EKG R AXIS: -49 DEGREES
EKG T AXIS: -24 DEGREES
EKG VENTRICULAR RATE: 70 BPM

## 2024-11-25 PROCEDURE — 93010 ELECTROCARDIOGRAM REPORT: CPT | Performed by: INTERNAL MEDICINE

## 2024-12-09 ENCOUNTER — OFFICE VISIT (OUTPATIENT)
Dept: PRIMARY CARE CLINIC | Age: 89
End: 2024-12-09
Payer: MEDICARE

## 2024-12-09 VITALS
DIASTOLIC BLOOD PRESSURE: 60 MMHG | OXYGEN SATURATION: 95 % | BODY MASS INDEX: 31.57 KG/M2 | SYSTOLIC BLOOD PRESSURE: 122 MMHG | HEART RATE: 83 BPM | TEMPERATURE: 98 F | WEIGHT: 220 LBS

## 2024-12-09 DIAGNOSIS — K43.5 PARASTOMAL HERNIA WITHOUT OBSTRUCTION OR GANGRENE: ICD-10-CM

## 2024-12-09 DIAGNOSIS — Z43.3 COLOSTOMY CARE (HCC): ICD-10-CM

## 2024-12-09 DIAGNOSIS — C34.91 ADENOCARCINOMA OF RIGHT LUNG (HCC): ICD-10-CM

## 2024-12-09 DIAGNOSIS — C61 PROSTATE CANCER METASTATIC TO BONE (HCC): ICD-10-CM

## 2024-12-09 DIAGNOSIS — I10 ESSENTIAL HYPERTENSION: ICD-10-CM

## 2024-12-09 DIAGNOSIS — M94.0 COSTOCHONDRITIS: Primary | ICD-10-CM

## 2024-12-09 DIAGNOSIS — I71.20 THORACIC AORTIC ANEURYSM WITHOUT RUPTURE, UNSPECIFIED PART (HCC): ICD-10-CM

## 2024-12-09 DIAGNOSIS — C79.51 PROSTATE CANCER METASTATIC TO BONE (HCC): ICD-10-CM

## 2024-12-09 DIAGNOSIS — K56.699 COLON STRICTURE (HCC): ICD-10-CM

## 2024-12-09 PROCEDURE — G2211 COMPLEX E/M VISIT ADD ON: HCPCS | Performed by: FAMILY MEDICINE

## 2024-12-09 PROCEDURE — G8484 FLU IMMUNIZE NO ADMIN: HCPCS | Performed by: FAMILY MEDICINE

## 2024-12-09 PROCEDURE — 1159F MED LIST DOCD IN RCRD: CPT | Performed by: FAMILY MEDICINE

## 2024-12-09 PROCEDURE — G8417 CALC BMI ABV UP PARAM F/U: HCPCS | Performed by: FAMILY MEDICINE

## 2024-12-09 PROCEDURE — G8427 DOCREV CUR MEDS BY ELIG CLIN: HCPCS | Performed by: FAMILY MEDICINE

## 2024-12-09 PROCEDURE — 99214 OFFICE O/P EST MOD 30 MIN: CPT | Performed by: FAMILY MEDICINE

## 2024-12-09 PROCEDURE — 1036F TOBACCO NON-USER: CPT | Performed by: FAMILY MEDICINE

## 2024-12-09 PROCEDURE — 1123F ACP DISCUSS/DSCN MKR DOCD: CPT | Performed by: FAMILY MEDICINE

## 2024-12-09 RX ORDER — PREDNISONE 10 MG/1
TABLET ORAL
Qty: 12 TABLET | Refills: 0 | Status: SHIPPED | OUTPATIENT
Start: 2024-12-09 | End: 2024-12-14

## 2024-12-09 NOTE — PROGRESS NOTES
BILIRUBINUR Negative 06/06/2023 12:57 PM    BILIRUBINUR NEGATIVE 12/21/2010 05:25 PM    KETUA NEGATIVE 10/12/2024 11:06 AM    KETUA NEGATIVE 06/24/2024 11:44 AM    KETUA NEGATIVE 01/07/2024 01:00 AM    BLOODU Negative 06/06/2023 12:57 PM    BLOODU Negative 08/23/2022 04:08 PM    BLOODU Negative 05/24/2022 12:45 PM    PHUR 5.5 10/12/2024 11:06 AM    PHUR 5.5 06/24/2024 11:44 AM    PHUR 5.0 01/07/2024 01:00 AM    PHUR 5.5 09/13/2023 11:40 AM    PHUR 5.0 06/06/2023 12:57 PM    PROTEINU NEGATIVE 10/12/2024 11:06 AM    PROTEINU NEGATIVE 06/24/2024 11:44 AM    PROTEINU NEGATIVE 01/07/2024 01:00 AM    UROBILINOGEN 0.2 10/12/2024 11:06 AM    UROBILINOGEN 0.2 06/24/2024 11:44 AM    UROBILINOGEN 0.2 01/07/2024 01:00 AM    NITRU NEGATIVE 10/12/2024 11:06 AM    NITRU NEGATIVE 06/24/2024 11:44 AM    NITRU NEGATIVE 01/07/2024 01:00 AM    LEUKOCYTESUR NEGATIVE 10/12/2024 11:06 AM    LEUKOCYTESUR NEGATIVE 06/24/2024 11:44 AM    LEUKOCYTESUR NEGATIVE 01/07/2024 01:00 AM     Urine Micro/Albumin Ratio  Lab Results   Component Value Date/Time    MALBCR 12 10/12/2024 11:06 AM    MALBCR Can not be calculated 06/24/2024 11:44 AM    MALBCR Can not be calculated 09/13/2023 11:40 AM    MALBCR 8.0 05/24/2022 12:45 PM    MALBCR 13.4 02/21/2022 11:17 AM    MALBCR 16.2 11/22/2021 10:06 AM                      Current Outpatient Medications:     predniSONE (DELTASONE) 10 MG tablet, Take 3 tablets by mouth daily for 2 days, THEN 2 tablets daily for 2 days, THEN 1 tablet daily for 2 days., Disp: 12 tablet, Rfl: 0    omeprazole (PRILOSEC) 20 MG delayed release capsule, Take 1 capsule by mouth daily, Disp: 90 capsule, Rfl: 3    atorvastatin (LIPITOR) 40 MG tablet, Take 1 tablet by mouth daily, Disp: 90 tablet, Rfl: 3    lisinopril (PRINIVIL;ZESTRIL) 5 MG tablet, Take 1 tablet by mouth daily, Disp: 90 tablet, Rfl: 3    ELIQUIS 2.5 MG TABS tablet, , Disp: , Rfl:     omega-3 acid ethyl esters (LOVAZA) 1 g capsule, Take 1 capsule by mouth 2 times daily,

## 2025-02-07 SDOH — HEALTH STABILITY: PHYSICAL HEALTH: ON AVERAGE, HOW MANY DAYS PER WEEK DO YOU ENGAGE IN MODERATE TO STRENUOUS EXERCISE (LIKE A BRISK WALK)?: 0 DAYS

## 2025-02-07 SDOH — HEALTH STABILITY: PHYSICAL HEALTH: ON AVERAGE, HOW MANY MINUTES DO YOU ENGAGE IN EXERCISE AT THIS LEVEL?: 0 MIN

## 2025-02-07 ASSESSMENT — PATIENT HEALTH QUESTIONNAIRE - PHQ9
SUM OF ALL RESPONSES TO PHQ QUESTIONS 1-9: 0
SUM OF ALL RESPONSES TO PHQ QUESTIONS 1-9: 0
SUM OF ALL RESPONSES TO PHQ9 QUESTIONS 1 & 2: 0
2. FEELING DOWN, DEPRESSED OR HOPELESS: NOT AT ALL
SUM OF ALL RESPONSES TO PHQ QUESTIONS 1-9: 0
1. LITTLE INTEREST OR PLEASURE IN DOING THINGS: NOT AT ALL
SUM OF ALL RESPONSES TO PHQ QUESTIONS 1-9: 0

## 2025-02-07 ASSESSMENT — LIFESTYLE VARIABLES
HOW OFTEN DO YOU HAVE A DRINK CONTAINING ALCOHOL: MONTHLY OR LESS
HOW OFTEN DO YOU HAVE SIX OR MORE DRINKS ON ONE OCCASION: 1
HOW MANY STANDARD DRINKS CONTAINING ALCOHOL DO YOU HAVE ON A TYPICAL DAY: 1 OR 2
HOW MANY STANDARD DRINKS CONTAINING ALCOHOL DO YOU HAVE ON A TYPICAL DAY: 1
HOW OFTEN DO YOU HAVE A DRINK CONTAINING ALCOHOL: 2

## 2025-02-08 ENCOUNTER — HOSPITAL ENCOUNTER (OUTPATIENT)
Age: 89
Discharge: HOME | End: 2025-02-08
Payer: MEDICARE

## 2025-02-08 DIAGNOSIS — E55.9 VITAMIN D DEFICIENCY: ICD-10-CM

## 2025-02-08 DIAGNOSIS — D61.818 PANCYTOPENIA (HCC): ICD-10-CM

## 2025-02-08 DIAGNOSIS — E53.8 VITAMIN B12 DEFICIENCY: ICD-10-CM

## 2025-02-08 DIAGNOSIS — N18.31 STAGE 3A CHRONIC KIDNEY DISEASE (HCC): ICD-10-CM

## 2025-02-08 DIAGNOSIS — E78.2 MIXED HYPERLIPIDEMIA: ICD-10-CM

## 2025-02-08 DIAGNOSIS — R79.0 LOW MAGNESIUM LEVEL: ICD-10-CM

## 2025-02-08 DIAGNOSIS — R73.9 HYPERGLYCEMIA: ICD-10-CM

## 2025-02-08 DIAGNOSIS — E03.2 HYPOTHYROIDISM DUE TO MEDICATION: ICD-10-CM

## 2025-02-08 LAB
25(OH)D3 SERPL-MCNC: 49.3 NG/ML (ref 30–100)
ALBUMIN SERPL-MCNC: 4.3 G/DL (ref 3.5–5.2)
ALP SERPL-CCNC: 50 U/L (ref 40–129)
ALT SERPL-CCNC: 18 U/L (ref 0–40)
ANION GAP SERPL CALCULATED.3IONS-SCNC: 11 MMOL/L (ref 7–16)
AST SERPL-CCNC: 25 U/L (ref 0–39)
BASOPHILS # BLD: 0.03 K/UL (ref 0–0.2)
BASOPHILS NFR BLD: 1 % (ref 0–2)
BILIRUB SERPL-MCNC: 0.8 MG/DL (ref 0–1.2)
BILIRUB UR QL STRIP: NEGATIVE
BUN SERPL-MCNC: 18 MG/DL (ref 6–23)
CALCIUM SERPL-MCNC: 9.4 MG/DL (ref 8.6–10.2)
CHLORIDE SERPL-SCNC: 105 MMOL/L (ref 98–107)
CHOLEST SERPL-MCNC: 119 MG/DL
CK SERPL-CCNC: 77 U/L (ref 20–200)
CLARITY UR: CLEAR
CO2 SERPL-SCNC: 27 MMOL/L (ref 22–29)
COLOR UR: YELLOW
COMMENT: ABNORMAL
CREAT SERPL-MCNC: 1.3 MG/DL (ref 0.7–1.2)
CREAT UR-MCNC: 137.4 MG/DL (ref 40–278)
EOSINOPHIL # BLD: 0.12 K/UL (ref 0.05–0.5)
EOSINOPHILS RELATIVE PERCENT: 3 % (ref 0–6)
ERYTHROCYTE [DISTWIDTH] IN BLOOD BY AUTOMATED COUNT: 14.4 % (ref 11.5–15)
FOLATE SERPL-MCNC: 12 NG/ML (ref 4.8–24.2)
GFR, ESTIMATED: 55 ML/MIN/1.73M2
GLUCOSE SERPL-MCNC: 102 MG/DL (ref 74–99)
GLUCOSE UR STRIP-MCNC: NEGATIVE MG/DL
HBA1C MFR BLD: 4.8 % (ref 4–5.6)
HCT VFR BLD AUTO: 34.1 % (ref 37–54)
HDLC SERPL-MCNC: 44 MG/DL
HGB BLD-MCNC: 11.7 G/DL (ref 12.5–16.5)
HGB UR QL STRIP.AUTO: NEGATIVE
IMM GRANULOCYTES # BLD AUTO: 0.03 K/UL (ref 0–0.58)
IMM GRANULOCYTES NFR BLD: 1 % (ref 0–5)
KETONES UR STRIP-MCNC: NEGATIVE MG/DL
LDLC SERPL CALC-MCNC: 47 MG/DL
LEUKOCYTE ESTERASE UR QL STRIP: NEGATIVE
LYMPHOCYTES NFR BLD: 1.14 K/UL (ref 1.5–4)
LYMPHOCYTES RELATIVE PERCENT: 24 % (ref 20–42)
MAGNESIUM SERPL-MCNC: 1.8 MG/DL (ref 1.6–2.6)
MCH RBC QN AUTO: 29.6 PG (ref 26–35)
MCHC RBC AUTO-ENTMCNC: 34.3 G/DL (ref 32–34.5)
MCV RBC AUTO: 86.3 FL (ref 80–99.9)
MICROALBUMIN UR-MCNC: 19 MG/L (ref 0–19)
MICROALBUMIN/CREAT UR-RTO: 14 MCG/MG CREAT (ref 0–30)
MONOCYTES NFR BLD: 0.3 K/UL (ref 0.1–0.95)
MONOCYTES NFR BLD: 6 % (ref 2–12)
NEUTROPHILS NFR BLD: 66 % (ref 43–80)
NEUTS SEG NFR BLD: 3.09 K/UL (ref 1.8–7.3)
NITRITE UR QL STRIP: NEGATIVE
PH UR STRIP: 5.5 [PH] (ref 5–8)
PLATELET # BLD AUTO: 147 K/UL (ref 130–450)
PMV BLD AUTO: 10 FL (ref 7–12)
POTASSIUM SERPL-SCNC: 4.7 MMOL/L (ref 3.5–5)
PROT SERPL-MCNC: 6.9 G/DL (ref 6.4–8.3)
PROT UR STRIP-MCNC: NEGATIVE MG/DL
PSA SERPL-MCNC: 0.62 NG/ML (ref 0–4)
RBC # BLD AUTO: 3.95 M/UL (ref 3.8–5.8)
SODIUM SERPL-SCNC: 143 MMOL/L (ref 132–146)
SP GR UR STRIP: >1.03 (ref 1–1.03)
T4 FREE SERPL-MCNC: 1.1 NG/DL (ref 0.9–1.7)
TRIGL SERPL-MCNC: 139 MG/DL
TSH SERPL DL<=0.05 MIU/L-ACNC: 6 UIU/ML (ref 0.27–4.2)
URATE SERPL-MCNC: 5.9 MG/DL (ref 3.4–7)
UROBILINOGEN UR STRIP-ACNC: 0.2 EU/DL (ref 0–1)
VIT B12 SERPL-MCNC: 1644 PG/ML (ref 211–946)
VLDLC SERPL CALC-MCNC: 28 MG/DL
WBC OTHER # BLD: 4.7 K/UL (ref 4.5–11.5)

## 2025-02-08 PROCEDURE — 81003 URINALYSIS AUTO W/O SCOPE: CPT

## 2025-02-08 PROCEDURE — 83735 ASSAY OF MAGNESIUM: CPT

## 2025-02-08 PROCEDURE — 84550 ASSAY OF BLOOD/URIC ACID: CPT

## 2025-02-08 PROCEDURE — 84443 ASSAY THYROID STIM HORMONE: CPT

## 2025-02-08 PROCEDURE — 36415 COLL VENOUS BLD VENIPUNCTURE: CPT

## 2025-02-08 PROCEDURE — 82306 VITAMIN D 25 HYDROXY: CPT

## 2025-02-08 PROCEDURE — 82570 ASSAY OF URINE CREATININE: CPT

## 2025-02-08 PROCEDURE — 80053 COMPREHEN METABOLIC PANEL: CPT

## 2025-02-08 PROCEDURE — 82550 ASSAY OF CK (CPK): CPT

## 2025-02-08 PROCEDURE — 82607 VITAMIN B-12: CPT

## 2025-02-08 PROCEDURE — 83036 HEMOGLOBIN GLYCOSYLATED A1C: CPT

## 2025-02-08 PROCEDURE — 84439 ASSAY OF FREE THYROXINE: CPT

## 2025-02-08 PROCEDURE — 82746 ASSAY OF FOLIC ACID SERUM: CPT

## 2025-02-08 PROCEDURE — 84153 ASSAY OF PSA TOTAL: CPT

## 2025-02-08 PROCEDURE — 80061 LIPID PANEL: CPT

## 2025-02-08 PROCEDURE — 85025 COMPLETE CBC W/AUTO DIFF WBC: CPT

## 2025-02-08 PROCEDURE — 82043 UR ALBUMIN QUANTITATIVE: CPT

## 2025-02-10 SDOH — ECONOMIC STABILITY: FOOD INSECURITY: WITHIN THE PAST 12 MONTHS, THE FOOD YOU BOUGHT JUST DIDN'T LAST AND YOU DIDN'T HAVE MONEY TO GET MORE.: NEVER TRUE

## 2025-02-10 SDOH — ECONOMIC STABILITY: FOOD INSECURITY: WITHIN THE PAST 12 MONTHS, YOU WORRIED THAT YOUR FOOD WOULD RUN OUT BEFORE YOU GOT MONEY TO BUY MORE.: NEVER TRUE

## 2025-02-10 SDOH — ECONOMIC STABILITY: TRANSPORTATION INSECURITY
IN THE PAST 12 MONTHS, HAS THE LACK OF TRANSPORTATION KEPT YOU FROM MEDICAL APPOINTMENTS OR FROM GETTING MEDICATIONS?: NO

## 2025-02-10 SDOH — ECONOMIC STABILITY: INCOME INSECURITY: IN THE LAST 12 MONTHS, WAS THERE A TIME WHEN YOU WERE NOT ABLE TO PAY THE MORTGAGE OR RENT ON TIME?: NO

## 2025-02-13 ENCOUNTER — OFFICE VISIT (OUTPATIENT)
Dept: PRIMARY CARE CLINIC | Age: 89
End: 2025-02-13

## 2025-02-13 ENCOUNTER — OFFICE VISIT (OUTPATIENT)
Dept: PRIMARY CARE CLINIC | Age: 89
End: 2025-02-13
Payer: MEDICARE

## 2025-02-13 VITALS
HEIGHT: 70 IN | BODY MASS INDEX: 31.64 KG/M2 | TEMPERATURE: 97.8 F | SYSTOLIC BLOOD PRESSURE: 118 MMHG | DIASTOLIC BLOOD PRESSURE: 62 MMHG | WEIGHT: 221 LBS | OXYGEN SATURATION: 96 % | HEART RATE: 74 BPM

## 2025-02-13 VITALS
BODY MASS INDEX: 31.5 KG/M2 | HEIGHT: 70 IN | WEIGHT: 220 LBS | SYSTOLIC BLOOD PRESSURE: 118 MMHG | DIASTOLIC BLOOD PRESSURE: 60 MMHG

## 2025-02-13 DIAGNOSIS — I25.10 CORONARY ARTERY DISEASE INVOLVING NATIVE CORONARY ARTERY OF NATIVE HEART WITHOUT ANGINA PECTORIS: ICD-10-CM

## 2025-02-13 DIAGNOSIS — N18.31 STAGE 3A CHRONIC KIDNEY DISEASE (HCC): ICD-10-CM

## 2025-02-13 DIAGNOSIS — C34.91 ADENOCARCINOMA OF RIGHT LUNG (HCC): ICD-10-CM

## 2025-02-13 DIAGNOSIS — R73.9 HYPERGLYCEMIA: ICD-10-CM

## 2025-02-13 DIAGNOSIS — E78.2 MIXED HYPERLIPIDEMIA: Primary | ICD-10-CM

## 2025-02-13 DIAGNOSIS — C79.51 METASTATIC CANCER TO BONE (HCC): ICD-10-CM

## 2025-02-13 DIAGNOSIS — E55.9 VITAMIN D DEFICIENCY: ICD-10-CM

## 2025-02-13 DIAGNOSIS — I71.20 THORACIC AORTIC ANEURYSM WITHOUT RUPTURE, UNSPECIFIED PART (HCC): ICD-10-CM

## 2025-02-13 DIAGNOSIS — I10 ESSENTIAL HYPERTENSION: ICD-10-CM

## 2025-02-13 DIAGNOSIS — M81.0 AGE-RELATED OSTEOPOROSIS WITHOUT CURRENT PATHOLOGICAL FRACTURE: ICD-10-CM

## 2025-02-13 DIAGNOSIS — R79.0 LOW MAGNESIUM LEVEL: ICD-10-CM

## 2025-02-13 DIAGNOSIS — E03.2 HYPOTHYROIDISM DUE TO MEDICATION: ICD-10-CM

## 2025-02-13 DIAGNOSIS — Z43.3 COLOSTOMY CARE (HCC): ICD-10-CM

## 2025-02-13 DIAGNOSIS — E53.8 VITAMIN B12 DEFICIENCY: ICD-10-CM

## 2025-02-13 DIAGNOSIS — Z00.00 MEDICARE ANNUAL WELLNESS VISIT, SUBSEQUENT: Primary | ICD-10-CM

## 2025-02-13 DIAGNOSIS — I73.9 PVD (PERIPHERAL VASCULAR DISEASE) WITH CLAUDICATION (HCC): ICD-10-CM

## 2025-02-13 DIAGNOSIS — C79.51 PROSTATE CANCER METASTATIC TO BONE (HCC): ICD-10-CM

## 2025-02-13 DIAGNOSIS — K56.699 COLON STRICTURE (HCC): ICD-10-CM

## 2025-02-13 DIAGNOSIS — C61 PROSTATE CANCER METASTATIC TO BONE (HCC): ICD-10-CM

## 2025-02-13 DIAGNOSIS — D64.9 ANEMIA, UNSPECIFIED TYPE: ICD-10-CM

## 2025-02-13 PROCEDURE — 1123F ACP DISCUSS/DSCN MKR DOCD: CPT | Performed by: FAMILY MEDICINE

## 2025-02-13 PROCEDURE — G0439 PPPS, SUBSEQ VISIT: HCPCS | Performed by: FAMILY MEDICINE

## 2025-02-13 PROCEDURE — 1159F MED LIST DOCD IN RCRD: CPT | Performed by: FAMILY MEDICINE

## 2025-02-13 RX ORDER — OMEGA-3-ACID ETHYL ESTERS 1 G/1
1 CAPSULE, LIQUID FILLED ORAL 2 TIMES DAILY
Qty: 180 CAPSULE | Refills: 3 | Status: SHIPPED | OUTPATIENT
Start: 2025-02-13

## 2025-02-13 RX ORDER — LISINOPRIL 5 MG/1
5 TABLET ORAL DAILY
Qty: 90 TABLET | Refills: 3 | Status: SHIPPED | OUTPATIENT
Start: 2025-02-13

## 2025-02-13 NOTE — PROGRESS NOTES
10/12/2024 11:06 AM    RBC 3.95 02/08/2025 01:05 PM    RBC 3.85 11/22/2024 12:43 PM    RBC 3.83 10/12/2024 11:06 AM    RBC 2.49 08/20/2022 05:21 AM    RBC 2.58 08/18/2022 02:50 AM    RBC 2.68 08/17/2022 05:33 AM    HGB 11.7 02/08/2025 01:05 PM    HGB 11.2 11/22/2024 12:43 PM    HGB 11.0 10/12/2024 11:06 AM    HCT 34.1 02/08/2025 01:05 PM    HCT 32.8 11/22/2024 12:43 PM    HCT 32.9 10/12/2024 11:06 AM    MCV 86.3 02/08/2025 01:05 PM    MCV 85.2 11/22/2024 12:43 PM    MCV 85.9 10/12/2024 11:06 AM     02/08/2025 01:05 PM     11/22/2024 12:43 PM     10/12/2024 11:06 AM      CMP  Lab Results   Component Value Date/Time     02/08/2025 01:05 PM     11/22/2024 12:43 PM     10/12/2024 11:06 AM    K 4.7 02/08/2025 01:05 PM    K 4.2 11/22/2024 12:43 PM    K 4.3 10/12/2024 11:06 AM    K 4.4 01/16/2021 03:04 PM    K 4.4 01/14/2021 03:33 AM     02/08/2025 01:05 PM     11/22/2024 12:43 PM     10/12/2024 11:06 AM    CO2 27 02/08/2025 01:05 PM    CO2 25 11/22/2024 12:43 PM    CO2 23 10/12/2024 11:06 AM    ANIONGAP 11 02/08/2025 01:05 PM    ANIONGAP 11 11/22/2024 12:43 PM    ANIONGAP 14 10/12/2024 11:06 AM    GLUCOSE 102 02/08/2025 01:05 PM    GLUCOSE 124 11/22/2024 12:43 PM    GLUCOSE 105 10/12/2024 11:06 AM    GLUCOSE 113 03/28/2011 03:00 PM    GLUCOSE 93 03/28/2011 04:40 AM    GLUCOSE 101 03/26/2011 04:30 AM    BUN 18 02/08/2025 01:05 PM    BUN 21 11/22/2024 12:43 PM    BUN 24 10/12/2024 11:06 AM    CREATININE 1.3 02/08/2025 01:05 PM    CREATININE 1.2 11/22/2024 12:43 PM    CREATININE 1.3 10/12/2024 11:06 AM    LABGLOM 55 02/08/2025 01:05 PM    LABGLOM 57 11/22/2024 12:43 PM    LABGLOM 55 10/12/2024 11:06 AM    LABGLOM 50 03/21/2024 11:47 AM    LABGLOM 52 03/21/2024 11:45 AM    LABGLOM 55 01/07/2024 01:00 AM    GFRAA >60 09/09/2022 11:19 AM    GFRAA >60 08/23/2022 04:09 PM    GFRAA 58 08/08/2022 02:38 PM    CALCIUM 9.4 02/08/2025 01:05 PM    CALCIUM 9.5 11/22/2024 12:43 PM

## 2025-02-13 NOTE — PATIENT INSTRUCTIONS
[email or web address] and enter U357 to learn more about \"Starting a Weight-Loss Plan: Care Instructions.\"  Current as of: April 30, 2024  Content Version: 14.3  © 2024 [employer].   Care instructions adapted under license by [employer]. If you have questions about a medical condition or this instruction, always ask your healthcare professional. [employer] disclaims any warranty or liability for your use of this information.         A Healthy Heart: Care Instructions  Overview     Coronary artery disease, also called heart disease, occurs when a substance called plaque builds up in the vessels that supply oxygen-rich blood to your heart muscle. This can narrow the blood vessels and reduce blood flow. A heart attack happens when blood flow is completely blocked. A high-fat diet, smoking, and other factors increase the risk of heart disease.  Your doctor has found that you have a chance of having heart disease. A heart-healthy lifestyle can help keep your heart healthy and prevent heart disease. This lifestyle includes eating healthy, being active, staying at a weight that's healthy for you, and not smoking or using tobacco. It also includes taking medicines as directed, managing other health conditions, and trying to get a healthy amount of sleep.  Follow-up care is a key part of your treatment and safety. Be sure to make and go to all appointments, and call your doctor if you are having problems. It's also a good idea to know your test results and keep a list of the medicines you take.  How can you care for yourself at home?  Diet    Use less salt when you cook and eat. This helps lower your blood pressure. Taste food before salting. Add only a little salt when you think you need it. With time, your taste buds will adjust to less salt.     Eat fewer snack items, fast foods, canned soups, and other high-salt, high-fat, processed foods.     Read food labels and try to avoid

## 2025-02-13 NOTE — PROGRESS NOTES
Medicare Annual Wellness Visit    Alejandro Armenta is here for Medicare AW    Assessment & Plan   Assessment and Plan:   Diagnosis Orders   1. Medicare annual wellness visit, subsequent               No problem-specific Assessment & Plan notes found for this encounter.       Plan as above.  Counseled extensively and differential diagnoses relevant to above were reviewed, including serious etiologies, risks and complications, especially if left uncontrolled.  If relevant, instructions and  alternatives to meds/treatment reviewed, as well as interactions, and  SE's/ADRs reviewed, notify immediately if any, discontinuing new meds if any.  Plan made after discussion and shared decision making.          Health maintenance issues discussed at length as above, 2/13/2025 .  Encouraged yearly physicals.        As long as symptoms steadily improve/resolve, and medical conditions follow the expected course, FU as below, as previously directed and sooner PRN.    Return for Medicare Annual Wellness Visit in 1 year.                Educational materials and/or home exercises printed for patient's review and were included in patient instructions on his/her After Visit Summary and given to patient at the end of visit.       After discussion, patient and/or guardian verbalizes understanding, agrees, feels comfortable with and wishes to proceed with above treatment plan. Call for any pending results, FU sooner if abnormal, as needed or if any current symptoms persist/worsen.      Advised patient to call with any new medication issues, and read all Rx info from pharmacy to assure aware of all possible risks and side effects of medication before taking.     Reviewed age and gender appropriate health screening exams and vaccinations.  Advised patient regarding importance of keeping up with recommended health maintenance and to schedule as soon as possible if overdue, as this is important in assessing for undiagnosed pathology,

## 2025-06-10 ENCOUNTER — RESULTS FOLLOW-UP (OUTPATIENT)
Dept: PRIMARY CARE CLINIC | Age: 89
End: 2025-06-10

## 2025-06-10 ENCOUNTER — HOSPITAL ENCOUNTER (OUTPATIENT)
Age: 89
Discharge: HOME | End: 2025-06-10
Payer: MEDICARE

## 2025-06-10 DIAGNOSIS — E55.9 VITAMIN D DEFICIENCY: ICD-10-CM

## 2025-06-10 DIAGNOSIS — E53.8 VITAMIN B12 DEFICIENCY: ICD-10-CM

## 2025-06-10 DIAGNOSIS — E78.2 MIXED HYPERLIPIDEMIA: ICD-10-CM

## 2025-06-10 DIAGNOSIS — R73.9 HYPERGLYCEMIA: ICD-10-CM

## 2025-06-10 DIAGNOSIS — N18.31 STAGE 3A CHRONIC KIDNEY DISEASE (HCC): ICD-10-CM

## 2025-06-10 DIAGNOSIS — I10 ESSENTIAL HYPERTENSION: ICD-10-CM

## 2025-06-10 DIAGNOSIS — E03.2 HYPOTHYROIDISM DUE TO MEDICATION: ICD-10-CM

## 2025-06-10 DIAGNOSIS — D64.9 ANEMIA, UNSPECIFIED TYPE: ICD-10-CM

## 2025-06-10 DIAGNOSIS — R79.0 LOW MAGNESIUM LEVEL: ICD-10-CM

## 2025-06-10 LAB
25(OH)D3 SERPL-MCNC: 64.4 NG/ML (ref 30–100)
ALBUMIN SERPL-MCNC: 4.5 G/DL (ref 3.5–5.2)
ALP SERPL-CCNC: 46 U/L (ref 40–129)
ALT SERPL-CCNC: 17 U/L (ref 0–40)
ANION GAP SERPL CALCULATED.3IONS-SCNC: 13 MMOL/L (ref 7–16)
AST SERPL-CCNC: 21 U/L (ref 0–39)
BASOPHILS # BLD: 0.03 K/UL (ref 0–0.2)
BASOPHILS NFR BLD: 1 % (ref 0–2)
BILIRUB SERPL-MCNC: 0.7 MG/DL (ref 0–1.2)
BILIRUB UR QL STRIP: NEGATIVE
BUN SERPL-MCNC: 22 MG/DL (ref 6–23)
CALCIUM SERPL-MCNC: 9.9 MG/DL (ref 8.6–10.2)
CHLORIDE SERPL-SCNC: 105 MMOL/L (ref 98–107)
CHOLEST SERPL-MCNC: 127 MG/DL
CK SERPL-CCNC: 58 U/L (ref 20–200)
CLARITY UR: CLEAR
CO2 SERPL-SCNC: 25 MMOL/L (ref 22–29)
COLOR UR: YELLOW
COMMENT: NORMAL
CREAT SERPL-MCNC: 1.4 MG/DL (ref 0.7–1.2)
CREAT UR-MCNC: 113 MG/DL (ref 40–278)
EOSINOPHIL # BLD: 0.1 K/UL (ref 0.05–0.5)
EOSINOPHILS RELATIVE PERCENT: 2 % (ref 0–6)
ERYTHROCYTE [DISTWIDTH] IN BLOOD BY AUTOMATED COUNT: 14.2 % (ref 11.5–15)
FOLATE SERPL-MCNC: 33.1 NG/ML (ref 4.6–34.8)
GFR, ESTIMATED: 50 ML/MIN/1.73M2
GLUCOSE SERPL-MCNC: 106 MG/DL (ref 74–99)
GLUCOSE UR STRIP-MCNC: NEGATIVE MG/DL
HBA1C MFR BLD: 5.3 % (ref 4–5.6)
HCT VFR BLD AUTO: 35.4 % (ref 37–54)
HDLC SERPL-MCNC: 54 MG/DL
HGB BLD-MCNC: 12.1 G/DL (ref 12.5–16.5)
HGB UR QL STRIP.AUTO: NEGATIVE
IMM GRANULOCYTES # BLD AUTO: 0.03 K/UL (ref 0–0.58)
IMM GRANULOCYTES NFR BLD: 1 % (ref 0–5)
KETONES UR STRIP-MCNC: NEGATIVE MG/DL
LDLC SERPL CALC-MCNC: 39 MG/DL
LEUKOCYTE ESTERASE UR QL STRIP: NEGATIVE
LYMPHOCYTES NFR BLD: 1.35 K/UL (ref 1.5–4)
LYMPHOCYTES RELATIVE PERCENT: 29 % (ref 20–42)
MAGNESIUM SERPL-MCNC: 1.8 MG/DL (ref 1.6–2.6)
MCH RBC QN AUTO: 29.7 PG (ref 26–35)
MCHC RBC AUTO-ENTMCNC: 34.2 G/DL (ref 32–34.5)
MCV RBC AUTO: 87 FL (ref 80–99.9)
MICROALBUMIN UR-MCNC: <12 MG/L (ref 0–20)
MICROALBUMIN/CREAT UR-RTO: <11 MCG/MG CREAT (ref 0–30)
MONOCYTES NFR BLD: 0.31 K/UL (ref 0.1–0.95)
MONOCYTES NFR BLD: 7 % (ref 2–12)
NEUTROPHILS NFR BLD: 61 % (ref 43–80)
NEUTS SEG NFR BLD: 2.82 K/UL (ref 1.8–7.3)
NITRITE UR QL STRIP: NEGATIVE
PH UR STRIP: 5.5 [PH] (ref 5–8)
PLATELET # BLD AUTO: 154 K/UL (ref 130–450)
PMV BLD AUTO: 10.2 FL (ref 7–12)
POTASSIUM SERPL-SCNC: 4.9 MMOL/L (ref 3.5–5)
PROT SERPL-MCNC: 7.3 G/DL (ref 6.4–8.3)
PROT UR STRIP-MCNC: NEGATIVE MG/DL
RBC # BLD AUTO: 4.07 M/UL (ref 3.8–5.8)
SODIUM SERPL-SCNC: 143 MMOL/L (ref 132–146)
SP GR UR STRIP: 1.01 (ref 1–1.03)
T4 FREE SERPL-MCNC: 1 NG/DL (ref 0.9–1.7)
TRIGL SERPL-MCNC: 168 MG/DL
TSH SERPL DL<=0.05 MIU/L-ACNC: 6.74 UIU/ML (ref 0.27–4.2)
URATE SERPL-MCNC: 5.8 MG/DL (ref 3.4–7)
UROBILINOGEN UR STRIP-ACNC: 0.2 EU/DL (ref 0–1)
VIT B12 SERPL-MCNC: >2000 PG/ML (ref 232–1245)
VLDLC SERPL CALC-MCNC: 34 MG/DL
WBC OTHER # BLD: 4.6 K/UL (ref 4.5–11.5)

## 2025-06-10 PROCEDURE — 83735 ASSAY OF MAGNESIUM: CPT

## 2025-06-10 PROCEDURE — 84443 ASSAY THYROID STIM HORMONE: CPT

## 2025-06-10 PROCEDURE — 83036 HEMOGLOBIN GLYCOSYLATED A1C: CPT

## 2025-06-10 PROCEDURE — 82570 ASSAY OF URINE CREATININE: CPT

## 2025-06-10 PROCEDURE — 80061 LIPID PANEL: CPT

## 2025-06-10 PROCEDURE — 80053 COMPREHEN METABOLIC PANEL: CPT

## 2025-06-10 PROCEDURE — 84439 ASSAY OF FREE THYROXINE: CPT

## 2025-06-10 PROCEDURE — 82306 VITAMIN D 25 HYDROXY: CPT

## 2025-06-10 PROCEDURE — 85025 COMPLETE CBC W/AUTO DIFF WBC: CPT

## 2025-06-10 PROCEDURE — 84550 ASSAY OF BLOOD/URIC ACID: CPT

## 2025-06-10 PROCEDURE — 82043 UR ALBUMIN QUANTITATIVE: CPT

## 2025-06-10 PROCEDURE — 81003 URINALYSIS AUTO W/O SCOPE: CPT

## 2025-06-10 PROCEDURE — 36415 COLL VENOUS BLD VENIPUNCTURE: CPT

## 2025-06-10 PROCEDURE — 82607 VITAMIN B-12: CPT

## 2025-06-10 PROCEDURE — 82746 ASSAY OF FOLIC ACID SERUM: CPT

## 2025-06-10 PROCEDURE — 82550 ASSAY OF CK (CPK): CPT

## 2025-06-13 ENCOUNTER — OFFICE VISIT (OUTPATIENT)
Dept: PRIMARY CARE CLINIC | Age: 89
End: 2025-06-13

## 2025-06-13 VITALS
OXYGEN SATURATION: 93 % | TEMPERATURE: 97.9 F | BODY MASS INDEX: 31.35 KG/M2 | WEIGHT: 219 LBS | HEIGHT: 70 IN | HEART RATE: 77 BPM | DIASTOLIC BLOOD PRESSURE: 60 MMHG | SYSTOLIC BLOOD PRESSURE: 126 MMHG

## 2025-06-13 DIAGNOSIS — N18.31 STAGE 3A CHRONIC KIDNEY DISEASE (HCC): ICD-10-CM

## 2025-06-13 DIAGNOSIS — C61 PROSTATE CANCER METASTATIC TO BONE (HCC): ICD-10-CM

## 2025-06-13 DIAGNOSIS — C79.51 METASTATIC CANCER TO BONE (HCC): ICD-10-CM

## 2025-06-13 DIAGNOSIS — R73.9 HYPERGLYCEMIA: ICD-10-CM

## 2025-06-13 DIAGNOSIS — Z43.3 COLOSTOMY CARE (HCC): ICD-10-CM

## 2025-06-13 DIAGNOSIS — E03.2 HYPOTHYROIDISM DUE TO MEDICATION: ICD-10-CM

## 2025-06-13 DIAGNOSIS — E53.8 VITAMIN B12 DEFICIENCY: ICD-10-CM

## 2025-06-13 DIAGNOSIS — E55.9 VITAMIN D DEFICIENCY: ICD-10-CM

## 2025-06-13 DIAGNOSIS — R79.0 LOW MAGNESIUM LEVEL: ICD-10-CM

## 2025-06-13 DIAGNOSIS — E78.2 MIXED HYPERLIPIDEMIA: Primary | ICD-10-CM

## 2025-06-13 DIAGNOSIS — I10 ESSENTIAL HYPERTENSION: ICD-10-CM

## 2025-06-13 DIAGNOSIS — D64.9 ANEMIA, UNSPECIFIED TYPE: ICD-10-CM

## 2025-06-13 DIAGNOSIS — C79.51 PROSTATE CANCER METASTATIC TO BONE (HCC): ICD-10-CM

## 2025-06-13 DIAGNOSIS — C34.91 ADENOCARCINOMA OF RIGHT LUNG (HCC): ICD-10-CM

## 2025-06-13 PROBLEM — K56.699 COLON STRICTURE (HCC): Status: RESOLVED | Noted: 2024-01-24 | Resolved: 2025-06-13

## 2025-06-13 RX ORDER — APIXABAN 5 MG/1
5 TABLET, FILM COATED ORAL
COMMUNITY
Start: 2025-04-02

## 2025-06-13 NOTE — PROGRESS NOTES
Alejandro Armenta : 1935 Sex: male  Age: 89 y.o.    Chief Complaint   Patient presents with    Discuss Labs       HPI:       Presents with wife.  With PSA rising, Saw dr milligan and dr vance called; Ben went from every 4 days, to every 2 days, then Wyatt preferred every 3.     Generally feeling well without acute complaints or concerns.       follows with Dr. Vance, following Dr. Milligan (GI), Dr. Milligan ([facility]) and cardiology.      ROS:  Const: Denies chills, fever, malaise and sweats.  Eyes: Denies discharge, pain, redness and visual disturbance.  ENMT: Denies earaches, other ear symptoms. Denies nasal or sinus symptoms other than stated  above. Denies mouth and tongue lesions and sore throat.  CV: Denies chest discomfort/pain  diaphoresis, dizziness, edema, lightheadedness, orthopnea,  palpitations, syncope and near syncopal episode or any exertional symptoms  Resp: Denies cough, hemoptysis, pleuritic pain, SOB, sputum production and wheezing.  GI: Denies pain, change in bowel habits, hematochezia, melena, nausea and vomiting.  Stoma functioning well.  Parastomal hernia/stable  : Denies urinary symptoms including dysuria , urgency, frequency or hematuria.  Musculo: Chronic pain stable.    Skin: Denies bruising and rash.  Neuro: Denies headache, numbness, stiff neck, tingling and focal weakness slurred speech or facial  droop  Hema/Lymph: Denies bleeding/bruising tendency and enlarged lymph nodes     Blood work reviewed creatinine elevated but stable 1.4 glucose 106 A1c only 5.3 CK 58 HDL 58 LDL 39 triglyceride 168 TSH elevated 6.74 Free T41.0 Vitamin D 64 B12 greater than 2000 hemoglobin low but stable 12.1 CBC otherwise grossly normal differential reviewed urinalysis negative microalbumin creatinine ratio less than 11    Most Recent Labs  CBC  Lab Results   Component Value Date/Time    WBC 4.6 06/10/2025 03:04 PM    WBC 4.7 2025 01:05 PM    WBC 4.5 2024 12:43 PM    RBC 4.07

## 2025-07-06 DIAGNOSIS — E78.2 MIXED HYPERLIPIDEMIA: ICD-10-CM

## 2025-07-07 RX ORDER — ATORVASTATIN CALCIUM 40 MG/1
40 TABLET, FILM COATED ORAL DAILY
Qty: 90 TABLET | Refills: 3 | Status: SHIPPED | OUTPATIENT
Start: 2025-07-07

## 2025-08-12 ENCOUNTER — HOSPITAL ENCOUNTER (OUTPATIENT)
Dept: LAB | Age: 89
Discharge: HOME | End: 2025-08-12
Payer: MEDICARE

## 2025-08-12 LAB — PSA SERPL-MCNC: 0.81 NG/ML (ref 0–4)

## 2025-08-12 PROCEDURE — 36415 COLL VENOUS BLD VENIPUNCTURE: CPT

## 2025-08-12 PROCEDURE — 84153 ASSAY OF PSA TOTAL: CPT

## (undated) DEVICE — 3M™ RED DOT™ MONITORING ELECTRODE WITH FOAM TAPE AND STICKY GEL 2560, 50/BAG, 20/CASE, 72/PLT: Brand: RED DOT™

## (undated) DEVICE — 6 ML SYRINGE LUER-LOCK TIP: Brand: MONOJECT

## (undated) DEVICE — Z DISCONTINUED APPLICATOR SURG PREP 0.35OZ 2% CHG 70% ISO ALC W/ HI LT

## (undated) DEVICE — BANDAGE ADH W1XL3IN NAT FAB WVN FLX DURABLE N ADH PD SEAL

## (undated) DEVICE — 12 ML SYRINGE,LUER-LOCK TIP: Brand: MONOJECT

## (undated) DEVICE — SYRINGE, LUER LOCK, 5ML: Brand: MEDLINE

## (undated) DEVICE — Device: Brand: PORTEX

## (undated) DEVICE — NEEDLE HYPO 25GA L1.5IN BLU POLYPR HUB S STL REG BVL STR

## (undated) DEVICE — NON-DEHP CATHETER EXTENSION SET, MALE LUER LOCK ADAPTER

## (undated) DEVICE — GLOVE ORANGE PI 7 1/2   MSG9075

## (undated) DEVICE — GAUZE,SPONGE,4"X4",12PLY,STERILE,LF,2'S: Brand: MEDLINE

## (undated) DEVICE — NEEDLE HYPO 18GA L1.5IN PNK POLYPR HUB S STL THN WALL FILL

## (undated) DEVICE — SINGLE USE SUCTION VALVE MAJ-209: Brand: SINGLE USE SUCTION VALVE (STERILE)

## (undated) DEVICE — ADAPTER TBNG DIA15MM SWVL FBROPT BRONCHSCP TERM 2 AXIS PEEP

## (undated) DEVICE — Device: Brand: BALLOON

## (undated) DEVICE — NEEDLE ASPIR 22GA L40MM WRK L70CM SYR VLV ECHOGENIC DIMPLED